# Patient Record
Sex: FEMALE | Race: WHITE | NOT HISPANIC OR LATINO | Employment: OTHER | URBAN - METROPOLITAN AREA
[De-identification: names, ages, dates, MRNs, and addresses within clinical notes are randomized per-mention and may not be internally consistent; named-entity substitution may affect disease eponyms.]

---

## 2017-01-12 RX ORDER — TRAZODONE HYDROCHLORIDE 50 MG/1
TABLET ORAL
Qty: 30 TABLET | Refills: 5 | Status: SHIPPED | OUTPATIENT
Start: 2017-01-12 | End: 2018-02-08 | Stop reason: SDUPTHER

## 2017-01-19 DIAGNOSIS — F41.9 ANXIETY: ICD-10-CM

## 2017-01-20 RX ORDER — VALACYCLOVIR HYDROCHLORIDE 500 MG/1
TABLET, FILM COATED ORAL
Qty: 4 TABLET | Refills: 0 | Status: SHIPPED | OUTPATIENT
Start: 2017-01-20 | End: 2017-06-21 | Stop reason: SDUPTHER

## 2017-01-20 RX ORDER — ALPRAZOLAM 0.5 MG/1
TABLET ORAL
Qty: 45 TABLET | Refills: 0 | Status: SHIPPED | OUTPATIENT
Start: 2017-01-20 | End: 2017-03-08 | Stop reason: SDUPTHER

## 2017-01-25 ENCOUNTER — OFFICE VISIT (OUTPATIENT)
Dept: SURGERY | Facility: CLINIC | Age: 72
End: 2017-01-25

## 2017-01-25 VITALS
BODY MASS INDEX: 23.01 KG/M2 | WEIGHT: 146.6 LBS | HEART RATE: 76 BPM | SYSTOLIC BLOOD PRESSURE: 140 MMHG | HEIGHT: 67 IN | OXYGEN SATURATION: 94 % | DIASTOLIC BLOOD PRESSURE: 80 MMHG

## 2017-01-25 DIAGNOSIS — R10.13 EPIGASTRIC PAIN: Primary | ICD-10-CM

## 2017-01-25 PROCEDURE — 99204 OFFICE O/P NEW MOD 45 MIN: CPT | Performed by: SURGERY

## 2017-02-27 ENCOUNTER — OUTSIDE FACILITY SERVICE (OUTPATIENT)
Dept: SURGERY | Facility: CLINIC | Age: 72
End: 2017-02-27

## 2017-02-27 ENCOUNTER — LAB REQUISITION (OUTPATIENT)
Dept: LAB | Facility: HOSPITAL | Age: 72
End: 2017-02-27

## 2017-02-27 DIAGNOSIS — K21.9 GASTRO-ESOPHAGEAL REFLUX DISEASE WITHOUT ESOPHAGITIS: ICD-10-CM

## 2017-02-27 PROCEDURE — 43239 EGD BIOPSY SINGLE/MULTIPLE: CPT | Performed by: SURGERY

## 2017-02-27 PROCEDURE — 88312 SPECIAL STAINS GROUP 1: CPT | Performed by: SURGERY

## 2017-02-27 PROCEDURE — 88305 TISSUE EXAM BY PATHOLOGIST: CPT | Performed by: SURGERY

## 2017-03-01 ENCOUNTER — TELEPHONE (OUTPATIENT)
Dept: FAMILY MEDICINE CLINIC | Facility: CLINIC | Age: 72
End: 2017-03-01

## 2017-03-01 LAB
CYTO UR: NORMAL
LAB AP CASE REPORT: NORMAL
Lab: NORMAL
PATH REPORT.FINAL DX SPEC: NORMAL
PATH REPORT.GROSS SPEC: NORMAL

## 2017-03-01 NOTE — TELEPHONE ENCOUNTER
Pt had EGD done on Monday. Has gastritis and inflammation in esophagus. Patient is taking Prilosec but would like to know with GERD what foods should she eat and what should she avoid. Asked GI office for a GERD diet but they did not have one.

## 2017-03-08 DIAGNOSIS — F41.9 ANXIETY: ICD-10-CM

## 2017-03-09 RX ORDER — ALPRAZOLAM 0.5 MG/1
TABLET ORAL
Qty: 45 TABLET | Refills: 0 | Status: SHIPPED | OUTPATIENT
Start: 2017-03-09 | End: 2017-05-02 | Stop reason: SDUPTHER

## 2017-03-17 ENCOUNTER — TELEPHONE (OUTPATIENT)
Dept: FAMILY MEDICINE CLINIC | Facility: CLINIC | Age: 72
End: 2017-03-17

## 2017-03-17 NOTE — TELEPHONE ENCOUNTER
sucralafate is for about 2 weeks and she can take it in addition to the omeprazole in am and zantac in pm.  I would follow Dr. Kingston's rec

## 2017-03-17 NOTE — TELEPHONE ENCOUNTER
Pt called said she saw dr Kingston and has severe GERD. Was told to take sucrafate 1 gram? Not sure on spelling.  She is a little worried about that and wanted your opinion.    Should she take instead the omeprazole in am and zantac in pm like you have been giving her instead? Please advise

## 2017-03-29 RX ORDER — CONJUGATED ESTROGENS 0.3 MG/1
TABLET, FILM COATED ORAL
Qty: 90 TABLET | Refills: 0 | Status: SHIPPED | OUTPATIENT
Start: 2017-03-29 | End: 2017-06-29 | Stop reason: SDUPTHER

## 2017-04-12 ENCOUNTER — TREATMENT (OUTPATIENT)
Dept: PHYSICAL THERAPY | Facility: CLINIC | Age: 72
End: 2017-04-12

## 2017-04-12 DIAGNOSIS — S92.131P: Primary | ICD-10-CM

## 2017-04-12 DIAGNOSIS — S93.401D SPRAIN OF RIGHT ANKLE, UNSPECIFIED LIGAMENT, SUBSEQUENT ENCOUNTER: ICD-10-CM

## 2017-04-12 PROCEDURE — G8978 MOBILITY CURRENT STATUS: HCPCS | Performed by: PHYSICAL THERAPIST

## 2017-04-12 PROCEDURE — 97110 THERAPEUTIC EXERCISES: CPT | Performed by: PHYSICAL THERAPIST

## 2017-04-12 PROCEDURE — 97161 PT EVAL LOW COMPLEX 20 MIN: CPT | Performed by: PHYSICAL THERAPIST

## 2017-04-12 PROCEDURE — G0283 ELEC STIM OTHER THAN WOUND: HCPCS | Performed by: PHYSICAL THERAPIST

## 2017-04-12 PROCEDURE — 97116 GAIT TRAINING THERAPY: CPT | Performed by: PHYSICAL THERAPIST

## 2017-04-12 PROCEDURE — G8979 MOBILITY GOAL STATUS: HCPCS | Performed by: PHYSICAL THERAPIST

## 2017-04-12 NOTE — PROGRESS NOTES
Physical Therapy Initial Evaluation and Plan of Care    TIME IN 1:05 TIME OUT 2:30    Patient: Alexandria Yousif   : 1945  Diagnosis/ICD-10 Code:  No primary diagnosis found.  Referring practitioner: Nazanin MATUTE    Patient reports still healing fracture.  Restrictions:  Ok to drive, come out of boot when start therapy, full weightbearing allowed.   Subjective Evaluation    History of Present Illness  Date of onset: 3/8/2017  Mechanism of injury: Sitting down, foot was asleep, lept up to talk to .  Spun with all weight on right foot- inversion/plantarflexion injury.  Saw MD, xray and fracture found. Patient was placed in a boot and crutches.  Last week allowed to wean out of boot.  Stopped crutches.    Quality of life: excellent    Pain  Current pain ratin  At best pain ratin  At worst pain ratin  Quality: dull ache  Relieving factors: relaxation, rest and ice (boot)  Aggravating factors: movement (walking, up on foot a lot)  Progression: improved    Social Support  Lives in: one-story house  Lives with: spouse    Diagnostic Tests  X-ray: abnormal (stress fx lateral process of talus)    Patient Goals  Patient goals for therapy: decreased pain, independence with ADLs/IADLs, increased strength, improved balance and return to sport/leisure activities             Objective     Observations     Right Ankle/Foot   Positive for edema. Negative for deformity and spasms.     Additional Observation Details  No ecchymosis    Palpation     Right Tenderness of the peroneus.     Tenderness     Right Ankle/Foot   Tenderness in the calcaneofibular ligament, lateral malleolus, metatarsal head, peroneal tendon, posterior talofibular ligament and talar dome.     Neurological Testing   Sensation     Ankle/Foot     Right Ankle/Foot   Diminished: light touch    Comments   Right light touch: lateral right foot.     Active Range of Motion   Left Ankle/Foot   Dorsiflexion (ke): 9 degrees   Plantar flexion:  70 degrees   Inversion: 34 degrees   Eversion: 18 degrees     Right Ankle/Foot   Dorsiflexion (ke): 0 degrees   Plantar flexion: 70 degrees   Inversion: 15 degrees   Eversion: 6 degrees     Strength/Myotome Testing     Left Ankle/Foot   Dorsiflexion: 5  Plantar flexion: 5  Inversion: 5  Eversion: 5  Great toe flexion: 5  Great toe extension: 5    Right Ankle/Foot   Dorsiflexion: 3  Plantar flexion: 3  Inversion: 3  Eversion: 3  Great toe flexion: 3  Great toe extension: 4    Additional Strength Details  Extensor digitorum 2-5 4/5  Flexor digitorum 2-/5 3/5 with pain  Pain worst with inversion      Tests     Right Ankle/Foot   Positive for anterior drawer and varus tilt.   Negative for valgus tilt.     Additional Tests Details       Swelling   Left Ankle/Foot   Figure 8: 50 cm  Malleoli: 25 cm    Right Ankle/Foot   Figure 8: 50 cm  Malleoli: 24.1 cm    Ambulation   Weight-Bearing Status   Weight-Bearing Status (Right): flat foot weight-bearing      Additional Weight-Bearing Status Details  Tall cast boot present    Ambulation: Level Surfaces   Ambulation without assistive device: independent    Observational Gait   Gait: antalgic   Increased right swing time. Decreased walking speed, stride length, right stance time and right step length.   Right foot contact pattern: foot flat         Assessment & Plan     Assessment  Impairments: abnormal gait, abnormal or restricted ROM, activity intolerance, impaired balance, impaired physical strength, pain with function, safety issue and weight-bearing intolerance  Assessment details: Patient is a 71 y.o. female who sustained an injury to the right ankle when she abruptly plantarflexed and inverted.  She presents with decreased and painful ROM, weakness in the ankle and foot musculature, varus instability, decreased tolerance to weightbearing, and an antalgic gait pattern.  Based on the above findings, Mrs. Yousif is an excellent candidate for therapy.  Prognosis:  good  Prognosis details: 3 weeks  1.  Patient will increase  DF and inversion ROM on right ankle.  2.  Patient will ambulate without her boot with minimal to no pain for 2 blocks with supportive footwear and possibly brace.  3.  Patient will demonstrate >0.5cm edema.  4.    6 weeks  1.  Patient will increase ROM for right ankle to full.   2.  Patient will ambulate without an antagic pattern or support for community distances including steps and ramps.  3.  Patient will return to all previous activities with tolerable symptoms.  4.  5/5 ankle strength.      Plan  Therapy options: will be seen for skilled physical therapy services  Planned modality interventions: cryotherapy, high voltage pulsed current (pain management), iontophoresis and ultrasound  Other planned modality interventions: modalities PRN  Planned therapy interventions: manual therapy, neuromuscular re-education, soft tissue mobilization, strengthening, stretching, joint mobilization, home exercise program, flexibility, functional ROM exercises and balance/weight-bearing training  Other planned therapy interventions: Suggest right ankle stabilzing brace due to inversion instability and weakness  Frequency: 2x week  Duration in weeks: 6  Treatment plan discussed with: patient        Manual Therapy:          mins  09472;  Therapeutic Exercise:    15     mins  93899;     Neuromuscular Pj:         mins  76048;    Therapeutic Activity:           mins  06553;     Gait Trainin     mins  68421;     Ultrasound:           mins  95030;    Electrical Stimulation:    15     mins  63587 ( );  Dry Needling           mins self-pay    Timed Treatment:   23   mins   Total Treatment:     90   mins    PT SIGNATURE: Aracelis Porter, PT   DATE TREATMENT INITIATED: 2017    Medicare Initial Certification  Certification Period: 2017  I certify that the therapy services are furnished while this patient is under my care.  The services outlined above are  required by this patient, and will be reviewed every 90 days.     PHYSICIAN:       DATE:     Please sign and return via fax to [phone].. Thank you, [facility].

## 2017-04-13 ENCOUNTER — TELEPHONE (OUTPATIENT)
Dept: FAMILY MEDICINE CLINIC | Facility: CLINIC | Age: 72
End: 2017-04-13

## 2017-04-13 DIAGNOSIS — K21.9 GASTROESOPHAGEAL REFLUX DISEASE, ESOPHAGITIS PRESENCE NOT SPECIFIED: Primary | ICD-10-CM

## 2017-04-13 NOTE — PATIENT INSTRUCTIONS
Patient was educated on findings of evaluation, purpose of treatment, and goals for therapy.  Treatment options discussed and questions answered.  Patient was educated on exercises/self treatment/pain relief techniques.  Please view [facility] Alexandria Yousif for a complete list of HEP instructions.

## 2017-04-13 NOTE — TELEPHONE ENCOUNTER
Pt called and said she would like for you to refer her to GI for gerd w/reflux. She said dr paris told her that is her next step and she would like you to choose for her. thanks

## 2017-04-17 ENCOUNTER — TREATMENT (OUTPATIENT)
Dept: PHYSICAL THERAPY | Facility: CLINIC | Age: 72
End: 2017-04-17

## 2017-04-17 DIAGNOSIS — S93.401D SPRAIN OF RIGHT ANKLE, UNSPECIFIED LIGAMENT, SUBSEQUENT ENCOUNTER: ICD-10-CM

## 2017-04-17 DIAGNOSIS — S92.131P: Primary | ICD-10-CM

## 2017-04-17 PROCEDURE — 97112 NEUROMUSCULAR REEDUCATION: CPT | Performed by: PHYSICAL THERAPIST

## 2017-04-17 PROCEDURE — G0283 ELEC STIM OTHER THAN WOUND: HCPCS | Performed by: PHYSICAL THERAPIST

## 2017-04-17 PROCEDURE — 97110 THERAPEUTIC EXERCISES: CPT | Performed by: PHYSICAL THERAPIST

## 2017-04-17 NOTE — PROGRESS NOTES
Physical Therapy Daily Progress Note    Time In 8:38  Time Out 9:45    Visit # : 2  Alexandria Yousif reports: ankle is feeling better.  .      Subjective     Objective   See Exercise, Manual, and Modality Logs for complete treatment.   Patient fitted with proankle stabilizer.    Assessment/Plan  Able to tolerate standing exercise with soreness but no instability with use of brace.    Progress strengthening /stabilization /functional activity  Assess new exercises and brace  Start cup walks         Manual Therapy:          mins  55242;  Therapeutic Exercise:    25     mins  51837;     Neuromuscular Pj:   13      mins  80387;    Therapeutic Activity:           mins  11116;     Gait Training:            mins  38589;     Ultrasound:           mins  55725;    Electrical Stimulation:    15     mins  34684 ( );  Dry Needling           mins self-pay    Timed Treatment:   38   mins   Total Treatment:     67   mins    Aracelis Porter, PT  Physical Therapist

## 2017-04-21 ENCOUNTER — TREATMENT (OUTPATIENT)
Dept: PHYSICAL THERAPY | Facility: CLINIC | Age: 72
End: 2017-04-21

## 2017-04-21 DIAGNOSIS — S92.131P: Primary | ICD-10-CM

## 2017-04-21 DIAGNOSIS — S93.401D SPRAIN OF RIGHT ANKLE, UNSPECIFIED LIGAMENT, SUBSEQUENT ENCOUNTER: ICD-10-CM

## 2017-04-21 PROCEDURE — 97110 THERAPEUTIC EXERCISES: CPT | Performed by: PHYSICAL THERAPIST

## 2017-04-21 PROCEDURE — 97112 NEUROMUSCULAR REEDUCATION: CPT | Performed by: PHYSICAL THERAPIST

## 2017-04-21 PROCEDURE — 97116 GAIT TRAINING THERAPY: CPT | Performed by: PHYSICAL THERAPIST

## 2017-04-21 NOTE — PROGRESS NOTES
Physical Therapy Daily Progress Note    Time In 10;35  Time Out 11:45    Visit # : 3  Alexandria Yousif reports: Back is really flared up across waist. Brace works well.  Mild swelling noted. Able  To transition to brace and out of boot without increased discomfort.    Subjective     Objective   See Exercise, Manual, and Modality Logs for complete treatment.       Assessment/Plan  Back exacerbation, unsure if position or gait without boot has exacerbation. Modified exercise position   Progress per Plan of Care  Continue to increased strength as ankle pain tolerates.         Manual Therapy:          mins  51894;  Therapeutic Exercise:    20     mins  31238;     Neuromuscular Pj:    20   mins  12527;    Therapeutic Activity:           mins  13138;     Gait Trainin     mins  80210;     Ultrasound:           mins  00383;    Electrical Stimulation:    20     mins  80319 ( );  Dry Needling           mins self-pay    Timed Treatment:   48   mins   Total Treatment:     70  mins    Aracelis Porter, PT  Physical Therapist

## 2017-04-24 ENCOUNTER — TREATMENT (OUTPATIENT)
Dept: PHYSICAL THERAPY | Facility: CLINIC | Age: 72
End: 2017-04-24

## 2017-04-24 DIAGNOSIS — S93.401D SPRAIN OF RIGHT ANKLE, UNSPECIFIED LIGAMENT, SUBSEQUENT ENCOUNTER: ICD-10-CM

## 2017-04-24 DIAGNOSIS — S92.131P: Primary | ICD-10-CM

## 2017-04-24 PROCEDURE — 97530 THERAPEUTIC ACTIVITIES: CPT | Performed by: PHYSICAL THERAPIST

## 2017-04-24 PROCEDURE — 97110 THERAPEUTIC EXERCISES: CPT | Performed by: PHYSICAL THERAPIST

## 2017-04-24 PROCEDURE — G0283 ELEC STIM OTHER THAN WOUND: HCPCS | Performed by: PHYSICAL THERAPIST

## 2017-04-24 PROCEDURE — 97112 NEUROMUSCULAR REEDUCATION: CPT | Performed by: PHYSICAL THERAPIST

## 2017-04-24 NOTE — PROGRESS NOTES
Physical Therapy Daily Progress Note    Time In 9:32  Time Out 10:45    Visit # : 4  Alexandria Yousif reports: ankle is good. Constant low grade tenderness, no pain.   My back is killing me.  Hard to walk. Reviewed medical history and MRI lumbar spine.    Subjective     Objective     Special Questions      Additional Special Questions  All denied    Postural Observations    Additional Postural Observation Details  Right upslip with anterior innominate rotation.    Observations     Right Ankle/Foot   Positive for edema.     Additional Observation Details  Lateral malleolus     Palpation     Right Tenderness of the iliopsoas.     Tenderness     Left Hip   Tenderness in the PSIS.     Right Hip   Tenderness in the PSIS.     Tests     Lumbar     Right   Negative crossed SLR.     Right Pelvic Girdle/Sacrum   Positive: sacrum compression.     Right Hip   Positive Gaenslen's.     Additional Tests Details  After mobilization, SI alignment WNL and no pain.     See Exercise, Manual, and Modality Logs for complete treatment.       Assessment/Plan  Right SI out of alignment possibly from boot weanng.  Needed mobilization in order to ambulate.  After realignment patient able to ambulate without antalgic gait pattern.    Progress strengthening /stabilization /functional activity  Keep ankle exercises bilateral with closed chain balance until back pain has resolved.  Add SI mobs as needed to treatment until patient is adjusted to not wearing the boot. If persists, patient will need to follow up with MD regarding spine but no s/s of anything other than SI joint at this time.       Manual Therapy:           mins  45834;  Therapeutic Exercise:    35    mins  95996;     Neuromuscular Pj:    8   mins  31198;    Therapeutic Activity:    10       mins  45668;     Gait Training:            mins  22677;     Ultrasound:           mins  82412;    Electrical Stimulation:    15     mins  33600 ( );  Dry Needling           mins  self-pay    Timed Treatment:   53   mins   Total Treatment:     75   mins    Aracelis Porter, PT  Physical Therapist

## 2017-04-25 RX ORDER — ROSUVASTATIN CALCIUM 10 MG/1
TABLET, COATED ORAL
Qty: 90 TABLET | Refills: 0 | Status: SHIPPED | OUTPATIENT
Start: 2017-04-25 | End: 2017-07-26 | Stop reason: SDUPTHER

## 2017-04-26 ENCOUNTER — TREATMENT (OUTPATIENT)
Dept: PHYSICAL THERAPY | Facility: CLINIC | Age: 72
End: 2017-04-26

## 2017-04-26 DIAGNOSIS — S92.131P: Primary | ICD-10-CM

## 2017-04-26 DIAGNOSIS — S93.401D SPRAIN OF RIGHT ANKLE, UNSPECIFIED LIGAMENT, SUBSEQUENT ENCOUNTER: ICD-10-CM

## 2017-04-26 PROCEDURE — 97112 NEUROMUSCULAR REEDUCATION: CPT | Performed by: PHYSICAL THERAPIST

## 2017-04-26 PROCEDURE — 97110 THERAPEUTIC EXERCISES: CPT | Performed by: PHYSICAL THERAPIST

## 2017-04-26 PROCEDURE — G0283 ELEC STIM OTHER THAN WOUND: HCPCS | Performed by: PHYSICAL THERAPIST

## 2017-04-26 NOTE — PROGRESS NOTES
Physical Therapy Daily Progress Note    Time In 9:30  Time Out 10:30    Visit # : 5  Alexandria Yousif reports: felt great until today.  Back is tight.  Ankle continues to improved.  Just a little sore when I wore boots.    Subjective     Objective   See Exercise, Manual, and Modality Logs for complete treatment.   Right upslip with anterior innominate and right inflare    Assessment/Plan  Right SI joint malaligned again but corrected easily and able to progress with standing today. Patient has near full ROM in ankle, minimal lateral malleolar edema.  Progress per Plan of Care           Manual Therapy:          mins  79079;  Therapeutic Exercise:    10     mins  29531;     Neuromuscular Pj:    20    mins  89153;    Therapeutic Activity:           mins  69293;     Gait Training:            mins  40554;     Ultrasound:           mins  01021;    Electrical Stimulation:    15     mins  20145 ( );  Dry Needling           mins self-pay    Timed Treatment:   30   mins   Total Treatment:     60   mins    Aracelis Porter, PT  Physical Therapist

## 2017-05-01 ENCOUNTER — TREATMENT (OUTPATIENT)
Dept: PHYSICAL THERAPY | Facility: CLINIC | Age: 72
End: 2017-05-01

## 2017-05-01 DIAGNOSIS — S93.401D SPRAIN OF RIGHT ANKLE, UNSPECIFIED LIGAMENT, SUBSEQUENT ENCOUNTER: ICD-10-CM

## 2017-05-01 DIAGNOSIS — S92.131P: Primary | ICD-10-CM

## 2017-05-01 PROCEDURE — G0283 ELEC STIM OTHER THAN WOUND: HCPCS | Performed by: PHYSICAL THERAPIST

## 2017-05-01 PROCEDURE — 97110 THERAPEUTIC EXERCISES: CPT | Performed by: PHYSICAL THERAPIST

## 2017-05-01 PROCEDURE — 97530 THERAPEUTIC ACTIVITIES: CPT | Performed by: PHYSICAL THERAPIST

## 2017-05-02 DIAGNOSIS — F41.9 ANXIETY: ICD-10-CM

## 2017-05-03 ENCOUNTER — TREATMENT (OUTPATIENT)
Dept: PHYSICAL THERAPY | Facility: CLINIC | Age: 72
End: 2017-05-03

## 2017-05-03 DIAGNOSIS — S93.401D SPRAIN OF RIGHT ANKLE, UNSPECIFIED LIGAMENT, SUBSEQUENT ENCOUNTER: ICD-10-CM

## 2017-05-03 DIAGNOSIS — S92.131P: Primary | ICD-10-CM

## 2017-05-03 PROCEDURE — G0283 ELEC STIM OTHER THAN WOUND: HCPCS | Performed by: PHYSICAL THERAPIST

## 2017-05-03 PROCEDURE — 97110 THERAPEUTIC EXERCISES: CPT | Performed by: PHYSICAL THERAPIST

## 2017-05-03 RX ORDER — DESVENLAFAXINE 100 MG/1
TABLET, EXTENDED RELEASE ORAL
Qty: 30 TABLET | Refills: 5 | Status: SHIPPED | OUTPATIENT
Start: 2017-05-03 | End: 2017-11-27 | Stop reason: SDUPTHER

## 2017-05-03 RX ORDER — ALPRAZOLAM 0.5 MG/1
TABLET ORAL
Qty: 45 TABLET | Refills: 0 | Status: SHIPPED | OUTPATIENT
Start: 2017-05-03 | End: 2017-06-21 | Stop reason: SDUPTHER

## 2017-05-05 ENCOUNTER — TRANSCRIBE ORDERS (OUTPATIENT)
Dept: FAMILY MEDICINE CLINIC | Facility: CLINIC | Age: 72
End: 2017-05-05

## 2017-05-05 DIAGNOSIS — Z12.31 VISIT FOR SCREENING MAMMOGRAM: Primary | ICD-10-CM

## 2017-05-10 ENCOUNTER — TREATMENT (OUTPATIENT)
Dept: PHYSICAL THERAPY | Facility: CLINIC | Age: 72
End: 2017-05-10

## 2017-05-10 DIAGNOSIS — S93.401D SPRAIN OF RIGHT ANKLE, UNSPECIFIED LIGAMENT, SUBSEQUENT ENCOUNTER: ICD-10-CM

## 2017-05-10 DIAGNOSIS — S92.131P: Primary | ICD-10-CM

## 2017-05-10 PROCEDURE — 97112 NEUROMUSCULAR REEDUCATION: CPT | Performed by: PHYSICAL THERAPIST

## 2017-05-10 PROCEDURE — 97110 THERAPEUTIC EXERCISES: CPT | Performed by: PHYSICAL THERAPIST

## 2017-05-10 PROCEDURE — G0283 ELEC STIM OTHER THAN WOUND: HCPCS | Performed by: PHYSICAL THERAPIST

## 2017-05-15 ENCOUNTER — TREATMENT (OUTPATIENT)
Dept: PHYSICAL THERAPY | Facility: CLINIC | Age: 72
End: 2017-05-15

## 2017-05-15 DIAGNOSIS — S93.401D SPRAIN OF RIGHT ANKLE, UNSPECIFIED LIGAMENT, SUBSEQUENT ENCOUNTER: ICD-10-CM

## 2017-05-15 DIAGNOSIS — S92.131P: Primary | ICD-10-CM

## 2017-05-15 PROCEDURE — G0283 ELEC STIM OTHER THAN WOUND: HCPCS | Performed by: PHYSICAL THERAPIST

## 2017-05-15 PROCEDURE — 97110 THERAPEUTIC EXERCISES: CPT | Performed by: PHYSICAL THERAPIST

## 2017-05-15 PROCEDURE — 97530 THERAPEUTIC ACTIVITIES: CPT | Performed by: PHYSICAL THERAPIST

## 2017-05-16 ENCOUNTER — HOSPITAL ENCOUNTER (OUTPATIENT)
Dept: MAMMOGRAPHY | Facility: HOSPITAL | Age: 72
Discharge: HOME | End: 2017-05-16
Admitting: INTERNAL MEDICINE

## 2017-05-16 DIAGNOSIS — Z12.31 VISIT FOR SCREENING MAMMOGRAM: ICD-10-CM

## 2017-05-16 PROCEDURE — G0202 SCR MAMMO BI INCL CAD: HCPCS

## 2017-05-25 ENCOUNTER — APPOINTMENT (OUTPATIENT)
Dept: MAMMOGRAPHY | Facility: HOSPITAL | Age: 72
End: 2017-05-25

## 2017-06-06 RX ORDER — FENOFIBRATE 145 MG/1
TABLET, COATED ORAL
Qty: 90 TABLET | Refills: 0 | Status: SHIPPED | OUTPATIENT
Start: 2017-06-06 | End: 2017-09-12 | Stop reason: SDUPTHER

## 2017-06-21 DIAGNOSIS — F41.9 ANXIETY: ICD-10-CM

## 2017-06-21 RX ORDER — VALACYCLOVIR HYDROCHLORIDE 500 MG/1
TABLET, FILM COATED ORAL
Qty: 4 TABLET | Refills: 0 | Status: SHIPPED | OUTPATIENT
Start: 2017-06-21 | End: 2017-12-14 | Stop reason: SDUPTHER

## 2017-06-21 RX ORDER — ALPRAZOLAM 0.5 MG/1
TABLET ORAL
Qty: 45 TABLET | Refills: 0 | Status: SHIPPED | OUTPATIENT
Start: 2017-06-21 | End: 2017-08-10 | Stop reason: SDUPTHER

## 2017-06-29 RX ORDER — CONJUGATED ESTROGENS 0.3 MG/1
TABLET, FILM COATED ORAL
Qty: 90 TABLET | Refills: 0 | Status: SHIPPED | OUTPATIENT
Start: 2017-06-29 | End: 2017-10-25 | Stop reason: SDUPTHER

## 2017-07-26 RX ORDER — ROSUVASTATIN CALCIUM 10 MG/1
TABLET, COATED ORAL
Qty: 90 TABLET | Refills: 0 | Status: SHIPPED | OUTPATIENT
Start: 2017-07-26 | End: 2017-10-25 | Stop reason: SDUPTHER

## 2017-08-10 DIAGNOSIS — F41.9 ANXIETY: ICD-10-CM

## 2017-08-11 RX ORDER — ALPRAZOLAM 0.5 MG/1
TABLET ORAL
Qty: 45 TABLET | Refills: 0 | Status: SHIPPED | OUTPATIENT
Start: 2017-08-11 | End: 2017-09-27 | Stop reason: SDUPTHER

## 2017-08-14 ENCOUNTER — TELEPHONE (OUTPATIENT)
Dept: FAMILY MEDICINE CLINIC | Facility: CLINIC | Age: 72
End: 2017-08-14

## 2017-08-14 NOTE — TELEPHONE ENCOUNTER
There is a mouth swab but we don't have the lab to do it.  Usually this is done at a specialty lab and with a psychiatrist

## 2017-08-14 NOTE — TELEPHONE ENCOUNTER
Pt is wanting to know if there is lab work that can be done to see if patient is on the correct anti depressant.

## 2017-09-08 DIAGNOSIS — E78.5 HYPERLIPIDEMIA, UNSPECIFIED HYPERLIPIDEMIA TYPE: Primary | ICD-10-CM

## 2017-09-11 LAB
ALBUMIN SERPL-MCNC: 5.3 G/DL (ref 3.5–5.2)
ALBUMIN/GLOB SERPL: 2.3 G/DL
ALP SERPL-CCNC: 60 U/L (ref 39–117)
ALT SERPL-CCNC: 44 U/L (ref 1–33)
AST SERPL-CCNC: 34 U/L (ref 1–32)
BASOPHILS # BLD AUTO: 0.05 10*3/MM3 (ref 0–0.2)
BASOPHILS NFR BLD AUTO: 0.9 % (ref 0–1.5)
BILIRUB SERPL-MCNC: 0.4 MG/DL (ref 0.1–1.2)
BUN SERPL-MCNC: 21 MG/DL (ref 8–23)
BUN/CREAT SERPL: 24.7 (ref 7–25)
CALCIUM SERPL-MCNC: 10.5 MG/DL (ref 8.6–10.5)
CHLORIDE SERPL-SCNC: 100 MMOL/L (ref 98–107)
CHOLEST SERPL-MCNC: 167 MG/DL (ref 0–200)
CO2 SERPL-SCNC: 30.1 MMOL/L (ref 22–29)
CREAT SERPL-MCNC: 0.85 MG/DL (ref 0.57–1)
EOSINOPHIL # BLD AUTO: 0.14 10*3/MM3 (ref 0–0.7)
EOSINOPHIL NFR BLD AUTO: 2.6 % (ref 0.3–6.2)
ERYTHROCYTE [DISTWIDTH] IN BLOOD BY AUTOMATED COUNT: 14.6 % (ref 11.7–13)
GLOBULIN SER CALC-MCNC: 2.3 GM/DL
GLUCOSE SERPL-MCNC: 107 MG/DL (ref 65–99)
HCT VFR BLD AUTO: 47.5 % (ref 35.6–45.5)
HDLC SERPL-MCNC: 56 MG/DL (ref 40–60)
HGB BLD-MCNC: 15.4 G/DL (ref 11.9–15.5)
IMM GRANULOCYTES # BLD: 0.02 10*3/MM3 (ref 0–0.03)
IMM GRANULOCYTES NFR BLD: 0.4 % (ref 0–0.5)
LDLC SERPL CALC-MCNC: 77 MG/DL (ref 0–100)
LDLC/HDLC SERPL: 1.38 {RATIO}
LYMPHOCYTES # BLD AUTO: 1.93 10*3/MM3 (ref 0.9–4.8)
LYMPHOCYTES NFR BLD AUTO: 36 % (ref 19.6–45.3)
MCH RBC QN AUTO: 30.7 PG (ref 26.9–32)
MCHC RBC AUTO-ENTMCNC: 32.4 G/DL (ref 32.4–36.3)
MCV RBC AUTO: 94.6 FL (ref 80.5–98.2)
MONOCYTES # BLD AUTO: 0.58 10*3/MM3 (ref 0.2–1.2)
MONOCYTES NFR BLD AUTO: 10.8 % (ref 5–12)
NEUTROPHILS # BLD AUTO: 2.64 10*3/MM3 (ref 1.9–8.1)
NEUTROPHILS NFR BLD AUTO: 49.3 % (ref 42.7–76)
PLATELET # BLD AUTO: 319 10*3/MM3 (ref 140–500)
POTASSIUM SERPL-SCNC: 4.3 MMOL/L (ref 3.5–5.2)
PROT SERPL-MCNC: 7.6 G/DL (ref 6–8.5)
RBC # BLD AUTO: 5.02 10*6/MM3 (ref 3.9–5.2)
SODIUM SERPL-SCNC: 144 MMOL/L (ref 136–145)
TRIGL SERPL-MCNC: 170 MG/DL (ref 0–150)
VLDLC SERPL CALC-MCNC: 34 MG/DL (ref 5–40)
WBC # BLD AUTO: 5.36 10*3/MM3 (ref 4.5–10.7)

## 2017-09-12 RX ORDER — FENOFIBRATE 145 MG/1
TABLET, COATED ORAL
Qty: 90 TABLET | Refills: 0 | Status: SHIPPED | OUTPATIENT
Start: 2017-09-12 | End: 2017-12-14 | Stop reason: SDUPTHER

## 2017-09-15 ENCOUNTER — OFFICE VISIT (OUTPATIENT)
Dept: FAMILY MEDICINE CLINIC | Facility: CLINIC | Age: 72
End: 2017-09-15

## 2017-09-15 VITALS
HEART RATE: 71 BPM | TEMPERATURE: 97.8 F | DIASTOLIC BLOOD PRESSURE: 60 MMHG | OXYGEN SATURATION: 98 % | BODY MASS INDEX: 22.88 KG/M2 | HEIGHT: 67 IN | SYSTOLIC BLOOD PRESSURE: 118 MMHG | WEIGHT: 145.8 LBS

## 2017-09-15 DIAGNOSIS — F41.9 ANXIETY: ICD-10-CM

## 2017-09-15 DIAGNOSIS — R06.00 DYSPNEA, UNSPECIFIED TYPE: ICD-10-CM

## 2017-09-15 DIAGNOSIS — E78.5 HYPERLIPIDEMIA, UNSPECIFIED HYPERLIPIDEMIA TYPE: Primary | ICD-10-CM

## 2017-09-15 DIAGNOSIS — Z00.00 HEALTH CARE MAINTENANCE: ICD-10-CM

## 2017-09-15 PROCEDURE — G0009 ADMIN PNEUMOCOCCAL VACCINE: HCPCS | Performed by: INTERNAL MEDICINE

## 2017-09-15 PROCEDURE — G0438 PPPS, INITIAL VISIT: HCPCS | Performed by: INTERNAL MEDICINE

## 2017-09-15 PROCEDURE — 90670 PCV13 VACCINE IM: CPT | Performed by: INTERNAL MEDICINE

## 2017-09-15 PROCEDURE — 99213 OFFICE O/P EST LOW 20 MIN: CPT | Performed by: INTERNAL MEDICINE

## 2017-09-15 RX ORDER — CYCLOBENZAPRINE HCL 10 MG
10 TABLET ORAL 2 TIMES DAILY PRN
COMMUNITY
End: 2019-05-10

## 2017-09-15 NOTE — PROGRESS NOTES
QUICK REFERENCE INFORMATION:  The ABCs of the Annual Wellness Visit    Initial Medicare Wellness Visit    HEALTH RISK ASSESSMENT    1945    Recent Hospitalizations:  No hospitalization(s) within the last year..        Current Medical Providers:  Patient Care Team:  Anita Muse MD as PCP - General  Anita Muse MD as PCP - Family Medicine  GIOVANI Whipple as PCP - Claims Attributed        Smoking Status:  History   Smoking Status   • Never Smoker   Smokeless Tobacco   • Never Used       Alcohol Consumption:  History   Alcohol Use   • 1.8 - 2.4 oz/week   • 2 - 3 Glasses of wine, 1 Cans of beer per week       Depression Screen:   PHQ-2/PHQ-9 Depression Screening 9/15/2017   Little interest or pleasure in doing things 0   Feeling down, depressed, or hopeless 0   Total Score 0       Health Habits and Functional and Cognitive Screening:  Functional & Cognitive Status 9/15/2017   Do you have difficulty preparing food and eating? No   Do you have difficulty bathing yourself? No   Do you have difficulty getting dressed? No   Do you have difficulty using the toilet? No   Do you have difficulty moving around from place to place? No   In the past year have you fallen or experienced a near fall? No   Do you need help using the phone?  No   Are you deaf or do you have serious difficulty hearing?  No   Do you need help with transportation? No   Do you need help shopping? No   Do you need help preparing meals?  No   Do you need help with housework?  No   Do you need help with laundry? No   Do you need help taking your medications? No   Do you need help managing money? No   Do you have difficulty concentrating, remembering or making decisions? No       Health Habits  Current Diet: Well Balanced Diet  Dental Exam: Up to date  Eye Exam: Up to date  Exercise (times per week): 1 times per week  Current Exercise Activities Include: Housecleaning          Does the patient have evidence of cognitive impairment?  No    Asiprin use counseling: Taking ASA appropriately as indicated      Recent Lab Results:    Visual Acuity:  No exam data present    Age-appropriate Screening Schedule:  Refer to the list below for future screening recommendations based on patient's age, sex and/or medical conditions. Orders for these recommended tests are listed in the plan section. The patient has been provided with a written plan.    Health Maintenance   Topic Date Due   • DXA SCAN  04/12/2017   • INFLUENZA VACCINE  10/01/2017 (Originally 8/1/2017)   • HEMOGLOBIN A1C  12/31/2018 (Originally 8/8/2017)   • LIPID PANEL  09/11/2018   • COLONOSCOPY  01/30/2019   • TDAP/TD VACCINES (2 - Td) 01/30/2019   • MAMMOGRAM  05/16/2019   • PNEUMOCOCCAL VACCINES (65+ LOW/MEDIUM RISK)  Completed   • ZOSTER VACCINE  Completed        Subjective   History of Present Illness    Alexandria Yousif is a 72 y.o. female who presents for an Annual Wellness Visit.    The following portions of the patient's history were reviewed and updated as appropriate: allergies, current medications, past family history, past medical history, past social history, past surgical history and problem list.    Outpatient Medications Prior to Visit   Medication Sig Dispense Refill   • ALPRAZolam (XANAX) 0.5 MG tablet TAKE 1 TABLET BY MOUTH EVERY DAY AS NEEDED FOR ANXIETY 45 tablet 0   • aspirin 81 MG tablet Take  by mouth.     • desvenlafaxine (PRISTIQ) 100 MG 24 hr tablet TAKE 1 TABLET BY MOUTH EVERY DAY 30 tablet 5   • fenofibrate (TRICOR) 145 MG tablet TAKE 1 TABLET BY MOUTH EVERY DAY 90 tablet 0   • guaifenesin-dextromethorphan (MUCINEX DM)  MG tablet sustained-release 12 hour tablet Take  by mouth.     • levETIRAcetam (KEPPRA) 500 MG tablet TAKE 1 TABLET BY MOUTH EVERY DAY AS NEEDED 30 tablet 5   • naproxen (NAPROSYN) 500 MG tablet TAKE 1 TABLET BY MOUTH EVERY 12 HOURS AS NEEDED FOR PAIN 60 tablet 0   • omeprazole (priLOSEC) 40 MG capsule TAKE 1 CAPSULE BY MOUTH EVERY DAY 90  "capsule 1   • PREMARIN 0.3 MG tablet TAKE 1 TABLET BY MOUTH EVERY DAY 90 tablet 0   • PRISTIQ 100 MG 24 hr tablet TAKE 1 TABLET BY MOUTH EVERY DAY 30 tablet 5   • raNITIdine (ZANTAC) 150 MG tablet Take 1 tablet by mouth Every Night. 30 tablet 4   • rosuvastatin (CRESTOR) 10 MG tablet TAKE 1 TABLET BY MOUTH EVERY NIGHT AT BEDTIME 90 tablet 0   • spironolactone (ALDACTONE) 100 MG tablet Take  by mouth daily.     • traZODone (DESYREL) 50 MG tablet TAKE 1/2 TO 1 TABLET BY MOUTH AT BEDTIME AS NEEDED FOR SLEEP 30 tablet 5   • valACYclovir (VALTREX) 500 MG tablet TAKE 2 TABLETS BY MOUTH TWICE DAILY 4 tablet 0     No facility-administered medications prior to visit.        Patient Active Problem List   Diagnosis   • Hypercalcemia   • Anxiety   • Hyperlipidemia   • Visit for screening mammogram   • Encounter for cervical Pap smear with pelvic exam   • Encounter for breast cancer screening other than mammogram   • Musculoskeletal strain   • Screening for colon cancer   • Pap smear of vagina following gynecologic surgery       Advance Care Planning:  has an advance directive - a copy HAS NOT been provided    Identification of Risk Factors:  Risk factors include: cardiovascular risk and hearing limitations.    Review of Systems    Compared to one year ago, the patient feels her physical health is better.  Compared to one year ago, the patient feels her mental health is better.    Objective     Physical Exam    Vitals:    09/15/17 1019   BP: 118/60   Pulse: 71   Temp: 97.8 °F (36.6 °C)   SpO2: 98%   Weight: 145 lb 12.8 oz (66.1 kg)   Height: 66.5\" (168.9 cm)       Body mass index is 23.18 kg/(m^2).  Discussed the patient's BMI with her. The BMI is in the acceptable range.    Assessment/Plan   Patient Self-Management and Personalized Health Advice  The patient has been provided with information about: diet, prevention of cardiac or vascular disease and mental health concerns and preventive services including:   · Colorectal " cancer screening, cologuard test ordered, Counseling for cardiovascular disease risk reduction.    Visit Diagnoses:    ICD-10-CM ICD-9-CM   1. Hyperlipidemia, unspecified hyperlipidemia type E78.5 272.4   2. Anxiety F41.9 300.00       No orders of the defined types were placed in this encounter.      Outpatient Encounter Prescriptions as of 9/15/2017   Medication Sig Dispense Refill   • ALPRAZolam (XANAX) 0.5 MG tablet TAKE 1 TABLET BY MOUTH EVERY DAY AS NEEDED FOR ANXIETY 45 tablet 0   • aspirin 81 MG tablet Take  by mouth.     • cyclobenzaprine (FLEXERIL) 10 MG tablet Take 10 mg by mouth 2 (Two) Times a Day As Needed for Muscle Spasms.     • desvenlafaxine (PRISTIQ) 100 MG 24 hr tablet TAKE 1 TABLET BY MOUTH EVERY DAY 30 tablet 5   • fenofibrate (TRICOR) 145 MG tablet TAKE 1 TABLET BY MOUTH EVERY DAY 90 tablet 0   • guaifenesin-dextromethorphan (MUCINEX DM)  MG tablet sustained-release 12 hour tablet Take  by mouth.     • levETIRAcetam (KEPPRA) 500 MG tablet TAKE 1 TABLET BY MOUTH EVERY DAY AS NEEDED 30 tablet 5   • naproxen (NAPROSYN) 500 MG tablet TAKE 1 TABLET BY MOUTH EVERY 12 HOURS AS NEEDED FOR PAIN 60 tablet 0   • omeprazole (priLOSEC) 40 MG capsule TAKE 1 CAPSULE BY MOUTH EVERY DAY 90 capsule 1   • PREMARIN 0.3 MG tablet TAKE 1 TABLET BY MOUTH EVERY DAY 90 tablet 0   • PRISTIQ 100 MG 24 hr tablet TAKE 1 TABLET BY MOUTH EVERY DAY 30 tablet 5   • raNITIdine (ZANTAC) 150 MG tablet Take 1 tablet by mouth Every Night. 30 tablet 4   • rosuvastatin (CRESTOR) 10 MG tablet TAKE 1 TABLET BY MOUTH EVERY NIGHT AT BEDTIME 90 tablet 0   • spironolactone (ALDACTONE) 100 MG tablet Take  by mouth daily.     • traZODone (DESYREL) 50 MG tablet TAKE 1/2 TO 1 TABLET BY MOUTH AT BEDTIME AS NEEDED FOR SLEEP 30 tablet 5   • valACYclovir (VALTREX) 500 MG tablet TAKE 2 TABLETS BY MOUTH TWICE DAILY 4 tablet 0     No facility-administered encounter medications on file as of 9/15/2017.        Reviewed use of high risk medication  in the elderly: yes  Reviewed for potential of harmful drug interactions in the elderly: yes    Follow Up:  No Follow-up on file.     An After Visit Summary and PPPS with all of these plans were given to the patient.        devan recommended  Consider hearing assessment

## 2017-09-15 NOTE — PROGRESS NOTES
Subjective   Alexandria Yousif is a 72 y.o. female who comes in today for   Chief Complaint   Patient presents with   • Hyperlipidemia     discuss lab results   • Anxiety   • adult wellness exam   .    History of Present Illness   Here for f/u on HL, HTG, anxiety and elevated glucose and AWV.  She takes xanax and pristiq for her anxiety and it is working well for her other than some occasional anxiety.  Takes crestor for HL and follows a diet for her TG.  Her glucose is occasionally elevated but does drink wine on a regular basis and occasionally eats more sweets, etc.   She recently was out of town at a convention and was off her diet and did drink more wine or a cocktail last week.  Does a lot of deep breathing and minimizing caffeine to help her anxiety.  She still has bouts of anxious feelings and is wondering about an occasional 0.25 mg of xanax.  Has GERD related sx's at times and uses prn omeprazole and daily zantac.  EGD within year.      The following portions of the patient's history were reviewed and updated as appropriate: allergies, current medications, past family history, past medical history, past social history, past surgical history and problem list.    Review of Systems   Constitutional: Negative.    Psychiatric/Behavioral: Positive for sleep disturbance. The patient is nervous/anxious.        Vitals:    09/15/17 1019   BP: 118/60   Pulse: 71   Temp: 97.8 °F (36.6 °C)   SpO2: 98%       Objective   Physical Exam   Constitutional: She is oriented to person, place, and time. She appears well-developed and well-nourished.   HENT:   Head: Normocephalic and atraumatic.   Right Ear: External ear normal.   Left Ear: External ear normal.   Mouth/Throat: Oropharynx is clear and moist.   Eyes: Conjunctivae are normal.   Neck: Neck supple.   Cardiovascular: Normal rate, regular rhythm and normal heart sounds.    No bruits   Pulmonary/Chest: Effort normal and breath sounds normal. No respiratory distress. She  has no wheezes. She has no rales.   Abdominal: Soft. Bowel sounds are normal. She exhibits no distension and no mass. There is no tenderness.   Lymphadenopathy:     She has no cervical adenopathy.   Neurological: She is alert and oriented to person, place, and time.   Skin: Skin is warm.   Psychiatric: She has a normal mood and affect. Her behavior is normal. Judgment and thought content normal.   Nursing note and vitals reviewed.      Assessment/Plan   Alexandria was seen today for hyperlipidemia, anxiety and adult wellness exam.    Diagnoses and all orders for this visit:    Hyperlipidemia, unspecified hyperlipidemia type    Anxiety    Dyspnea, unspecified type  -     Ambulatory Referral to Cardiology    [facility] maintenance  -     Discontinue: pneumococcal conj. 13-valent (PREVNAR-13) vaccine 0.5 mL; Inject 0.5 mL into the shoulder, thigh, or buttocks 1 (One) Time.    Other orders  -     Pneumococcal Conjugate Vaccine 13-Valent All        She has some dyspnea at times and would like to have cardiologist evaluate her based on her fhx of CAD, CHF and arrhythmia.    prevnar today (had pneumococcal over a year ago at her pharmacy --we called and verified)  Labs reviewed and she will hydrate and repeat her CMP in a few weeks  Continue her xanax and pristiq for anxiety which appears to be working ok.  She does have break through anxiety here and there but is functioning well  Continue tricor and crestor  for HTG /HL and diet discussed  Continue omeprazole and zantac for GERD.  She uses omeprazole prn and daily zantac.  EGD with Dr. Kingston earlier this year showed gastritis.           I have asked for the patient to return to clinic in 6month(s).

## 2017-09-18 ENCOUNTER — TELEPHONE (OUTPATIENT)
Dept: FAMILY MEDICINE CLINIC | Facility: CLINIC | Age: 72
End: 2017-09-18

## 2017-09-27 DIAGNOSIS — F41.9 ANXIETY: ICD-10-CM

## 2017-09-27 RX ORDER — ALPRAZOLAM 0.5 MG/1
TABLET ORAL
Qty: 45 TABLET | Refills: 0 | Status: SHIPPED | OUTPATIENT
Start: 2017-09-27 | End: 2018-01-05 | Stop reason: SDUPTHER

## 2017-10-03 ENCOUNTER — OFFICE VISIT (OUTPATIENT)
Dept: FAMILY MEDICINE CLINIC | Facility: CLINIC | Age: 72
End: 2017-10-03

## 2017-10-03 VITALS
RESPIRATION RATE: 18 BRPM | WEIGHT: 144.6 LBS | HEIGHT: 67 IN | TEMPERATURE: 98.2 F | SYSTOLIC BLOOD PRESSURE: 118 MMHG | DIASTOLIC BLOOD PRESSURE: 70 MMHG | HEART RATE: 88 BPM | OXYGEN SATURATION: 94 % | BODY MASS INDEX: 22.7 KG/M2

## 2017-10-03 DIAGNOSIS — T80.90XA INJECTION SITE REACTION, INITIAL ENCOUNTER: Primary | ICD-10-CM

## 2017-10-03 PROCEDURE — 99213 OFFICE O/P EST LOW 20 MIN: CPT | Performed by: INTERNAL MEDICINE

## 2017-10-03 NOTE — PROGRESS NOTES
Subjective   Alexandria Yousif is a 72 y.o. female who comes in today for   Chief Complaint   Patient presents with   • arm pain     has pneumo vaccine 2 weeks ago    .    History of Present Illness   Got prevnar shot on 9/15/17 and it hurt when going into the muscle at time of the shot.  She continues to have pain in the outer deltoid muscle when she moves it certain directions as well as if she lays on that shoulder.  No edema in arm.  At the time of the shot, nurse didn't notice anything different but pt did say that it hurt more than usual shots.  Shot was given with a 1 in needle and given in the deltoid about 2 fingerbreaths below the shoulder.  Reviewed location with patient and it does appear to be in the correct location.  Never swelled or got discolored.  She has used ice and tens unit.  Temporarily relieves the pain.  Worse with movement and it sometimes hurts while sleeping on it. No fever.  No lump under the skin after injection was given.  No knot.      The following portions of the patient's history were reviewed and updated as appropriate: allergies, current medications, past family history, past medical history, past social history, past surgical history and problem list.    Review of Systems   Constitutional: Negative.    Musculoskeletal: Positive for myalgias (left deltoid after she received the prevnar).       Vitals:    10/03/17 1556   BP: 118/70   Pulse: 88   Resp: 18   Temp: 98.2 °F (36.8 °C)   SpO2: 94%       Objective   Physical Exam   Constitutional: She is oriented to person, place, and time. She appears well-developed and well-nourished.   HENT:   Head: Normocephalic and atraumatic.   Musculoskeletal: Normal range of motion. She exhibits tenderness (over the lateral deltoid area.  no redness or bruising. no edema or swelling).   Neurological: She is alert and oriented to person, place, and time. She exhibits normal muscle tone. Coordination normal.   Skin: Skin is warm. No rash noted. No  erythema.   Psychiatric: She has a normal mood and affect. Her behavior is normal. Judgment and thought content normal.   Nursing note and vitals reviewed.      Assessment/Plan   Alexandria was seen today for arm pain.    Diagnoses and all orders for this visit:    Injection site reaction, initial encounter    Without evidence of bruising or infection or swelling, we will monitor her pain level for another 5-7 days.  If dysfunction starts or her pain level worsens, she will call me immediately.  She will continue her ice, ibuprofen prn and tylenol.  Gentle massage is ok as well.   If sx's don't improve, will get CT or MRI of shoulder to better assess area             I have asked for the patient to return to clinic in 3month(s).

## 2017-10-04 PROBLEM — T80.90XA INJECTION SITE REACTION: Status: ACTIVE | Noted: 2017-10-04

## 2017-10-10 ENCOUNTER — OFFICE VISIT (OUTPATIENT)
Dept: CARDIOLOGY | Facility: CLINIC | Age: 72
End: 2017-10-10

## 2017-10-10 VITALS
HEIGHT: 67 IN | DIASTOLIC BLOOD PRESSURE: 78 MMHG | HEART RATE: 83 BPM | WEIGHT: 143 LBS | SYSTOLIC BLOOD PRESSURE: 122 MMHG | BODY MASS INDEX: 22.44 KG/M2

## 2017-10-10 DIAGNOSIS — R06.09 DOE (DYSPNEA ON EXERTION): Primary | ICD-10-CM

## 2017-10-10 DIAGNOSIS — R01.1 SYSTOLIC MURMUR: ICD-10-CM

## 2017-10-10 DIAGNOSIS — Z82.49 FAMILY HISTORY OF CORONARY ARTERY DISEASE: ICD-10-CM

## 2017-10-10 PROCEDURE — 99204 OFFICE O/P NEW MOD 45 MIN: CPT | Performed by: INTERNAL MEDICINE

## 2017-10-10 PROCEDURE — 93000 ELECTROCARDIOGRAM COMPLETE: CPT | Performed by: INTERNAL MEDICINE

## 2017-10-10 NOTE — PROGRESS NOTES
Date of Office Visit: 10/10/2017  Encounter Provider: Daquan Rhodes MD  Place of Service: [facility]  Patient Name: Alexandria Yousif  :1945    Chief complaint: Shortness of breath, systolic murmur, family history   of multiple cardiac issues.    History of Present Illness:    Dear Anita:    I had the pleasure of seeing your patient in cardiology office on 10/10/2017.  As you   well know, she is a very pleasant 72 year-old white female with a past medical   history significant for rheumatic fever at age 17, systolic murmur, and hyperlipidemia   who presents for evaluation.  The patient states that over the last 8-9 months, she   has had significant shortness of breath.  She states that she feels short of breath   when rushing, walking briskly, or going up inclines.  She states that she often has   to stop and rest, and this is a significant change from her baseline previously.  She   also noticed that when she has hiked recently, she has to stop and rest as well.    She has not had any significant chest pain or chest pressure.  She does state that   she had rheumatic fever at age 17.  She has had a murmur for quite some time.    She also has hyperlipidemia and hypertriglyceridemia.  She has an extensive family   history of cardiac disease.  Her mother had 3 permanent pacemakers and   congestive heart failure.  She has one sister who had a fatal MI at age 64, and   another sister who has had atrial fibrillation.  Her father also had a four-vessel   CABG at age 62.    Past Medical History:   Diagnosis Date   • Abdominal pain    • Anxiety and depression    • GERD (gastroesophageal reflux disease)    • Headache    • Heart murmur    • Hyperlipidemia    • Hypertriglyceridemia    • Injury of back    • Osteopenia    • Rheumatic fever     Age 17   • Shortness of breath        Past Surgical History:   Procedure Laterality Date   • COLONOSCOPY     • HYSTERECTOMY     • KNEE  SURGERY     • OOPHORECTOMY     • TONSILLECTOMY AND ADENOIDECTOMY         Current Outpatient Prescriptions on File Prior to Visit   Medication Sig Dispense Refill   • ALPRAZolam (XANAX) 0.5 MG tablet TAKE 1 TABLET BY MOUTH EVERY DAY AS NEEDED ANXIETY 45 tablet 0   • aspirin 81 MG tablet Take  by mouth.     • cyclobenzaprine (FLEXERIL) 10 MG tablet Take 10 mg by mouth 2 (Two) Times a Day As Needed for Muscle Spasms.     • desvenlafaxine (PRISTIQ) 100 MG 24 hr tablet TAKE 1 TABLET BY MOUTH EVERY DAY 30 tablet 5   • fenofibrate (TRICOR) 145 MG tablet TAKE 1 TABLET BY MOUTH EVERY DAY 90 tablet 0   • guaifenesin-dextromethorphan (MUCINEX DM)  MG tablet sustained-release 12 hour tablet Take  by mouth.     • levETIRAcetam (KEPPRA) 500 MG tablet TAKE 1 TABLET BY MOUTH EVERY DAY AS NEEDED 30 tablet 5   • naproxen (NAPROSYN) 500 MG tablet TAKE 1 TABLET BY MOUTH EVERY 12 HOURS AS NEEDED FOR PAIN 60 tablet 0   • omeprazole (priLOSEC) 40 MG capsule TAKE 1 CAPSULE BY MOUTH EVERY DAY 90 capsule 1   • PREMARIN 0.3 MG tablet TAKE 1 TABLET BY MOUTH EVERY DAY 90 tablet 0   • PRISTIQ 100 MG 24 hr tablet TAKE 1 TABLET BY MOUTH EVERY DAY 30 tablet 5   • raNITIdine (ZANTAC) 150 MG tablet Take 1 tablet by mouth Every Night. 30 tablet 4   • rosuvastatin (CRESTOR) 10 MG tablet TAKE 1 TABLET BY MOUTH EVERY NIGHT AT BEDTIME 90 tablet 0   • spironolactone (ALDACTONE) 100 MG tablet Take  by mouth daily.     • traZODone (DESYREL) 50 MG tablet TAKE 1/2 TO 1 TABLET BY MOUTH AT BEDTIME AS NEEDED FOR SLEEP 30 tablet 5   • valACYclovir (VALTREX) 500 MG tablet TAKE 2 TABLETS BY MOUTH TWICE DAILY 4 tablet 0     No current facility-administered medications on file prior to visit.      Allergies as of 10/10/2017 - Dimitri as Reviewed 10/10/2017   Allergen Reaction Noted   • Codeine  03/29/2016     Social History     Social History   • Marital status:      Spouse name: N/A   • Number of children: N/A   • Years of education: N/A     Occupational  "History   • TEACHER      Social History Main Topics   • Smoking status: Never Smoker   • Smokeless tobacco: Never Used   • Alcohol use 1.8 - 2.4 oz/week     2 - 3 Glasses of wine, 1 Cans of beer per week   • Drug use: No   • Sexual activity: Defer     Other Topics Concern   • Not on file     Social History Narrative     Family History   Problem Relation Age of Onset   • Uterine cancer Mother    • Heart disease Mother      Mother with 3 pacemakers over course of life, as well as CHF   • Diabetes Mother    • Skin cancer Father    • Heart disease Father      Father with 4 vessel CABG at 62   • Colon polyps Father    • Heart disease Sister      Siter with fatal MI at age 64   • Atrial fibrillation Sister        Review of Systems   HENT: Positive for ear pain and hearing loss.    Cardiovascular: Positive for dyspnea on exertion.   Respiratory: Positive for cough.    Endocrine: Positive for heat intolerance.   Musculoskeletal: Positive for joint pain and myalgias.   Neurological: Positive for excessive daytime sleepiness and headaches.   Psychiatric/Behavioral: Positive for depression. The patient is nervous/anxious.    All other systems reviewed and are negative.    Objective:     Vitals:    10/10/17 0906   BP: 122/78   Pulse: 83   Weight: 143 lb (64.9 kg)   Height: 66.5\" (168.9 cm)     Body mass index is 22.74 kg/(m^2).    Physical Exam   Constitutional: She is oriented to person, place, and time. She appears well-developed and well-nourished.   HENT:   Head: Normocephalic and atraumatic.   Eyes: Conjunctivae are normal.   Neck: Neck supple.   Cardiovascular: Normal rate and regular rhythm.  Exam reveals no gallop and no friction rub.    Murmur heard.   Systolic murmur is present with a grade of 2/6  at the upper right sternal border, upper left sternal border, lower left sternal border  Pulmonary/Chest: Effort normal and breath sounds normal.   Abdominal: Soft. There is no tenderness.   Musculoskeletal: She exhibits no " edema.   Neurological: She is alert and oriented to person, place, and time.   Skin: Skin is warm.   Psychiatric: She has a normal mood and affect. Her behavior is normal.     Lab Review:     ECG 12 Lead  Date/Time: 10/10/2017 9:16 AM  Performed by: RYAN ALVA  Authorized by: RYAN ALVA   Comparison: not compared with previous ECG   Previous ECG: no previous ECG available  Rhythm: sinus rhythm  Clinical impression: non-specific ECG  Comments: Borderline q waves inferior leads          Assessment:       Diagnosis Plan   1. DONG (dyspnea on exertion)  Adult Stress Echo W/ Cont or Stress Agent if Necessary Per Protocol   2. Systolic murmur  Adult Stress Echo W/ Cont or Stress Agent if Necessary Per Protocol   3. Family history of coronary artery disease  Adult Stress Echo W/ Cont or Stress Agent if Necessary Per Protocol     Plan:       As noted, the patient has had progressive dyspnea on exertion, and this is a clear   change from her baseline.  This has been ongoing for 8-9 months.  She did have   rheumatic fever at age 17, and she does have a 2/6 systolic murmur throughout   her precordium.  She also has extensive family history of cardiac disease,   including coronary disease with her father having a four-vessel CABG at age 62   and her sister having a fatal MI at age 64.  I have recommended proceeding with   an exercise stress echocardiogram at this point.  This will not only evaluate for   any potential ischemia, but also for any structural heart disease.  I feel that this   will be a good first evaluation.  She does take an aspirin every day.  Further   plans will be made pending the results of the stress echocardiogram.

## 2017-10-18 ENCOUNTER — HOSPITAL ENCOUNTER (OUTPATIENT)
Dept: CARDIOLOGY | Facility: HOSPITAL | Age: 72
Discharge: HOME | End: 2017-10-18
Attending: INTERNAL MEDICINE | Admitting: INTERNAL MEDICINE

## 2017-10-18 VITALS
DIASTOLIC BLOOD PRESSURE: 80 MMHG | WEIGHT: 143 LBS | HEART RATE: 83 BPM | HEIGHT: 67 IN | BODY MASS INDEX: 22.44 KG/M2 | SYSTOLIC BLOOD PRESSURE: 100 MMHG

## 2017-10-18 DIAGNOSIS — R01.1 SYSTOLIC MURMUR: ICD-10-CM

## 2017-10-18 DIAGNOSIS — Z82.49 FAMILY HISTORY OF CORONARY ARTERY DISEASE: ICD-10-CM

## 2017-10-18 DIAGNOSIS — R06.09 DOE (DYSPNEA ON EXERTION): ICD-10-CM

## 2017-10-18 LAB
BH CV ECHO MEAS - ACS: 1.8 CM
BH CV ECHO MEAS - AO MAX PG: 7.9 MMHG
BH CV ECHO MEAS - AO ROOT AREA (BSA CORRECTED): 1.7
BH CV ECHO MEAS - AO ROOT AREA: 7.3 CM^2
BH CV ECHO MEAS - AO ROOT DIAM: 3 CM
BH CV ECHO MEAS - AO V2 MAX: 140.3 CM/SEC
BH CV ECHO MEAS - BSA(HAYCOCK): 1.8 M^2
BH CV ECHO MEAS - BSA: 1.8 M^2
BH CV ECHO MEAS - BZI_BMI: 22.4 KILOGRAMS/M^2
BH CV ECHO MEAS - BZI_METRIC_HEIGHT: 170.2 CM
BH CV ECHO MEAS - BZI_METRIC_WEIGHT: 64.9 KG
BH CV ECHO MEAS - CONTRAST EF 4CH: 63.5 ML/M^2
BH CV ECHO MEAS - EDV(MOD-SP4): 85 ML
BH CV ECHO MEAS - EDV(TEICH): 97.8 ML
BH CV ECHO MEAS - EF(CUBED): 72.1 %
BH CV ECHO MEAS - EF(MOD-SP4): 63.5 %
BH CV ECHO MEAS - EF(TEICH): 63.9 %
BH CV ECHO MEAS - ESV(MOD-SP4): 31 ML
BH CV ECHO MEAS - ESV(TEICH): 35.3 ML
BH CV ECHO MEAS - FS: 34.7 %
BH CV ECHO MEAS - IVS/LVPW: 1.1
BH CV ECHO MEAS - IVSD: 0.83 CM
BH CV ECHO MEAS - LAT PEAK E' VEL: 10 CM/SEC
BH CV ECHO MEAS - LV DIASTOLIC VOL/BSA (35-75): 48.5 ML/M^2
BH CV ECHO MEAS - LV MASS(C)D: 118.1 GRAMS
BH CV ECHO MEAS - LV MASS(C)DI: 67.4 GRAMS/M^2
BH CV ECHO MEAS - LV SYSTOLIC VOL/BSA (12-30): 17.7 ML/M^2
BH CV ECHO MEAS - LVIDD: 4.6 CM
BH CV ECHO MEAS - LVIDS: 3 CM
BH CV ECHO MEAS - LVLD AP4: 7.4 CM
BH CV ECHO MEAS - LVLS AP4: 5.8 CM
BH CV ECHO MEAS - LVPWD: 0.77 CM
BH CV ECHO MEAS - MED PEAK E' VEL: 6 CM/SEC
BH CV ECHO MEAS - MR MAX PG: 23.8 MMHG
BH CV ECHO MEAS - MR MAX VEL: 243.9 CM/SEC
BH CV ECHO MEAS - MV A DUR: 0.14 SEC
BH CV ECHO MEAS - MV A MAX VEL: 84.4 CM/SEC
BH CV ECHO MEAS - MV DEC SLOPE: 439.3 CM/SEC^2
BH CV ECHO MEAS - MV DEC TIME: 0.19 SEC
BH CV ECHO MEAS - MV E MAX VEL: 85.4 CM/SEC
BH CV ECHO MEAS - MV E/A: 1
BH CV ECHO MEAS - MV P1/2T MAX VEL: 84.9 CM/SEC
BH CV ECHO MEAS - MV P1/2T: 56.6 MSEC
BH CV ECHO MEAS - MVA P1/2T LCG: 2.6 CM^2
BH CV ECHO MEAS - MVA(P1/2T): 3.9 CM^2
BH CV ECHO MEAS - PULM A REVS DUR: 0.13 SEC
BH CV ECHO MEAS - PULM A REVS VEL: 31.5 CM/SEC
BH CV ECHO MEAS - PULM DIAS VEL: 38.3 CM/SEC
BH CV ECHO MEAS - PULM S/D: 1.3
BH CV ECHO MEAS - PULM SYS VEL: 49.5 CM/SEC
BH CV ECHO MEAS - RAP SYSTOLE: 3 MMHG
BH CV ECHO MEAS - RVSP: 28 MMHG
BH CV ECHO MEAS - SI(CUBED): 40.3 ML/M^2
BH CV ECHO MEAS - SI(MOD-SP4): 30.8 ML/M^2
BH CV ECHO MEAS - SI(TEICH): 35.6 ML/M^2
BH CV ECHO MEAS - SUP REN AO DIAM: 2 CM
BH CV ECHO MEAS - SV(CUBED): 70.6 ML
BH CV ECHO MEAS - SV(MOD-SP4): 54 ML
BH CV ECHO MEAS - SV(TEICH): 62.5 ML
BH CV ECHO MEAS - TAPSE (>1.6): 2.2 CM2
BH CV ECHO MEAS - TR MAX VEL: 248.9 CM/SEC
BH CV STRESS BP STAGE 1: NORMAL
BH CV STRESS BP STAGE 2: NORMAL
BH CV STRESS DURATION MIN STAGE 1: 3
BH CV STRESS DURATION MIN STAGE 2: 3
BH CV STRESS DURATION SEC STAGE 1: 0
BH CV STRESS DURATION SEC STAGE 2: 0
BH CV STRESS ECHO POST STRESS EJECTION FRACTION EF: 70 %
BH CV STRESS GRADE STAGE 1: 10
BH CV STRESS GRADE STAGE 2: 12
BH CV STRESS HR STAGE 1: 114
BH CV STRESS HR STAGE 2: 133
BH CV STRESS METS STAGE 1: 5
BH CV STRESS METS STAGE 2: 7.5
BH CV STRESS PROTOCOL 1: NORMAL
BH CV STRESS SPEED STAGE 1: 1.7
BH CV STRESS SPEED STAGE 2: 2.5
BH CV STRESS STAGE 1: 1
BH CV STRESS STAGE 2: 2
BH CV XLRA - RV BASE: 3 CM
BH CV XLRA - TDI S': 11 CM/SEC
E/E' RATIO: 11.5
LEFT ATRIUM VOLUME INDEX: 18 ML/M2
LV EF 2D ECHO EST: 64 %
MAXIMAL PREDICTED HEART RATE: 149 BPM
PERCENT MAX PREDICTED HR: 89.26 %
STRESS BASELINE BP: NORMAL MMHG
STRESS BASELINE HR: 83 BPM
STRESS O2 SAT REST: 97 %
STRESS PERCENT HR: 105 %
STRESS POST ESTIMATED WORKLOAD: 7.5 METS
STRESS POST EXERCISE DUR MIN: 6 MIN
STRESS POST EXERCISE DUR SEC: 0 SEC
STRESS POST PEAK BP: NORMAL MMHG
STRESS POST PEAK HR: 133 BPM
STRESS TARGET HR: 127 BPM

## 2017-10-18 PROCEDURE — 93350 STRESS TTE ONLY: CPT | Performed by: INTERNAL MEDICINE

## 2017-10-18 PROCEDURE — 93352 ADMIN ECG CONTRAST AGENT: CPT | Performed by: INTERNAL MEDICINE

## 2017-10-18 PROCEDURE — 93350 STRESS TTE ONLY: CPT

## 2017-10-18 PROCEDURE — 93320 DOPPLER ECHO COMPLETE: CPT | Performed by: INTERNAL MEDICINE

## 2017-10-18 PROCEDURE — 93325 DOPPLER ECHO COLOR FLOW MAPG: CPT

## 2017-10-18 PROCEDURE — 25010000002 PERFLUTREN (DEFINITY) 8.476 MG IN SODIUM CHLORIDE 0.9 % 10 ML INJECTION: Performed by: INTERNAL MEDICINE

## 2017-10-18 PROCEDURE — 93017 CV STRESS TEST TRACING ONLY: CPT

## 2017-10-18 PROCEDURE — 93016 CV STRESS TEST SUPVJ ONLY: CPT | Performed by: INTERNAL MEDICINE

## 2017-10-18 PROCEDURE — 93320 DOPPLER ECHO COMPLETE: CPT

## 2017-10-18 PROCEDURE — 93018 CV STRESS TEST I&R ONLY: CPT | Performed by: INTERNAL MEDICINE

## 2017-10-18 PROCEDURE — 93325 DOPPLER ECHO COLOR FLOW MAPG: CPT | Performed by: INTERNAL MEDICINE

## 2017-10-18 RX ADMIN — PERFLUTREN 3 ML: 6.52 INJECTION, SUSPENSION INTRAVENOUS at 13:23

## 2017-10-20 DIAGNOSIS — R79.89 ELEVATED LFTS: ICD-10-CM

## 2017-10-20 DIAGNOSIS — E78.5 HYPERLIPIDEMIA, UNSPECIFIED HYPERLIPIDEMIA TYPE: Primary | ICD-10-CM

## 2017-10-20 DIAGNOSIS — E34.9 ELEVATED CALCITONIN LEVEL: ICD-10-CM

## 2017-10-20 DIAGNOSIS — E83.52 SERUM CALCIUM ELEVATED: ICD-10-CM

## 2017-10-20 DIAGNOSIS — R73.9 HYPERGLYCEMIA: ICD-10-CM

## 2017-10-25 RX ORDER — CONJUGATED ESTROGENS 0.3 MG/1
TABLET, FILM COATED ORAL
Qty: 90 TABLET | Refills: 1 | Status: SHIPPED | OUTPATIENT
Start: 2017-10-25 | End: 2018-04-18 | Stop reason: SDUPTHER

## 2017-10-25 RX ORDER — ROSUVASTATIN CALCIUM 10 MG/1
TABLET, COATED ORAL
Qty: 90 TABLET | Refills: 1 | Status: SHIPPED | OUTPATIENT
Start: 2017-10-25 | End: 2018-05-08 | Stop reason: SDUPTHER

## 2017-10-27 LAB
ALBUMIN SERPL-MCNC: 5.1 G/DL (ref 3.5–5.2)
ALBUMIN/GLOB SERPL: 2 G/DL
ALP SERPL-CCNC: 65 U/L (ref 39–117)
ALT SERPL-CCNC: 25 U/L (ref 1–33)
AST SERPL-CCNC: 26 U/L (ref 1–32)
BASOPHILS # BLD AUTO: 0.04 10*3/MM3 (ref 0–0.2)
BASOPHILS NFR BLD AUTO: 0.8 % (ref 0–1.5)
BILIRUB SERPL-MCNC: 0.3 MG/DL (ref 0.1–1.2)
BUN SERPL-MCNC: 18 MG/DL (ref 8–23)
BUN/CREAT SERPL: 26.1 (ref 7–25)
CALCIUM SERPL-MCNC: 10.2 MG/DL (ref 8.6–10.5)
CHLORIDE SERPL-SCNC: 103 MMOL/L (ref 98–107)
CHOLEST SERPL-MCNC: 180 MG/DL (ref 0–200)
CO2 SERPL-SCNC: 26.2 MMOL/L (ref 22–29)
CREAT SERPL-MCNC: 0.69 MG/DL (ref 0.57–1)
EOSINOPHIL # BLD AUTO: 0.1 10*3/MM3 (ref 0–0.7)
EOSINOPHIL NFR BLD AUTO: 2.1 % (ref 0.3–6.2)
ERYTHROCYTE [DISTWIDTH] IN BLOOD BY AUTOMATED COUNT: 13.9 % (ref 11.7–13)
GFR SERPLBLD CREATININE-BSD FMLA CKD-EPI: 102 ML/MIN/1.73
GFR SERPLBLD CREATININE-BSD FMLA CKD-EPI: 84 ML/MIN/1.73
GLOBULIN SER CALC-MCNC: 2.5 GM/DL
GLUCOSE SERPL-MCNC: 102 MG/DL (ref 65–99)
HBA1C MFR BLD: 5.7 % (ref 4.8–5.6)
HCT VFR BLD AUTO: 44.8 % (ref 35.6–45.5)
HDLC SERPL-MCNC: 54 MG/DL (ref 40–60)
HGB BLD-MCNC: 14.6 G/DL (ref 11.9–15.5)
IMM GRANULOCYTES # BLD: 0 10*3/MM3 (ref 0–0.03)
IMM GRANULOCYTES NFR BLD: 0 % (ref 0–0.5)
LDLC SERPL CALC-MCNC: 98 MG/DL (ref 0–100)
LDLC/HDLC SERPL: 1.81 {RATIO}
LYMPHOCYTES # BLD AUTO: 1.71 10*3/MM3 (ref 0.9–4.8)
LYMPHOCYTES NFR BLD AUTO: 35.6 % (ref 19.6–45.3)
MCH RBC QN AUTO: 30.6 PG (ref 26.9–32)
MCHC RBC AUTO-ENTMCNC: 32.6 G/DL (ref 32.4–36.3)
MCV RBC AUTO: 93.9 FL (ref 80.5–98.2)
MONOCYTES # BLD AUTO: 0.43 10*3/MM3 (ref 0.2–1.2)
MONOCYTES NFR BLD AUTO: 8.9 % (ref 5–12)
NEUTROPHILS # BLD AUTO: 2.53 10*3/MM3 (ref 1.9–8.1)
NEUTROPHILS NFR BLD AUTO: 52.6 % (ref 42.7–76)
PLATELET # BLD AUTO: 310 10*3/MM3 (ref 140–500)
POTASSIUM SERPL-SCNC: 4.4 MMOL/L (ref 3.5–5.2)
PROT SERPL-MCNC: 7.6 G/DL (ref 6–8.5)
RBC # BLD AUTO: 4.77 10*6/MM3 (ref 3.9–5.2)
SODIUM SERPL-SCNC: 145 MMOL/L (ref 136–145)
TRIGL SERPL-MCNC: 140 MG/DL (ref 0–150)
VLDLC SERPL CALC-MCNC: 28 MG/DL (ref 5–40)
WBC # BLD AUTO: 4.81 10*3/MM3 (ref 4.5–10.7)

## 2017-11-09 ENCOUNTER — TELEPHONE (OUTPATIENT)
Dept: FAMILY MEDICINE CLINIC | Facility: CLINIC | Age: 72
End: 2017-11-09

## 2017-11-09 NOTE — TELEPHONE ENCOUNTER
Pt called she received her lab results about 1 1/2 wks ago however she is wondering since she has been off her calcium over a year    Should she start it back up or just stay off of it?

## 2017-11-09 NOTE — TELEPHONE ENCOUNTER
She should stay off calcium supplementation indefinitely unless we tell her otherwise.  We no longer rec anyone to take ca++ unless they have issues with low Ca++

## 2017-11-27 RX ORDER — DESVENLAFAXINE 100 MG/1
TABLET, EXTENDED RELEASE ORAL
Qty: 30 TABLET | Refills: 5 | Status: SHIPPED | OUTPATIENT
Start: 2017-11-27 | End: 2018-04-24

## 2017-12-14 RX ORDER — VALACYCLOVIR HYDROCHLORIDE 500 MG/1
TABLET, FILM COATED ORAL
Qty: 4 TABLET | Refills: 0 | Status: SHIPPED | OUTPATIENT
Start: 2017-12-14 | End: 2018-09-25

## 2017-12-14 RX ORDER — FENOFIBRATE 145 MG/1
TABLET, COATED ORAL
Qty: 90 TABLET | Refills: 0 | Status: SHIPPED | OUTPATIENT
Start: 2017-12-14 | End: 2018-03-23 | Stop reason: SDUPTHER

## 2018-01-05 DIAGNOSIS — F41.9 ANXIETY: ICD-10-CM

## 2018-01-05 RX ORDER — ALPRAZOLAM 0.5 MG/1
TABLET ORAL
Qty: 45 TABLET | Refills: 0 | Status: SHIPPED | OUTPATIENT
Start: 2018-01-05 | End: 2018-02-26 | Stop reason: SDUPTHER

## 2018-01-22 RX ORDER — RANITIDINE 150 MG/1
TABLET ORAL
Qty: 30 TABLET | Refills: 5 | Status: SHIPPED | OUTPATIENT
Start: 2018-01-22 | End: 2018-09-25

## 2018-02-08 RX ORDER — TRAZODONE HYDROCHLORIDE 50 MG/1
TABLET ORAL
Qty: 30 TABLET | Refills: 5 | Status: SHIPPED | OUTPATIENT
Start: 2018-02-08 | End: 2018-09-25

## 2018-02-26 DIAGNOSIS — F41.9 ANXIETY: ICD-10-CM

## 2018-02-26 RX ORDER — ALPRAZOLAM 0.5 MG/1
TABLET ORAL
Qty: 45 TABLET | Refills: 0 | Status: SHIPPED | OUTPATIENT
Start: 2018-02-26 | End: 2018-04-18 | Stop reason: SDUPTHER

## 2018-03-23 RX ORDER — FENOFIBRATE 145 MG/1
TABLET, COATED ORAL
Qty: 90 TABLET | Refills: 0 | Status: SHIPPED | OUTPATIENT
Start: 2018-03-23 | End: 2018-06-20 | Stop reason: SDUPTHER

## 2018-03-23 RX ORDER — OMEPRAZOLE 40 MG/1
CAPSULE, DELAYED RELEASE ORAL
Qty: 90 CAPSULE | Refills: 0 | Status: SHIPPED | OUTPATIENT
Start: 2018-03-23 | End: 2018-06-27 | Stop reason: SDUPTHER

## 2018-04-03 RX ORDER — LEVETIRACETAM 500 MG/1
TABLET ORAL
Qty: 30 TABLET | Refills: 0 | Status: SHIPPED | OUTPATIENT
Start: 2018-04-03 | End: 2018-09-25

## 2018-04-04 ENCOUNTER — TELEPHONE (OUTPATIENT)
Dept: FAMILY MEDICINE CLINIC | Facility: CLINIC | Age: 73
End: 2018-04-04

## 2018-04-05 NOTE — TELEPHONE ENCOUNTER
Left message for pt asked her to call me and let me know if she would like the medrol dose pack today or Friday

## 2018-04-05 NOTE — TELEPHONE ENCOUNTER
Minimal movement of her head and slow movements of her body.  Increase hydration.  Also, she could try antivert which is OTC and she could take it tid prn vertigo.  Occasionally if this doesn't correct itself in a day or two, we can try medrol dosepak.  If she is still having sx's, you can call out the medrol dosepak for her on Thursday

## 2018-04-18 DIAGNOSIS — F41.9 ANXIETY: ICD-10-CM

## 2018-04-18 RX ORDER — CONJUGATED ESTROGENS 0.3 MG/1
TABLET, FILM COATED ORAL
Qty: 90 TABLET | Refills: 0 | Status: SHIPPED | OUTPATIENT
Start: 2018-04-18 | End: 2018-07-27 | Stop reason: SDUPTHER

## 2018-04-18 RX ORDER — ALPRAZOLAM 0.5 MG/1
TABLET ORAL
Qty: 45 TABLET | Refills: 0 | Status: SHIPPED | OUTPATIENT
Start: 2018-04-18 | End: 2018-06-08 | Stop reason: SDUPTHER

## 2018-04-24 ENCOUNTER — TELEPHONE (OUTPATIENT)
Dept: FAMILY MEDICINE CLINIC | Facility: CLINIC | Age: 73
End: 2018-04-24

## 2018-04-24 RX ORDER — DESVENLAFAXINE SUCCINATE 50 MG/1
50 TABLET, EXTENDED RELEASE ORAL DAILY
Qty: 30 TABLET | Refills: 5 | Status: SHIPPED | OUTPATIENT
Start: 2018-04-24 | End: 2018-10-27 | Stop reason: SDUPTHER

## 2018-04-24 NOTE — TELEPHONE ENCOUNTER
Patient would like a prescription for pristiq 50mg     salty     Was using samples but is out of those and we do not have any right now for her.

## 2018-05-08 RX ORDER — ROSUVASTATIN CALCIUM 10 MG/1
TABLET, COATED ORAL
Qty: 90 TABLET | Refills: 0 | Status: SHIPPED | OUTPATIENT
Start: 2018-05-08 | End: 2018-08-22 | Stop reason: SDUPTHER

## 2018-05-09 ENCOUNTER — TRANSCRIBE ORDERS (OUTPATIENT)
Dept: ADMINISTRATIVE | Facility: HOSPITAL | Age: 73
End: 2018-05-09

## 2018-05-09 ENCOUNTER — TELEPHONE (OUTPATIENT)
Dept: FAMILY MEDICINE CLINIC | Facility: CLINIC | Age: 73
End: 2018-05-09

## 2018-05-09 DIAGNOSIS — Z78.0 MENOPAUSE: Primary | ICD-10-CM

## 2018-05-09 DIAGNOSIS — Z12.39 SCREENING BREAST EXAMINATION: Primary | ICD-10-CM

## 2018-05-09 NOTE — TELEPHONE ENCOUNTER
Patient would like to have an order for a bone density scan. Said that it has been a couple years since her last one.

## 2018-05-14 DIAGNOSIS — E78.5 HYPERLIPIDEMIA, UNSPECIFIED HYPERLIPIDEMIA TYPE: ICD-10-CM

## 2018-05-14 DIAGNOSIS — R73.9 HYPERGLYCEMIA: ICD-10-CM

## 2018-05-14 DIAGNOSIS — E83.52 HYPERCALCEMIA: Primary | ICD-10-CM

## 2018-05-18 LAB
ALBUMIN SERPL-MCNC: 5.3 G/DL (ref 3.5–5.2)
ALBUMIN/GLOB SERPL: 2 G/DL
ALP SERPL-CCNC: 66 U/L (ref 39–117)
ALT SERPL-CCNC: 42 U/L (ref 1–33)
AST SERPL-CCNC: 31 U/L (ref 1–32)
BASOPHILS # BLD AUTO: 0.06 10*3/MM3 (ref 0–0.2)
BASOPHILS NFR BLD AUTO: 0.9 % (ref 0–1.5)
BILIRUB SERPL-MCNC: 0.3 MG/DL (ref 0.1–1.2)
BUN SERPL-MCNC: 20 MG/DL (ref 8–23)
BUN/CREAT SERPL: 22.2 (ref 7–25)
CALCIUM SERPL-MCNC: 10.3 MG/DL (ref 8.6–10.5)
CHLORIDE SERPL-SCNC: 101 MMOL/L (ref 98–107)
CHOLEST SERPL-MCNC: 176 MG/DL (ref 0–200)
CO2 SERPL-SCNC: 26.3 MMOL/L (ref 22–29)
CREAT SERPL-MCNC: 0.9 MG/DL (ref 0.57–1)
EOSINOPHIL # BLD AUTO: 0.2 10*3/MM3 (ref 0–0.7)
EOSINOPHIL NFR BLD AUTO: 3.1 % (ref 0.3–6.2)
ERYTHROCYTE [DISTWIDTH] IN BLOOD BY AUTOMATED COUNT: 14.2 % (ref 11.7–13)
GFR SERPLBLD CREATININE-BSD FMLA CKD-EPI: 62 ML/MIN/1.73
GFR SERPLBLD CREATININE-BSD FMLA CKD-EPI: 75 ML/MIN/1.73
GLOBULIN SER CALC-MCNC: 2.7 GM/DL
GLUCOSE SERPL-MCNC: 105 MG/DL (ref 65–99)
HBA1C MFR BLD: 6.31 % (ref 4.8–5.6)
HCT VFR BLD AUTO: 48.3 % (ref 35.6–45.5)
HDLC SERPL-MCNC: 55 MG/DL (ref 40–60)
HGB BLD-MCNC: 15.7 G/DL (ref 11.9–15.5)
IMM GRANULOCYTES # BLD: 0.01 10*3/MM3 (ref 0–0.03)
IMM GRANULOCYTES NFR BLD: 0.2 % (ref 0–0.5)
LDLC SERPL CALC-MCNC: 86 MG/DL (ref 0–100)
LDLC/HDLC SERPL: 1.56 {RATIO}
LYMPHOCYTES # BLD AUTO: 1.81 10*3/MM3 (ref 0.9–4.8)
LYMPHOCYTES NFR BLD AUTO: 27.8 % (ref 19.6–45.3)
MCH RBC QN AUTO: 30.8 PG (ref 26.9–32)
MCHC RBC AUTO-ENTMCNC: 32.5 G/DL (ref 32.4–36.3)
MCV RBC AUTO: 94.7 FL (ref 80.5–98.2)
MONOCYTES # BLD AUTO: 0.49 10*3/MM3 (ref 0.2–1.2)
MONOCYTES NFR BLD AUTO: 7.5 % (ref 5–12)
NEUTROPHILS # BLD AUTO: 3.96 10*3/MM3 (ref 1.9–8.1)
NEUTROPHILS NFR BLD AUTO: 60.7 % (ref 42.7–76)
PLATELET # BLD AUTO: 304 10*3/MM3 (ref 140–500)
POTASSIUM SERPL-SCNC: 4.9 MMOL/L (ref 3.5–5.2)
PROT SERPL-MCNC: 8 G/DL (ref 6–8.5)
RBC # BLD AUTO: 5.1 10*6/MM3 (ref 3.9–5.2)
SODIUM SERPL-SCNC: 143 MMOL/L (ref 136–145)
TRIGL SERPL-MCNC: 177 MG/DL (ref 0–150)
VLDLC SERPL CALC-MCNC: 35.4 MG/DL (ref 5–40)
WBC # BLD AUTO: 6.52 10*3/MM3 (ref 4.5–10.7)

## 2018-05-23 ENCOUNTER — TELEPHONE (OUTPATIENT)
Dept: FAMILY MEDICINE CLINIC | Facility: CLINIC | Age: 73
End: 2018-05-23

## 2018-05-23 DIAGNOSIS — R53.83 FATIGUE, UNSPECIFIED TYPE: Primary | ICD-10-CM

## 2018-05-23 NOTE — TELEPHONE ENCOUNTER
Patient has been having a lot of fatigue and mental confusion.     Would like to know if the high blood sugar could cause  The fatigue.   But also put an order in for her to have a urinalysis done to see if there is a UTI due to the mental confusion.

## 2018-05-24 ENCOUNTER — HOSPITAL ENCOUNTER (OUTPATIENT)
Dept: MAMMOGRAPHY | Facility: HOSPITAL | Age: 73
Discharge: HOME | End: 2018-05-24
Admitting: INTERNAL MEDICINE

## 2018-05-24 ENCOUNTER — HOSPITAL ENCOUNTER (OUTPATIENT)
Dept: BONE DENSITY | Facility: HOSPITAL | Age: 73
Discharge: HOME | End: 2018-05-24

## 2018-05-24 DIAGNOSIS — Z78.0 MENOPAUSE: ICD-10-CM

## 2018-05-24 DIAGNOSIS — Z12.39 SCREENING BREAST EXAMINATION: ICD-10-CM

## 2018-05-24 PROCEDURE — 77080 DXA BONE DENSITY AXIAL: CPT

## 2018-05-24 PROCEDURE — 77067 SCR MAMMO BI INCL CAD: CPT

## 2018-05-25 LAB
APPEARANCE UR: CLEAR
BACTERIA #/AREA URNS HPF: NORMAL /HPF
BILIRUB UR QL STRIP: NEGATIVE
COLOR UR: YELLOW
EPI CELLS #/AREA URNS HPF: NORMAL /HPF
GLUCOSE UR QL: NEGATIVE
HGB UR QL STRIP: NEGATIVE
KETONES UR QL STRIP: NEGATIVE
LEUKOCYTE ESTERASE UR QL STRIP: NEGATIVE
MICRO URNS: NORMAL
MICRO URNS: NORMAL
NITRITE UR QL STRIP: NEGATIVE
PH UR STRIP: 7.5 [PH] (ref 5–7.5)
PROT UR QL STRIP: NEGATIVE
RBC #/AREA URNS HPF: NORMAL /HPF
SP GR UR: 1.01 (ref 1–1.03)
T3FREE SERPL-MCNC: 2.8 PG/ML (ref 2–4.4)
T4 FREE SERPL-MCNC: 1.36 NG/DL (ref 0.93–1.7)
TSH SERPL DL<=0.005 MIU/L-ACNC: 1.57 MIU/ML (ref 0.27–4.2)
URINALYSIS REFLEX: NORMAL
UROBILINOGEN UR STRIP-MCNC: 0.2 MG/DL (ref 0.2–1)
WBC #/AREA URNS HPF: NORMAL /HPF

## 2018-05-27 ENCOUNTER — RESULTS ENCOUNTER (OUTPATIENT)
Dept: FAMILY MEDICINE CLINIC | Facility: CLINIC | Age: 73
End: 2018-05-27

## 2018-05-27 DIAGNOSIS — R53.83 FATIGUE, UNSPECIFIED TYPE: ICD-10-CM

## 2018-05-29 ENCOUNTER — OFFICE VISIT (OUTPATIENT)
Dept: FAMILY MEDICINE CLINIC | Facility: CLINIC | Age: 73
End: 2018-05-29

## 2018-05-29 VITALS
TEMPERATURE: 98.3 F | HEIGHT: 67 IN | DIASTOLIC BLOOD PRESSURE: 74 MMHG | RESPIRATION RATE: 18 BRPM | WEIGHT: 142 LBS | OXYGEN SATURATION: 97 % | SYSTOLIC BLOOD PRESSURE: 124 MMHG | HEART RATE: 80 BPM | BODY MASS INDEX: 22.29 KG/M2

## 2018-05-29 DIAGNOSIS — R41.82 ALTERED MENTAL STATUS, UNSPECIFIED ALTERED MENTAL STATUS TYPE: ICD-10-CM

## 2018-05-29 DIAGNOSIS — R73.03 PREDIABETES: ICD-10-CM

## 2018-05-29 DIAGNOSIS — R51.9 NONINTRACTABLE HEADACHE, UNSPECIFIED CHRONICITY PATTERN, UNSPECIFIED HEADACHE TYPE: ICD-10-CM

## 2018-05-29 DIAGNOSIS — R42 VERTIGO: Primary | ICD-10-CM

## 2018-05-29 PROCEDURE — 99214 OFFICE O/P EST MOD 30 MIN: CPT | Performed by: INTERNAL MEDICINE

## 2018-05-29 NOTE — PROGRESS NOTES
Subjective   Alexandria Yousif is a 73 y.o. female who comes in today for   Chief Complaint   Patient presents with   • Fatigue   • Altered Mental Status   .    History of Present Illness   Here for f/u on depression and anxiety on pristiq and prn xanax and over the winter she always gets a little down.  Developed vertigo for a few days with nausea severe for 2 days.  On the 4th day, I sent in prednisone taper and she actually didn't take it at first.  10 days later, vertigo started again after turning over in bed, and started spinning and therefore she started prednisone taper and no more vertigo since the prednisone.  She has gradually improved with mental confusion where she got off the elevator at the wrong floor, put her gown on over her clothes prior to going to the grocery, and sometimes word finding concerns.  NIDDM and strokes in her family.  Some ha's at times.  Stopped the spironolactone after labs showed dehydration.  Labs also showed elevated glucose.    The following portions of the patient's history were reviewed and updated as appropriate: allergies, current medications, past family history, past medical history, past social history, past surgical history and problem list.    Review of Systems   Constitutional: Positive for fatigue.   Psychiatric/Behavioral: Positive for decreased concentration and sleep disturbance (does ok if takes 1/2 trazodone). Negative for dysphoric mood. The patient is not nervous/anxious.        Vitals:    05/29/18 1033   BP: 124/74   Pulse: 80   Resp: 18   Temp: 98.3 °F (36.8 °C)   SpO2: 97%       Objective   Physical Exam   Constitutional: She is oriented to person, place, and time. She appears well-developed and well-nourished.   HENT:   Head: Normocephalic and atraumatic.   Right Ear: External ear normal.   Left Ear: External ear normal.   Mouth/Throat: Oropharynx is clear and moist.   Eyes: Conjunctivae are normal.   Neck: Neck supple.   Cardiovascular: Normal rate,  regular rhythm and normal heart sounds.    No bruits   Pulmonary/Chest: Effort normal and breath sounds normal. No respiratory distress. She has no wheezes. She has no rales.   Abdominal: Soft. Bowel sounds are normal. She exhibits no distension and no mass. There is no tenderness.   Lymphadenopathy:     She has no cervical adenopathy.   Neurological: She is alert and oriented to person, place, and time.   Skin: Skin is warm.   Psychiatric: She has a normal mood and affect. Her behavior is normal. Judgment and thought content normal.   Nursing note and vitals reviewed.      Assessment/Plan   Alexandria was seen today for fatigue and altered mental status.    Diagnoses and all orders for this visit:    Vertigo  -     MRI Brain With & Without Contrast; Future    Nonintractable headache, unspecified chronicity pattern, unspecified headache type  -     MRI Brain With & Without Contrast; Future    Altered mental status, unspecified altered mental status type  -     MRI Brain With & Without Contrast; Future    Prediabetes    labs reviewed  Discussed diet and exercise and hyperglycemia.  Decreasing alcohol will also help reduce her sugars.  Her weight is stable  Get MRI due to HA and vertigo episodes.  I think more than likely she is suffering from accumulation of side effects from multiple medications that altar mental status including alcohol.    Minimize medications that altar her mental status like flexeril, keppra, and xanax and also minimze or stop alcohol consumption.    Continue good hydration  Consider psychological testing if MRI is negative and sx's of mental fog persist                 I have asked for the patient to return to clinic in 6month(s).

## 2018-06-08 ENCOUNTER — HOSPITAL ENCOUNTER (OUTPATIENT)
Dept: MRI IMAGING | Facility: HOSPITAL | Age: 73
Discharge: HOME | End: 2018-06-08
Admitting: INTERNAL MEDICINE

## 2018-06-08 DIAGNOSIS — R41.82 ALTERED MENTAL STATUS, UNSPECIFIED ALTERED MENTAL STATUS TYPE: ICD-10-CM

## 2018-06-08 DIAGNOSIS — R42 VERTIGO: ICD-10-CM

## 2018-06-08 DIAGNOSIS — R51.9 NONINTRACTABLE HEADACHE, UNSPECIFIED CHRONICITY PATTERN, UNSPECIFIED HEADACHE TYPE: ICD-10-CM

## 2018-06-08 DIAGNOSIS — F41.9 ANXIETY: ICD-10-CM

## 2018-06-08 PROCEDURE — 82565 ASSAY OF CREATININE: CPT

## 2018-06-08 PROCEDURE — 0 GADOBENATE DIMEGLUMINE 529 MG/ML SOLUTION: Performed by: INTERNAL MEDICINE

## 2018-06-08 PROCEDURE — 70553 MRI BRAIN STEM W/O & W/DYE: CPT

## 2018-06-08 PROCEDURE — A9577 INJ MULTIHANCE: HCPCS | Performed by: INTERNAL MEDICINE

## 2018-06-08 RX ADMIN — GADOBENATE DIMEGLUMINE 12 ML: 529 INJECTION, SOLUTION INTRAVENOUS at 13:00

## 2018-06-09 LAB — CREAT BLDA-MCNC: 0.7 MG/DL (ref 0.6–1.3)

## 2018-06-09 RX ORDER — ALPRAZOLAM 0.5 MG/1
TABLET ORAL
Qty: 45 TABLET | Refills: 0 | Status: SHIPPED | OUTPATIENT
Start: 2018-06-09 | End: 2018-08-06 | Stop reason: SDUPTHER

## 2018-06-11 DIAGNOSIS — R93.0 ABNORMAL MRI OF HEAD: Primary | ICD-10-CM

## 2018-06-11 DIAGNOSIS — R40.4 TRANSIENT ALTERATION OF AWARENESS: ICD-10-CM

## 2018-06-12 ENCOUNTER — TELEPHONE (OUTPATIENT)
Dept: FAMILY MEDICINE CLINIC | Facility: CLINIC | Age: 73
End: 2018-06-12

## 2018-06-12 NOTE — TELEPHONE ENCOUNTER
Left message for pt that the reason for an additional MRI is because radiologist wants thinner cuts on MRI to look at sides of brain. Could be artifact and wants to make sure. Per dr poole.

## 2018-06-13 ENCOUNTER — TELEPHONE (OUTPATIENT)
Dept: FAMILY MEDICINE CLINIC | Facility: CLINIC | Age: 73
End: 2018-06-13

## 2018-06-13 NOTE — TELEPHONE ENCOUNTER
----- Message from Anita Muse MD sent at 6/12/2018  5:36 PM EDT -----  Please explain that due to me being at a [employer] and having no wifi or phone access, I placed the order and asked you to call her.  Wanted the order to go through early so not to delay. Results weren't back yesterday therefore didn't send you the order.  Radiologist called me.   I wrote on the orders that this is to rule out artifact in the temporal lobe.  There was either motion artifact or worse case scenario there could be inflammation or infection present. Radiologist thinks more likely due to artifact.  Apologize for the gap in communication for me.    ----- Message -----  From: Chika Govea  Sent: 6/11/2018   3:49 PM  To: Anita Muse MD, Rhiannon Buenrostro LPN    Pt wants to know why she is having another mri . Can someone please call her and explain to her why she is needing another mri

## 2018-06-13 NOTE — TELEPHONE ENCOUNTER
I spoke w/ schedule one they do not have any openings as of right now before June 20th , I also spoke w/ pt I told her I would call them again tomorrow to see if they have had any cancellations the pt is not avail to go for the mri on Friday or Monday

## 2018-06-13 NOTE — TELEPHONE ENCOUNTER
I spoke with patient yesterday and explained to her the reasoning for another MRI. And also explained to her that you are out of the office. She is getting the MRI done next week and I told her that should be okay that way when you are back from camp that you would be able to review results and call her if needed and there would not be a gap.     She is fine, with no worries

## 2018-06-19 ENCOUNTER — TELEPHONE (OUTPATIENT)
Dept: FAMILY MEDICINE CLINIC | Facility: CLINIC | Age: 73
End: 2018-06-19

## 2018-06-19 DIAGNOSIS — R41.82 ALTERED MENTAL STATUS, UNSPECIFIED ALTERED MENTAL STATUS TYPE: ICD-10-CM

## 2018-06-19 DIAGNOSIS — R93.0 ABNORMAL MRI OF HEAD: Primary | ICD-10-CM

## 2018-06-19 NOTE — TELEPHONE ENCOUNTER
Radiology called and said that they are now wanting with and without contrast for MRI with seizure protocol.    Could you please put in new order for patient.   She is having done tomorrow

## 2018-06-20 ENCOUNTER — HOSPITAL ENCOUNTER (OUTPATIENT)
Dept: MRI IMAGING | Facility: HOSPITAL | Age: 73
Discharge: HOME | End: 2018-06-20
Admitting: INTERNAL MEDICINE

## 2018-06-20 DIAGNOSIS — R40.4 TRANSIENT ALTERATION OF AWARENESS: ICD-10-CM

## 2018-06-20 DIAGNOSIS — R93.0 ABNORMAL MRI OF HEAD: ICD-10-CM

## 2018-06-20 DIAGNOSIS — R41.82 ALTERED MENTAL STATUS, UNSPECIFIED ALTERED MENTAL STATUS TYPE: ICD-10-CM

## 2018-06-20 PROCEDURE — 0 GADOBENATE DIMEGLUMINE 529 MG/ML SOLUTION: Performed by: INTERNAL MEDICINE

## 2018-06-20 PROCEDURE — A9577 INJ MULTIHANCE: HCPCS | Performed by: INTERNAL MEDICINE

## 2018-06-20 PROCEDURE — 82565 ASSAY OF CREATININE: CPT

## 2018-06-20 PROCEDURE — 70553 MRI BRAIN STEM W/O & W/DYE: CPT

## 2018-06-20 RX ADMIN — GADOBENATE DIMEGLUMINE 15 ML: 529 INJECTION, SOLUTION INTRAVENOUS at 19:30

## 2018-06-21 LAB — CREAT BLDA-MCNC: 0.8 MG/DL (ref 0.6–1.3)

## 2018-06-22 DIAGNOSIS — H93.13 TINNITUS OF BOTH EARS: ICD-10-CM

## 2018-06-22 DIAGNOSIS — R51.9 NONINTRACTABLE HEADACHE, UNSPECIFIED CHRONICITY PATTERN, UNSPECIFIED HEADACHE TYPE: Primary | ICD-10-CM

## 2018-06-22 DIAGNOSIS — R41.82 ALTERED MENTAL STATUS, UNSPECIFIED ALTERED MENTAL STATUS TYPE: ICD-10-CM

## 2018-06-22 RX ORDER — FENOFIBRATE 145 MG/1
TABLET, COATED ORAL
Qty: 90 TABLET | Refills: 1 | Status: SHIPPED | OUTPATIENT
Start: 2018-06-22 | End: 2018-09-25

## 2018-06-26 ENCOUNTER — HOSPITAL ENCOUNTER (OUTPATIENT)
Dept: MRI IMAGING | Facility: HOSPITAL | Age: 73
Discharge: HOME | End: 2018-06-26
Admitting: INTERNAL MEDICINE

## 2018-06-26 DIAGNOSIS — R41.82 ALTERED MENTAL STATUS, UNSPECIFIED ALTERED MENTAL STATUS TYPE: ICD-10-CM

## 2018-06-26 DIAGNOSIS — H93.13 TINNITUS OF BOTH EARS: ICD-10-CM

## 2018-06-26 DIAGNOSIS — R51.9 NONINTRACTABLE HEADACHE, UNSPECIFIED CHRONICITY PATTERN, UNSPECIFIED HEADACHE TYPE: ICD-10-CM

## 2018-06-26 PROCEDURE — 70544 MR ANGIOGRAPHY HEAD W/O DYE: CPT

## 2018-06-27 RX ORDER — OMEPRAZOLE 40 MG/1
CAPSULE, DELAYED RELEASE ORAL
Qty: 90 CAPSULE | Refills: 0 | Status: SHIPPED | OUTPATIENT
Start: 2018-06-27 | End: 2018-09-25

## 2018-06-29 ENCOUNTER — APPOINTMENT (OUTPATIENT)
Dept: MRI IMAGING | Facility: HOSPITAL | Age: 73
End: 2018-06-29

## 2018-06-29 DIAGNOSIS — H93.13 TINNITUS OF BOTH EARS: ICD-10-CM

## 2018-06-29 DIAGNOSIS — R42 VERTIGO: ICD-10-CM

## 2018-06-29 DIAGNOSIS — R93.0 ABNORMAL MRI OF THE HEAD: Primary | ICD-10-CM

## 2018-07-30 ENCOUNTER — TELEPHONE (OUTPATIENT)
Dept: FAMILY MEDICINE CLINIC | Facility: CLINIC | Age: 73
End: 2018-07-30

## 2018-07-30 RX ORDER — CONJUGATED ESTROGENS 0.3 MG/1
TABLET, FILM COATED ORAL
Qty: 90 TABLET | Refills: 0 | Status: SHIPPED | OUTPATIENT
Start: 2018-07-30 | End: 2018-09-25

## 2018-08-01 ENCOUNTER — OFFICE VISIT (OUTPATIENT)
Dept: FAMILY MEDICINE CLINIC | Facility: CLINIC | Age: 73
End: 2018-08-01

## 2018-08-01 VITALS
DIASTOLIC BLOOD PRESSURE: 80 MMHG | HEIGHT: 66 IN | OXYGEN SATURATION: 98 % | SYSTOLIC BLOOD PRESSURE: 110 MMHG | HEART RATE: 84 BPM | RESPIRATION RATE: 18 BRPM | TEMPERATURE: 97.8 F | WEIGHT: 138.1 LBS | BODY MASS INDEX: 22.19 KG/M2

## 2018-08-01 DIAGNOSIS — R73.03 PREDIABETES: ICD-10-CM

## 2018-08-01 DIAGNOSIS — Z87.898 HISTORY OF VERTIGO: ICD-10-CM

## 2018-08-01 DIAGNOSIS — E78.5 HYPERLIPIDEMIA, UNSPECIFIED HYPERLIPIDEMIA TYPE: ICD-10-CM

## 2018-08-01 DIAGNOSIS — F41.9 ANXIETY: ICD-10-CM

## 2018-08-01 DIAGNOSIS — R41.0 MENTAL CONFUSION: ICD-10-CM

## 2018-08-01 DIAGNOSIS — E83.52 HYPERCALCEMIA: Primary | ICD-10-CM

## 2018-08-01 PROCEDURE — 99214 OFFICE O/P EST MOD 30 MIN: CPT | Performed by: INTERNAL MEDICINE

## 2018-08-01 NOTE — PROGRESS NOTES
"Subjective   Alexandria Yousif is a 73 y.o. female who comes in today for   Chief Complaint   Patient presents with   • Hyperlipidemia   • Dizziness     follow up   .    History of Present Illness   Here to f/u on episode of vertigo and mental fog.  MRI was ordered that showed questionable temporal lobe changes and therefore MRI seizure protocol was ordered.  That showed sinus cavity narrowing and copious venous revascularization.  To f/u on that, Dr. Lake wanted MRA.  I sent her to ENT Dr. Abarca and he has sent  her to Dr. Corbett in his practice who is a neuro ENT.  Dr. Abarca initially saw her for the sinus anatomy that shows narrowing but he wanted her to see the neuro ENT Dr. Corbett.  If he clears her, Dr. Abarca will order balance PT sessions.  Her vertigo has resolved totally.  Her mental confusion is also greatly improved--85 % improved.  She has stopped taking keppra and does take 0.25mg xanax daily and occasionally 0.5mg xanax /day if she is having a lot of anxiety that day.  Takes 25mg trazodone qhs and it has \"cured\" her insomnia.  Taking an asa 81 mg qd and MRI does show small vessel ischemic changes.  Had mohs' surgery on scalp on Friday for skin cancer.  Fasting today to f/u on HL.  pmh anxiety on pristiq/xanax prn, HL on tricor and crestor, GERD on omeprazole  The following portions of the patient's history were reviewed and updated as appropriate: allergies, current medications, past family history, past medical history, past social history, past surgical history and problem list.    Review of Systems   Constitutional:        Intentionally lowering carbs and sweets and has lost 4 pounds   Neurological:        Vertigo has resolved.  Dr. Abarca felt like her balance wasn't totally normal.  He may start PT for her.  Vertigo couldn't be reproduced by audiologist at ENT office.    Brain fog/mental confusion is 85% better.  Still feels at times like her mental speed is not what her younger friends have.  No " "confusion.  Still has some anxiety and takes xanax daily.         Vitals:    08/01/18 0924   BP: 110/80   Pulse: 84   Resp: 18   Temp: 97.8 °F (36.6 °C)   SpO2: 98%       Objective   Physical Exam   Constitutional: She is oriented to person, place, and time. She appears well-developed and well-nourished.   HENT:   Head: Normocephalic and atraumatic.   Right Ear: External ear normal.   Left Ear: External ear normal.   Mouth/Throat: Oropharynx is clear and moist.   Eyes: Conjunctivae are normal.   Neck: Neck supple.   Cardiovascular: Normal rate, regular rhythm and normal heart sounds.    No bruits   Pulmonary/Chest: Effort normal and breath sounds normal. No respiratory distress. She has no wheezes. She has no rales.   Abdominal: Soft. Bowel sounds are normal. She exhibits no distension and no mass. There is no tenderness.   Lymphadenopathy:     She has no cervical adenopathy.   Neurological: She is alert and oriented to person, place, and time.   Skin: Skin is warm.   Psychiatric: She has a normal mood and affect. Her behavior is normal. Judgment and thought content normal.   Nursing note and vitals reviewed.      Assessment/Plan   Alexandria was seen today for hyperlipidemia and dizziness.    Diagnoses and all orders for this visit:    Hypercalcemia  -     Comprehensive Metabolic Panel  -     CBC & Differential  -     Hemoglobin A1c  -     Calcium, Ionized    Anxiety  -     Comprehensive Metabolic Panel  -     CBC & Differential  -     Hemoglobin A1c  -     Calcium, Ionized    Prediabetes  -     Comprehensive Metabolic Panel  -     CBC & Differential  -     Hemoglobin A1c  -     Calcium, Ionized    Hyperlipidemia, unspecified hyperlipidemia type    Mental confusion    History of vertigo    Dr. Abarca and Aric following vertigo and balance.  Mental fog is greatly improved.  Some of her remaining \"speed\" issues could be related to anxiety or related to small vessel changes found on MRI.  She is taking baby ASA /day and " will f/u on HL with labs today  Anxiety is controlled on pristiq and prn xanax.  Avoiding alcohol is recommended due to mental speed issues.  Off keppra which I think is good . She took that PRN related to her mood issues.  F/u on her sugars today with a1c and cmp.   H/o elev calcium--check cmp and ionized calcium             I have asked for the patient to return to clinic in 6month(s).

## 2018-08-02 LAB
ALBUMIN SERPL-MCNC: 5.5 G/DL (ref 3.5–5.2)
ALBUMIN/GLOB SERPL: 2.4 G/DL
ALP SERPL-CCNC: 67 U/L (ref 39–117)
ALT SERPL-CCNC: 28 U/L (ref 1–33)
AST SERPL-CCNC: 25 U/L (ref 1–32)
BASOPHILS # BLD AUTO: 0.05 10*3/MM3 (ref 0–0.2)
BASOPHILS NFR BLD AUTO: 0.9 % (ref 0–1.5)
BILIRUB SERPL-MCNC: 0.3 MG/DL (ref 0.1–1.2)
BUN SERPL-MCNC: 22 MG/DL (ref 8–23)
BUN/CREAT SERPL: 30.6 (ref 7–25)
CA-I SERPL ISE-MCNC: 5.4 MG/DL (ref 4.5–5.6)
CALCIUM SERPL-MCNC: 10.2 MG/DL (ref 8.6–10.5)
CHLORIDE SERPL-SCNC: 101 MMOL/L (ref 98–107)
CO2 SERPL-SCNC: 28.6 MMOL/L (ref 22–29)
CREAT SERPL-MCNC: 0.72 MG/DL (ref 0.57–1)
EOSINOPHIL # BLD AUTO: 0.16 10*3/MM3 (ref 0–0.7)
EOSINOPHIL NFR BLD AUTO: 2.9 % (ref 0.3–6.2)
ERYTHROCYTE [DISTWIDTH] IN BLOOD BY AUTOMATED COUNT: 13.7 % (ref 11.7–13)
GLOBULIN SER CALC-MCNC: 2.3 GM/DL
GLUCOSE SERPL-MCNC: 94 MG/DL (ref 65–99)
HBA1C MFR BLD: 5.9 % (ref 4.8–5.6)
HCT VFR BLD AUTO: 48.5 % (ref 35.6–45.5)
HGB BLD-MCNC: 15.7 G/DL (ref 11.9–15.5)
IMM GRANULOCYTES # BLD: 0.02 10*3/MM3 (ref 0–0.03)
IMM GRANULOCYTES NFR BLD: 0.4 % (ref 0–0.5)
LYMPHOCYTES # BLD AUTO: 1.8 10*3/MM3 (ref 0.9–4.8)
LYMPHOCYTES NFR BLD AUTO: 32.7 % (ref 19.6–45.3)
MCH RBC QN AUTO: 30.1 PG (ref 26.9–32)
MCHC RBC AUTO-ENTMCNC: 32.4 G/DL (ref 32.4–36.3)
MCV RBC AUTO: 93.1 FL (ref 80.5–98.2)
MONOCYTES # BLD AUTO: 0.41 10*3/MM3 (ref 0.2–1.2)
MONOCYTES NFR BLD AUTO: 7.5 % (ref 5–12)
NEUTROPHILS # BLD AUTO: 3.08 10*3/MM3 (ref 1.9–8.1)
NEUTROPHILS NFR BLD AUTO: 56 % (ref 42.7–76)
PLATELET # BLD AUTO: 332 10*3/MM3 (ref 140–500)
POTASSIUM SERPL-SCNC: 4.3 MMOL/L (ref 3.5–5.2)
PROT SERPL-MCNC: 7.8 G/DL (ref 6–8.5)
RBC # BLD AUTO: 5.21 10*6/MM3 (ref 3.9–5.2)
SODIUM SERPL-SCNC: 143 MMOL/L (ref 136–145)
WBC # BLD AUTO: 5.5 10*3/MM3 (ref 4.5–10.7)

## 2018-08-06 DIAGNOSIS — E88.09 HYPERPROTEINEMIA: Primary | ICD-10-CM

## 2018-08-06 DIAGNOSIS — F41.9 ANXIETY: ICD-10-CM

## 2018-08-06 RX ORDER — ALPRAZOLAM 0.5 MG/1
TABLET ORAL
Qty: 45 TABLET | Refills: 0 | Status: SHIPPED | OUTPATIENT
Start: 2018-08-06 | End: 2018-09-25

## 2018-08-07 LAB
ALBUMIN SERPL ELPH-MCNC: 4.4 G/DL (ref 2.9–4.4)
ALBUMIN/GLOB SERPL: 1.4 {RATIO} (ref 0.7–1.7)
ALPHA1 GLOB SERPL ELPH-MCNC: 0.3 G/DL (ref 0–0.4)
ALPHA2 GLOB SERPL ELPH-MCNC: 0.9 G/DL (ref 0.4–1)
B-GLOBULIN SERPL ELPH-MCNC: 1.3 G/DL (ref 0.7–1.3)
GAMMA GLOB SERPL ELPH-MCNC: 0.8 G/DL (ref 0.4–1.8)
GLOBULIN SER CALC-MCNC: 3.2 G/DL (ref 2.2–3.9)
LABORATORY COMMENT REPORT: NORMAL
Lab: NORMAL
M PROTEIN SERPL ELPH-MCNC: NORMAL G/DL
PROT SERPL-MCNC: 7.6 G/DL (ref 6–8.5)
WRITTEN AUTHORIZATION: NORMAL

## 2018-08-17 ENCOUNTER — TELEPHONE (OUTPATIENT)
Dept: FAMILY MEDICINE CLINIC | Facility: CLINIC | Age: 73
End: 2018-08-17

## 2018-08-17 NOTE — TELEPHONE ENCOUNTER
Patient would like a recommendation for a vascular neurosurgeon.     Dr Corbett at [facility] has suggested this after looking at her MRI, and said that this is above his expertise to deal with sinuses around the brain. She told him that he would look into who she should see.     She did say she is feeling much better doing PT at home for balance.

## 2018-08-20 DIAGNOSIS — R90.89 ABNORMAL FINDING ON MRI OF BRAIN: ICD-10-CM

## 2018-08-20 DIAGNOSIS — Z87.898 HISTORY OF VERTIGO: Primary | ICD-10-CM

## 2018-08-22 RX ORDER — ROSUVASTATIN CALCIUM 10 MG/1
TABLET, COATED ORAL
Qty: 90 TABLET | Refills: 1 | Status: SHIPPED | OUTPATIENT
Start: 2018-08-22 | End: 2018-09-25

## 2018-09-14 ENCOUNTER — OFFICE VISIT (OUTPATIENT)
Dept: NEUROSURGERY | Facility: CLINIC | Age: 73
End: 2018-09-14

## 2018-09-14 VITALS
HEART RATE: 64 BPM | HEIGHT: 67 IN | DIASTOLIC BLOOD PRESSURE: 76 MMHG | WEIGHT: 140 LBS | BODY MASS INDEX: 21.97 KG/M2 | RESPIRATION RATE: 16 BRPM | SYSTOLIC BLOOD PRESSURE: 120 MMHG

## 2018-09-14 DIAGNOSIS — R73.03 PREDIABETES: ICD-10-CM

## 2018-09-14 DIAGNOSIS — R90.89 ABNORMAL BRAIN MRI: Primary | ICD-10-CM

## 2018-09-14 DIAGNOSIS — R06.00 DYSPNEA, UNSPECIFIED TYPE: ICD-10-CM

## 2018-09-14 PROCEDURE — 99204 OFFICE O/P NEW MOD 45 MIN: CPT | Performed by: NEUROLOGICAL SURGERY

## 2018-09-14 RX ORDER — CEFAZOLIN SODIUM 2 G/100ML
2 INJECTION, SOLUTION INTRAVENOUS ONCE
Status: CANCELLED | OUTPATIENT
Start: 2018-10-08 | End: 2018-10-08

## 2018-09-14 RX ORDER — SODIUM CHLORIDE 9 MG/ML
100 INJECTION, SOLUTION INTRAVENOUS CONTINUOUS
Status: CANCELLED | OUTPATIENT
Start: 2018-10-08

## 2018-09-14 NOTE — PROGRESS NOTES
"Subjective   Patient ID: Alexnadria Yousif is a 73 y.o. female is being seen for consultation today at the request of Anita Muse MD for brain imaging abnormality. She is unaccompanied.    History of Present Illness  Patient presents for evaluation and treatment recommendations of abnormal brain imaging. She had sudden onset of severe vertigo in her sleep in April. It slowly improved over one week. She has had a total of 5 episodes, typically when lying flat and improved with sitting up. Her last episode was last week (several brief episodes while out of town. She has associated nausea and visual changes and imbalance but no falls. She took prednisone with second episode in May and symptoms improved fairly quickly. She had confusion- with difficulty comprehending conversations as well as inappropriate thinking and decision making \"a fogginess\". She was told she may have had some TIAs. She had VNG and [place] maneuver as well as evaluation with ENT.    Radiologic evaluation demonstrated a question of stenosis of the distal transverse sinuses and Dr. Bravo indicates the possibility of hypertrophic pacchionian granulations.    The following portions of the patient's history were reviewed and updated as appropriate: allergies, current medications, past family history, past medical history, past social history, past surgical history and problem list.    Review of Systems   Constitutional: Negative for fever.   HENT: Negative for trouble swallowing.    Eyes: Negative for visual disturbance.   Respiratory: Positive for cough (allergy related). Negative for wheezing.    Cardiovascular: Positive for palpitations. Negative for chest pain.   Gastrointestinal: Negative for abdominal pain.   Genitourinary: Positive for enuresis (intermittent). Negative for difficulty urinating.   Musculoskeletal: Positive for gait problem.   Neurological: Positive for dizziness. Negative for speech difficulty and headaches. "   Psychiatric/Behavioral: Positive for confusion and decreased concentration.       Objective   Physical Exam   Constitutional: She is oriented to person, place, and time. She appears well-developed and well-nourished.   Body mass index is 22.26 kg/m².     Eyes: Pupils are equal, round, and reactive to light. EOM are normal.   Neck: Neck supple. Normal carotid pulses present. Carotid bruit is not present.   Cardiovascular: Normal rate, regular rhythm and normal heart sounds.    Pulmonary/Chest: Effort normal.   Neurological: She is alert and oriented to person, place, and time. She has normal strength. She has a normal Finger-Nose-Finger Test, a normal Heel to Shin Test, a normal Romberg Test and a normal Tandem Gait Test. Gait normal.   Skin: Skin is warm and dry.   Psychiatric: She has a normal mood and affect. Her speech is normal and behavior is normal. Judgment normal.   Vitals reviewed.    Neurologic Exam     Mental Status   Oriented to person, place, and time.   Follows 3 step commands.   Attention: normal. Concentration: normal.   Speech: speech is normal   Level of consciousness: alert  Knowledge: good.   Normal comprehension.     Cranial Nerves     CN II   Visual fields full to confrontation.     CN III, IV, VI   Pupils are equal, round, and reactive to light.  Extraocular motions are normal.   CN III: no CN III palsy  CN VI: no CN VI palsy  Nystagmus: none     CN V   Facial sensation intact.     CN VII   Facial expression full, symmetric.     CN VIII   Hearing: impaired (to finger rustle)  Right Rinne: AC > BC  Left Rinne: AC > BC  Chavarria: lateralizes left     CN IX, X   CN IX normal.   CN X normal.     CN XI   CN XI normal.     CN XII   CN XII normal.     Motor Exam   Right arm pronator drift: absent  Left arm pronator drift: absent    Strength   Strength 5/5 throughout.     Gait, Coordination, and Reflexes     Gait  Gait: normal    Coordination   Romberg: negative  Finger to nose coordination:  normal  Heel to shin coordination: normal  Tandem walking coordination: normal      Assessment/Plan   Independent Review of Radiographic Studies:      I personally reviewed the MRI and MRA done of brain: The MRA demonstrates no arterial abnormalities.  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  There are also a few frontal periventricular white matter hyperintensities.    Medical Decision Making:    I confirmed and obtained the above history as recorded by the nurse practitioner acting as a scribe. I performed the above examination and it is documented by the nurse practitioner acting as a scribe.    The patient presents with the above-noted history and physical findings.  There are no specific red flags.    I don't think that we have solved the question as to whether or not there is a dural arteriovenous malformation or significant stenosis of the distal transverse sinuses.  There is certainly hypertrophy of the cortical venous drainage system in the area that would drain into the transverse sinus which can be seen either on the basis of stenosis or an arteriovenous malformation.    The symptoms that the patient has been experiencing certainly could be secondary to a lot of different things.  To ascribe the symptomatology to dural venous sinus stenosis we would have to demonstrate elevation of intracranial pressure proximal to the areas of stenosis.    Additionally the noninvasive testing that is been accomplished does not rule out the possibility of the IV dural arteriovenous malformation.    I'm recommending the patient undergo a cerebral arteriogram with venous pressure monitoring of the sinus.  This will give us a much better idea of the degree of stenosis of the transverse sinuses and whether or not the narrowing is hemodynamically significant.  We will also elucidate whether or not there is an abnormal connection between arteries and veins which of course, if there is venous reflux into  cortical veins, would pose a vital risk to her because of the risk of intracranial hemorrhage.    The risks, benefits and alternatives of cerebral angiography were explained in detail to the patient. The alternative is not to undergo this procedure. The benefit of cerebral angiography should be, though is not guaranteed, the elucidation of the vascular anatomy of the brain and the blood vessels leading to the brain. The risks of the procedure include, but are not limited to, the possibility of stroke, bleeding, damage to an artery in the brain, groin or neck(possibly requiring surgery to correct), infection, reaction to the contrast dye resulting in itching, swelling, anaphylaxis or even death, lack of ability to perform the procedure, need for further operative intervention, blindness, etcetera. The patient voiced understanding of the risks, benefits and alternatives and requests that we proceed with cerebral angiography.    After a complete physical exam, the patient has been informed of the consequences, benefits, appropriate us, and office policies regarding the medication being prescribed. A ERIKA check will be made on-line, and will be repeated if prescription is renewed after a 90 day period. The patient agrees to adhering to the medication regimen as prescribed.    PLAN: Cerebral arteriogram and return to office.    Alexandria was seen today for brain imaging abnormality.    Diagnoses and all orders for this visit:    Abnormal brain MRI  -     Case Request; Standing  -     CBC and Differential; Future  -     Basic metabolic panel; Future  -     XR chest 1 vw; Future  -     sodium chloride 0.9 % infusion; Infuse 100 mL/hr into a venous catheter Continuous.  -     ceFAZolin (ANCEF) 2 g in sodium chloride 0.9 % 100 mL IVPB; Infuse 2 g into a venous catheter 1 (One) Time.  -     Case Request    Dyspnea, unspecified type    Prediabetes    Other orders  -     Follow anesthesia standing orders.  -     Obtain informed  consent  -     Clorhexidine skin prep; Future  -     Follow Anesthesia Guidelines / Standing Orders; Standing  -     SCD (sequential compression device)- to be placed on patient in Pre-op; Standing  -     Clip operative site; Standing  -     Provide instructions to patient on NPO status; Future      Return for Follow up after testing.

## 2018-09-19 ENCOUNTER — RESULTS ENCOUNTER (OUTPATIENT)
Dept: NEUROSURGERY | Facility: CLINIC | Age: 73
End: 2018-09-19

## 2018-09-19 DIAGNOSIS — R90.89 ABNORMAL BRAIN MRI: ICD-10-CM

## 2018-09-25 ENCOUNTER — HOSPITAL ENCOUNTER (OUTPATIENT)
Dept: GENERAL RADIOLOGY | Facility: HOSPITAL | Age: 73
Discharge: HOME | End: 2018-09-25
Admitting: NEUROLOGICAL SURGERY

## 2018-09-25 ENCOUNTER — APPOINTMENT (OUTPATIENT)
Dept: PREADMISSION TESTING | Facility: HOSPITAL | Age: 73
End: 2018-09-25

## 2018-09-25 VITALS
HEIGHT: 66 IN | BODY MASS INDEX: 23.05 KG/M2 | TEMPERATURE: 98.2 F | SYSTOLIC BLOOD PRESSURE: 124 MMHG | WEIGHT: 143.4 LBS | HEART RATE: 70 BPM | OXYGEN SATURATION: 95 % | DIASTOLIC BLOOD PRESSURE: 77 MMHG | RESPIRATION RATE: 16 BRPM

## 2018-09-25 DIAGNOSIS — R90.89 ABNORMAL BRAIN MRI: ICD-10-CM

## 2018-09-25 LAB
ANION GAP SERPL CALCULATED.3IONS-SCNC: 10 MMOL/L
BASOPHILS # BLD AUTO: 0.05 10*3/MM3 (ref 0–0.2)
BASOPHILS NFR BLD AUTO: 1 % (ref 0–1.5)
BUN BLD-MCNC: 12 MG/DL (ref 8–23)
BUN/CREAT SERPL: 16.9 (ref 7–25)
CALCIUM SPEC-SCNC: 9.8 MG/DL (ref 8.6–10.5)
CHLORIDE SERPL-SCNC: 104 MMOL/L (ref 98–107)
CO2 SERPL-SCNC: 28 MMOL/L (ref 22–29)
CREAT BLD-MCNC: 0.71 MG/DL (ref 0.57–1)
DEPRECATED RDW RBC AUTO: 44.7 FL (ref 37–54)
EOSINOPHIL # BLD AUTO: 0.12 10*3/MM3 (ref 0–0.7)
EOSINOPHIL NFR BLD AUTO: 2.4 % (ref 0.3–6.2)
ERYTHROCYTE [DISTWIDTH] IN BLOOD BY AUTOMATED COUNT: 13.4 % (ref 11.7–13)
GFR SERPL CREATININE-BSD FRML MDRD: 81 ML/MIN/1.73
GLUCOSE BLD-MCNC: 79 MG/DL (ref 65–99)
HBA1C MFR BLD: 6.13 % (ref 4.8–5.6)
HCT VFR BLD AUTO: 47.3 % (ref 35.6–45.5)
HGB BLD-MCNC: 15.3 G/DL (ref 11.9–15.5)
IMM GRANULOCYTES # BLD: 0 10*3/MM3 (ref 0–0.03)
IMM GRANULOCYTES NFR BLD: 0 % (ref 0–0.5)
LYMPHOCYTES # BLD AUTO: 1.59 10*3/MM3 (ref 0.9–4.8)
LYMPHOCYTES NFR BLD AUTO: 32.1 % (ref 19.6–45.3)
MCH RBC QN AUTO: 29.6 PG (ref 26.9–32)
MCHC RBC AUTO-ENTMCNC: 32.3 G/DL (ref 32.4–36.3)
MCV RBC AUTO: 91.5 FL (ref 80.5–98.2)
MONOCYTES # BLD AUTO: 0.49 10*3/MM3 (ref 0.2–1.2)
MONOCYTES NFR BLD AUTO: 9.9 % (ref 5–12)
NEUTROPHILS # BLD AUTO: 2.71 10*3/MM3 (ref 1.9–8.1)
NEUTROPHILS NFR BLD AUTO: 54.6 % (ref 42.7–76)
PLATELET # BLD AUTO: 267 10*3/MM3 (ref 140–500)
PMV BLD AUTO: 9.4 FL (ref 6–12)
POTASSIUM BLD-SCNC: 4.2 MMOL/L (ref 3.5–5.2)
RBC # BLD AUTO: 5.17 10*6/MM3 (ref 3.9–5.2)
SODIUM BLD-SCNC: 142 MMOL/L (ref 136–145)
WBC NRBC COR # BLD: 4.96 10*3/MM3 (ref 4.5–10.7)

## 2018-09-25 PROCEDURE — 93010 ELECTROCARDIOGRAM REPORT: CPT | Performed by: INTERNAL MEDICINE

## 2018-09-25 PROCEDURE — 71046 X-RAY EXAM CHEST 2 VIEWS: CPT

## 2018-09-25 PROCEDURE — 85025 COMPLETE CBC W/AUTO DIFF WBC: CPT | Performed by: NEUROLOGICAL SURGERY

## 2018-09-25 PROCEDURE — 83036 HEMOGLOBIN GLYCOSYLATED A1C: CPT | Performed by: INTERNAL MEDICINE

## 2018-09-25 PROCEDURE — 80048 BASIC METABOLIC PNL TOTAL CA: CPT | Performed by: NEUROLOGICAL SURGERY

## 2018-09-25 PROCEDURE — 36415 COLL VENOUS BLD VENIPUNCTURE: CPT | Performed by: INTERNAL MEDICINE

## 2018-09-25 PROCEDURE — 84165 PROTEIN E-PHORESIS SERUM: CPT | Performed by: INTERNAL MEDICINE

## 2018-09-25 PROCEDURE — 84155 ASSAY OF PROTEIN SERUM: CPT | Performed by: INTERNAL MEDICINE

## 2018-09-25 PROCEDURE — 93005 ELECTROCARDIOGRAM TRACING: CPT

## 2018-09-25 RX ORDER — ROSUVASTATIN CALCIUM 10 MG/1
10 TABLET, COATED ORAL NIGHTLY
COMMUNITY
End: 2019-02-25 | Stop reason: SDUPTHER

## 2018-09-25 RX ORDER — VALACYCLOVIR HYDROCHLORIDE 500 MG/1
500 TABLET, FILM COATED ORAL 2 TIMES DAILY PRN
COMMUNITY
End: 2018-10-18 | Stop reason: SDUPTHER

## 2018-09-25 RX ORDER — CHLORHEXIDINE GLUCONATE 500 MG/1
1 CLOTH TOPICAL TAKE AS DIRECTED
COMMUNITY
End: 2018-10-12

## 2018-09-25 RX ORDER — RANITIDINE 150 MG/1
150 TABLET ORAL NIGHTLY PRN
COMMUNITY
End: 2018-10-18 | Stop reason: SDUPTHER

## 2018-09-25 RX ORDER — OMEPRAZOLE 40 MG/1
40 CAPSULE, DELAYED RELEASE ORAL DAILY
COMMUNITY
End: 2018-10-18 | Stop reason: SDUPTHER

## 2018-09-25 RX ORDER — NAPROXEN 500 MG/1
500 TABLET ORAL AS NEEDED
COMMUNITY
End: 2021-08-23

## 2018-09-25 RX ORDER — TRAZODONE HYDROCHLORIDE 50 MG/1
25 TABLET ORAL NIGHTLY
COMMUNITY
End: 2019-01-18 | Stop reason: SDUPTHER

## 2018-09-25 RX ORDER — FENOFIBRATE 145 MG/1
145 TABLET, COATED ORAL DAILY
COMMUNITY
End: 2018-12-13 | Stop reason: SDUPTHER

## 2018-09-25 RX ORDER — ALPRAZOLAM 0.5 MG/1
0.5 TABLET ORAL 2 TIMES DAILY PRN
COMMUNITY
End: 2018-09-30 | Stop reason: SDUPTHER

## 2018-09-25 RX ORDER — LEVETIRACETAM 500 MG/1
250 TABLET, EXTENDED RELEASE ORAL AS NEEDED
COMMUNITY
End: 2019-09-26

## 2018-09-26 LAB
ALBUMIN SERPL-MCNC: 4.5 G/DL (ref 2.9–4.4)
ALBUMIN/GLOB SERPL: 1.6 {RATIO} (ref 0.7–1.7)
ALPHA1 GLOB FLD ELPH-MCNC: 0.3 G/DL (ref 0–0.4)
ALPHA2 GLOB SERPL ELPH-MCNC: 0.8 G/DL (ref 0.4–1)
B-GLOBULIN SERPL ELPH-MCNC: 1.2 G/DL (ref 0.7–1.3)
GAMMA GLOB SERPL ELPH-MCNC: 0.7 G/DL (ref 0.4–1.8)
GLOBULIN SER CALC-MCNC: 2.9 G/DL (ref 2.2–3.9)
Lab: ABNORMAL
M-SPIKE: ABNORMAL G/DL
PROT SERPL-MCNC: 7.4 G/DL (ref 6–8.5)

## 2018-10-02 RX ORDER — ALPRAZOLAM 0.5 MG/1
TABLET ORAL
Qty: 45 TABLET | Refills: 0 | Status: SHIPPED | OUTPATIENT
Start: 2018-10-02 | End: 2018-10-08 | Stop reason: HOSPADM

## 2018-10-08 ENCOUNTER — ANESTHESIA EVENT (OUTPATIENT)
Dept: PERIOP | Facility: HOSPITAL | Age: 73
End: 2018-10-08

## 2018-10-08 ENCOUNTER — ANESTHESIA (OUTPATIENT)
Dept: PERIOP | Facility: HOSPITAL | Age: 73
End: 2018-10-08

## 2018-10-08 ENCOUNTER — HOSPITAL ENCOUNTER (OUTPATIENT)
Facility: HOSPITAL | Age: 73
Setting detail: HOSPITAL OUTPATIENT SURGERY
Discharge: HOME | End: 2018-10-08
Attending: NEUROLOGICAL SURGERY | Admitting: NEUROLOGICAL SURGERY

## 2018-10-08 ENCOUNTER — APPOINTMENT (OUTPATIENT)
Dept: GENERAL RADIOLOGY | Facility: HOSPITAL | Age: 73
End: 2018-10-08

## 2018-10-08 VITALS
DIASTOLIC BLOOD PRESSURE: 87 MMHG | BODY MASS INDEX: 21.63 KG/M2 | WEIGHT: 137.8 LBS | SYSTOLIC BLOOD PRESSURE: 139 MMHG | HEIGHT: 67 IN | OXYGEN SATURATION: 99 % | TEMPERATURE: 97.8 F | HEART RATE: 73 BPM | RESPIRATION RATE: 16 BRPM

## 2018-10-08 DIAGNOSIS — R90.89 ABNORMAL BRAIN MRI: ICD-10-CM

## 2018-10-08 PROBLEM — T82.9XXA COMPLICATION OF VASCULAR ACCESS FOR DIALYSIS: Status: ACTIVE | Noted: 2018-10-08

## 2018-10-08 PROCEDURE — C1760 CLOSURE DEV, VASC: HCPCS | Performed by: NEUROLOGICAL SURGERY

## 2018-10-08 PROCEDURE — C1887 CATHETER, GUIDING: HCPCS | Performed by: NEUROLOGICAL SURGERY

## 2018-10-08 PROCEDURE — 25010000002 PROPOFOL 10 MG/ML EMULSION: Performed by: ANESTHESIOLOGY

## 2018-10-08 PROCEDURE — C1894 INTRO/SHEATH, NON-LASER: HCPCS | Performed by: NEUROLOGICAL SURGERY

## 2018-10-08 PROCEDURE — 36226 PLACE CATH VERTEBRAL ART: CPT | Performed by: NEUROLOGICAL SURGERY

## 2018-10-08 PROCEDURE — 75860 VEIN X-RAY NECK: CPT

## 2018-10-08 PROCEDURE — C1769 GUIDE WIRE: HCPCS | Performed by: NEUROLOGICAL SURGERY

## 2018-10-08 PROCEDURE — 75870 VEIN X-RAY SKULL: CPT | Performed by: NEUROLOGICAL SURGERY

## 2018-10-08 PROCEDURE — 25010000002 HEPARIN (PORCINE) PER 1000 UNITS: Performed by: NEUROLOGICAL SURGERY

## 2018-10-08 PROCEDURE — 75870 VEIN X-RAY SKULL: CPT

## 2018-10-08 PROCEDURE — 25010000002 MIDAZOLAM PER 1 MG: Performed by: ANESTHESIOLOGY

## 2018-10-08 PROCEDURE — 36224 PLACE CATH CAROTD ART: CPT | Performed by: NEUROLOGICAL SURGERY

## 2018-10-08 PROCEDURE — 36012 PLACE CATHETER IN VEIN: CPT | Performed by: NEUROLOGICAL SURGERY

## 2018-10-08 PROCEDURE — 25010000002 FENTANYL CITRATE (PF) 100 MCG/2ML SOLUTION: Performed by: ANESTHESIOLOGY

## 2018-10-08 PROCEDURE — 36227 PLACE CATH XTRNL CAROTID: CPT | Performed by: NEUROLOGICAL SURGERY

## 2018-10-08 PROCEDURE — 0 IODIXANOL PER 1 ML: Performed by: NEUROLOGICAL SURGERY

## 2018-10-08 RX ORDER — FENTANYL CITRATE 50 UG/ML
50 INJECTION, SOLUTION INTRAMUSCULAR; INTRAVENOUS
Status: DISCONTINUED | OUTPATIENT
Start: 2018-10-08 | End: 2018-10-08 | Stop reason: HOSPADM

## 2018-10-08 RX ORDER — SODIUM CHLORIDE, SODIUM LACTATE, POTASSIUM CHLORIDE, CALCIUM CHLORIDE 600; 310; 30; 20 MG/100ML; MG/100ML; MG/100ML; MG/100ML
9 INJECTION, SOLUTION INTRAVENOUS CONTINUOUS
Status: DISCONTINUED | OUTPATIENT
Start: 2018-10-08 | End: 2018-10-08 | Stop reason: HOSPADM

## 2018-10-08 RX ORDER — PROMETHAZINE HYDROCHLORIDE 25 MG/1
12.5 TABLET ORAL ONCE AS NEEDED
Status: DISCONTINUED | OUTPATIENT
Start: 2018-10-08 | End: 2018-10-08 | Stop reason: HOSPADM

## 2018-10-08 RX ORDER — MIDAZOLAM HYDROCHLORIDE 1 MG/ML
INJECTION INTRAMUSCULAR; INTRAVENOUS AS NEEDED
Status: DISCONTINUED | OUTPATIENT
Start: 2018-10-08 | End: 2018-10-08 | Stop reason: SURG

## 2018-10-08 RX ORDER — SODIUM CHLORIDE 9 MG/ML
100 INJECTION, SOLUTION INTRAVENOUS CONTINUOUS
Status: DISCONTINUED | OUTPATIENT
Start: 2018-10-08 | End: 2018-10-08 | Stop reason: HOSPADM

## 2018-10-08 RX ORDER — DIPHENHYDRAMINE HYDROCHLORIDE 50 MG/ML
12.5 INJECTION INTRAMUSCULAR; INTRAVENOUS
Status: DISCONTINUED | OUTPATIENT
Start: 2018-10-08 | End: 2018-10-08 | Stop reason: HOSPADM

## 2018-10-08 RX ORDER — PROPOFOL 10 MG/ML
VIAL (ML) INTRAVENOUS CONTINUOUS PRN
Status: DISCONTINUED | OUTPATIENT
Start: 2018-10-08 | End: 2018-10-08 | Stop reason: SURG

## 2018-10-08 RX ORDER — MIDAZOLAM HYDROCHLORIDE 1 MG/ML
1 INJECTION INTRAMUSCULAR; INTRAVENOUS
Status: DISCONTINUED | OUTPATIENT
Start: 2018-10-08 | End: 2018-10-08 | Stop reason: HOSPADM

## 2018-10-08 RX ORDER — FLUMAZENIL 0.1 MG/ML
0.2 INJECTION INTRAVENOUS AS NEEDED
Status: DISCONTINUED | OUTPATIENT
Start: 2018-10-08 | End: 2018-10-08 | Stop reason: HOSPADM

## 2018-10-08 RX ORDER — MIDAZOLAM HYDROCHLORIDE 1 MG/ML
2 INJECTION INTRAMUSCULAR; INTRAVENOUS
Status: DISCONTINUED | OUTPATIENT
Start: 2018-10-08 | End: 2018-10-08 | Stop reason: HOSPADM

## 2018-10-08 RX ORDER — LIDOCAINE HYDROCHLORIDE 10 MG/ML
0.5 INJECTION, SOLUTION EPIDURAL; INFILTRATION; INTRACAUDAL; PERINEURAL ONCE AS NEEDED
Status: DISCONTINUED | OUTPATIENT
Start: 2018-10-08 | End: 2018-10-08 | Stop reason: HOSPADM

## 2018-10-08 RX ORDER — EPHEDRINE SULFATE 50 MG/ML
5 INJECTION, SOLUTION INTRAVENOUS ONCE AS NEEDED
Status: DISCONTINUED | OUTPATIENT
Start: 2018-10-08 | End: 2018-10-08 | Stop reason: HOSPADM

## 2018-10-08 RX ORDER — PROMETHAZINE HYDROCHLORIDE 25 MG/1
25 SUPPOSITORY RECTAL ONCE AS NEEDED
Status: DISCONTINUED | OUTPATIENT
Start: 2018-10-08 | End: 2018-10-08 | Stop reason: HOSPADM

## 2018-10-08 RX ORDER — FENTANYL CITRATE 50 UG/ML
INJECTION, SOLUTION INTRAMUSCULAR; INTRAVENOUS AS NEEDED
Status: DISCONTINUED | OUTPATIENT
Start: 2018-10-08 | End: 2018-10-08 | Stop reason: SURG

## 2018-10-08 RX ORDER — FAMOTIDINE 10 MG/ML
20 INJECTION, SOLUTION INTRAVENOUS ONCE
Status: COMPLETED | OUTPATIENT
Start: 2018-10-08 | End: 2018-10-08

## 2018-10-08 RX ORDER — LABETALOL HYDROCHLORIDE 5 MG/ML
5 INJECTION, SOLUTION INTRAVENOUS
Status: DISCONTINUED | OUTPATIENT
Start: 2018-10-08 | End: 2018-10-08 | Stop reason: HOSPADM

## 2018-10-08 RX ORDER — PROMETHAZINE HYDROCHLORIDE 25 MG/1
25 TABLET ORAL ONCE AS NEEDED
Status: DISCONTINUED | OUTPATIENT
Start: 2018-10-08 | End: 2018-10-08 | Stop reason: HOSPADM

## 2018-10-08 RX ORDER — NALOXONE HCL 0.4 MG/ML
0.2 VIAL (ML) INJECTION AS NEEDED
Status: DISCONTINUED | OUTPATIENT
Start: 2018-10-08 | End: 2018-10-08 | Stop reason: HOSPADM

## 2018-10-08 RX ORDER — ACETAMINOPHEN 500 MG
1000 TABLET ORAL EVERY 6 HOURS PRN
COMMUNITY

## 2018-10-08 RX ORDER — IODIXANOL 320 MG/ML
350 INJECTION, SOLUTION INTRAVASCULAR
Status: COMPLETED | OUTPATIENT
Start: 2018-10-08 | End: 2018-10-08

## 2018-10-08 RX ORDER — ALPRAZOLAM 0.5 MG/1
0.25 TABLET ORAL DAILY PRN
COMMUNITY
End: 2018-11-26 | Stop reason: SDUPTHER

## 2018-10-08 RX ORDER — PROMETHAZINE HYDROCHLORIDE 25 MG/ML
12.5 INJECTION, SOLUTION INTRAMUSCULAR; INTRAVENOUS ONCE AS NEEDED
Status: DISCONTINUED | OUTPATIENT
Start: 2018-10-08 | End: 2018-10-08 | Stop reason: HOSPADM

## 2018-10-08 RX ORDER — OXYCODONE AND ACETAMINOPHEN 7.5; 325 MG/1; MG/1
1 TABLET ORAL ONCE AS NEEDED
Status: DISCONTINUED | OUTPATIENT
Start: 2018-10-08 | End: 2018-10-08 | Stop reason: HOSPADM

## 2018-10-08 RX ORDER — SODIUM CHLORIDE 0.9 % (FLUSH) 0.9 %
1-10 SYRINGE (ML) INJECTION AS NEEDED
Status: DISCONTINUED | OUTPATIENT
Start: 2018-10-08 | End: 2018-10-08 | Stop reason: HOSPADM

## 2018-10-08 RX ORDER — ONDANSETRON 2 MG/ML
4 INJECTION INTRAMUSCULAR; INTRAVENOUS ONCE AS NEEDED
Status: DISCONTINUED | OUTPATIENT
Start: 2018-10-08 | End: 2018-10-08 | Stop reason: HOSPADM

## 2018-10-08 RX ORDER — CEFAZOLIN SODIUM 2 G/100ML
2 INJECTION, SOLUTION INTRAVENOUS ONCE
Status: DISCONTINUED | OUTPATIENT
Start: 2018-10-08 | End: 2018-10-08 | Stop reason: HOSPADM

## 2018-10-08 RX ORDER — HYDROCODONE BITARTRATE AND ACETAMINOPHEN 7.5; 325 MG/1; MG/1
1 TABLET ORAL ONCE AS NEEDED
Status: DISCONTINUED | OUTPATIENT
Start: 2018-10-08 | End: 2018-10-08 | Stop reason: HOSPADM

## 2018-10-08 RX ORDER — HYDROMORPHONE HYDROCHLORIDE 1 MG/ML
0.5 INJECTION, SOLUTION INTRAMUSCULAR; INTRAVENOUS; SUBCUTANEOUS
Status: DISCONTINUED | OUTPATIENT
Start: 2018-10-08 | End: 2018-10-08 | Stop reason: HOSPADM

## 2018-10-08 RX ADMIN — FAMOTIDINE 20 MG: 10 INJECTION, SOLUTION INTRAVENOUS at 11:14

## 2018-10-08 RX ADMIN — SODIUM CHLORIDE 100 ML/HR: 9 INJECTION, SOLUTION INTRAVENOUS at 10:25

## 2018-10-08 RX ADMIN — FENTANYL CITRATE 50 MCG: 50 INJECTION INTRAMUSCULAR; INTRAVENOUS at 12:58

## 2018-10-08 RX ADMIN — FENTANYL CITRATE 25 MCG: 50 INJECTION INTRAMUSCULAR; INTRAVENOUS at 12:27

## 2018-10-08 RX ADMIN — PROPOFOL 50 MCG/KG/MIN: 10 INJECTION, EMULSION INTRAVENOUS at 12:29

## 2018-10-08 RX ADMIN — MIDAZOLAM HYDROCHLORIDE 2 MG: 2 INJECTION, SOLUTION INTRAMUSCULAR; INTRAVENOUS at 11:14

## 2018-10-08 RX ADMIN — FENTANYL CITRATE 25 MCG: 50 INJECTION INTRAMUSCULAR; INTRAVENOUS at 12:43

## 2018-10-08 RX ADMIN — Medication 2 MG: at 12:25

## 2018-10-08 RX ADMIN — IODIXANOL 213.3 ML: 320 INJECTION, SOLUTION INTRAVASCULAR at 13:00

## 2018-10-08 RX ADMIN — SODIUM CHLORIDE, POTASSIUM CHLORIDE, SODIUM LACTATE AND CALCIUM CHLORIDE: 600; 310; 30; 20 INJECTION, SOLUTION INTRAVENOUS at 12:23

## 2018-10-08 NOTE — ANESTHESIA POSTPROCEDURE EVALUATION
"Patient: Alexandria Yousif    Procedure Summary     Date:  10/08/18 Room / Location:  [facility] INV [facility]    Anesthesia Start:  1223 Anesthesia Stop:  1439    Procedure:  CEREBRAL ANGIOGRAM AND VENOGRAM WITH INTRASINUS PRESSURE RECORDING (N/A ) Diagnosis:       Abnormal brain MRI      (Abnormal brain MRI [R90.89])    Surgeon:  Alfredo Caruso MD Provider:  Cesar Cabral MD    Anesthesia Type:  MAC ASA Status:  3          Anesthesia Type: MAC  Last vitals  BP   126/77 (10/08/18 1610)   Temp   36.6 °C (97.8 °F) (10/08/18 1438)   Pulse   71 (10/08/18 1610)   Resp   16 (10/08/18 1610)     SpO2   98 % (10/08/18 1610)     Post Anesthesia Care and Evaluation    Patient location during evaluation: bedside  Patient participation: complete - patient participated  Level of consciousness: sleepy but conscious  Pain score: 0  Pain management: adequate  Airway patency: patent  Anesthetic complications: No anesthetic complications    Cardiovascular status: acceptable  Respiratory status: acceptable  Hydration status: acceptable    Comments: /77   Pulse 71   Temp 36.6 °C (97.8 °F) (Oral)   Resp 16   Ht 169.5 cm (66.75\")   Wt 62.5 kg (137 lb 12.8 oz)   LMP  (LMP Unknown)   SpO2 98%   BMI 21.74 kg/m²         "

## 2018-10-08 NOTE — BRIEF OP NOTE
CEREBRAL ANGIOGRAM  Progress Note    Alexandria Yousif  10/8/2018    Pre-op Diagnosis:   Abnormal brain MRI [R90.89]       Post-Op Diagnosis Codes:     * Abnormal brain MRI [R90.89]    Procedure/CPT® Codes:      Procedure(s):  CEREBRAL ANGIOGRAM AND VENOGRAM WITH INTRASINUS PRESSURE RECORDING    Surgeon(s):  Alfredo Caruso MD    Anesthesia: Monitor Anesthesia Care    Staff:   Circulator: Odessa Bob RN  Scrub Person: Amirah Olivera  Assistant: Judy Monique CSA  Vascular Radiology Technician: Luisa Muñoz    Estimated Blood Loss: none    Urine Voided: * No values recorded between 10/8/2018 12:22 PM and 10/8/2018  2:30 PM *    Specimens:                None      Drains:      Findings: normal venous sinus pressures    Complications: none      Alfredo Caruso MD     Date: 10/8/2018  Time: 2:30 PM

## 2018-10-08 NOTE — ANESTHESIA PREPROCEDURE EVALUATION
Anesthesia Evaluation     NPO Solid Status: > 8 hours             Airway   Mallampati: II  Neck ROM: full  No difficulty expected  Dental - normal exam     Pulmonary - normal exam   Cardiovascular - normal exam    (+) hypertension,       Neuro/Psych  (+) TIA, headaches, dizziness/light headedness, psychiatric history Anxiety,     GI/Hepatic/Renal/Endo    (+)  GERD,      Musculoskeletal     Abdominal    Substance History      OB/GYN          Other   (+) arthritis                     Anesthesia Plan    ASA 3     MAC     intravenous induction   Anesthetic plan, all risks, benefits, and alternatives have been provided, discussed and informed consent has been obtained with: patient.

## 2018-10-08 NOTE — OP NOTE
CEREBRAL ANGIOGRAM  Procedure Note  Cerebral Angiogram    Alexandria Yousif  10/8/2018  [id number]    SURGEON  Alfredo Caruso MD    ASSISTANT:  Judy Monique CFA  INDICATION:  Abnormal brain MRI and MRA    Pre-op Diagnosis:   Abnormal brain MRI [R90.89]    Post-Op Diagnosis Codes:     * Abnormal brain MRI [R90.89]    PROCEDURE PERFORMED:  Procedure(s):  CEREBRAL ANGIOGRAM AND VENOGRAM WITH INTRASINUS PRESSURE RECORDING    1. Selective catheterization and bilateral internal carotid angiography  2. Selective catheterization and bilateral external carotid angiography  3. Selective catheterization and right vertebral artery angiography  4. Sagittal sinus venogram  5. Right transverse sinus venogram  6. Left transverse sinus venogram  7. Intracranial pressure monitoring    Anesthesia: Monitor Anesthesia Care    Staff:   Circulator: Odessa Bob RN  Scrub Person: Amirah Olivera  Assistant: Judy Monique CSA  Vascular Radiology Technician: Luisa Muñoz    Estimated Blood Loss: minimal    Specimens: none      Findings: Prominent cortical draining veins without evidence of dural arteriovenous malformation or intraparenchymal arteriovenous malformation.  No cerebral aneurysms identified.    Normal intracranial pressure    Complications: none    PROCEDURE IN DETAIL: The patient was brought to the operating room where she was hooked up to EKG, oxygen saturation, and blood pressure monitoring and placed on the biplane angiography table. Continuous monitoring was accomplished during the operative intervention. She was given intravenous sedation and the groin was prepped in the usual sterile manner.     Local anesthesia was given.  Singlewall puncture technique 5 Kyrgyz introducing sheath. 5 Kyr[place]z Radhames catheter.    Under fluoroscopic guidance selective catheterization of the right external carotid artery and the left external carotid artery.    Bilateral external carotid arteriography demonstrates the  normal branching pattern of the external carotid artery without evidence of arteriovenous fistula, venous anomaly, abnormal connections between the extra and intracranial circulation, or aneurysm was identified.    Selective catheterization of the right internal carotid artery and the left internal carotid artery.  Bilateral internal carotid artery cerebral angiography in multiple projections demonstrates the normal branching pattern of the internal carotid artery bilaterally. There is no evidence of aneurysmal formation, arterio-venous malformation, arterio-venous fistula or venous anomaly.    Selective catheterization of the right vertebral artery and right vertebral arteriography multiple projections demonstrates the normal branching pattern of the vertebral arteries bilaterally. There is no evidence of aneurysmal formation, arterio-venous malformation, arterio-venous fistula or venous anomaly.    The venous phase of both the carotid and the vertebral angiogram demonstrates patency of both jugular veins and prominent pacchionian granulations at the transverse sigmoid sinus junction bilaterally.    I then placed a 5 Bur[place]ian introducing sheath under ultrasound guidance into the right femoral vein.  Using a MTC 5 [place]ian guide catheter I then selectively catheterized the right jugular vein.    Left jugular vein/jugular bulb venography demonstrated normal drainage pattern as expected.    Jugular vein pressure on the left was measured.        I then slightly catheterized the right jugular vein and jugular bulb venography demonstrated normal venous pattern of the jugular vein as expected.    Jugular vein pressure on the right was measured.        I then used the marksman 0.27 microcatheter and a Synchro wire and I selectively catheterized the right transverse sinus from the left jugular vein.    Left transverse sinus venography demonstrated the appropriate drainage pattern around the large pacchionian granulations  immediately into the sigmoid sinus into the jugular vein.    Left transverse sinus pressure was measured.        I drew the microcatheter back into the right transverse sinus.  Right transverse sinus venography demonstrated appropriate drainage pattern around the pacchionian granulations and down the sigmoid sinus into the jugular vein.    Right transverse sinurs pressure was measured:        I then selectively catheterize the anterior sagittal sinus.  Anterior sagittal sinus venography demonstrated a normal venous drainage pattern with anterior and posterior illness drainage.    Anterior sagittal sinus pressure was measured.        I then withdrew the microcatheter back to the posterior sagittal sinus.  Posterior sagittal sinus venography demonstrated normal venous drainage pattern as expected.    Posterior sagittal sinus pressure was measured.        Arteriography through the existing sheath demonstrates the sheath to be within the common femoral artery. Minyx system was used to close the artery, pressure was held for the appropriate length of time and the patient was transferred to the recovery room in stable and good condition.      Alfredo Caruso MD     Date: 10/8/2018  Time: 2:31 PM

## 2018-10-08 NOTE — H&P (VIEW-ONLY)
"Subjective   Patient ID: Alexandria Yousif is a 73 y.o. female is being seen for consultation today at the request of Anita Muse MD for brain imaging abnormality. She is unaccompanied.    History of Present Illness  Patient presents for evaluation and treatment recommendations of abnormal brain imaging. She had sudden onset of severe vertigo in her sleep in April. It slowly improved over one week. She has had a total of 5 episodes, typically when lying flat and improved with sitting up. Her last episode was last week (several brief episodes while out of town. She has associated nausea and visual changes and imbalance but no falls. She took prednisone with second episode in May and symptoms improved fairly quickly. She had confusion- with difficulty comprehending conversations as well as inappropriate thinking and decision making \"a fogginess\". She was told she may have had some TIAs. She had VNG and Waters-Robertson maneuver as well as evaluation with ENT.    Radiologic evaluation demonstrated a question of stenosis of the distal transverse sinuses and Dr. Bravo indicates the possibility of hypertrophic pacchionian granulations.    The following portions of the patient's history were reviewed and updated as appropriate: allergies, current medications, past family history, past medical history, past social history, past surgical history and problem list.    Review of Systems   Constitutional: Negative for fever.   HENT: Negative for trouble swallowing.    Eyes: Negative for visual disturbance.   Respiratory: Positive for cough (allergy related). Negative for wheezing.    Cardiovascular: Positive for palpitations. Negative for chest pain.   Gastrointestinal: Negative for abdominal pain.   Genitourinary: Positive for enuresis (intermittent). Negative for difficulty urinating.   Musculoskeletal: Positive for gait problem.   Neurological: Positive for dizziness. Negative for speech difficulty and headaches. "   Psychiatric/Behavioral: Positive for confusion and decreased concentration.       Objective   Physical Exam   Constitutional: She is oriented to person, place, and time. She appears well-developed and well-nourished.   Body mass index is 22.26 kg/m².     Eyes: Pupils are equal, round, and reactive to light. EOM are normal.   Neck: Neck supple. Normal carotid pulses present. Carotid bruit is not present.   Cardiovascular: Normal rate, regular rhythm and normal heart sounds.    Pulmonary/Chest: Effort normal.   Neurological: She is alert and oriented to person, place, and time. She has normal strength. She has a normal Finger-Nose-Finger Test, a normal Heel to Shin Test, a normal Romberg Test and a normal Tandem Gait Test. Gait normal.   Skin: Skin is warm and dry.   Psychiatric: She has a normal mood and affect. Her speech is normal and behavior is normal. Judgment normal.   Vitals reviewed.    Neurologic Exam     Mental Status   Oriented to person, place, and time.   Follows 3 step commands.   Attention: normal. Concentration: normal.   Speech: speech is normal   Level of consciousness: alert  Knowledge: good.   Normal comprehension.     Cranial Nerves     CN II   Visual fields full to confrontation.     CN III, IV, VI   Pupils are equal, round, and reactive to light.  Extraocular motions are normal.   CN III: no CN III palsy  CN VI: no CN VI palsy  Nystagmus: none     CN V   Facial sensation intact.     CN VII   Facial expression full, symmetric.     CN VIII   Hearing: impaired (to finger rustle)  Right Rinne: AC > BC  Left Rinne: AC > BC  Chavarria: lateralizes left     CN IX, X   CN IX normal.   CN X normal.     CN XI   CN XI normal.     CN XII   CN XII normal.     Motor Exam   Right arm pronator drift: absent  Left arm pronator drift: absent    Strength   Strength 5/5 throughout.     Gait, Coordination, and Reflexes     Gait  Gait: normal    Coordination   Romberg: negative  Finger to nose coordination:  normal  Heel to shin coordination: normal  Tandem walking coordination: normal      Assessment/Plan   Independent Review of Radiographic Studies:      I personally reviewed the MRI and MRA done of brain: The MRA demonstrates no arterial abnormalities.  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  There are also a few frontal periventricular white matter hyperintensities.    Medical Decision Making:    I confirmed and obtained the above history as recorded by the nurse practitioner acting as a scribe. I performed the above examination and it is documented by the nurse practitioner acting as a scribe.    The patient presents with the above-noted history and physical findings.  There are no specific red flags.    I don't think that we have solved the question as to whether or not there is a dural arteriovenous malformation or significant stenosis of the distal transverse sinuses.  There is certainly hypertrophy of the cortical venous drainage system in the area that would drain into the transverse sinus which can be seen either on the basis of stenosis or an arteriovenous malformation.    The symptoms that the patient has been experiencing certainly could be secondary to a lot of different things.  To ascribe the symptomatology to dural venous sinus stenosis we would have to demonstrate elevation of intracranial pressure proximal to the areas of stenosis.    Additionally the noninvasive testing that is been accomplished does not rule out the possibility of the IV dural arteriovenous malformation.    I'm recommending the patient undergo a cerebral arteriogram with venous pressure monitoring of the sinus.  This will give us a much better idea of the degree of stenosis of the transverse sinuses and whether or not the narrowing is hemodynamically significant.  We will also elucidate whether or not there is an abnormal connection between arteries and veins which of course, if there is venous reflux into  cortical veins, would pose a vital risk to her because of the risk of intracranial hemorrhage.    The risks, benefits and alternatives of cerebral angiography were explained in detail to the patient. The alternative is not to undergo this procedure. The benefit of cerebral angiography should be, though is not guaranteed, the elucidation of the vascular anatomy of the brain and the blood vessels leading to the brain. The risks of the procedure include, but are not limited to, the possibility of stroke, bleeding, damage to an artery in the brain, groin or neck(possibly requiring surgery to correct), infection, reaction to the contrast dye resulting in itching, swelling, anaphylaxis or even death, lack of ability to perform the procedure, need for further operative intervention, blindness, etcetera. The patient voiced understanding of the risks, benefits and alternatives and requests that we proceed with cerebral angiography.    After a complete physical exam, the patient has been informed of the consequences, benefits, appropriate us, and office policies regarding the medication being prescribed. A ERIKA check will be made on-line, and will be repeated if prescription is renewed after a 90 day period. The patient agrees to adhering to the medication regimen as prescribed.    PLAN: Cerebral arteriogram and return to office.    Alexandria was seen today for brain imaging abnormality.    Diagnoses and all orders for this visit:    Abnormal brain MRI  -     Case Request; Standing  -     CBC and Differential; Future  -     Basic metabolic panel; Future  -     XR chest 1 vw; Future  -     sodium chloride 0.9 % infusion; Infuse 100 mL/hr into a venous catheter Continuous.  -     ceFAZolin (ANCEF) 2 g in sodium chloride 0.9 % 100 mL IVPB; Infuse 2 g into a venous catheter 1 (One) Time.  -     Case Request    Dyspnea, unspecified type    Prediabetes    Other orders  -     Follow anesthesia standing orders.  -     Obtain informed  consent  -     Clorhexidine skin prep; Future  -     Follow Anesthesia Guidelines / Standing Orders; Standing  -     SCD (sequential compression device)- to be placed on patient in Pre-op; Standing  -     Clip operative site; Standing  -     Provide instructions to patient on NPO status; Future      Return for Follow up after testing.

## 2018-10-08 NOTE — DISCHARGE INSTRUCTIONS
Alfredo Caruso MD  [facility] [place] [facility]    [address]  [address]  [place] [address]   P: [phone]    SEDATION DISCHARGE INSTRUCTIONS.  IMPORTANT: The following information will help you return to your best level of health.  Sedation.  You have had a procedure that called for some medicine to reduce anxiety and pain. This medicine (or medicines) is called sedation. After receiving the medicine, you may be sleepy, but able to breathe on your own. The effects of the medicine may last for several hours.  Follow these instructions after sedation:  Go right home. Rest quietly at home today, then you can be up and about.  Do not drink alcohol, drive or operate machinery for 24 hours.  Do not do anything where light-headedness or clumsiness would be dangerous.  Do not make important decisions or sign any legal papers for the next 24 hours.  Make sure A RESPONSIBLE PERSON stays with you the rest of today and overnight for your protection and safety.  Start your diet with fluids and light foods (jello, soup, juice, toast). Then, slowly progress to your usual diet if you are not sick to your stomach.  Call your doctor if you have:  a gray or blue skin color.  excess sleepiness.  repeated vomiting.  trouble breathing.  any new problems or concerns.    POSTOPERATIVE CARE INSTRUCTIONS FOR CEREBRAL ANGIOGRAPHY    1. After your angiogram, you will need to be less active than normal. you should plan to be off work and relax for the next two days. This is because Dr. Caruso has placed a plug in your artery to close it up, but it still needs some time to heal.    2. You may feel some stinging and see some slight swelling. You may also see bruising; possibly a large amount of bruising. This is normal. An ice pack will help relieve the stinging and swelling. Use the pack at your puncture site for about 20 minutes; then remove it for at least 30 minutes. You may repeat this as often as you  need.    3. Because Dr. Caruso injected some dye into your artery, you will need to drink a lot of fluids over the next 2 days in order to flush it out of your body. Water and juice are the best choices. If you need to drink soda, coffee or tea, choose decaffeinated. Caffeine will dehydrate you, and it will take you longer to flush out the dye from your body. You will know you are getting enough fluids if your urine is clear or very pale yellow.    4. Avoid strenuous activity and heavy lifting intil you return to the office. Heavy lifting is considered anything over 10 pounds. A gallon of milk weighs 8 pounds. If you have to hold anything more than 10 pounds (such as a baby), have someone place that object in your lap or arms.    5. Check your dressing occasionally. You may remove it 48 hours after your procedure. If you notice that you are bleeding or your dressing becomes soaked with fresh blood, call 911 and have them take you to the emergency room.   Hold pressure over the site. Call our office immediately ([phone]) if you notice the following: any bright redness at the puncture site, coldness in the leg, periods of the chills, red streaks running up your abdomen or down your leg, a fever over 101 degrees F orally, green or yellow discharge or discharge that has a foul odor. If it is after office hours, your call will be forwarded to the neurosurgeon on-call.    6. If you have any questions or concerns at any time, please feel free to call the office at ([phone]).Dr. Caruso or neurosurgeon on-call is available 24-hours a day. Our staff is here to help you in any way we can.

## 2018-10-09 ENCOUNTER — TELEPHONE (OUTPATIENT)
Dept: NEUROSURGERY | Facility: CLINIC | Age: 73
End: 2018-10-09

## 2018-10-09 NOTE — TELEPHONE ENCOUNTER
Patient had C-angio with [facility] 10/8, states that after the procedure she had some nausea and headache but they resolved overnight. States that nausea returned after breakfast. Denies vomiting, headache, change in vision, states that she is just nauseated.  Per LB, patient should increase fluids and take a bland diet. Patient also states that she has noticed insomnia since procedure, states that she thought that she would be drowsy but instead feels as though she has been given a stimulant. Per LB, unable to determine the reason for this feeling, no cause for concern. Informed patient, voiced understanding. If has worst headache of life, dizziness, or any other signs of aneurysm rupture, go to ER. Voiced understanding.

## 2018-10-12 ENCOUNTER — OFFICE VISIT (OUTPATIENT)
Dept: NEUROSURGERY | Facility: CLINIC | Age: 73
End: 2018-10-12

## 2018-10-12 VITALS
SYSTOLIC BLOOD PRESSURE: 122 MMHG | BODY MASS INDEX: 21.93 KG/M2 | HEART RATE: 64 BPM | DIASTOLIC BLOOD PRESSURE: 70 MMHG | WEIGHT: 139 LBS | RESPIRATION RATE: 16 BRPM

## 2018-10-12 DIAGNOSIS — R90.89 ABNORMAL BRAIN MRI: Primary | ICD-10-CM

## 2018-10-12 DIAGNOSIS — R42 VERTIGO: ICD-10-CM

## 2018-10-12 PROCEDURE — 99213 OFFICE O/P EST LOW 20 MIN: CPT | Performed by: NEUROLOGICAL SURGERY

## 2018-10-12 NOTE — PROGRESS NOTES
Subjective   Patient ID: Alexandria Yousif is a 73 y.o. female is here today for follow-up of abnormal brain imaging. She has undergone cerebral angiography with venogram on 10-8 and presents unaccompanied .    History of Present Illness  Patient presents for follow-up after cerebral angiogram and MR venogram for evaluation of abnormal brain imaging. She reports headaches and nausea for first 24 hours and insomnia for 36 hours. Now resolved. No leg swelling, numbness, color or temperature change.    She has had no further episodes of vertigo.     The following portions of the patient's history were reviewed and updated as appropriate: allergies, current medications and problem list.    Review of Systems   Eyes: Negative for visual disturbance.   Cardiovascular: Negative for leg swelling.   Skin: Negative for color change (or coolness of procedure leg), pallor and wound (swelling, excess bruising or bleeding of groin site ).   Neurological: Positive for headaches (but now resolved). Negative for facial asymmetry, speech difficulty, weakness and numbness (of procedure leg).       Objective   Physical Exam   Constitutional: She is oriented to person, place, and time. She appears well-developed and well-nourished.   Body mass index is 22.26 kg/m².     Eyes: Pupils are equal, round, and reactive to light. EOM are normal.   Neck: Neck supple. Normal carotid pulses present. Carotid bruit is not present.   Cardiovascular: Intact distal pulses.    Pulmonary/Chest: Effort normal.   Neurological: She is alert and oriented to person, place, and time. Gait normal.   Skin: Skin is warm and dry.   Superficial Skin irritation left groin; right groin site with mild yellow bruise   Psychiatric: She has a normal mood and affect. Her speech is normal and behavior is normal. Judgment normal.   Vitals reviewed.    Neurologic Exam     Mental Status   Oriented to person, place, and time.   Speech: speech is normal   Level of consciousness:  alert  Knowledge: good.   Normal comprehension.     Cranial Nerves   Cranial nerves II through XII intact.     CN III, IV, VI   Pupils are equal, round, and reactive to light.  Extraocular motions are normal.     Motor Exam   Right arm pronator drift: absent  Left arm pronator drift: absent    Sensory Exam   Right leg light touch: normal    Gait, Coordination, and Reflexes     Gait  Gait: normal      Assessment/Plan   Independent Review of Radiographic Studies:    Personally reviewed the cerebral arteriogram that I performed last week.  I also reviewed the pressures that were taken of the intracranial venous sinuses.  There are some prominent cortical venous drainage areas but there is no evidence of arteriovenous fistula or significant flow restriction of the exited this of blood from the brain.  There is a small area of arachnoidal granulations in the left transverse sigmoid junction that is not flow restrictive.  Intracranial pressures are all within normal limits.  No aneurysms or vascular abnormalities are identified intracranially or leading to the brain.  Medical Decision Making:    I confirmed and obtained the above history as recorded by the nurse practitioner acting as a scribe. I performed the above examination and it is documented by the nurse practitioner acting as a scribe.    I have asked Dr. Bill, neuroradiologist review the arteriogram and he will let me know later on today whether or not he sees any abnormality.  I am taking this extra precaution to make sure there were not missing some.    Otherwise from a neurosurgical standpoint there are no indications for further imaging for that matter follow-up.  This vertigo that she is experienced hopefully will not return!  He can restart her aspirin at any time.        Alexandria was seen today for abnormal brain imaging.    Diagnoses and all orders for this visit:    Abnormal brain MRI    Vertigo      Return if symptoms worsen or fail to improve.

## 2018-10-18 RX ORDER — VALACYCLOVIR HYDROCHLORIDE 500 MG/1
TABLET, FILM COATED ORAL
Qty: 4 TABLET | Refills: 0 | Status: SHIPPED | OUTPATIENT
Start: 2018-10-18 | End: 2019-08-14 | Stop reason: SDUPTHER

## 2018-10-18 RX ORDER — OMEPRAZOLE 40 MG/1
CAPSULE, DELAYED RELEASE ORAL
Qty: 90 CAPSULE | Refills: 0 | Status: SHIPPED | OUTPATIENT
Start: 2018-10-18 | End: 2019-01-30 | Stop reason: SDUPTHER

## 2018-10-18 RX ORDER — CONJUGATED ESTROGENS 0.3 MG/1
TABLET, FILM COATED ORAL
Qty: 90 TABLET | Refills: 0 | Status: SHIPPED | OUTPATIENT
Start: 2018-10-18 | End: 2019-01-20 | Stop reason: SDUPTHER

## 2018-10-30 RX ORDER — DESVENLAFAXINE SUCCINATE 50 MG/1
50 TABLET, EXTENDED RELEASE ORAL DAILY
Qty: 90 TABLET | Refills: 1 | Status: SHIPPED | OUTPATIENT
Start: 2018-10-30 | End: 2019-01-02 | Stop reason: SDUPTHER

## 2018-11-26 DIAGNOSIS — F41.9 ANXIETY: Primary | ICD-10-CM

## 2018-11-27 RX ORDER — ALPRAZOLAM 0.5 MG/1
TABLET ORAL
Qty: 45 TABLET | Refills: 0 | Status: SHIPPED | OUTPATIENT
Start: 2018-11-27 | End: 2019-01-17 | Stop reason: SDUPTHER

## 2018-12-13 RX ORDER — FENOFIBRATE 145 MG/1
TABLET, COATED ORAL
Qty: 90 TABLET | Refills: 0 | Status: SHIPPED | OUTPATIENT
Start: 2018-12-13 | End: 2019-03-14 | Stop reason: SDUPTHER

## 2019-01-02 RX ORDER — DESVENLAFAXINE SUCCINATE 50 MG/1
50 TABLET, EXTENDED RELEASE ORAL DAILY
Qty: 90 TABLET | Refills: 1 | Status: SHIPPED | OUTPATIENT
Start: 2019-01-02 | End: 2019-03-04 | Stop reason: SDUPTHER

## 2019-01-17 DIAGNOSIS — F41.9 ANXIETY: ICD-10-CM

## 2019-01-18 RX ORDER — ALPRAZOLAM 0.5 MG/1
TABLET ORAL
Qty: 45 TABLET | Refills: 0 | Status: SHIPPED | OUTPATIENT
Start: 2019-01-18 | End: 2019-03-14 | Stop reason: SDUPTHER

## 2019-01-18 RX ORDER — TRAZODONE HYDROCHLORIDE 50 MG/1
TABLET ORAL
Qty: 30 TABLET | Refills: 5 | Status: SHIPPED | OUTPATIENT
Start: 2019-01-18 | End: 2019-10-29 | Stop reason: SDUPTHER

## 2019-01-22 RX ORDER — CONJUGATED ESTROGENS 0.3 MG/1
TABLET, FILM COATED ORAL
Qty: 90 TABLET | Refills: 0 | Status: SHIPPED | OUTPATIENT
Start: 2019-01-22 | End: 2019-04-25 | Stop reason: SDUPTHER

## 2019-01-31 RX ORDER — OMEPRAZOLE 40 MG/1
CAPSULE, DELAYED RELEASE ORAL
Qty: 90 CAPSULE | Refills: 0 | Status: SHIPPED | OUTPATIENT
Start: 2019-01-31 | End: 2019-05-15 | Stop reason: SDUPTHER

## 2019-02-22 ENCOUNTER — TRANSCRIBE ORDERS (OUTPATIENT)
Dept: ADMINISTRATIVE | Facility: HOSPITAL | Age: 74
End: 2019-02-22

## 2019-02-22 DIAGNOSIS — M75.101 TEAR OF RIGHT ROTATOR CUFF, UNSPECIFIED TEAR EXTENT: Primary | ICD-10-CM

## 2019-02-25 ENCOUNTER — TELEPHONE (OUTPATIENT)
Dept: FAMILY MEDICINE CLINIC | Facility: CLINIC | Age: 74
End: 2019-02-25

## 2019-02-25 DIAGNOSIS — E78.5 HYPERLIPIDEMIA, UNSPECIFIED HYPERLIPIDEMIA TYPE: Primary | ICD-10-CM

## 2019-02-25 DIAGNOSIS — R73.9 HYPERGLYCEMIA: ICD-10-CM

## 2019-02-25 RX ORDER — ROSUVASTATIN CALCIUM 10 MG/1
10 TABLET, COATED ORAL NIGHTLY
Qty: 90 TABLET | Refills: 1 | Status: SHIPPED | OUTPATIENT
Start: 2019-02-25 | End: 2019-08-31 | Stop reason: SDUPTHER

## 2019-02-26 RX ORDER — ROSUVASTATIN CALCIUM 10 MG/1
TABLET, COATED ORAL
Qty: 90 TABLET | Refills: 0 | OUTPATIENT
Start: 2019-02-26

## 2019-02-28 ENCOUNTER — HOSPITAL ENCOUNTER (OUTPATIENT)
Dept: MRI IMAGING | Facility: HOSPITAL | Age: 74
Discharge: HOME | End: 2019-02-28
Admitting: ORTHOPAEDIC SURGERY

## 2019-02-28 DIAGNOSIS — M75.101 TEAR OF RIGHT ROTATOR CUFF, UNSPECIFIED TEAR EXTENT: ICD-10-CM

## 2019-02-28 PROCEDURE — 73221 MRI JOINT UPR EXTREM W/O DYE: CPT

## 2019-03-01 ENCOUNTER — RESULTS ENCOUNTER (OUTPATIENT)
Dept: FAMILY MEDICINE CLINIC | Facility: CLINIC | Age: 74
End: 2019-03-01

## 2019-03-01 DIAGNOSIS — E78.5 HYPERLIPIDEMIA, UNSPECIFIED HYPERLIPIDEMIA TYPE: ICD-10-CM

## 2019-03-01 DIAGNOSIS — R73.9 HYPERGLYCEMIA: ICD-10-CM

## 2019-03-04 NOTE — TELEPHONE ENCOUNTER
Pt has been taking 100 mg every other day because her depression has been worse through the winter months, so she is almost out of the Pristiq earlier then should be. She is requesting a refill.

## 2019-03-05 NOTE — TELEPHONE ENCOUNTER
Does she want me to refill it at 100 mg daily dosage?  Is she currently taking 50 mg alternating with 100 mg qod?  Or 100 mg qod and opposite days she takes no pristiq?

## 2019-03-05 NOTE — TELEPHONE ENCOUNTER
She is alt 100mg/50mg she said she intends to go back to just the 50mg hopefully soon, once the weather gets better and able to get out more. I believe she want to keep the 50mg qd.

## 2019-03-06 ENCOUNTER — TRANSCRIBE ORDERS (OUTPATIENT)
Dept: PHYSICAL THERAPY | Facility: CLINIC | Age: 74
End: 2019-03-06

## 2019-03-06 DIAGNOSIS — M25.511 ACUTE PAIN OF RIGHT SHOULDER: Primary | ICD-10-CM

## 2019-03-06 RX ORDER — DESVENLAFAXINE SUCCINATE 50 MG/1
50 TABLET, EXTENDED RELEASE ORAL DAILY
Qty: 90 TABLET | Refills: 1 | Status: SHIPPED | OUTPATIENT
Start: 2019-03-06 | End: 2019-03-08 | Stop reason: SDUPTHER

## 2019-03-07 ENCOUNTER — TELEPHONE (OUTPATIENT)
Dept: FAMILY MEDICINE CLINIC | Facility: CLINIC | Age: 74
End: 2019-03-07

## 2019-03-07 DIAGNOSIS — E83.52 HYPERCALCEMIA: Primary | ICD-10-CM

## 2019-03-07 LAB
ALBUMIN SERPL-MCNC: 5.3 G/DL (ref 3.5–5.2)
ALBUMIN/GLOB SERPL: 2.5 G/DL
ALP SERPL-CCNC: 64 U/L (ref 39–117)
ALT SERPL-CCNC: 26 U/L (ref 1–33)
AST SERPL-CCNC: 30 U/L (ref 1–32)
BILIRUB SERPL-MCNC: 0.3 MG/DL (ref 0.1–1.2)
BUN SERPL-MCNC: 20 MG/DL (ref 8–23)
BUN/CREAT SERPL: 26.7 (ref 7–25)
CALCIUM SERPL-MCNC: 10.6 MG/DL (ref 8.6–10.5)
CHLORIDE SERPL-SCNC: 104 MMOL/L (ref 98–107)
CHOLEST SERPL-MCNC: 149 MG/DL (ref 0–200)
CO2 SERPL-SCNC: 26.6 MMOL/L (ref 22–29)
CREAT SERPL-MCNC: 0.75 MG/DL (ref 0.57–1)
GLOBULIN SER CALC-MCNC: 2.1 GM/DL
GLUCOSE SERPL-MCNC: 107 MG/DL (ref 65–99)
HBA1C MFR BLD: 6.3 % (ref 4.8–5.6)
HDLC SERPL-MCNC: 56 MG/DL (ref 40–60)
LDLC SERPL CALC-MCNC: 68 MG/DL (ref 0–100)
LDLC/HDLC SERPL: 1.22 {RATIO}
POTASSIUM SERPL-SCNC: 4.3 MMOL/L (ref 3.5–5.2)
PROT SERPL-MCNC: 7.4 G/DL (ref 6–8.5)
SODIUM SERPL-SCNC: 146 MMOL/L (ref 136–145)
TRIGL SERPL-MCNC: 124 MG/DL (ref 0–150)
TSH SERPL DL<=0.005 MIU/L-ACNC: 2.07 MIU/ML (ref 0.27–4.2)
VLDLC SERPL CALC-MCNC: 24.8 MG/DL (ref 5–40)

## 2019-03-07 NOTE — TELEPHONE ENCOUNTER
Pt said she takes prestiq 1 a day but sometimes she takes 2 a day. This is why she doesn't have any. There are no samples. Please advise

## 2019-03-07 NOTE — TELEPHONE ENCOUNTER
Pt called and said she is early on the pristiq and her ins will not pay for it until next week. Do you have sample?

## 2019-03-08 RX ORDER — METFORMIN HYDROCHLORIDE 500 MG/1
TABLET, EXTENDED RELEASE ORAL
Qty: 30 TABLET | Refills: 2 | Status: SHIPPED | OUTPATIENT
Start: 2019-03-08 | End: 2019-05-29 | Stop reason: SDUPTHER

## 2019-03-08 RX ORDER — DESVENLAFAXINE 100 MG/1
100 TABLET, EXTENDED RELEASE ORAL DAILY
Qty: 90 TABLET | Refills: 0 | Status: SHIPPED | OUTPATIENT
Start: 2019-03-08 | End: 2019-08-05 | Stop reason: SDUPTHER

## 2019-03-08 NOTE — TELEPHONE ENCOUNTER
Could we prescribe pristiq 100 mg and she half the tablet on the days she wants 50 mg and takes a whole tablet on the other days.
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
## 2019-03-13 ENCOUNTER — TREATMENT (OUTPATIENT)
Dept: PHYSICAL THERAPY | Facility: CLINIC | Age: 74
End: 2019-03-13

## 2019-03-13 DIAGNOSIS — S40.011D CONTUSION OF MULTIPLE SITES OF RIGHT SHOULDER, SUBSEQUENT ENCOUNTER: Primary | ICD-10-CM

## 2019-03-13 DIAGNOSIS — M25.511 ACUTE PAIN OF RIGHT SHOULDER: ICD-10-CM

## 2019-03-13 DIAGNOSIS — M75.41 IMPINGEMENT SYNDROME OF RIGHT SHOULDER: ICD-10-CM

## 2019-03-13 PROCEDURE — 97110 THERAPEUTIC EXERCISES: CPT | Performed by: PHYSICAL THERAPIST

## 2019-03-13 PROCEDURE — 97161 PT EVAL LOW COMPLEX 20 MIN: CPT | Performed by: PHYSICAL THERAPIST

## 2019-03-13 PROCEDURE — 97035 APP MDLTY 1+ULTRASOUND EA 15: CPT | Performed by: PHYSICAL THERAPIST

## 2019-03-13 PROCEDURE — G0283 ELEC STIM OTHER THAN WOUND: HCPCS | Performed by: PHYSICAL THERAPIST

## 2019-03-14 DIAGNOSIS — F41.9 ANXIETY: ICD-10-CM

## 2019-03-18 ENCOUNTER — TREATMENT (OUTPATIENT)
Dept: PHYSICAL THERAPY | Facility: CLINIC | Age: 74
End: 2019-03-18

## 2019-03-18 DIAGNOSIS — S40.011D CONTUSION OF MULTIPLE SITES OF RIGHT SHOULDER, SUBSEQUENT ENCOUNTER: Primary | ICD-10-CM

## 2019-03-18 DIAGNOSIS — M75.41 IMPINGEMENT SYNDROME OF RIGHT SHOULDER: ICD-10-CM

## 2019-03-18 DIAGNOSIS — M25.511 ACUTE PAIN OF RIGHT SHOULDER: ICD-10-CM

## 2019-03-18 PROCEDURE — 97140 MANUAL THERAPY 1/> REGIONS: CPT | Performed by: PHYSICAL THERAPIST

## 2019-03-18 PROCEDURE — G0283 ELEC STIM OTHER THAN WOUND: HCPCS | Performed by: PHYSICAL THERAPIST

## 2019-03-18 PROCEDURE — 97035 APP MDLTY 1+ULTRASOUND EA 15: CPT | Performed by: PHYSICAL THERAPIST

## 2019-03-18 PROCEDURE — 97110 THERAPEUTIC EXERCISES: CPT | Performed by: PHYSICAL THERAPIST

## 2019-03-18 NOTE — PROGRESS NOTES
Physical Therapy Daily Progress Note         Visit # : 2  Alexandria Yousif reports: sore with ex, had to decrease to once day.    Subjective     Objective   See Exercise, Manual, and Modality Logs for complete treatment.   TTP right upper trap, levator, serratus, AC joint      Assessment/Plan  Increased shoulder PROM but also increased pain.  Adjusted intensity to light tension vs. Strain and corrected form.  Progress per Plan of Care  Continue gentle ROM and joint mobs until symptoms calmed down.         Manual Therapy:    10     mins  38418;  Therapeutic Exercise:    20     mins  27404;     Neuromuscular Pj:    -    mins  93816;    Therapeutic Activity:     -     mins  00267;     Gait Training:      -     mins  90207;     Ultrasound:     10     mins  99292;    Electrical Stimulation:    15     mins  32103 ( );  Dry Needling           mins self-pay    Timed Treatment:   40   mins   Total Treatment:     70   mins    Aracelis Porter, PT  Physical Therapist

## 2019-03-19 RX ORDER — ALPRAZOLAM 0.5 MG/1
TABLET ORAL
Qty: 45 TABLET | Refills: 0 | Status: SHIPPED | OUTPATIENT
Start: 2019-03-19 | End: 2019-05-03 | Stop reason: SDUPTHER

## 2019-03-19 RX ORDER — FENOFIBRATE 145 MG/1
TABLET, COATED ORAL
Qty: 90 TABLET | Refills: 0 | Status: SHIPPED | OUTPATIENT
Start: 2019-03-19 | End: 2019-06-05 | Stop reason: SDUPTHER

## 2019-03-22 ENCOUNTER — TREATMENT (OUTPATIENT)
Dept: PHYSICAL THERAPY | Facility: CLINIC | Age: 74
End: 2019-03-22

## 2019-03-22 DIAGNOSIS — M75.41 IMPINGEMENT SYNDROME OF RIGHT SHOULDER: ICD-10-CM

## 2019-03-22 DIAGNOSIS — M25.511 ACUTE PAIN OF RIGHT SHOULDER: ICD-10-CM

## 2019-03-22 DIAGNOSIS — S40.011D CONTUSION OF MULTIPLE SITES OF RIGHT SHOULDER, SUBSEQUENT ENCOUNTER: Primary | ICD-10-CM

## 2019-03-22 PROCEDURE — 97035 APP MDLTY 1+ULTRASOUND EA 15: CPT | Performed by: PHYSICAL THERAPIST

## 2019-03-22 PROCEDURE — 97110 THERAPEUTIC EXERCISES: CPT | Performed by: PHYSICAL THERAPIST

## 2019-03-22 PROCEDURE — G0283 ELEC STIM OTHER THAN WOUND: HCPCS | Performed by: PHYSICAL THERAPIST

## 2019-03-22 PROCEDURE — 97140 MANUAL THERAPY 1/> REGIONS: CPT | Performed by: PHYSICAL THERAPIST

## 2019-03-22 NOTE — PROGRESS NOTES
Physical Therapy Daily Progress Note       Visit # : 3  Alexandria Yousif reports: my shoulder feels great, just pain in the muscle now above the shoulder into my neck    Subjective     Objective       Active Range of Motion     Right Shoulder   Flexion: 160 degrees   Abduction: 140 degrees   External rotation 0°: 60 degrees   Internal rotation 0°: 67 degrees      See Exercise, Manual, and Modality Logs for complete treatment.       Assessment/Plan  Improving right shoulder AROM but still tenderness at end range of abduction and ER.  Guarding significant in levator scapula and upper trap on the right.  Progress per Plan of Care           Manual Therapy:    15     mins  91807;  Therapeutic Exercise:    10     mins  89690;     Neuromuscular Pj:         mins  36759;    Therapeutic Activity:           mins  67403;     Gait Training:             mins  17674;     Ultrasound:     10     mins  43797;    Electrical Stimulation:    15     mins  91221 ( );  Dry Needling           mins self-pay    Timed Treatment:   35   mins   Total Treatment:     80   mins    Aracelis Porter, PT  Physical Therapist

## 2019-03-25 ENCOUNTER — TREATMENT (OUTPATIENT)
Dept: PHYSICAL THERAPY | Facility: CLINIC | Age: 74
End: 2019-03-25

## 2019-03-25 DIAGNOSIS — M75.41 IMPINGEMENT SYNDROME OF RIGHT SHOULDER: ICD-10-CM

## 2019-03-25 DIAGNOSIS — M25.511 ACUTE PAIN OF RIGHT SHOULDER: ICD-10-CM

## 2019-03-25 DIAGNOSIS — S40.011D CONTUSION OF MULTIPLE SITES OF RIGHT SHOULDER, SUBSEQUENT ENCOUNTER: Primary | ICD-10-CM

## 2019-03-25 PROCEDURE — 97140 MANUAL THERAPY 1/> REGIONS: CPT | Performed by: PHYSICAL THERAPIST

## 2019-03-25 PROCEDURE — 97110 THERAPEUTIC EXERCISES: CPT | Performed by: PHYSICAL THERAPIST

## 2019-03-25 PROCEDURE — G0283 ELEC STIM OTHER THAN WOUND: HCPCS | Performed by: PHYSICAL THERAPIST

## 2019-03-25 PROCEDURE — 97035 APP MDLTY 1+ULTRASOUND EA 15: CPT | Performed by: PHYSICAL THERAPIST

## 2019-03-25 NOTE — PROGRESS NOTES
Physical Therapy Daily Progress Note         Visit # : 4  Alexandria Yousif reports: my shoulder feels great.  No pain at all.  Still some pain in the neck muscle.  No radiation.    Subjective     Objective       Palpation     Right Tenderness of the suboccipitals and upper trapezius.     Strength/Myotome Testing     Right Shoulder     Planes of Motion   Flexion: 4+   Abduction: 4+   External rotation at 45°: 4+   Internal rotation at 45°: 4+     Additional Strength Details  No pain.    Tests     Right Shoulder   Negative AC shear, belly press, empty can, Hawkin's and Speed's.    Right shoulder AROM:  Full and no pain.    See Exercise, Manual, and Modality Logs for complete treatment.     Assessment/Plan  Right shoulder much improved, just weakness.  Upper trap and paracervicals  from fall with pain on active movement.    Progress per Plan of Care  Add bands tostrengthening exercises in one week then probable d/c to HEP         Manual Therapy:    20     mins  60787;  Therapeutic Exercise:    15     mins  71506;     Neuromuscular Pj:    -    mins  23849;    Therapeutic Activity:     -     mins  94576;     Gait Training:      -     mins  70809;     Ultrasound:     8    mins  46511;    Electrical Stimulation:    15     mins  84199 ( );  Dry Needling     -     mins self-pay    Timed Treatment:   43   mins   Total Treatment:     60   mins    Aracelis Porter, PT  Physical Therapist

## 2019-03-29 DIAGNOSIS — E83.52 HYPERCALCEMIA: ICD-10-CM

## 2019-04-01 RX ORDER — RANITIDINE 150 MG/1
TABLET ORAL
Qty: 90 TABLET | Refills: 0 | Status: SHIPPED | OUTPATIENT
Start: 2019-04-01 | End: 2019-09-26 | Stop reason: ALTCHOICE

## 2019-04-01 RX ORDER — LEVETIRACETAM 500 MG/1
TABLET ORAL
Qty: 90 TABLET | Refills: 0 | Status: SHIPPED | OUTPATIENT
Start: 2019-04-01 | End: 2019-10-09 | Stop reason: SDUPTHER

## 2019-04-04 ENCOUNTER — TELEPHONE (OUTPATIENT)
Dept: FAMILY MEDICINE CLINIC | Facility: CLINIC | Age: 74
End: 2019-04-04

## 2019-04-04 DIAGNOSIS — R30.0 DYSURIA: Primary | ICD-10-CM

## 2019-04-04 NOTE — TELEPHONE ENCOUNTER
Pt called office today r/t having painful urination and urgency. Pt requested that I add a urinalysis onto her lab order.  Pt was advised after speaking to practice manager that we could to the U/A but there would be no one to read this until Monday r/t both APRN being book up today and tomorrow. Pt was ok with that and stated that she would prefer to wait on poole.  Lab order placed.

## 2019-04-05 ENCOUNTER — TREATMENT (OUTPATIENT)
Dept: PHYSICAL THERAPY | Facility: CLINIC | Age: 74
End: 2019-04-05

## 2019-04-05 DIAGNOSIS — S40.011D CONTUSION OF MULTIPLE SITES OF RIGHT SHOULDER, SUBSEQUENT ENCOUNTER: Primary | ICD-10-CM

## 2019-04-05 DIAGNOSIS — M25.511 ACUTE PAIN OF RIGHT SHOULDER: ICD-10-CM

## 2019-04-05 DIAGNOSIS — M75.41 IMPINGEMENT SYNDROME OF RIGHT SHOULDER: ICD-10-CM

## 2019-04-05 LAB
ALBUMIN SERPL-MCNC: 5.3 G/DL (ref 3.5–5.2)
ALBUMIN/GLOB SERPL: 2.5 G/DL
ALP SERPL-CCNC: 65 U/L (ref 39–117)
ALT SERPL-CCNC: 28 U/L (ref 1–33)
AST SERPL-CCNC: 28 U/L (ref 1–32)
BILIRUB SERPL-MCNC: 0.2 MG/DL (ref 0.2–1.2)
BUN SERPL-MCNC: 15 MG/DL (ref 8–23)
BUN/CREAT SERPL: 20.5 (ref 7–25)
CA-I SERPL ISE-MCNC: 5.5 MG/DL (ref 4.5–5.6)
CALCIUM SERPL-MCNC: 10.5 MG/DL (ref 8.6–10.5)
CHLORIDE SERPL-SCNC: 101 MMOL/L (ref 98–107)
CO2 SERPL-SCNC: 28.4 MMOL/L (ref 22–29)
CREAT SERPL-MCNC: 0.73 MG/DL (ref 0.57–1)
GLOBULIN SER CALC-MCNC: 2.1 GM/DL
GLUCOSE SERPL-MCNC: 90 MG/DL (ref 65–99)
POTASSIUM SERPL-SCNC: 4.5 MMOL/L (ref 3.5–5.2)
PROT SERPL-MCNC: 7.4 G/DL (ref 6–8.5)
PTH-INTACT SERPL-MCNC: 27 PG/ML (ref 15–65)
SODIUM SERPL-SCNC: 141 MMOL/L (ref 136–145)

## 2019-04-05 PROCEDURE — G0283 ELEC STIM OTHER THAN WOUND: HCPCS | Performed by: PHYSICAL THERAPIST

## 2019-04-05 PROCEDURE — 97140 MANUAL THERAPY 1/> REGIONS: CPT | Performed by: PHYSICAL THERAPIST

## 2019-04-05 PROCEDURE — 97110 THERAPEUTIC EXERCISES: CPT | Performed by: PHYSICAL THERAPIST

## 2019-04-05 NOTE — PROGRESS NOTES
Physical Therapy Daily Progress Note/Discharge Summary     Patient was seen for a total of 5 visits.  Treatments consisted of ROM, manual, ultrasound, estim, and heat.  Visit # : 5  Alexandria Yousif reports: my shoulder feels really good.  I've returned to most activities without any problem.    Subjective Evaluation    Pain  Location: neck only.           Objective       Palpation     Additional Palpation Details  SA space    Active Range of Motion     Right Shoulder   Normal active range of motion    Additional Active Range of Motion Details  No significant pain.    Joint Play     Right Shoulder  Joints within functional limits are the anterior capsule, posterior capsule and inferior capsule.     Strength/Myotome Testing     Right Shoulder     Planes of Motion   Flexion: 5   Abduction: 5   External rotation at 0°: 4+   Internal rotation at 0°: 5     Isolated Muscles   Supraspinatus: 4+     Tests     Right Shoulder   Positive empty can.   Negative active compression ([place]), Hawkin's and Neer's.      See Exercise, Manual, and Modality Logs for complete treatment.       Assessment/Plan  Patient independent with HEP, has normal ROM, and near full strength.  Ready for discharge.  Goals met.  [facility] to [facility]           Manual Therapy:    15     mins  47186;  Therapeutic Exercise:    15     mins  52823;     Neuromuscular Pj:    -    mins  93544;    Therapeutic Activity:     -     mins  91578;     Gait Training:      -     mins  82486;     Ultrasound:     -     mins  98078;    Electrical Stimulation:    15     mins  16155 ( );  Dry Needling     -     mins self-pay    Timed Treatment:   30   mins   Total Treatment:     65   mins    Aracelis Porter PT  Physical Therapist

## 2019-04-08 LAB
APPEARANCE UR: CLEAR
BACTERIA #/AREA URNS HPF: ABNORMAL /HPF
BACTERIA UR CULT: NORMAL
BACTERIA UR CULT: NORMAL
BILIRUB UR QL STRIP: NEGATIVE
COLOR UR: YELLOW
CRYSTALS URNS MICRO: ABNORMAL
EPI CELLS #/AREA URNS HPF: ABNORMAL /HPF
GLUCOSE UR QL: NEGATIVE
HGB UR QL STRIP: NEGATIVE
KETONES UR QL STRIP: NEGATIVE
LEUKOCYTE ESTERASE UR QL STRIP: ABNORMAL
MICRO URNS: ABNORMAL
NITRITE UR QL STRIP: NEGATIVE
PH UR STRIP: 5.5 [PH] (ref 5–7.5)
PROT UR QL STRIP: NEGATIVE
RBC #/AREA URNS HPF: ABNORMAL /HPF
SP GR UR: 1.02 (ref 1–1.03)
UNIDENT CRYS URNS QL MICRO: PRESENT /LPF
URINALYSIS REFLEX: ABNORMAL
UROBILINOGEN UR STRIP-MCNC: 0.2 MG/DL (ref 0.2–1)
WBC #/AREA URNS HPF: ABNORMAL /HPF

## 2019-04-09 ENCOUNTER — TELEPHONE (OUTPATIENT)
Dept: FAMILY MEDICINE CLINIC | Facility: CLINIC | Age: 74
End: 2019-04-09

## 2019-04-09 NOTE — TELEPHONE ENCOUNTER
----- Message from Anita Muse MD sent at 4/9/2019 12:49 PM EDT -----  Regarding: UA  UA normal other than trace leucocytes  and cx neg.  No infection

## 2019-04-10 ENCOUNTER — TELEPHONE (OUTPATIENT)
Dept: FAMILY MEDICINE CLINIC | Facility: CLINIC | Age: 74
End: 2019-04-10

## 2019-04-10 NOTE — TELEPHONE ENCOUNTER
I notified PT of lab results. What is CX stand for? If Chest X-ray Pt said she didn't have one so Im confused. Please help. Thank you.

## 2019-04-25 ENCOUNTER — TELEPHONE (OUTPATIENT)
Dept: CARDIOLOGY | Facility: CLINIC | Age: 74
End: 2019-04-25

## 2019-04-25 DIAGNOSIS — R55 PRE-SYNCOPE: Primary | ICD-10-CM

## 2019-04-25 RX ORDER — CONJUGATED ESTROGENS 0.3 MG/1
TABLET, FILM COATED ORAL
Qty: 90 TABLET | Refills: 0 | Status: SHIPPED | OUTPATIENT
Start: 2019-04-25 | End: 2019-07-22 | Stop reason: SDUPTHER

## 2019-04-25 NOTE — TELEPHONE ENCOUNTER
"04/25/19  11:13 AM  Alexandria Yousif  1945  Home Phone [phone]   Mobile [phone]       Alexandria Yousif is a patient of Dr. Rhodes, calling in saying her ENT saw her for some L ear noise w/o infection, intermittent, X 6-8 weeks, after lying down and getting up, turning head quickly, positional. W/her hx of TIAs last fall, the ENT said she \"needed to see her cardiologist to have her carotids checked again\".    Made an appt w/Iona Nova on 5/2/19 @ 2:30pm.     The ENT did not take a BP/HR while she was there, and she did not have BP cuff at home.    Also, current cardiac meds are:    Fenofibrate 145mg daily  Rosuvastatin 10mg nightly  Aspirin 81mg daily    Just blue, Dr. Rhodes/Iona.    Shalini Thomas  Triage RN        "

## 2019-04-25 NOTE — TELEPHONE ENCOUNTER
Iona,    I ordered an echocardiogram with bubble study and a carotid Doppler.  Can you please schedule on the same day with Iona Nova?  Can you please let the patient know?  Thanks     NATHAN

## 2019-04-29 ENCOUNTER — TREATMENT (OUTPATIENT)
Dept: PHYSICAL THERAPY | Facility: CLINIC | Age: 74
End: 2019-04-29

## 2019-04-29 DIAGNOSIS — M75.41 IMPINGEMENT SYNDROME OF RIGHT SHOULDER: ICD-10-CM

## 2019-04-29 DIAGNOSIS — M25.511 ACUTE PAIN OF RIGHT SHOULDER: Primary | ICD-10-CM

## 2019-04-29 PROCEDURE — 97110 THERAPEUTIC EXERCISES: CPT | Performed by: PHYSICAL THERAPIST

## 2019-04-29 PROCEDURE — G0283 ELEC STIM OTHER THAN WOUND: HCPCS | Performed by: PHYSICAL THERAPIST

## 2019-04-29 PROCEDURE — 97140 MANUAL THERAPY 1/> REGIONS: CPT | Performed by: PHYSICAL THERAPIST

## 2019-04-29 PROCEDURE — 97530 THERAPEUTIC ACTIVITIES: CPT | Performed by: PHYSICAL THERAPIST

## 2019-04-29 NOTE — PROGRESS NOTES
Re-Assessment / Re-Certification       Patient: Alexandria Yousif   : 1945  Diagnosis/ICD-10 Code:  Acute pain of right shoulder [M25.511]  Referring practitioner: Amador Acosta MD  Date of Initial Visit: 2019  Today's Date: 2019  Patient seen for 1 sessions      Subjective:   Alexandria Yousif    Subjective Questionnaire: QuickDASH: 39%  Clinical Progress: worse  Home Program Compliance: Yes  Treatment has included: HEP    Subjective Evaluation    History of Present Illness  Mechanism of injury: Shoulder was doing better.    Went back to the gym, used the stretching machine with arms overhead and walking using arm.Then potted geraniums.  Couldn't hardly move next day. Used Kepra at night, rested, heat worked better and TENS. Neck and shoulder flared up. Now radiating pain down right shoulder.    Pain  At worst pain rating: 10  Location: running down right arm nerve, right upper trap, to posterior shoulder.  Quality: radiating         Objective       Palpation     Right Tenderness of the biceps, cervical paraspinals, infraspinatus, suboccipitals, supraspinatus and upper trapezius.     Neurological Testing     Sensation   Cervical/Thoracic   Left   Intact: light touch    Right   Intact: light touch    Shoulder   Left Shoulder   Intact: light touch    Right Shoulder   Intact: light touch    Reflexes   Left   Biceps (C5/C6): normal (2+)  Brachioradialis (C6): normal (2+)  Triceps (C7): normal (2+)    Right   Biceps (C5/C6): normal (2+)  Brachioradialis (C6): normal (2+)  Triceps (C7): normal (2+)    Passive Range of Motion     Right Shoulder   Flexion: 130 degrees with pain  Abduction: 140 degrees with pain  External rotation 90°: 78 degrees with pain  Internal rotation 90°: 82 degrees with pain    Strength/Myotome Testing     Right Shoulder     Planes of Motion   Flexion: 3+   Abduction: 3+   External rotation at 0°: 5   Internal rotation at 0°: 4     Isolated Muscles   Biceps: 5   Supraspinatus: 3+    Triceps: 5   Upper trapezius: 5     Additional Strength Details  Pain limited.    Tests   Cervical     Right   Positive cervical distraction and Spurling's sign.     Right Shoulder   Positive AC shear, active compression ([place]), empty can, Neer's and painful arc.   Negative anterior load and shift, belly press, drop arm, lift-off and posterior load and shift.      Assessment & Plan     Assessment  Impairments: abnormal muscle firing, abnormal muscle tone, abnormal or restricted ROM, activity intolerance, impaired physical strength and pain with function  Functional Limitations: carrying objects, lifting, sleeping, walking, pulling, pushing, uncomfortable because of pain, reaching overhead and unable to perform repetitive tasks  Plan  Therapy options: will be seen for skilled physical therapy services  Planned modality interventions: electrical stimulation/Russian stimulation, thermotherapy (hydrocollator packs), ultrasound and iontophoresis  Planned therapy interventions: manual therapy, home exercise program, functional ROM exercises, therapeutic activities, stretching and strengthening  Frequency: 2x week  Duration in visits: 12  Treatment plan discussed with: patient       Patient presents with acute exacerbation of previous right shoulder impingement and cervical degenerative changes.  She has regressed in her active and PROM in the shoulder, AROM cervical, and and positive impingement signs.  Cervical compression and distraction are positive.           GOALS:  STG X4 weeks  1.  Patient will tolerate and be compliant with HEP.  2.  Increase shoulder ROM by 10 degrees in flexion.  3.  Increase right shoulder ROM to 80 ER.  4.  Actively retract shoulders without hiking shoulder.  LTG X8 weeks  1.  Full AROM right shoulder and 75% cervical spine.  2.  Strength at least 4+/5.  3.  Tolerate ADL's, driving, and light hobbies.      Recommendations: Resume therapy  Timeframe: 1 month  Prognosis to achieve goals:  good    PT Signature: Aracelis Porter, PT      Based upon review of the patient's progress and continued therapy plan, it is my medical opinion that Alexandria Yousif should [facility]  [address] [facility] [address] [phone].    Signature: __________________________________  Amador Acosta MD    Manual Therapy:    15     mins  02853;  Therapeutic Exercise:    15     mins  48022;     Neuromuscular Pj:    -    mins  27341;    Therapeutic Activity:    10     mins  79858;     Gait Training:      -     mins  18666;     Ultrasound:     10     mins  93870;    Electrical Stimulation:    15     mins  49803 ( );  Dry Needling     -     mins self-pay    Timed Treatment:   50   mins   Total Treatment:     70   mins

## 2019-04-30 NOTE — PATIENT INSTRUCTIONS
Patient was educated on findings of evaluation, purpose of treatment, and goals for therapy.  Treatment options discussed and questions answered.  Patient was educated on exercises/self treatment/pain relief techniques.

## 2019-05-02 ENCOUNTER — HOSPITAL ENCOUNTER (OUTPATIENT)
Dept: CARDIOLOGY | Facility: HOSPITAL | Age: 74
Discharge: HOME | End: 2019-05-02

## 2019-05-02 ENCOUNTER — HOSPITAL ENCOUNTER (OUTPATIENT)
Dept: CARDIOLOGY | Facility: HOSPITAL | Age: 74
Discharge: HOME | End: 2019-05-02
Admitting: INTERNAL MEDICINE

## 2019-05-02 VITALS
HEIGHT: 66 IN | BODY MASS INDEX: 21.69 KG/M2 | WEIGHT: 135 LBS | HEART RATE: 75 BPM | SYSTOLIC BLOOD PRESSURE: 122 MMHG | DIASTOLIC BLOOD PRESSURE: 70 MMHG

## 2019-05-02 DIAGNOSIS — R55 PRE-SYNCOPE: ICD-10-CM

## 2019-05-02 LAB
BH CV ECHO MEAS - ACS: 1.7 CM
BH CV ECHO MEAS - AO MAX PG (FULL): 5.8 MMHG
BH CV ECHO MEAS - AO MAX PG: 11.1 MMHG
BH CV ECHO MEAS - AO MEAN PG (FULL): 2.5 MMHG
BH CV ECHO MEAS - AO MEAN PG: 5.5 MMHG
BH CV ECHO MEAS - AO ROOT AREA (BSA CORRECTED): 1.6
BH CV ECHO MEAS - AO ROOT AREA: 5.5 CM^2
BH CV ECHO MEAS - AO ROOT DIAM: 2.6 CM
BH CV ECHO MEAS - AO V2 MAX: 166.5 CM/SEC
BH CV ECHO MEAS - AO V2 MEAN: 105.8 CM/SEC
BH CV ECHO MEAS - AO V2 VTI: 32.8 CM
BH CV ECHO MEAS - AVA(I,A): 2.2 CM^2
BH CV ECHO MEAS - AVA(I,D): 2.2 CM^2
BH CV ECHO MEAS - AVA(V,A): 1.9 CM^2
BH CV ECHO MEAS - AVA(V,D): 1.9 CM^2
BH CV ECHO MEAS - BSA(HAYCOCK): 1.7 M^2
BH CV ECHO MEAS - BSA: 1.7 M^2
BH CV ECHO MEAS - BZI_BMI: 21.8 KILOGRAMS/M^2
BH CV ECHO MEAS - BZI_METRIC_HEIGHT: 167.6 CM
BH CV ECHO MEAS - BZI_METRIC_WEIGHT: 61.2 KG
BH CV ECHO MEAS - EDV(MOD-SP2): 50 ML
BH CV ECHO MEAS - EDV(MOD-SP4): 43 ML
BH CV ECHO MEAS - EDV(TEICH): 82.7 ML
BH CV ECHO MEAS - EF(CUBED): 84 %
BH CV ECHO MEAS - EF(MOD-BP): 67 %
BH CV ECHO MEAS - EF(MOD-SP2): 68 %
BH CV ECHO MEAS - EF(MOD-SP4): 67.4 %
BH CV ECHO MEAS - EF(TEICH): 77.4 %
BH CV ECHO MEAS - ESV(MOD-SP2): 16 ML
BH CV ECHO MEAS - ESV(MOD-SP4): 14 ML
BH CV ECHO MEAS - ESV(TEICH): 18.7 ML
BH CV ECHO MEAS - FS: 45.7 %
BH CV ECHO MEAS - IVS/LVPW: 1
BH CV ECHO MEAS - IVSD: 1 CM
BH CV ECHO MEAS - LAT PEAK E' VEL: 8 CM/SEC
BH CV ECHO MEAS - LV DIASTOLIC VOL/BSA (35-75): 25.4 ML/M^2
BH CV ECHO MEAS - LV MASS(C)D: 141.7 GRAMS
BH CV ECHO MEAS - LV MASS(C)DI: 83.7 GRAMS/M^2
BH CV ECHO MEAS - LV MAX PG: 5.3 MMHG
BH CV ECHO MEAS - LV MEAN PG: 3.1 MMHG
BH CV ECHO MEAS - LV SYSTOLIC VOL/BSA (12-30): 8.3 ML/M^2
BH CV ECHO MEAS - LV V1 MAX: 115 CM/SEC
BH CV ECHO MEAS - LV V1 MEAN: 82.4 CM/SEC
BH CV ECHO MEAS - LV V1 VTI: 26.1 CM
BH CV ECHO MEAS - LVIDD: 4.3 CM
BH CV ECHO MEAS - LVIDS: 2.3 CM
BH CV ECHO MEAS - LVLD AP2: 6 CM
BH CV ECHO MEAS - LVLD AP4: 6.3 CM
BH CV ECHO MEAS - LVLS AP2: 5.1 CM
BH CV ECHO MEAS - LVLS AP4: 4.9 CM
BH CV ECHO MEAS - LVOT AREA (M): 2.8 CM^2
BH CV ECHO MEAS - LVOT AREA: 2.8 CM^2
BH CV ECHO MEAS - LVOT DIAM: 1.9 CM
BH CV ECHO MEAS - LVPWD: 1 CM
BH CV ECHO MEAS - MED PEAK E' VEL: 6 CM/SEC
BH CV ECHO MEAS - MV A DUR: 0.14 SEC
BH CV ECHO MEAS - MV A MAX VEL: 84.4 CM/SEC
BH CV ECHO MEAS - MV DEC SLOPE: 588.3 CM/SEC^2
BH CV ECHO MEAS - MV DEC TIME: 0.2 SEC
BH CV ECHO MEAS - MV E MAX VEL: 105 CM/SEC
BH CV ECHO MEAS - MV E/A: 1.2
BH CV ECHO MEAS - MV MAX PG: 3.9 MMHG
BH CV ECHO MEAS - MV MEAN PG: 2.2 MMHG
BH CV ECHO MEAS - MV P1/2T MAX VEL: 112.2 CM/SEC
BH CV ECHO MEAS - MV P1/2T: 55.9 MSEC
BH CV ECHO MEAS - MV V2 MAX: 98.9 CM/SEC
BH CV ECHO MEAS - MV V2 MEAN: 68.6 CM/SEC
BH CV ECHO MEAS - MV V2 VTI: 32.8 CM
BH CV ECHO MEAS - MVA P1/2T LCG: 2 CM^2
BH CV ECHO MEAS - MVA(P1/2T): 3.9 CM^2
BH CV ECHO MEAS - MVA(VTI): 2.2 CM^2
BH CV ECHO MEAS - PA MAX PG (FULL): 1.5 MMHG
BH CV ECHO MEAS - PA MAX PG: 4.6 MMHG
BH CV ECHO MEAS - PA V2 MAX: 107.4 CM/SEC
BH CV ECHO MEAS - PULM A REVS DUR: 0.12 SEC
BH CV ECHO MEAS - PULM A REVS VEL: 36.4 CM/SEC
BH CV ECHO MEAS - PULM DIAS VEL: 47.5 CM/SEC
BH CV ECHO MEAS - PULM S/D: 0.94
BH CV ECHO MEAS - PULM SYS VEL: 44.6 CM/SEC
BH CV ECHO MEAS - PVA(V,A): 1.6 CM^2
BH CV ECHO MEAS - PVA(V,D): 1.6 CM^2
BH CV ECHO MEAS - QP/QS: 0.58
BH CV ECHO MEAS - RAP SYSTOLE: 3 MMHG
BH CV ECHO MEAS - RV MAX PG: 3.1 MMHG
BH CV ECHO MEAS - RV MEAN PG: 2 MMHG
BH CV ECHO MEAS - RV V1 MAX: 87.8 CM/SEC
BH CV ECHO MEAS - RV V1 MEAN: 65.8 CM/SEC
BH CV ECHO MEAS - RV V1 VTI: 21.6 CM
BH CV ECHO MEAS - RVOT AREA: 2 CM^2
BH CV ECHO MEAS - RVOT DIAM: 1.6 CM
BH CV ECHO MEAS - RVSP: 24 MMHG
BH CV ECHO MEAS - SI(AO): 106.8 ML/M^2
BH CV ECHO MEAS - SI(CUBED): 39.3 ML/M^2
BH CV ECHO MEAS - SI(LVOT): 43.1 ML/M^2
BH CV ECHO MEAS - SI(MOD-SP2): 20.1 ML/M^2
BH CV ECHO MEAS - SI(MOD-SP4): 17.1 ML/M^2
BH CV ECHO MEAS - SI(TEICH): 37.8 ML/M^2
BH CV ECHO MEAS - SUP REN AO DIAM: 1.7 CM
BH CV ECHO MEAS - SV(AO): 180.8 ML
BH CV ECHO MEAS - SV(CUBED): 66.4 ML
BH CV ECHO MEAS - SV(LVOT): 73 ML
BH CV ECHO MEAS - SV(MOD-SP2): 34 ML
BH CV ECHO MEAS - SV(MOD-SP4): 29 ML
BH CV ECHO MEAS - SV(RVOT): 42.5 ML
BH CV ECHO MEAS - SV(TEICH): 64 ML
BH CV ECHO MEAS - TAPSE (>1.6): 1.9 CM2
BH CV ECHO MEAS - TR MAX VEL: 238.9 CM/SEC
BH CV ECHO MEASUREMENTS AVERAGE E/E' RATIO: 15
BH CV XLRA - RV BASE: 2.3 CM
BH CV XLRA - TDI S': 16 CM/SEC
LEFT ATRIUM VOLUME INDEX: 15 ML/M2
LEFT ATRIUM VOLUME: 25 CM3
MAXIMAL PREDICTED HEART RATE: 147 BPM
SINUS: 2.8 CM
STJ: 2.9 CM
STRESS TARGET HR: 125 BPM

## 2019-05-02 PROCEDURE — 93306 TTE W/DOPPLER COMPLETE: CPT | Performed by: INTERNAL MEDICINE

## 2019-05-02 PROCEDURE — 93880 EXTRACRANIAL BILAT STUDY: CPT | Performed by: INTERNAL MEDICINE

## 2019-05-02 PROCEDURE — 93306 TTE W/DOPPLER COMPLETE: CPT

## 2019-05-02 PROCEDURE — 93880 EXTRACRANIAL BILAT STUDY: CPT

## 2019-05-03 ENCOUNTER — TREATMENT (OUTPATIENT)
Dept: PHYSICAL THERAPY | Facility: CLINIC | Age: 74
End: 2019-05-03

## 2019-05-03 DIAGNOSIS — F41.9 ANXIETY: ICD-10-CM

## 2019-05-03 DIAGNOSIS — M25.511 ACUTE PAIN OF RIGHT SHOULDER: Primary | ICD-10-CM

## 2019-05-03 DIAGNOSIS — M75.41 IMPINGEMENT SYNDROME OF RIGHT SHOULDER: ICD-10-CM

## 2019-05-03 PROCEDURE — 97140 MANUAL THERAPY 1/> REGIONS: CPT | Performed by: PHYSICAL THERAPIST

## 2019-05-03 PROCEDURE — 97110 THERAPEUTIC EXERCISES: CPT | Performed by: PHYSICAL THERAPIST

## 2019-05-03 PROCEDURE — G0283 ELEC STIM OTHER THAN WOUND: HCPCS | Performed by: PHYSICAL THERAPIST

## 2019-05-03 NOTE — PROGRESS NOTES
Physical Therapy Daily Progress Note         Visit # : 2  Alexandria Yousif reports: much better after last treatment.  Had to have a carotid us yesterday and my neck flared up but shoulder still feels good.  No longer pain radiating down shoulder.    Subjective     Objective   See Exercise, Manual, and Modality Logs for complete treatment.   Slight ttp right SA space.  Moderate tenderness right upper trap  (+) cervical compression/distraction.    Assessment/Plan  Much improved right shoulder A/PROM with decreased pain.  Still with limited right IR to ~60 degrees.  Added stretch to improve posterior capsule mobility.   Progress per Plan of Care  Add gentle theraband next visit if tolerated.          Manual Therapy:    15     mins  07508;  Therapeutic Exercise:    10     mins  59870;     Neuromuscular Pj:    -    mins  30091;    Therapeutic Activity:     -     mins  56919;     Gait Training:      -     mins  18669;     Ultrasound:     8     mins  06399;    Electrical Stimulation:    15     mins  69155 ( );  Dry Needling     -     mins self-pay    Timed Treatment:   33   mins   Total Treatment:     60   mins    Aracelis Porter, PT  Physical Therapist

## 2019-05-06 LAB
BH CV XLRA MEAS LEFT DIST CCA EDV: 29 CM/SEC
BH CV XLRA MEAS LEFT DIST CCA PSV: 97.2 CM/SEC
BH CV XLRA MEAS LEFT DIST ICA EDV: -32.1 CM/SEC
BH CV XLRA MEAS LEFT DIST ICA PSV: -101.9 CM/SEC
BH CV XLRA MEAS LEFT ICA/CCA RATIO: 1
BH CV XLRA MEAS LEFT MID CCA EDV: 24.3 CM/SEC
BH CV XLRA MEAS LEFT MID CCA PSV: 91.7 CM/SEC
BH CV XLRA MEAS LEFT MID ICA EDV: -20.4 CM/SEC
BH CV XLRA MEAS LEFT MID ICA PSV: -59.6 CM/SEC
BH CV XLRA MEAS LEFT PROX CCA EDV: 23.5 CM/SEC
BH CV XLRA MEAS LEFT PROX CCA PSV: 98 CM/SEC
BH CV XLRA MEAS LEFT PROX ECA PSV: -78.4 CM/SEC
BH CV XLRA MEAS LEFT PROX ICA EDV: -15.7 CM/SEC
BH CV XLRA MEAS LEFT PROX ICA PSV: -80 CM/SEC
BH CV XLRA MEAS LEFT PROX SCLA PSV: 111.2 CM/SEC
BH CV XLRA MEAS LEFT VERTEBRAL A PSV: -73.7 CM/SEC
BH CV XLRA MEAS RIGHT DIST CCA EDV: 30.8 CM/SEC
BH CV XLRA MEAS RIGHT DIST CCA PSV: 96 CM/SEC
BH CV XLRA MEAS RIGHT DIST ICA EDV: -28.7 CM/SEC
BH CV XLRA MEAS RIGHT DIST ICA PSV: -86.2 CM/SEC
BH CV XLRA MEAS RIGHT ICA/CCA RATIO: 0.9
BH CV XLRA MEAS RIGHT MID CCA EDV: 32.2 CM/SEC
BH CV XLRA MEAS RIGHT MID CCA PSV: 125.4 CM/SEC
BH CV XLRA MEAS RIGHT MID ICA EDV: -19.6 CM/SEC
BH CV XLRA MEAS RIGHT MID ICA PSV: -58.1 CM/SEC
BH CV XLRA MEAS RIGHT PROX CCA EDV: 29.4 CM/SEC
BH CV XLRA MEAS RIGHT PROX CCA PSV: 122.6 CM/SEC
BH CV XLRA MEAS RIGHT PROX ECA PSV: -138 CM/SEC
BH CV XLRA MEAS RIGHT PROX ICA EDV: -16.8 CM/SEC
BH CV XLRA MEAS RIGHT PROX ICA PSV: -68.7 CM/SEC
BH CV XLRA MEAS RIGHT PROX SCLA PSV: 150.5 CM/SEC
BH CV XLRA MEAS RIGHT VERTEBRAL A PSV: 71.3 CM/SEC

## 2019-05-06 RX ORDER — ALPRAZOLAM 0.5 MG/1
TABLET ORAL
Qty: 45 TABLET | Refills: 0 | Status: SHIPPED | OUTPATIENT
Start: 2019-05-06 | End: 2019-06-25 | Stop reason: SDUPTHER

## 2019-05-06 NOTE — PROGRESS NOTES
Called and informed patient of grade 2 diastolic dysfunction.  Patient has an appointment with me on Friday, 5/10.  Will further discuss and evaluate plan at that time.  At this point in time patient is feeling well and denies any shortness of breath.

## 2019-05-07 ENCOUNTER — TELEPHONE (OUTPATIENT)
Dept: CARDIOLOGY | Facility: CLINIC | Age: 74
End: 2019-05-07

## 2019-05-07 NOTE — TELEPHONE ENCOUNTER
----- Message from Daquan Rhodes MD sent at 5/6/2019  6:05 PM EDT -----  Can you please let her know these were normal?    ----- Message -----  From: Nik Rodriguez MD  Sent: 5/6/2019   4:50 PM  To: Daquan Rhodes MD

## 2019-05-08 ENCOUNTER — TREATMENT (OUTPATIENT)
Dept: PHYSICAL THERAPY | Facility: CLINIC | Age: 74
End: 2019-05-08

## 2019-05-08 DIAGNOSIS — M25.511 ACUTE PAIN OF RIGHT SHOULDER: Primary | ICD-10-CM

## 2019-05-08 DIAGNOSIS — M75.41 IMPINGEMENT SYNDROME OF RIGHT SHOULDER: ICD-10-CM

## 2019-05-08 PROCEDURE — G0283 ELEC STIM OTHER THAN WOUND: HCPCS | Performed by: PHYSICAL THERAPIST

## 2019-05-08 PROCEDURE — 97140 MANUAL THERAPY 1/> REGIONS: CPT | Performed by: PHYSICAL THERAPIST

## 2019-05-08 PROCEDURE — 97035 APP MDLTY 1+ULTRASOUND EA 15: CPT | Performed by: PHYSICAL THERAPIST

## 2019-05-08 NOTE — PROGRESS NOTES
Physical Therapy Daily Progress Note         Visit # : 3  Alexandria Yousif reports: much better with arm.  Neck still tight.  No radiation    Subjective     Objective   See Exercise, Manual, and Modality Logs for complete treatment.   Full ER, d/c cane stretch  Lacks ~20 degrees abduction, 10 degrees flexion.    Assessment/Plan  Right shoulder non tender with near full P/AROM.  No longer guarding at end range.  Upper trap remains tight but no radicular symptoms.  Progressing well with HEP, may be ready for more strengthening if ROM full in shoulder next visit.  Progress strengthening /stabilization /functional activity  tbands when ROM full         Manual Therapy:    20     mins  01821;  Therapeutic Exercise:    5     mins  84231;     Neuromuscular Pj:    -    mins  82697;    Therapeutic Activity:     -     mins  99880;     Gait Training:      -     mins  17424;     Ultrasound:     10     mins  02811;    Electrical Stimulation:    15     mins  85774 ( );  Dry Needling     -     mins self-pay    Timed Treatment:   35   mins   Total Treatment:     60   mins    Aracelis Porter, PT  Physical Therapist

## 2019-05-10 ENCOUNTER — OFFICE VISIT (OUTPATIENT)
Dept: CARDIOLOGY | Facility: CLINIC | Age: 74
End: 2019-05-10

## 2019-05-10 ENCOUNTER — TREATMENT (OUTPATIENT)
Dept: PHYSICAL THERAPY | Facility: CLINIC | Age: 74
End: 2019-05-10

## 2019-05-10 VITALS
SYSTOLIC BLOOD PRESSURE: 120 MMHG | HEIGHT: 66 IN | OXYGEN SATURATION: 98 % | DIASTOLIC BLOOD PRESSURE: 72 MMHG | HEART RATE: 90 BPM | WEIGHT: 139 LBS | BODY MASS INDEX: 22.34 KG/M2

## 2019-05-10 DIAGNOSIS — M25.511 ACUTE PAIN OF RIGHT SHOULDER: Primary | ICD-10-CM

## 2019-05-10 DIAGNOSIS — I51.89 DIASTOLIC DYSFUNCTION: Primary | ICD-10-CM

## 2019-05-10 DIAGNOSIS — R06.09 DOE (DYSPNEA ON EXERTION): ICD-10-CM

## 2019-05-10 DIAGNOSIS — R53.83 FATIGUE, UNSPECIFIED TYPE: ICD-10-CM

## 2019-05-10 DIAGNOSIS — M75.41 IMPINGEMENT SYNDROME OF RIGHT SHOULDER: ICD-10-CM

## 2019-05-10 PROCEDURE — G0283 ELEC STIM OTHER THAN WOUND: HCPCS | Performed by: PHYSICAL THERAPIST

## 2019-05-10 PROCEDURE — 97035 APP MDLTY 1+ULTRASOUND EA 15: CPT | Performed by: PHYSICAL THERAPIST

## 2019-05-10 PROCEDURE — 99214 OFFICE O/P EST MOD 30 MIN: CPT | Performed by: NURSE PRACTITIONER

## 2019-05-10 PROCEDURE — 97140 MANUAL THERAPY 1/> REGIONS: CPT | Performed by: PHYSICAL THERAPIST

## 2019-05-10 NOTE — PROGRESS NOTES
[facility]      Patient Name: Alexandria Yousif  :1945  Age: 74 y.o.  Primary Cardiologist: Geoff Rhodes MD  Encounter Provider:  GIOVANI Olivarez      Chief Complaint:   Chief Complaint   Patient presents with   • Follow-up         HPI  Alexandria Yousif is a 74 y.o. female who presents today for a reevaluation visit. Pt has a  history significant for TIA, hypertension, hypertriglyceridemia.  Patient reports that she has done well over the past 6 months.  She states that she has been seen by her ear nose and throat doctor because she could hear her heartbeat through her left ear and thought she had a infection.  There was no infection so he recommended that she be evaluated by our service for a carotid Doppler study.  Dr. Rhodes did order a carotid Doppler study and echocardiogram prior to her appointment today.  Carotid artery study revealed normal carotid arteries.  Echocardiogram revealed grade 2 diastolic dysfunction which is new compared to last echocardiogram.  Patient states that she does have some episodes of shortness of breath with moderate exertion.  She also reports intermittent episodes of fatigue.  She denies any chest pain, palpitations, lightheadedness, swelling.  Reports that she does not exercise on a routine basis.    The following portions of the patient's history were reviewed and updated as appropriate: allergies, current medications, past family history, past medical history, past social history, past surgical history and problem list.    Current Outpatient Medications on File Prior to Visit   Medication Sig   • acetaminophen (TYLENOL) 500 MG tablet Take 1,000 mg by mouth Every 6 (Six) Hours As Needed for Mild Pain .   • ALPRAZolam (XANAX) 0.5 MG tablet TAKE 1 TABLET BY MOUTH EVERY DAY AS NEEDED FOR ANXIETY   • aspirin 81 MG tablet Take 81 mg by mouth Daily.   • desvenlafaxine (PRISTIQ) 100 MG 24 hr tablet Take 1 tablet by mouth Daily.   •  fenofibrate (TRICOR) 145 MG tablet TAKE 1 TABLET BY MOUTH EVERY DAY   • levETIRAcetam (KEPPRA) 500 MG tablet TAKE 1 TABLET BY MOUTH EVERY DAY AS NEEDED   • levETIRAcetam XR (KEPPRA XR) 500 MG 24 hr tablet Take 250 mg by mouth As Needed. TAKES FOR BACK SPASMS   • metFORMIN ER (GLUCOPHAGE-XR) 500 MG 24 hr tablet Take one daily with largest meal of the day and a glass of water   • naproxen (NAPROSYN) 500 MG tablet Take 500 mg by mouth As Needed for Mild Pain .   • omeprazole (priLOSEC) 40 MG capsule TAKE 1 CAPSULE BY MOUTH EVERY DAY   • PREMARIN 0.3 MG tablet TAKE 1 TABLET BY MOUTH EVERY DAY   • raNITIdine (ZANTAC) 150 MG tablet TAKE 1 TABLET BY MOUTH EVERY NIGHT   • rosuvastatin (CRESTOR) 10 MG tablet Take 1 tablet by mouth Every Night.   • traZODone (DESYREL) 50 MG tablet TAKE 1/2 TO 1 TABLET BY MOUTH AT BEDTIME AS NEEDED FOR SLEEP   • valACYclovir (VALTREX) 500 MG tablet TAKE 2 TABLETS BY MOUTH TWICE DAILY   • [DISCONTINUED] cyclobenzaprine (FLEXERIL) 10 MG tablet Take 10 mg by mouth 2 (Two) Times a Day As Needed for Muscle Spasms.   • [DISCONTINUED] spironolactone (ALDACTONE) 100 MG tablet Take 100 mg by mouth Every Other Day.     No current facility-administered medications on file prior to visit.          Review of Systems   Constitution: Positive for malaise/fatigue and weight loss.   HENT: Positive for hearing loss.    Eyes: Positive for blurred vision.   Cardiovascular: Positive for dyspnea on exertion. Negative for chest pain and leg swelling.   Respiratory: Positive for cough and shortness of breath.    Endocrine: Positive for heat intolerance and polyuria.   Musculoskeletal: Positive for joint pain and myalgias.   Gastrointestinal: Positive for abdominal pain, diarrhea and nausea.   Genitourinary: Positive for decreased libido.   Neurological: Positive for excessive daytime sleepiness, dizziness, headaches and light-headedness.   Psychiatric/Behavioral: Positive for depression. The patient is  "nervous/anxious.    All other systems reviewed and are negative.      OBJECTIVE:   Vital Signs  Vitals:    05/10/19 1328   BP: 120/72   Pulse: 90   SpO2: 98%     Estimated body mass index is 22.44 kg/m² as calculated from the following:    Height as of this encounter: 167.6 cm (66\").    Weight as of this encounter: 63 kg (139 lb).    Physical Exam   Constitutional: She is oriented to person, place, and time. Vital signs are normal. She appears well-developed and well-nourished. She is active.   Eyes: Conjunctivae are normal.   Neck: Carotid bruit is not present.   Cardiovascular: Normal rate, regular rhythm and normal heart sounds.   Pulmonary/Chest: Breath sounds normal.   Abdominal: Normal appearance.   Musculoskeletal:   No cyanosis, clubbing, edema  Normal ROM   Neurological: She is alert and oriented to person, place, and time. GCS eye subscore is 4. GCS verbal subscore is 5. GCS motor subscore is 6.   Skin: Skin is warm, dry and intact.   Psychiatric: She has a normal mood and affect. Her speech is normal and behavior is normal. Judgment and thought content normal. Cognition and memory are normal.       Procedures    Cardiac Procedures:  1. Stress echocardiogram 10/18/2017.  EF 64%.  Trace aortic valve regurgitation.  Trace tricuspid valve regurgitation.  Normal stress echo without significant echocardiographic evidence for myocardial ischemia.  2. Echocardiogram 5/2/2019.  EF 67%.  Normal left ventricular cavity size.  All left ventricular wall segments contract normally.  LVH.  Grade 2 diastolic dysfunction.  3. Carotid ultrasound of 5/6/2019.  Normal bilateral carotid arteries.        ASSESSMENT:      Diagnosis Plan   1. Diastolic dysfunction     2. DONG (dyspnea on exertion)     3. Fatigue, unspecified type           PLAN OF CARE:     Patient with new diagnosis of grade 2 diastolic dysfunction.  Patient reports that she has had intermittent episodes of fatigue as well as some shortness of breath when she " has moderate exertion.  She states that she started to notice this a few months ago.  Patient and I had discussion about diastolic dysfunction and treatment options.  At this time patient is feeling pretty well on a routine day-to-day basis.  Her blood pressure is very well controlled at 120/72.  She is currently not on any beta-blockers or calcium channel blockers.  We did discuss the role of beta-blockers and calcium channel blockers and diastolic dysfunction.  My fear was starting her on either of these medications is that she will become hypotensive will have increased lightheadedness and increased fatigue.  At this point I am recommending that patient begin a exercise regimen.  I advised her that if her symptoms remain at baseline or better that we are not going to make any medication changes.  If her symptoms worsen then we may consider medical therapy for diastolic dysfunction.  I think that patient would probably benefit most from calcium channel blocker as I think the beta-blocker could just increase her fatigue.  She was advised to notify our office if she had any increased symptoms.  I did personally review both the carotid artery study as well as her current echocardiogram and most recent stress echocardiogram that was done in 2017.  She will follow-up with Dr. Rhodes in 6 months.    Counseling was given to patient for the following topics: diagnostic results, instructions for management, risk factor reductions, prognosis, patient and family education, impressions, risks and benefits of treatment options and importance of treatment compliance . Total time of the encounter was 50 minutes and 40 minutes was spend counseling.      Thank you for allowing me to participate in the care of your patient,      Sincerely,   GIOVANI Olivarez  [facility]  05/10/19  2:22 PM    **Francesco Disclaimer:**  Much of this encounter note is an electronic transcription/translation of spoken language  to printed text. The electronic translation of spoken language may permit erroneous, or at times, nonsensical words or phrases to be inadvertently transcribed. Although I have reviewed the note for such errors, some may still exist.

## 2019-05-10 NOTE — PROGRESS NOTES
Physical Therapy Daily Progress Note         Visit # : 4  Alexandria Yousif reports: I am feeling really good in the shoulder.  Tender at neck.    Subjective     Objective   See Exercise, Manual, and Modality Logs for complete treatment.   Minimal tenderness right SA space  Full PROM abduction, IR, ER.  Lacks 10 degrees flexion with hypomobility.  Increased UT and levator tension right    Assessment/Plan  Near full PROM in right shoulder.  Still limited with cervical rotation and sidebending, pivms and muscular restrictions.  Progress strengthening /stabilization /functional activity           Manual Therapy:    15     mins  87911;  Therapeutic Exercise:    -     mins  14461;     Neuromuscular Pj:    -    mins  77765;    Therapeutic Activity:     -     mins  00789;     Gait Training:      -     mins  29327;     Ultrasound:     10     mins  06544;    Electrical Stimulation:    15     mins  98565 ( );  Dry Needling     -     mins self-pay    Timed Treatment:   25   mins   Total Treatment:     55   mins    Aracelis Porter, PT  Physical Therapist

## 2019-05-13 ENCOUNTER — TREATMENT (OUTPATIENT)
Dept: PHYSICAL THERAPY | Facility: CLINIC | Age: 74
End: 2019-05-13

## 2019-05-13 DIAGNOSIS — M75.41 IMPINGEMENT SYNDROME OF RIGHT SHOULDER: ICD-10-CM

## 2019-05-13 DIAGNOSIS — M25.511 ACUTE PAIN OF RIGHT SHOULDER: Primary | ICD-10-CM

## 2019-05-13 DIAGNOSIS — S40.011D CONTUSION OF MULTIPLE SITES OF RIGHT SHOULDER, SUBSEQUENT ENCOUNTER: ICD-10-CM

## 2019-05-13 PROCEDURE — 97110 THERAPEUTIC EXERCISES: CPT | Performed by: PHYSICAL THERAPIST

## 2019-05-13 PROCEDURE — G0283 ELEC STIM OTHER THAN WOUND: HCPCS | Performed by: PHYSICAL THERAPIST

## 2019-05-13 PROCEDURE — 97140 MANUAL THERAPY 1/> REGIONS: CPT | Performed by: PHYSICAL THERAPIST

## 2019-05-13 NOTE — PROGRESS NOTES
Physical Therapy Daily Progress Note     Visit # : 5  Alexandria Yousif reports: neck and shoulder feeling really good, just mild tightness in cervical spine. No radiation.  I've had a flareup of sciatica since this weekend on the left side.    Subjective     Objective   See Exercise, Manual, and Modality Logs for complete treatment.   See additional exercises.    Assessment/Plan  Able to increase strengthening on the UE without pain.  Near full ROM now. Improving cervical and shoulder ROM.  Progress per Plan of Care  Assess if patient can  to possibly qiw for treatment as ADL's and activities are increased.         Manual Therapy:    15     mins  03317;  Therapeutic Exercise:    8     mins  35284;     Neuromuscular Pj:    -    mins  81251;    Therapeutic Activity:     -     mins  26655;     Gait Training:      -     mins  92093;     Ultrasound:     8     mins  25509;    Electrical Stimulation:    15     mins  57412 ( );  Dry Needling     -     mins self-pay    Timed Treatment:   31   mins   Total Treatment:     70   mins    Aracelis Porter, PT  Physical Therapist

## 2019-05-15 RX ORDER — OMEPRAZOLE 40 MG/1
CAPSULE, DELAYED RELEASE ORAL
Qty: 90 CAPSULE | Refills: 0 | Status: SHIPPED | OUTPATIENT
Start: 2019-05-15 | End: 2019-08-14 | Stop reason: SDUPTHER

## 2019-05-17 ENCOUNTER — TREATMENT (OUTPATIENT)
Dept: PHYSICAL THERAPY | Facility: CLINIC | Age: 74
End: 2019-05-17

## 2019-05-17 DIAGNOSIS — M25.511 ACUTE PAIN OF RIGHT SHOULDER: Primary | ICD-10-CM

## 2019-05-17 DIAGNOSIS — M75.41 IMPINGEMENT SYNDROME OF RIGHT SHOULDER: ICD-10-CM

## 2019-05-17 PROCEDURE — 97110 THERAPEUTIC EXERCISES: CPT | Performed by: PHYSICAL THERAPIST

## 2019-05-17 PROCEDURE — 97035 APP MDLTY 1+ULTRASOUND EA 15: CPT | Performed by: PHYSICAL THERAPIST

## 2019-05-17 PROCEDURE — G0283 ELEC STIM OTHER THAN WOUND: HCPCS | Performed by: PHYSICAL THERAPIST

## 2019-05-17 PROCEDURE — 97140 MANUAL THERAPY 1/> REGIONS: CPT | Performed by: PHYSICAL THERAPIST

## 2019-05-17 NOTE — PROGRESS NOTES
Physical Therapy Daily Progress Note         Visit # : 6  Alexandria Yousif reports: my shoulder and neck are doing much better.  Just a little soreness in the neck.  Able to swim a few laps with back stroke and felt good.  Still a little painful for freestyle.    Subjective     Objective   See Exercise, Manual, and Modality Logs for complete treatment.   Full cervical and shoulder ROM.  Tender with trigger point right upper trap  Right shoulder flexion, abduction, IR, ER, scaption 5/5 without pain.    Assessment/Plan  Able to progress UE and postural exercises without difficulty. May be ready for HEP next week.    Anticipate DC next Visit, continue qiw           Manual Therapy:    14   mins  09054;  Therapeutic Exercise:    16    mins  93798;     Neuromuscular Pj:    -    mins  43375;    Therapeutic Activity:     -     mins  00158;     Gait Training:      -     mins  34768;     Ultrasound:     8     mins  95149;    Electrical Stimulation:    15     mins  89178 ( );  Dry Needling     -     mins self-pay    Timed Treatment:   38   mins   Total Treatment:     60   mins    Aracelis Porter, PT  Physical Therapist

## 2019-05-21 ENCOUNTER — TELEPHONE (OUTPATIENT)
Dept: FAMILY MEDICINE CLINIC | Facility: CLINIC | Age: 74
End: 2019-05-21

## 2019-05-21 NOTE — TELEPHONE ENCOUNTER
Pt wants to know if she can go back on Spironolactone? And Also asked if she needed to do blood work now? She has been on metformin for 2 months? Asking about checking her Blood Sugar.    PT is seeing Dr. Hurtado pt has early stages heart diease.

## 2019-05-22 NOTE — TELEPHONE ENCOUNTER
Checking her blood sugar would be helpful  rec checkig FBS in am.  accucheck glucometer is usually one that medicare covers well. But she should call them and find out what type glucometer they cover and let me know and I will order strips/lancets.  Does she want to restart spironolactone due to what reason?  It is used for many things.

## 2019-05-23 NOTE — TELEPHONE ENCOUNTER
Hair regrowth on scalp spironolactone. From skin cancer mole removal  If you are ok with doing this?  Also would like the accucheck meter, strips and lancets please

## 2019-05-24 RX ORDER — SPIRONOLACTONE 25 MG/1
25 TABLET ORAL DAILY
Qty: 30 TABLET | Refills: 4 | Status: SHIPPED | OUTPATIENT
Start: 2019-05-24 | End: 2020-01-16

## 2019-05-24 NOTE — TELEPHONE ENCOUNTER
Please call in accucheck meter to her pharmacy and #100 lancets and #100 test strips for checking FBS qam.    I sent in spironolactone to her pharmacy

## 2019-05-29 RX ORDER — METFORMIN HYDROCHLORIDE 500 MG/1
TABLET, EXTENDED RELEASE ORAL
Qty: 30 TABLET | Refills: 0 | Status: SHIPPED | OUTPATIENT
Start: 2019-05-29 | End: 2019-07-08 | Stop reason: SDUPTHER

## 2019-06-03 ENCOUNTER — TELEPHONE (OUTPATIENT)
Dept: CARDIOLOGY | Facility: CLINIC | Age: 74
End: 2019-06-03

## 2019-06-03 ENCOUNTER — TRANSCRIBE ORDERS (OUTPATIENT)
Dept: ADMINISTRATIVE | Facility: HOSPITAL | Age: 74
End: 2019-06-03

## 2019-06-03 DIAGNOSIS — Z12.39 BREAST SCREENING: Primary | ICD-10-CM

## 2019-06-03 DIAGNOSIS — R00.2 PALPITATIONS: Primary | ICD-10-CM

## 2019-06-03 NOTE — TELEPHONE ENCOUNTER
06/03/19   Pt called stating she is having sporadic and more frequent palpitations. She states this has occurred more since seeing Iona on 5/10/19. She states she is having it more at rest and she get soa and dizzy with episodes.   She had an episode yesterday that lasted over 1 hour. She took a Xanax thinking it was anxiety. She also did some deep breathing and laid down took a nap.   She states she has started taking Metformin about 2 mths ago did know if fluttering could be from this.   Please advise   Pt's call back# [phone]   Thanks Tyshawn SAINZ

## 2019-06-03 NOTE — TELEPHONE ENCOUNTER
When I saw her she was not having any heart palpitations.  I think that we should probably place a Holter monitor to evaluate if she is having any dysrhythmias.  I have placed order.  I have spoken with the patient and she is in agreement.  She states that she is having palpitations/tachycardias at least once if not more times daily.Iona, we you please call and make arrangements for patient to come in to get Holter monitor?    Thanks,   GENARO

## 2019-06-05 RX ORDER — FENOFIBRATE 145 MG/1
TABLET, COATED ORAL
Qty: 90 TABLET | Refills: 0 | Status: SHIPPED | OUTPATIENT
Start: 2019-06-05 | End: 2019-09-12 | Stop reason: SDUPTHER

## 2019-06-06 ENCOUNTER — HOSPITAL ENCOUNTER (OUTPATIENT)
Dept: MAMMOGRAPHY | Facility: HOSPITAL | Age: 74
Discharge: HOME | End: 2019-06-06
Admitting: INTERNAL MEDICINE

## 2019-06-06 DIAGNOSIS — Z12.39 BREAST SCREENING: ICD-10-CM

## 2019-06-06 PROCEDURE — 77067 SCR MAMMO BI INCL CAD: CPT

## 2019-06-18 ENCOUNTER — TELEPHONE (OUTPATIENT)
Dept: CARDIOLOGY | Facility: CLINIC | Age: 74
End: 2019-06-18

## 2019-06-18 NOTE — TELEPHONE ENCOUNTER
06/18/19   Pt called wanting Holter monitor results.   Pt's call back # [phone]    Thanks Tyshawn SAINZ

## 2019-06-20 ENCOUNTER — TELEPHONE (OUTPATIENT)
Dept: CARDIOLOGY | Facility: CLINIC | Age: 74
End: 2019-06-20

## 2019-06-20 NOTE — TELEPHONE ENCOUNTER
06/20/19  12:19 PM      Left message for patient to call to receive Holter results.    GIOVANI Gonzalez  [facility]

## 2019-06-20 NOTE — TELEPHONE ENCOUNTER
The patient called requesting her results from her Holter monitor from 6/3/19.    Pt #: [phone]    Thank you  Cami

## 2019-06-21 NOTE — TELEPHONE ENCOUNTER
Did you give them to her?  There is a note attached to the report, if you could review them that would be great.

## 2019-06-25 DIAGNOSIS — F41.9 ANXIETY: ICD-10-CM

## 2019-06-25 RX ORDER — ALPRAZOLAM 0.5 MG/1
TABLET ORAL
Qty: 45 TABLET | Refills: 0 | Status: SHIPPED | OUTPATIENT
Start: 2019-06-25 | End: 2019-08-20 | Stop reason: SDUPTHER

## 2019-07-05 ENCOUNTER — APPOINTMENT (OUTPATIENT)
Dept: CARDIOLOGY | Facility: HOSPITAL | Age: 74
End: 2019-07-05

## 2019-07-05 ENCOUNTER — TELEPHONE (OUTPATIENT)
Dept: FAMILY MEDICINE CLINIC | Facility: CLINIC | Age: 74
End: 2019-07-05

## 2019-07-05 ENCOUNTER — HOSPITAL ENCOUNTER (EMERGENCY)
Facility: HOSPITAL | Age: 74
Discharge: HOME | End: 2019-07-05
Attending: EMERGENCY MEDICINE | Admitting: EMERGENCY MEDICINE

## 2019-07-05 VITALS
BODY MASS INDEX: 22.44 KG/M2 | RESPIRATION RATE: 18 BRPM | TEMPERATURE: 98.2 F | HEART RATE: 72 BPM | DIASTOLIC BLOOD PRESSURE: 93 MMHG | SYSTOLIC BLOOD PRESSURE: 128 MMHG | HEIGHT: 66 IN | OXYGEN SATURATION: 96 %

## 2019-07-05 DIAGNOSIS — M79.89 RIGHT LEG SWELLING: Primary | ICD-10-CM

## 2019-07-05 LAB
BH CV LOWER VASCULAR LEFT COMMON FEMORAL AUGMENT: NORMAL
BH CV LOWER VASCULAR LEFT COMMON FEMORAL COMPETENT: NORMAL
BH CV LOWER VASCULAR LEFT COMMON FEMORAL COMPRESS: NORMAL
BH CV LOWER VASCULAR LEFT COMMON FEMORAL PHASIC: NORMAL
BH CV LOWER VASCULAR LEFT COMMON FEMORAL SPONT: NORMAL
BH CV LOWER VASCULAR RIGHT COMMON FEMORAL AUGMENT: NORMAL
BH CV LOWER VASCULAR RIGHT COMMON FEMORAL COMPETENT: NORMAL
BH CV LOWER VASCULAR RIGHT COMMON FEMORAL COMPRESS: NORMAL
BH CV LOWER VASCULAR RIGHT COMMON FEMORAL PHASIC: NORMAL
BH CV LOWER VASCULAR RIGHT COMMON FEMORAL SPONT: NORMAL
BH CV LOWER VASCULAR RIGHT DISTAL FEMORAL COMPRESS: NORMAL
BH CV LOWER VASCULAR RIGHT GASTRONEMIUS COMPRESS: NORMAL
BH CV LOWER VASCULAR RIGHT GREATER SAPH AK COMPRESS: NORMAL
BH CV LOWER VASCULAR RIGHT GREATER SAPH BK COMPRESS: NORMAL
BH CV LOWER VASCULAR RIGHT MID FEMORAL AUGMENT: NORMAL
BH CV LOWER VASCULAR RIGHT MID FEMORAL COMPETENT: NORMAL
BH CV LOWER VASCULAR RIGHT MID FEMORAL COMPRESS: NORMAL
BH CV LOWER VASCULAR RIGHT MID FEMORAL PHASIC: NORMAL
BH CV LOWER VASCULAR RIGHT MID FEMORAL SPONT: NORMAL
BH CV LOWER VASCULAR RIGHT PERONEAL COMPRESS: NORMAL
BH CV LOWER VASCULAR RIGHT POPLITEAL AUGMENT: NORMAL
BH CV LOWER VASCULAR RIGHT POPLITEAL COMPETENT: NORMAL
BH CV LOWER VASCULAR RIGHT POPLITEAL COMPRESS: NORMAL
BH CV LOWER VASCULAR RIGHT POPLITEAL PHASIC: NORMAL
BH CV LOWER VASCULAR RIGHT POPLITEAL SPONT: NORMAL
BH CV LOWER VASCULAR RIGHT POSTERIOR TIBIAL COMPRESS: NORMAL
BH CV LOWER VASCULAR RIGHT PROXIMAL FEMORAL COMPRESS: NORMAL
BH CV LOWER VASCULAR RIGHT SAPHENOFEMORAL JUNCTION COMPRESS: NORMAL

## 2019-07-05 PROCEDURE — 93971 EXTREMITY STUDY: CPT

## 2019-07-05 PROCEDURE — 99283 EMERGENCY DEPT VISIT LOW MDM: CPT

## 2019-07-05 NOTE — TELEPHONE ENCOUNTER
Pt called to let you know that she went to the ER due to her pain today. [facility] Location. See Notes in Chart from ER.  CR

## 2019-07-05 NOTE — ED PROVIDER NOTES
EMERGENCY DEPARTMENT ENCOUNTER    Room number:  04/04  Date Seen:  7/5/2019  Time of transfer:1600  PCP:  Anita Muse MD      PROGRESS AND CONSULT NOTES:  1600:  Patient care transferred from MD Navdeep pending doppler RLE. Please refer to his note for history and exam findings.     1632: Per emelia Jin, doppler RLE is negative for DVT.     1719: Rechecked pt. Pt is resting comfortably. Notified pt of DVT RLE-negative acute. Discussed the plan to discharge the pt home. I instructed the pt to elevate her RLE and take OTC medication as needed for symptomatic treatment. Pt understands and agrees with the plan, all questions answered.      DIAGNOSIS:  Final diagnoses:   Right leg swelling       DISPOSITION:  DISCHARGE    Patient discharged in stable condition.    Reviewed implications of results, diagnosis, meds, responsibility to follow up, warning signs and symptoms of possible worsening, potential complications and reasons to return to ER.    Patient/Family voiced understanding of above instructions.    Discussed plan for discharge, as there is no emergent indication for admission. Patient referred to primary care provider for BP management due to today's BP. Pt/family is agreeable and understands need for follow up and repeat testing.  Pt is aware that discharge does not mean that nothing is wrong but it indicates no emergency is present that requires admission and they must continue care with follow-up as given below or physician of their choice.     FOLLOW-UP  Anita Muse MD  [address] [facility] [address]  [place] [address]  [phone]      As needed         Medication List      No changes were made to your prescriptions during this visit.           Provider attestation:  Documentation assistance provided by angela Hernandez for Dr. HERMELINDO Elizabeth MD.  Information recorded by the angela was done at my direction and has been verified and validated by me.      Please note that  portions of this were completed with a voice recognition program.          Erica Hernandez  07/05/19 1719       Toan Elizabeth MD  07/06/19 0052
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
## 2019-07-08 RX ORDER — METFORMIN HYDROCHLORIDE 500 MG/1
TABLET, EXTENDED RELEASE ORAL
Qty: 30 TABLET | Refills: 0 | Status: SHIPPED | OUTPATIENT
Start: 2019-07-08 | End: 2019-08-14 | Stop reason: SDUPTHER

## 2019-07-22 RX ORDER — CONJUGATED ESTROGENS 0.3 MG/1
TABLET, FILM COATED ORAL
Qty: 90 TABLET | Refills: 0 | Status: SHIPPED | OUTPATIENT
Start: 2019-07-22 | End: 2019-10-23 | Stop reason: SDUPTHER

## 2019-07-29 ENCOUNTER — DOCUMENTATION (OUTPATIENT)
Dept: PHYSICAL THERAPY | Facility: CLINIC | Age: 74
End: 2019-07-29

## 2019-07-29 NOTE — PROGRESS NOTES
Discharge Summary  Discharge Summary from Physical Therapy Report      Dates  PT visit: 3/13/19-5/17/19  Number of Visits: 6     Discharge Status of Patient: Full cervical and shoulder ROM, tendern right upper trap, right shoulder strength 5/5 without pain.    Goals: All Met    Discharge Plan: Continue with current home exercise program as instructed    Comments      Date of Discharge 6/17/19        Aracelis Porter, PT  Physical Therapist

## 2019-08-05 ENCOUNTER — TELEPHONE (OUTPATIENT)
Dept: CARDIOLOGY | Facility: CLINIC | Age: 74
End: 2019-08-05

## 2019-08-05 RX ORDER — DESVENLAFAXINE 100 MG/1
100 TABLET, EXTENDED RELEASE ORAL DAILY
Qty: 90 TABLET | Refills: 0 | Status: SHIPPED | OUTPATIENT
Start: 2019-08-05 | End: 2019-10-31 | Stop reason: SDUPTHER

## 2019-08-05 NOTE — TELEPHONE ENCOUNTER
She did have some very brief episodes, lasting less than 10 seconds of atrial tachycardias.  This could be what she is experiencing.  There is nothing that needs to be done unless it is consistent and last more than a minute or so.  If the episodes last longer than 30 minutes she may need a ZIO monitor.

## 2019-08-05 NOTE — TELEPHONE ENCOUNTER
The patient called and she worn a Holter monitor in June 6th.  It came back with normal results. Since then she has been keeping a journal of heart fluttering which has only happened about 5 times in the 5 weeks. She is wanting to know what she should do if you would like to review the journal or if you want her to come in and be seen.     Pt # [phone]    Thank you  Cami

## 2019-08-08 ENCOUNTER — TELEPHONE (OUTPATIENT)
Dept: CARDIOLOGY | Facility: CLINIC | Age: 74
End: 2019-08-08

## 2019-08-08 NOTE — TELEPHONE ENCOUNTER
"08/08/19  1:13 PM  Alexandria Yousif  1945  Home Phone [phone]   Mobile [phone]       Alexandria SRINATH Benja  a patient of Dr Rhodes called today in reference to her \"heart fluttering\". She has been keeping a journal of when it happens and how long it lasts.She was just concerned because it has happened and once lasted 15-20 mins.  Patient has had a holter monitor that came back relatively benign with some episodes of atrial tachycardia.  When asked about BP/HR patient stated that she has not checked one. She went and got machine and /77  HR 79  Denies chest pain/SOA but states after these episodes she is fatigued for about 2 hours.  I explained to patient that Iona MAIN has recommended to call office if she has an episode lasting longer than 30 minutes per last phone conversation on 8/5/19.   Patient felt reassured, encouraged her to take BP regularly and call office if symptoms worsen or are accompanied with chest pain and SOA. Also instructed that if emergent call Kelly Monson-is there anything else you would like to add ?    Paulette Cisneros, LUANA      "

## 2019-08-14 RX ORDER — METFORMIN HYDROCHLORIDE 500 MG/1
TABLET, EXTENDED RELEASE ORAL
Qty: 30 TABLET | Refills: 0 | Status: SHIPPED | OUTPATIENT
Start: 2019-08-14 | End: 2019-09-12 | Stop reason: SDUPTHER

## 2019-08-14 RX ORDER — OMEPRAZOLE 40 MG/1
CAPSULE, DELAYED RELEASE ORAL
Qty: 90 CAPSULE | Refills: 0 | Status: SHIPPED | OUTPATIENT
Start: 2019-08-14 | End: 2019-11-26 | Stop reason: SDUPTHER

## 2019-08-14 RX ORDER — VALACYCLOVIR HYDROCHLORIDE 500 MG/1
TABLET, FILM COATED ORAL
Qty: 4 TABLET | Refills: 0 | Status: SHIPPED | OUTPATIENT
Start: 2019-08-14 | End: 2020-02-05

## 2019-08-20 DIAGNOSIS — F41.9 ANXIETY: ICD-10-CM

## 2019-08-21 RX ORDER — ALPRAZOLAM 0.5 MG/1
TABLET ORAL
Qty: 45 TABLET | Refills: 0 | Status: SHIPPED | OUTPATIENT
Start: 2019-08-21 | End: 2019-10-10 | Stop reason: SDUPTHER

## 2019-09-03 RX ORDER — ROSUVASTATIN CALCIUM 10 MG/1
10 TABLET, COATED ORAL NIGHTLY
Qty: 90 TABLET | Refills: 0 | Status: SHIPPED | OUTPATIENT
Start: 2019-09-03 | End: 2019-12-05 | Stop reason: SDUPTHER

## 2019-09-13 RX ORDER — METFORMIN HYDROCHLORIDE 500 MG/1
TABLET, EXTENDED RELEASE ORAL
Qty: 30 TABLET | Refills: 0 | Status: SHIPPED | OUTPATIENT
Start: 2019-09-13 | End: 2019-10-10 | Stop reason: SDUPTHER

## 2019-09-13 RX ORDER — FENOFIBRATE 145 MG/1
TABLET, COATED ORAL
Qty: 90 TABLET | Refills: 0 | Status: SHIPPED | OUTPATIENT
Start: 2019-09-13 | End: 2019-12-14 | Stop reason: SDUPTHER

## 2019-09-26 ENCOUNTER — OFFICE VISIT (OUTPATIENT)
Dept: GASTROENTEROLOGY | Facility: CLINIC | Age: 74
End: 2019-09-26

## 2019-09-26 VITALS
TEMPERATURE: 98.7 F | WEIGHT: 137.8 LBS | BODY MASS INDEX: 22.14 KG/M2 | SYSTOLIC BLOOD PRESSURE: 122 MMHG | DIASTOLIC BLOOD PRESSURE: 82 MMHG | HEIGHT: 66 IN

## 2019-09-26 DIAGNOSIS — K21.00 GASTROESOPHAGEAL REFLUX DISEASE WITH ESOPHAGITIS: Primary | ICD-10-CM

## 2019-09-26 PROCEDURE — 99203 OFFICE O/P NEW LOW 30 MIN: CPT | Performed by: INTERNAL MEDICINE

## 2019-09-26 RX ORDER — AMOXICILLIN 500 MG
CAPSULE ORAL
COMMUNITY

## 2019-09-26 RX ORDER — BIOTIN 10 MG
TABLET ORAL
COMMUNITY

## 2019-09-26 NOTE — PROGRESS NOTES
Chief Complaint   Patient presents with   • Heartburn     Alexandria Yousif is a 74 y.o. female who presents with a history of GERD  HPI     Patient 74-year-old female with history of diabetes, hypertension, hyperlipidemia with ongoing reflux esophagitis.  Patient does very well with Prilosec though has been hesitant because of bad press lately.  Patient was assured that the vacation was safe.  Patient does does report occasional breakthrough symptoms that do not seem to respond well to either Zantac or Tums but due to improve with Pepto-Bismol.  Patient denies any change in bowel habits no melena or bright red blood per rectum.  Patient reports continued good appetite here for further recommendations.    Past Medical History:   Diagnosis Date   • Abdominal pain    • Anxiety and depression    • Arthritis    • Brain ischemia    • Cancer (CMS/HCC) March-2008    Scalp and face-[facility]   • Cervical herniated disc    • Coronary artery disease Spring, 2019    left ventricle-diastolic   • Diabetes mellitus (CMS/HCC) Spring 2019    Began Metformin   • GERD (gastroesophageal reflux disease)    • GERD (gastroesophageal reflux disease)    • Headache    • Heart murmur    • History of skin cancer    • Hyperlipidemia    • Hypertension    • Hypertriglyceridemia    • Injury of back    • Lumbar herniated disc    • Mental confusion    • Osteopenia    • Rheumatic fever     Age 17   • Shortness of breath    • TIA (transient ischemic attack)    • Tinnitus    • Vertigo        Current Outpatient Medications:   •  acetaminophen (TYLENOL) 500 MG tablet, Take 1,000 mg by mouth Every 6 (Six) Hours As Needed for Mild Pain ., Disp: , Rfl:   •  ALPRAZolam (XANAX) 0.5 MG tablet, TAKE 1 TABLET BY MOUTH EVERY DAY AS NEEDED FOR ANXIETY, Disp: 45 tablet, Rfl: 0  •  aspirin 81 MG tablet, Take 81 mg by mouth Daily., Disp: , Rfl:   •  Biotin 10 MG tablet, , Disp: , Rfl:   •  Cholecalciferol (D3 HIGH POTENCY) 2000 units capsule, , Disp: , Rfl:   •   desvenlafaxine (PRISTIQ) 100 MG 24 hr tablet, TAKE 1 TABLET BY MOUTH DAILY, Disp: 90 tablet, Rfl: 0  •  fenofibrate (TRICOR) 145 MG tablet, TAKE 1 TABLET BY MOUTH EVERY DAY, Disp: 90 tablet, Rfl: 0  •  levETIRAcetam (KEPPRA) 500 MG tablet, TAKE 1 TABLET BY MOUTH EVERY DAY AS NEEDED, Disp: 90 tablet, Rfl: 0  •  metFORMIN ER (GLUCOPHAGE-XR) 500 MG 24 hr tablet, TAKE 1 TABLET BY MOUTH DAILY WITH LARGEST MEAL OF THE DAY AND A GLASS OF WATER, Disp: 30 tablet, Rfl: 0  •  naproxen (NAPROSYN) 500 MG tablet, Take 500 mg by mouth As Needed for Mild Pain ., Disp: , Rfl:   •  Omega-3 Fatty Acids (FISH OIL) 1200 MG capsule capsule, , Disp: , Rfl:   •  omeprazole (priLOSEC) 40 MG capsule, TAKE 1 CAPSULE BY MOUTH EVERY DAY, Disp: 90 capsule, Rfl: 0  •  PREMARIN 0.3 MG tablet, TAKE 1 TABLET BY MOUTH EVERY DAY, Disp: 90 tablet, Rfl: 0  •  rosuvastatin (CRESTOR) 10 MG tablet, TAKE 1 TABLET BY MOUTH EVERY NIGHT, Disp: 90 tablet, Rfl: 0  •  spironolactone (ALDACTONE) 25 MG tablet, Take 1 tablet by mouth Daily., Disp: 30 tablet, Rfl: 4  •  traZODone (DESYREL) 50 MG tablet, TAKE 1/2 TO 1 TABLET BY MOUTH AT BEDTIME AS NEEDED FOR SLEEP, Disp: 30 tablet, Rfl: 5  •  valACYclovir (VALTREX) 500 MG tablet, TAKE 2 TABLETS BY MOUTH TWICE DAILY, Disp: 4 tablet, Rfl: 0  •  Alum Hydroxide-Mag Trisilicate (GAVISCON) 80-14.2 MG chewable tablet, Chew 1 tablet 4 (Four) Times a Day As Needed (Heartburn)., Disp: 224 each, Rfl: 11  Allergies   Allergen Reactions   • Codeine Nausea Only     BLURRED VISION, RASH     Social History     Socioeconomic History   • Marital status:      Spouse name: Not on file   • Number of children: Not on file   • Years of education: Not on file   • Highest education level: Not on file   Occupational History   • Occupation: TEACHER (retired)   Tobacco Use   • Smoking status: Never Smoker   • Smokeless tobacco: Never Used   Substance and Sexual Activity   • Alcohol use: Yes     Alcohol/week: 1.8 - 2.4 oz     Types: 2 - 3  Glasses of wine, 1 Cans of beer per week     Comment: liquor-1 to 2 month   • Drug use: No   • Sexual activity: Yes     Partners: Male     Birth control/protection: Post-menopausal     Family History   Problem Relation Age of Onset   • Uterine cancer Mother    • Heart disease Mother         Mother with 3 pacemakers over course of life, as well as CHF   • Diabetes Mother    • Skin cancer Father    • Heart disease Father         Father with 4 vessel CABG at 62   • Colon polyps Father         negative   • Heart disease Sister         Siter with fatal MI at age 64   • Atrial fibrillation Sister    • Diabetes Maternal Grandmother    • Heart disease Maternal Grandmother    • Stroke Maternal Grandmother    • Heart disease Paternal Grandmother    • Stroke Paternal Grandmother    • Heart disease Paternal Grandfather    • Stroke Paternal Grandfather    • Malig Hyperthermia Neg Hx      Review of Systems   Constitutional: Negative.    HENT: Negative.    Eyes: Negative.    Respiratory: Negative.    Cardiovascular: Negative.    Gastrointestinal: Negative.    Endocrine: Negative.    Musculoskeletal: Negative.    Skin: Negative.    Allergic/Immunologic: Negative.    Hematological: Negative.      Vitals:    09/26/19 1325   BP: 122/82   Temp: 98.7 °F (37.1 °C)     Physical Exam   Constitutional: She is oriented to person, place, and time. She appears well-developed and well-nourished.   HENT:   Head: Normocephalic and atraumatic.   Eyes: Pupils are equal, round, and reactive to light. No scleral icterus.   Neck: Normal range of motion.   Cardiovascular: Normal rate, regular rhythm and normal heart sounds. Exam reveals no gallop and no friction rub.   No murmur heard.  Pulmonary/Chest: Effort normal and breath sounds normal. She has no wheezes. She has no rales.   Abdominal: Soft. Bowel sounds are normal. She exhibits no shifting dullness, no distension, no pulsatile liver, no fluid wave, no abdominal bruit, no ascites, no pulsatile  midline mass and no mass. There is no hepatosplenomegaly. There is no tenderness. There is no rigidity and no guarding. No hernia.   Musculoskeletal: Normal range of motion. She exhibits no edema.   Lymphadenopathy:     She has no cervical adenopathy.   Neurological: She is alert and oriented to person, place, and time. No cranial nerve deficit.   Skin: Skin is warm and dry. No rash noted.   Psychiatric: She has a normal mood and affect. Her behavior is normal.   Nursing note and vitals reviewed.    Diagnoses and all orders for this visit:    Gastroesophageal reflux disease with esophagitis    Other orders  -     Omega-3 Fatty Acids (FISH OIL) 1200 MG capsule capsule  -     Cholecalciferol (D3 HIGH POTENCY) 2000 units capsule  -     Biotin 10 MG tablet  -     Alum Hydroxide-Mag Trisilicate (GAVISCON) 80-14.2 MG chewable tablet; Chew 1 tablet 4 (Four) Times a Day As Needed (Heartburn).    Patient 74-year-old female with history of hypertension, hyperlipidemia and diabetes as well as long-standing GERD who does relatively well with Prilosec though still has occasional breakthrough.  Patient is tried Zantac without terrific success even Tums does not work very well but does know Pepto-Bismol improves her breakthrough episodes more effectively.  This does suggest this may be alkali reflux and not residual acidity.  Would recommend changing to Gaviscon for her breakthrough symptoms to see if that will do a better job of controlling the alkali reflux.  No indication at this point yet for repeat endoscopy unless her symptoms change.  Will follow clinically as needed.

## 2019-09-30 DIAGNOSIS — E83.52 HYPERCALCEMIA: ICD-10-CM

## 2019-09-30 DIAGNOSIS — E78.5 HYPERLIPIDEMIA, UNSPECIFIED HYPERLIPIDEMIA TYPE: Primary | ICD-10-CM

## 2019-09-30 DIAGNOSIS — R73.9 HYPERGLYCEMIA: ICD-10-CM

## 2019-09-30 DIAGNOSIS — E03.9 HYPOTHYROIDISM, UNSPECIFIED TYPE: ICD-10-CM

## 2019-10-01 LAB
ALBUMIN SERPL-MCNC: 5.1 G/DL (ref 3.5–5.2)
ALBUMIN/GLOB SERPL: 2.3 G/DL
ALP SERPL-CCNC: 65 U/L (ref 39–117)
ALT SERPL-CCNC: 28 U/L (ref 1–33)
AST SERPL-CCNC: 25 U/L (ref 1–32)
BILIRUB SERPL-MCNC: 0.2 MG/DL (ref 0.2–1.2)
BUN SERPL-MCNC: 21 MG/DL (ref 8–23)
BUN/CREAT SERPL: 32.8 (ref 7–25)
CALCIUM SERPL-MCNC: 9.8 MG/DL (ref 8.6–10.5)
CHLORIDE SERPL-SCNC: 102 MMOL/L (ref 98–107)
CHOLEST SERPL-MCNC: 162 MG/DL (ref 0–200)
CO2 SERPL-SCNC: 27.2 MMOL/L (ref 22–29)
CREAT SERPL-MCNC: 0.64 MG/DL (ref 0.57–1)
GLOBULIN SER CALC-MCNC: 2.2 GM/DL
GLUCOSE SERPL-MCNC: 96 MG/DL (ref 65–99)
HBA1C MFR BLD: 6.1 % (ref 4.8–5.6)
HDLC SERPL-MCNC: 52 MG/DL (ref 40–60)
LDLC SERPL CALC-MCNC: 81 MG/DL (ref 0–100)
LDLC/HDLC SERPL: 1.57 {RATIO}
POTASSIUM SERPL-SCNC: 4.1 MMOL/L (ref 3.5–5.2)
PROT SERPL-MCNC: 7.3 G/DL (ref 6–8.5)
SODIUM SERPL-SCNC: 141 MMOL/L (ref 136–145)
TRIGL SERPL-MCNC: 143 MG/DL (ref 0–150)
TSH SERPL DL<=0.005 MIU/L-ACNC: 1.99 UIU/ML (ref 0.27–4.2)
VLDLC SERPL CALC-MCNC: 28.6 MG/DL

## 2019-10-09 ENCOUNTER — OFFICE VISIT (OUTPATIENT)
Dept: FAMILY MEDICINE CLINIC | Facility: CLINIC | Age: 74
End: 2019-10-09

## 2019-10-09 VITALS
OXYGEN SATURATION: 97 % | HEART RATE: 78 BPM | DIASTOLIC BLOOD PRESSURE: 78 MMHG | BODY MASS INDEX: 22.18 KG/M2 | SYSTOLIC BLOOD PRESSURE: 130 MMHG | HEIGHT: 66 IN | WEIGHT: 138 LBS

## 2019-10-09 DIAGNOSIS — R73.03 PREDIABETES: ICD-10-CM

## 2019-10-09 DIAGNOSIS — F32.A DEPRESSION, UNSPECIFIED DEPRESSION TYPE: ICD-10-CM

## 2019-10-09 DIAGNOSIS — F41.9 ANXIETY: ICD-10-CM

## 2019-10-09 DIAGNOSIS — E78.5 HYPERLIPIDEMIA, UNSPECIFIED HYPERLIPIDEMIA TYPE: ICD-10-CM

## 2019-10-09 DIAGNOSIS — Z12.11 COLON CANCER SCREENING: Primary | ICD-10-CM

## 2019-10-09 PROCEDURE — 99214 OFFICE O/P EST MOD 30 MIN: CPT | Performed by: INTERNAL MEDICINE

## 2019-10-09 RX ORDER — INFLUENZA VACCINE, ADJUVANTED 15; 15; 15 UG/.5ML; UG/.5ML; UG/.5ML
INJECTION, SUSPENSION INTRAMUSCULAR
Refills: 0 | COMMUNITY
Start: 2019-09-26 | End: 2019-12-09

## 2019-10-09 RX ORDER — HEPATITIS A VACCINE 1440 [IU]/ML
INJECTION, SUSPENSION INTRAMUSCULAR
Refills: 0 | COMMUNITY
Start: 2019-08-29 | End: 2019-12-09

## 2019-10-09 NOTE — PROGRESS NOTES
Subjective   Alexandria Yousif is a 74 y.o. female who comes in today for   Chief Complaint   Patient presents with   • Hyperlipidemia     F/U from Labs   .    History of Present Illness   Here for f/u on HL on crestor 10 mg qd and h/o TIA. Also has prediabetes and is on  mg qd and her a1c dropped from 6.3% to 6.1%.  She is exercising three days a week and watching her diet closely.  Seeing Dr. Flood for GERd and her screening cscope which is upcoming.  Taking trazodone for insomnia.  Was dx with early DM and also was dx with early stages of heart failure with Dr. Rhodes earlier this year.  He is seeing her again in Dec. 2019.  No swelling and no CP.  She does take a half or quarter of his Xanax as needed for anxiety.  She usually does require this at least once a day.  She is tried many different things for her anxiety including counseling and the Pristiq seems to be the best option for her.  Pression is much better controlled after increasing the dosage.    The following portions of the patient's history were reviewed and updated as appropriate: allergies, current medications, past family history, past medical history, past social history, past surgical history and problem list.    Review of Systems   Cardiovascular: Negative.    Gastrointestinal: Negative.    Psychiatric/Behavioral:        Depression is much improved with the increased dosage of pristiq from 50 mg to 100 mg qd       Vitals:    10/09/19 1358   BP: 130/78   Pulse: 78   SpO2: 97%     Fall Risk Assessment was completed, and patient is at low risk for falls.    STEADI Fall Risk Assessment has not been completed.    PHQ-2/PHQ-9 Depression Screening 10/9/2019   Little interest or pleasure in doing things 2   Feeling down, depressed, or hopeless 2   Trouble falling or staying asleep, or sleeping too much 0   Feeling tired or having little energy 2   Poor appetite or overeating 0   Feeling bad about yourself - or that you are a failure or have  let yourself or your family down 2   Trouble concentrating on things, such as reading the newspaper or watching television 2   Moving or speaking so slowly that other people could have noticed. Or the opposite - being so fidgety or restless that you have been moving around a lot more than usual 0   Thoughts that you would be better off dead, or of hurting yourself in some way 0   Total Score 10       Objective   Physical Exam   Constitutional: She is oriented to person, place, and time. She appears well-developed and well-nourished.   HENT:   Head: Normocephalic and atraumatic.   Right Ear: External ear normal.   Left Ear: External ear normal.   Mouth/Throat: Oropharynx is clear and moist.   Eyes: Conjunctivae are normal.   Neck: Neck supple.   Cardiovascular: Normal rate, regular rhythm and normal heart sounds.   No bruits   Pulmonary/Chest: Effort normal and breath sounds normal. No respiratory distress. She has no wheezes. She has no rales.   Abdominal: Soft. Bowel sounds are normal. She exhibits no distension and no mass. There is no tenderness.   Lymphadenopathy:     She has no cervical adenopathy.   Neurological: She is alert and oriented to person, place, and time.   Skin: Skin is warm.   Psychiatric: She has a normal mood and affect. Her behavior is normal. Judgment and thought content normal.   Nursing note and vitals reviewed.        Current Outpatient Medications:   •  acetaminophen (TYLENOL) 500 MG tablet, Take 1,000 mg by mouth Every 6 (Six) Hours As Needed for Mild Pain ., Disp: , Rfl:   •  ALPRAZolam (XANAX) 0.5 MG tablet, TAKE 1 TABLET BY MOUTH EVERY DAY AS NEEDED FOR ANXIETY, Disp: 45 tablet, Rfl: 0  •  Alum Hydroxide-Mag Trisilicate (GAVISCON) 80-14.2 MG chewable tablet, Chew 1 tablet 4 (Four) Times a Day As Needed (Heartburn)., Disp: 224 each, Rfl: 11  •  aspirin 81 MG tablet, Take 81 mg by mouth Daily., Disp: , Rfl:   •  Biotin 10 MG tablet, , Disp: , Rfl:   •  Cholecalciferol (D3 HIGH POTENCY)  2000 units capsule, , Disp: , Rfl:   •  desvenlafaxine (PRISTIQ) 100 MG 24 hr tablet, TAKE 1 TABLET BY MOUTH DAILY, Disp: 90 tablet, Rfl: 0  •  fenofibrate (TRICOR) 145 MG tablet, TAKE 1 TABLET BY MOUTH EVERY DAY, Disp: 90 tablet, Rfl: 0  •  metFORMIN ER (GLUCOPHAGE-XR) 500 MG 24 hr tablet, TAKE 1 TABLET BY MOUTH DAILY WITH LARGEST MEAL OF THE DAY AND A GLASS OF WATER, Disp: 30 tablet, Rfl: 0  •  naproxen (NAPROSYN) 500 MG tablet, Take 500 mg by mouth As Needed for Mild Pain ., Disp: , Rfl:   •  Omega-3 Fatty Acids (FISH OIL) 1200 MG capsule capsule, , Disp: , Rfl:   •  omeprazole (priLOSEC) 40 MG capsule, TAKE 1 CAPSULE BY MOUTH EVERY DAY, Disp: 90 capsule, Rfl: 0  •  PREMARIN 0.3 MG tablet, TAKE 1 TABLET BY MOUTH EVERY DAY, Disp: 90 tablet, Rfl: 0  •  rosuvastatin (CRESTOR) 10 MG tablet, TAKE 1 TABLET BY MOUTH EVERY NIGHT, Disp: 90 tablet, Rfl: 0  •  spironolactone (ALDACTONE) 25 MG tablet, Take 1 tablet by mouth Daily., Disp: 30 tablet, Rfl: 4  •  traZODone (DESYREL) 50 MG tablet, TAKE 1/2 TO 1 TABLET BY MOUTH AT BEDTIME AS NEEDED FOR SLEEP, Disp: 30 tablet, Rfl: 5  •  valACYclovir (VALTREX) 500 MG tablet, TAKE 2 TABLETS BY MOUTH TWICE DAILY, Disp: 4 tablet, Rfl: 0  •  FLUAD 0.5 ML suspension prefilled syringe, ADM 0.5ML IM UTD, Disp: , Rfl: 0  •  HAVRIX 1440 EL U/ML vaccine, ADM 1ML IM UTD, Disp: , Rfl: 0    Assessment/Plan   Alexandria was seen today for hyperlipidemia.    Diagnoses and all orders for this visit:    Colon cancer screening  -     Ambulatory Referral to Gastroenterology    Hyperlipidemia, unspecified hyperlipidemia type    Anxiety    Prediabetes    Depression, unspecified depression type    He is due for colon cancer screening and I placed a referral for Dr. Flood.  She is currently under his care for her reflux disease.  She will continue the Crestor 10 mg daily for her cholesterol and she will also continue the metformin  mg once daily for prediabetes.  We did see an improvement in her A1c  however it is still above 6%.  She thinks this could be related to her recent vacation and birthdays.  She is exercising regularly.  This helps her mood as well as her overall body health.  I will see her back in 6 months.  She will continue the Pristiq 100 mg daily and she has as needed Xanax if needed.               I have asked for the patient to return to clinic in 6month(s).

## 2019-10-10 DIAGNOSIS — F41.9 ANXIETY: ICD-10-CM

## 2019-10-10 RX ORDER — METFORMIN HYDROCHLORIDE 500 MG/1
TABLET, EXTENDED RELEASE ORAL
Qty: 30 TABLET | Refills: 0 | Status: SHIPPED | OUTPATIENT
Start: 2019-10-10 | End: 2019-11-15 | Stop reason: SDUPTHER

## 2019-10-11 RX ORDER — ALPRAZOLAM 0.5 MG/1
TABLET ORAL
Qty: 45 TABLET | Refills: 0 | Status: SHIPPED | OUTPATIENT
Start: 2019-10-11 | End: 2019-11-26 | Stop reason: SDUPTHER

## 2019-10-17 ENCOUNTER — TELEPHONE (OUTPATIENT)
Dept: FAMILY MEDICINE CLINIC | Facility: CLINIC | Age: 74
End: 2019-10-17

## 2019-10-17 NOTE — TELEPHONE ENCOUNTER
----- Message from Anita Muse MD sent at 10/14/2019  7:36 PM EDT -----  Regarding: RE: Immunizations  Ok NP  Can  You let pt know we don't have record of a tetanus shot.    ----- Message -----  From: Charis Jaeger MA  Sent: 10/14/2019  10:51 AM  To: Anita Muse MD  Subject: FW: Immunizations                                Could not find anything.    ----- Message -----  From: Aditi Cobb  Sent: 10/14/2019  10:41 AM  To: Charis Jaeger MA  Subject: RE: Immunizations                                    ----- Message -----  From: Charis Jaeger MA  Sent: 10/14/2019   7:49 AM  To: Aditi Cobb  Subject: FW: Immunizations                                Can you please find.    ----- Message -----  From: Anita Muse MD  Sent: 10/9/2019   3:54 PM  To: Charis Jaeger MA  Subject: Immunizations                                    Can you look back in all scripts and see if he can find a tetanus shot please thanks

## 2019-10-23 RX ORDER — CONJUGATED ESTROGENS 0.3 MG/1
TABLET, FILM COATED ORAL
Qty: 90 TABLET | Refills: 2 | Status: SHIPPED | OUTPATIENT
Start: 2019-10-23 | End: 2020-07-15

## 2019-10-29 RX ORDER — TRAZODONE HYDROCHLORIDE 50 MG/1
TABLET ORAL
Qty: 30 TABLET | Refills: 0 | Status: SHIPPED | OUTPATIENT
Start: 2019-10-29 | End: 2019-11-27 | Stop reason: SDUPTHER

## 2019-10-31 RX ORDER — DESVENLAFAXINE 100 MG/1
100 TABLET, EXTENDED RELEASE ORAL DAILY
Qty: 90 TABLET | Refills: 1 | Status: SHIPPED | OUTPATIENT
Start: 2019-10-31 | End: 2020-04-22

## 2019-11-14 ENCOUNTER — PREP FOR SURGERY (OUTPATIENT)
Dept: OTHER | Facility: HOSPITAL | Age: 74
End: 2019-11-14

## 2019-11-14 DIAGNOSIS — Z12.11 SCREEN FOR COLON CANCER: ICD-10-CM

## 2019-11-14 DIAGNOSIS — Z83.71 FH: COLON POLYPS: Primary | ICD-10-CM

## 2019-11-15 RX ORDER — METFORMIN HYDROCHLORIDE 500 MG/1
TABLET, EXTENDED RELEASE ORAL
Qty: 30 TABLET | Refills: 0 | Status: SHIPPED | OUTPATIENT
Start: 2019-11-15 | End: 2019-12-14 | Stop reason: SDUPTHER

## 2019-11-26 DIAGNOSIS — F41.9 ANXIETY: ICD-10-CM

## 2019-11-26 RX ORDER — ALPRAZOLAM 0.5 MG/1
TABLET ORAL
Qty: 45 TABLET | Refills: 0 | Status: SHIPPED | OUTPATIENT
Start: 2019-11-26 | End: 2020-01-15

## 2019-11-26 RX ORDER — OMEPRAZOLE 40 MG/1
CAPSULE, DELAYED RELEASE ORAL
Qty: 90 CAPSULE | Refills: 0 | Status: SHIPPED | OUTPATIENT
Start: 2019-11-26 | End: 2020-03-04

## 2019-11-27 PROBLEM — Z83.719 FH: COLON POLYPS: Status: ACTIVE | Noted: 2019-11-27

## 2019-11-27 PROBLEM — Z83.71 FH: COLON POLYPS: Status: ACTIVE | Noted: 2019-11-27

## 2019-11-27 PROBLEM — Z12.11 SCREEN FOR COLON CANCER: Status: ACTIVE | Noted: 2019-11-27

## 2019-11-27 RX ORDER — TRAZODONE HYDROCHLORIDE 50 MG/1
TABLET ORAL
Qty: 30 TABLET | Refills: 0 | Status: SHIPPED | OUTPATIENT
Start: 2019-11-27 | End: 2020-02-27

## 2019-12-06 RX ORDER — ROSUVASTATIN CALCIUM 10 MG/1
10 TABLET, COATED ORAL NIGHTLY
Qty: 90 TABLET | Refills: 0 | Status: SHIPPED | OUTPATIENT
Start: 2019-12-06 | End: 2020-02-27

## 2019-12-09 ENCOUNTER — OFFICE VISIT (OUTPATIENT)
Dept: CARDIOLOGY | Facility: CLINIC | Age: 74
End: 2019-12-09

## 2019-12-09 VITALS
DIASTOLIC BLOOD PRESSURE: 72 MMHG | HEIGHT: 66 IN | SYSTOLIC BLOOD PRESSURE: 118 MMHG | BODY MASS INDEX: 22.56 KG/M2 | HEART RATE: 90 BPM | WEIGHT: 140.4 LBS | OXYGEN SATURATION: 94 %

## 2019-12-09 DIAGNOSIS — R42 LIGHTHEADEDNESS: ICD-10-CM

## 2019-12-09 DIAGNOSIS — I47.1 ATRIAL TACHYCARDIA (HCC): ICD-10-CM

## 2019-12-09 DIAGNOSIS — R00.2 PALPITATIONS: Primary | ICD-10-CM

## 2019-12-09 PROCEDURE — 93000 ELECTROCARDIOGRAM COMPLETE: CPT | Performed by: INTERNAL MEDICINE

## 2019-12-09 PROCEDURE — 99214 OFFICE O/P EST MOD 30 MIN: CPT | Performed by: INTERNAL MEDICINE

## 2019-12-09 RX ORDER — LEVETIRACETAM 500 MG/1
250 TABLET ORAL AS NEEDED
COMMUNITY
End: 2020-11-06 | Stop reason: SDUPTHER

## 2019-12-16 RX ORDER — FENOFIBRATE 145 MG/1
TABLET, COATED ORAL
Qty: 90 TABLET | Refills: 0 | Status: SHIPPED | OUTPATIENT
Start: 2019-12-16 | End: 2020-03-16

## 2019-12-16 RX ORDER — METFORMIN HYDROCHLORIDE 500 MG/1
TABLET, EXTENDED RELEASE ORAL
Qty: 30 TABLET | Refills: 0 | Status: SHIPPED | OUTPATIENT
Start: 2019-12-16 | End: 2020-01-13

## 2019-12-20 ENCOUNTER — TELEPHONE (OUTPATIENT)
Dept: CARDIOLOGY | Facility: CLINIC | Age: 74
End: 2019-12-20

## 2019-12-20 NOTE — TELEPHONE ENCOUNTER
12/20/19  Noon [facility] [place] left - Patient states zio patch was placed on 12/9 - she had to remove it on 12/14 due to major skin reaction, causing her to see dermatologist and get a cortisone injection.  Her ph [phone]/alix

## 2019-12-29 NOTE — PROGRESS NOTES
Date of Office Visit: 2019  Encounter Provider: Daquan Rhodes MD  Place of Service: [facility] CARDIOLOGY  Patient Name: Alexandria Yousif  :1945    Chief complaint: Follow-up for palpitations, atrial tachycardia.    History of Present Illness:    I again had the pleasure of seeing the patient in cardiology office on 2019.  She is a very pleasant 74 year-old white female with a history of palpitations and atrial tachycardia who presents for follow-up.  I initially saw the patient on 10/10/2017.  She had been having shortness of breath with exertion.  She also was noted to have a systolic murmur on exam.  She has an extensive family history of cardiac disease.  Her mother had 3 permanent pacemakers and CHF, her sister had a fatal MI at age 64, another sister had atrial fibrillation, and her father had a four-vessel CABG at age 62.  She underwent an exercise stress echocardiogram on 10/17/2017 which was normal.    The patient began experiencing palpitations in .  She had also been feeling lightheaded.  She had an echocardiogram and carotid artery Doppler ultrasound on 2019.  The carotid artery ultrasound was normal.  The echocardiogram showed an ejection fraction of 67% with grade 2 diastolic dysfunction.  A subsequent Holter monitor on 2019 showed 12 very brief runs of atrial tachycardia, with the longest being 7 beats in length.  There was no atrial fibrillation.    The patient presents today for follow-up.  She is still having intermittent palpitations which she describes as occurring several times a week.  She states that she feels her heart racing, and she does feel somewhat lightheaded when this occurs.  She says that the episodes can last up to 10 minutes at a time.  She only feels short of breath when she has palpitations.    Past Medical History:   Diagnosis Date   • Abdominal pain    • Anxiety and depression    • Arthritis    • Brain ischemia   "  • Cancer (CMS/[facility]) March-2008    Scalp and face-basel   • Cervical herniated disc    • Coronary artery disease Spring, 2019    left ventricle-diastolic   • Diabetes mellitus (CMS/[facility]) Spring 2019    Began Metformin   • GERD (gastroesophageal reflux disease)    • GERD (gastroesophageal reflux disease)    • Headache    • Heart murmur    • History of skin cancer    • Hyperlipidemia    • Hypertension    • Hypertriglyceridemia    • Injury of back    • Lumbar herniated disc    • Mental confusion    • Osteopenia    • Rheumatic fever     Age 17   • Shortness of breath    • TIA (transient ischemic attack)    • Tinnitus    • Vertigo        Past Surgical History:   Procedure Laterality Date   • CEREBRAL ANGIOGRAM N/A 10/8/2018    Procedure: CEREBRAL ANGIOGRAM AND VENOGRAM WITH INTRASINUS PRESSURE RECORDING;  Surgeon: Alfredo Caruso MD;  Location: [facility] 18/19;  Service: Neurosurgery   • COLONOSCOPY     • ENDOSCOPY  02/27/2017    Esophagitis, gastritis, small hh   • HYSTERECTOMY     • KNEE SURGERY     • MOHS SURGERY     • OOPHORECTOMY     • TONSILLECTOMY AND ADENOIDECTOMY     • UPPER GASTROINTESTINAL ENDOSCOPY  2016    \"Extremely severe GERD\"       Current Outpatient Medications on File Prior to Visit   Medication Sig Dispense Refill   • acetaminophen (TYLENOL) 500 MG tablet Take 1,000 mg by mouth Every 6 (Six) Hours As Needed for Mild Pain .     • ALPRAZolam (XANAX) 0.5 MG tablet TAKE 1 TABLET BY MOUTH EVERY DAY AS NEEDED FOR ANXIETY 45 tablet 0   • Alum Hydroxide-Mag Trisilicate (GAVISCON) 80-14.2 MG chewable tablet Chew 1 tablet 4 (Four) Times a Day As Needed (Heartburn). 224 each 11   • aspirin 81 MG tablet Take 81 mg by mouth Daily.     • Biotin 10 MG tablet      • Cholecalciferol (D3 HIGH POTENCY) 2000 units capsule      • desvenlafaxine (PRISTIQ) 100 MG 24 hr tablet TAKE 1 TABLET BY MOUTH DAILY 90 tablet 1   • levETIRAcetam (KEPPRA) 500 MG tablet Take 250 mg by mouth As Needed.     • naproxen (NAPROSYN) " 500 MG tablet Take 500 mg by mouth As Needed for Mild Pain .     • Omega-3 Fatty Acids (FISH OIL) 1200 MG capsule capsule      • omeprazole (priLOSEC) 40 MG capsule TAKE 1 CAPSULE BY MOUTH EVERY DAY 90 capsule 0   • PREMARIN 0.3 MG tablet TAKE 1 TABLET BY MOUTH EVERY DAY 90 tablet 2   • rosuvastatin (CRESTOR) 10 MG tablet TAKE 1 TABLET BY MOUTH EVERY NIGHT 90 tablet 0   • spironolactone (ALDACTONE) 25 MG tablet Take 1 tablet by mouth Daily. 30 tablet 4   • traZODone (DESYREL) 50 MG tablet TAKE 1/2 TO 1 TABLET BY MOUTH AT BEDTIME AS NEEDED FOR SLEEP 30 tablet 0   • valACYclovir (VALTREX) 500 MG tablet TAKE 2 TABLETS BY MOUTH TWICE DAILY 4 tablet 0     No current facility-administered medications on file prior to visit.      Allergies as of 12/09/2019 - Reviewed 12/09/2019   Allergen Reaction Noted   • Codeine Nausea Only 03/29/2016     Social History     Socioeconomic History   • Marital status:      Spouse name: Not on file   • Number of children: Not on file   • Years of education: Not on file   • Highest education level: Not on file   Occupational History   • Occupation: TEACHER (retired)   Tobacco Use   • Smoking status: Never Smoker   • Smokeless tobacco: Never Used   Substance and Sexual Activity   • Alcohol use: Yes     Alcohol/week: 3.0 - 4.0 standard drinks     Types: 2 - 3 Glasses of wine, 1 Cans of beer per week     Comment: liquor-1 to 2 month   • Drug use: No   • Sexual activity: Yes     Partners: Male     Birth control/protection: Post-menopausal     Family History   Problem Relation Age of Onset   • Uterine cancer Mother    • Heart disease Mother         Mother with 3 pacemakers over course of life, as well as CHF   • Diabetes Mother    • Skin cancer Father    • Heart disease Father         Father with 4 vessel CABG at 62   • Colon polyps Father         negative   • Heart disease Sister         Siter with fatal MI at age 64   • Atrial fibrillation Sister    • Diabetes Maternal Grandmother    •  "Heart disease Maternal Grandmother    • Stroke Maternal Grandmother    • Heart disease Paternal Grandmother    • Stroke Paternal Grandmother    • Heart disease Paternal Grandfather    • Stroke Paternal Grandfather    • Malig Hyperthermia Neg Hx        Review of Systems   Constitution: Positive for malaise/fatigue.   HENT: Positive for hearing loss and sore throat.    Cardiovascular: Positive for palpitations.   Respiratory: Positive for cough, shortness of breath and snoring.    Musculoskeletal: Positive for joint pain and muscle cramps.   Gastrointestinal: Positive for abdominal pain.   Genitourinary: Positive for frequency.   Neurological: Positive for dizziness.   Psychiatric/Behavioral: Positive for depression. The patient is nervous/anxious.    All other systems reviewed and are negative.     Objective:     Vitals:    12/09/19 1506   BP: 118/72   BP Location: Left arm   Patient Position: Sitting   Pulse: 90   SpO2: 94%   Weight: 63.7 kg (140 lb 6.4 oz)   Height: 167.6 cm (66\")     Body mass index is 22.66 kg/m².    Physical Exam   Constitutional: She is oriented to person, place, and time. She appears well-developed and well-nourished.   HENT:   Head: Normocephalic and atraumatic.   Eyes: Conjunctivae are normal.   Neck: Neck supple.   Cardiovascular: Normal rate and regular rhythm. Exam reveals no gallop and no friction rub.   Murmur heard.   Systolic murmur is present with a grade of 2/6 at the upper right sternal border, upper left sternal border and lower left sternal border.  Pulmonary/Chest: Effort normal and breath sounds normal.   Abdominal: Soft. There is no tenderness.   Musculoskeletal: She exhibits no edema.   Neurological: She is alert and oriented to person, place, and time.   Skin: Skin is warm.   Psychiatric: She has a normal mood and affect. Her behavior is normal.     Lab Review:     ECG 12 Lead  Date/Time: 12/9/2019 3:09 PM  Performed by: Daquan Rhodes MD  Authorized by: Meagan, " Daquan LAGUNA MD   Comparison: compared with previous ECG from 9/25/2019  Similar to previous ECG  Rhythm: sinus rhythm  Rate: normal  BPM: 90    Clinical impression: normal ECG          Cardiac Procedures:  1.  Stress echocardiogram on 10/17/2017: Ejection fraction was 64%.  There was no ischemia.  There was no valvular disease.  2.  Carotid artery Doppler ultrasound on 5/2/2019: Normal.  3.  Echocardiogram on 5/2/2019: Ejection fraction was 67%.  There was mild LVH.  There was grade 2 diastolic dysfunction.  4.  Holter monitor on 6/6/2019: There were 12 very brief runs of atrial tachycardia, with the longest being 7 beats in length.    Assessment:       Diagnosis Plan   1. Palpitations  Holter Monitor - 72 Hour Up To 21 Days   2. Lightheadedness  Holter Monitor - 72 Hour Up To 21 Days   3. Atrial tachycardia (CMS/HCC)       Plan:       At this point, she is still having symptoms.  She did not have an episode of significant palpitations while she was wearing the Holter monitor.  I suspect that she is either having SVT or atrial tachycardia in longer runs than what was noted on the Holter monitor.  The episode she is referring to last up to 10 minutes at a time.  At this point, I have recommended proceeding with a 14-day ZIO Patch to further evaluate for any arrhythmias.  Her echocardiogram looked good.  She is only symptomatic with the palpitations, and I do not feel that she needs an ischemic evaluation at this point.  Depending on the results of the ZIO Patch, she might benefit from a beta-blocker or calcium channel blocker.  The patient was in agreement with the plan.

## 2019-12-31 ENCOUNTER — TELEPHONE (OUTPATIENT)
Dept: FAMILY MEDICINE CLINIC | Facility: CLINIC | Age: 74
End: 2019-12-31

## 2020-01-10 NOTE — TELEPHONE ENCOUNTER
I have called and left a voicemail for the patient and I have also sent the message over on my chart.

## 2020-01-13 RX ORDER — METFORMIN HYDROCHLORIDE 500 MG/1
TABLET, EXTENDED RELEASE ORAL
Qty: 90 TABLET | Refills: 1 | Status: SHIPPED | OUTPATIENT
Start: 2020-01-13 | End: 2020-07-06

## 2020-01-13 RX ORDER — METFORMIN HYDROCHLORIDE 500 MG/1
TABLET, EXTENDED RELEASE ORAL
Qty: 30 TABLET | Refills: 0 | Status: SHIPPED | OUTPATIENT
Start: 2020-01-13 | End: 2020-01-13

## 2020-01-15 DIAGNOSIS — F41.9 ANXIETY: ICD-10-CM

## 2020-01-15 RX ORDER — ALPRAZOLAM 0.5 MG/1
TABLET ORAL
Qty: 45 TABLET | Refills: 0 | Status: SHIPPED | OUTPATIENT
Start: 2020-01-15 | End: 2020-02-26

## 2020-01-16 RX ORDER — SPIRONOLACTONE 25 MG/1
25 TABLET ORAL DAILY
Qty: 30 TABLET | Refills: 4 | Status: SHIPPED | OUTPATIENT
Start: 2020-01-16 | End: 2020-06-19

## 2020-02-05 RX ORDER — VALACYCLOVIR HYDROCHLORIDE 500 MG/1
TABLET, FILM COATED ORAL
Qty: 4 TABLET | Refills: 0 | Status: SHIPPED | OUTPATIENT
Start: 2020-02-05 | End: 2022-03-15 | Stop reason: SDUPTHER

## 2020-02-17 ENCOUNTER — ANESTHESIA EVENT (OUTPATIENT)
Dept: GASTROENTEROLOGY | Facility: HOSPITAL | Age: 75
End: 2020-02-17

## 2020-02-17 ENCOUNTER — HOSPITAL ENCOUNTER (OUTPATIENT)
Facility: HOSPITAL | Age: 75
Setting detail: HOSPITAL OUTPATIENT SURGERY
Discharge: HOME | End: 2020-02-17
Attending: INTERNAL MEDICINE | Admitting: INTERNAL MEDICINE

## 2020-02-17 ENCOUNTER — ANESTHESIA (OUTPATIENT)
Dept: GASTROENTEROLOGY | Facility: HOSPITAL | Age: 75
End: 2020-02-17

## 2020-02-17 VITALS
RESPIRATION RATE: 16 BRPM | HEART RATE: 70 BPM | OXYGEN SATURATION: 99 % | HEIGHT: 67 IN | TEMPERATURE: 97.9 F | BODY MASS INDEX: 21.08 KG/M2 | SYSTOLIC BLOOD PRESSURE: 136 MMHG | DIASTOLIC BLOOD PRESSURE: 80 MMHG | WEIGHT: 134.31 LBS

## 2020-02-17 DIAGNOSIS — Z12.11 SCREEN FOR COLON CANCER: ICD-10-CM

## 2020-02-17 DIAGNOSIS — Z83.71 FH: COLON POLYPS: ICD-10-CM

## 2020-02-17 LAB — GLUCOSE BLDC GLUCOMTR-MCNC: 91 MG/DL (ref 70–130)

## 2020-02-17 PROCEDURE — 82962 GLUCOSE BLOOD TEST: CPT

## 2020-02-17 PROCEDURE — 88305 TISSUE EXAM BY PATHOLOGIST: CPT | Performed by: INTERNAL MEDICINE

## 2020-02-17 PROCEDURE — 45380 COLONOSCOPY AND BIOPSY: CPT | Performed by: INTERNAL MEDICINE

## 2020-02-17 PROCEDURE — 25010000002 PROPOFOL 10 MG/ML EMULSION: Performed by: NURSE ANESTHETIST, CERTIFIED REGISTERED

## 2020-02-17 PROCEDURE — S0260 H&P FOR SURGERY: HCPCS | Performed by: INTERNAL MEDICINE

## 2020-02-17 RX ORDER — PROPOFOL 10 MG/ML
VIAL (ML) INTRAVENOUS CONTINUOUS PRN
Status: DISCONTINUED | OUTPATIENT
Start: 2020-02-17 | End: 2020-02-17 | Stop reason: SURG

## 2020-02-17 RX ORDER — SODIUM CHLORIDE 0.9 % (FLUSH) 0.9 %
10 SYRINGE (ML) INJECTION AS NEEDED
Status: DISCONTINUED | OUTPATIENT
Start: 2020-02-17 | End: 2020-02-17 | Stop reason: HOSPADM

## 2020-02-17 RX ORDER — PROPOFOL 10 MG/ML
VIAL (ML) INTRAVENOUS AS NEEDED
Status: DISCONTINUED | OUTPATIENT
Start: 2020-02-17 | End: 2020-02-17 | Stop reason: SURG

## 2020-02-17 RX ORDER — SODIUM CHLORIDE, SODIUM LACTATE, POTASSIUM CHLORIDE, CALCIUM CHLORIDE 600; 310; 30; 20 MG/100ML; MG/100ML; MG/100ML; MG/100ML
30 INJECTION, SOLUTION INTRAVENOUS CONTINUOUS PRN
Status: DISCONTINUED | OUTPATIENT
Start: 2020-02-17 | End: 2020-02-17 | Stop reason: HOSPADM

## 2020-02-17 RX ADMIN — SODIUM CHLORIDE, POTASSIUM CHLORIDE, SODIUM LACTATE AND CALCIUM CHLORIDE 30 ML/HR: 600; 310; 30; 20 INJECTION, SOLUTION INTRAVENOUS at 09:38

## 2020-02-17 RX ADMIN — PROPOFOL 50 MG: 10 INJECTION, EMULSION INTRAVENOUS at 09:51

## 2020-02-17 RX ADMIN — SODIUM CHLORIDE, POTASSIUM CHLORIDE, SODIUM LACTATE AND CALCIUM CHLORIDE: 600; 310; 30; 20 INJECTION, SOLUTION INTRAVENOUS at 09:32

## 2020-02-17 RX ADMIN — PROPOFOL 250 MCG/KG/MIN: 10 INJECTION, EMULSION INTRAVENOUS at 09:50

## 2020-02-17 NOTE — DISCHARGE INSTRUCTIONS
For the next 24 hours patient needs to be with a responsible adult.    For 24 hours DO NOT drive, operate machinery, appliances, drink alcohol, make important decisions or sign legal documents.    Start with a light or bland diet and advance to regular diet as tolerated.    Follow recommendations on procedure report provided by your doctor.    Call Dr Flood for problems [phone]    Problems may include but not limited to: large amounts of bleeding, trouble breathing, repeated vomiting, severe unrelieved pain, fever or chills.

## 2020-02-17 NOTE — BRIEF OP NOTE
COLONOSCOPY  Progress Note    Alexandria Yousif  2/17/2020    Pre-op Diagnosis:   Screen for colon cancer [Z12.11]  FH: colon polyps [Z83.71]       Post-Op Diagnosis Codes:     * Screen for colon cancer [Z12.11]     * FH: colon polyps [Z83.71]     * Colon polyps [K63.5]     * Internal hemorrhoids [K64.8]    Procedure/CPT® Codes:      Procedure(s):  COLONOSCOPY TO CECUM WITH COLD BIOPSY POLYPECTOMY    Surgeon(s):  Stan Flood MD    Anesthesia: Monitored Anesthesia Care    Staff:   Endo Technician: Jenny Castorena  Endo Nurse: Eulalia Benitez RN    Estimated Blood Loss: minimal    Urine Voided: * No values recorded between 2/17/2020  9:53 AM and 2/17/2020 10:07 AM *    Specimens:                Specimens     ID Source Type Tests Collected By Collected At Frozen?      A Large Intestine, Cecum Polyp · TISSUE PATHOLOGY EXAM   Stan Flood MD 2/17/20 1001      Description: CECAL POLYP    This specimen was not marked as sent.                Drains: * No LDAs found *    Findings: Colonoscopy to the terminal ileum with normal mucosa.  Cecal polyp identified removed completely cold biopsy.  Internal hemorrhoids noted as well.    Complications: None      Stan Flood MD     Date: 2/17/2020  Time: 10:09 AM

## 2020-02-17 NOTE — ANESTHESIA PREPROCEDURE EVALUATION
Anesthesia Evaluation     Patient summary reviewed and Nursing notes reviewed   no history of anesthetic complications:  NPO Solid Status: > 8 hours  NPO Liquid Status: > 8 hours           Airway   Mallampati: II  TM distance: >3 FB  Neck ROM: full  No difficulty expected  Dental - normal exam     Pulmonary    (-) rhonchi, decreased breath sounds, wheezes, not a smoker  Cardiovascular   Exercise tolerance: good (4-7 METS)    Rhythm: regular  Rate: normal    (+) hypertension, CAD, dysrhythmias PVC, hyperlipidemia,   (-) angina, DONG, murmur    ROS comment: S/p Holter hist SVT and frequent PVCs   LVEF 67% via 2D echo     Neuro/Psych  (+) TIA,     (-) seizures, CVA    ROS Comment: Last TIA 2 yrs ago   Takes keppra prn for back pain   GI/Hepatic/Renal/Endo    (+)   diabetes mellitus type 2 well controlled,   (-) no renal disease    Musculoskeletal     Abdominal     Abdomen: soft.   Substance History      OB/GYN          Other   arthritis,                    Anesthesia Plan    ASA 3     MAC   total IV anesthesia  intravenous induction     Anesthetic plan, all risks, benefits, and alternatives have been provided, discussed and informed consent has been obtained with: patient.

## 2020-02-17 NOTE — ANESTHESIA POSTPROCEDURE EVALUATION
"Patient: Alexandria Yousif    Procedure Summary     Date:  02/17/20 Room / Location:   [facility]    Anesthesia Start:  0945 Anesthesia Stop:  1017    Procedure:  COLONOSCOPY TO CECUM WITH COLD BIOPSY POLYPECTOMY (N/A ) Diagnosis:       Screen for colon cancer      FH: colon polyps      Colon polyps      Internal hemorrhoids      (Screen for colon cancer [Z12.11])      (FH: colon polyps [Z83.71])    Surgeon:  Stan Flood MD Provider:  Devyn Bhat MD    Anesthesia Type:  MAC ASA Status:  3          Anesthesia Type: MAC    Vitals  Vitals Value Taken Time   /69 2/17/2020 10:22 AM   Temp     Pulse 69 2/17/2020 10:22 AM   Resp 16 2/17/2020 10:22 AM   SpO2 97 % 2/17/2020 10:22 AM           Post Anesthesia Care and Evaluation    Patient location during evaluation: bedside  Patient participation: complete - patient participated  Level of consciousness: awake and alert  Pain management: adequate  Airway patency: patent  Anesthetic complications: No anesthetic complications  PONV Status: none  Cardiovascular status: acceptable  Respiratory status: acceptable  Hydration status: acceptable    Comments: /69 (BP Location: Left arm, Patient Position: Lying)   Pulse 69   Temp 36.6 °C (97.9 °F) (Oral)   Resp 16   Ht 170.2 cm (67\")   Wt 60.9 kg (134 lb 5 oz)   LMP  (LMP Unknown)   SpO2 97%   BMI 21.04 kg/m²         "

## 2020-02-17 NOTE — H&P
"[facility]  Pre Procedure History & Physical    Chief Complaint:   Family history of colon polyps    Subjective     HPI:   Patient 74-year-old female with history of hypertension hyperlipidemia presenting for screening.  Patient with father with history of colon polyps here for colonoscopy.    Past Medical History:   Past Medical History:   Diagnosis Date   • Abdominal pain    • Anxiety and depression    • Arthritis    • Brain ischemia    • Cancer (CMS/HCC) March-2008    Scalp and face-basel   • Cervical herniated disc    • Coronary artery disease Spring, 2019    left ventricle-diastolic   • Diabetes mellitus (CMS/HCC) Spring 2019    Began Metformin   • GERD (gastroesophageal reflux disease)    • GERD (gastroesophageal reflux disease)    • Headache    • Heart murmur    • History of skin cancer    • Hyperlipidemia    • Hypertension    • Hypertriglyceridemia    • Injury of back    • Lumbar herniated disc    • Mental confusion    • Osteopenia    • Rheumatic fever     Age 17   • Shortness of breath    • TIA (transient ischemic attack)    • Tinnitus    • Vertigo        Past Surgical History:  Past Surgical History:   Procedure Laterality Date   • CEREBRAL ANGIOGRAM N/A 10/8/2018    Procedure: CEREBRAL ANGIOGRAM AND VENOGRAM WITH INTRASINUS PRESSURE RECORDING;  Surgeon: Alfredo Caruso MD;  Location: [facility] 18/19;  Service: Neurosurgery   • COLONOSCOPY      2010   • ENDOSCOPY  02/27/2017    Esophagitis, gastritis, small hh   • HYSTERECTOMY     • KNEE SURGERY     • MOHS SURGERY     • OOPHORECTOMY     • TONSILLECTOMY AND ADENOIDECTOMY     • UPPER GASTROINTESTINAL ENDOSCOPY  2016    \"Extremely severe GERD\"       Family History:  Family History   Problem Relation Age of Onset   • Uterine cancer Mother    • Heart disease Mother         Mother with 3 pacemakers over course of life, as well as CHF   • Diabetes Mother    • Skin cancer Father    • Heart disease Father         Father with 4 " vessel CABG at 62   • Colon polyps Father         negative   • Heart disease Sister         Siter with fatal MI at age 64   • Atrial fibrillation Sister    • Diabetes Maternal Grandmother    • Heart disease Maternal Grandmother    • Stroke Maternal Grandmother    • Heart disease Paternal Grandmother    • Stroke Paternal Grandmother    • Heart disease Paternal Grandfather    • Stroke Paternal Grandfather    • Malig Hyperthermia Neg Hx        Social History:   reports that she has never smoked. She has never used smokeless tobacco. She reports that she drinks about 3.0 - 4.0 standard drinks of alcohol per week. She reports that she does not use drugs.    Medications:   Medications Prior to Admission   Medication Sig Dispense Refill Last Dose   • acetaminophen (TYLENOL) 500 MG tablet Take 1,000 mg by mouth Every 6 (Six) Hours As Needed for Mild Pain .   2/16/2020 at Unknown time   • ALPRAZolam (XANAX) 0.5 MG tablet TAKE 1 TABLET BY MOUTH EVERY DAY AS NEEDED FOR ANXIETY 45 tablet 0 2/16/2020 at Unknown time   • Alum Hydroxide-Mag Trisilicate (GAVISCON) 80-14.2 MG chewable tablet Chew 1 tablet 4 (Four) Times a Day As Needed (Heartburn). 224 each 11 2/16/2020 at Unknown time   • aspirin 81 MG tablet Take 81 mg by mouth Daily.   2/14/2020 at Unknown time   • Biotin 10 MG tablet    2/16/2020 at Unknown time   • Cholecalciferol (D3 HIGH POTENCY) 2000 units capsule    2/16/2020 at Unknown time   • desvenlafaxine (PRISTIQ) 100 MG 24 hr tablet TAKE 1 TABLET BY MOUTH DAILY 90 tablet 1 2/16/2020 at Unknown time   • fenofibrate (TRICOR) 145 MG tablet TAKE 1 TABLET BY MOUTH EVERY DAY 90 tablet 0 2/16/2020 at Unknown time   • levETIRAcetam (KEPPRA) 500 MG tablet Take 250 mg by mouth As Needed.   2/16/2020 at Unknown time   • metFORMIN ER (GLUCOPHAGE-XR) 500 MG 24 hr tablet TAKE 1 TABLET BY MOUTH DAILY WITH LARGEST MEAL OF THE DAY AND A GLASS OF WATER 90 tablet 01 2/16/2020 at Unknown time   • metoprolol tartrate (LOPRESSOR) 25 MG  "tablet Take 1 tablet by mouth Daily As Needed (Palpitations). 60 tablet 11 2/16/2020 at Unknown time   • naproxen (NAPROSYN) 500 MG tablet Take 500 mg by mouth As Needed for Mild Pain .   2/16/2020 at Unknown time   • Omega-3 Fatty Acids (FISH OIL) 1200 MG capsule capsule    2/16/2020 at Unknown time   • omeprazole (priLOSEC) 40 MG capsule TAKE 1 CAPSULE BY MOUTH EVERY DAY 90 capsule 0 2/16/2020 at Unknown time   • PREMARIN 0.3 MG tablet TAKE 1 TABLET BY MOUTH EVERY DAY 90 tablet 2 2/16/2020 at Unknown time   • rosuvastatin (CRESTOR) 10 MG tablet TAKE 1 TABLET BY MOUTH EVERY NIGHT 90 tablet 0 2/16/2020 at Unknown time   • spironolactone (ALDACTONE) 25 MG tablet TAKE 1 TABLET BY MOUTH DAILY 30 tablet 4 2/16/2020 at Unknown time   • traZODone (DESYREL) 50 MG tablet TAKE 1/2 TO 1 TABLET BY MOUTH AT BEDTIME AS NEEDED FOR SLEEP 30 tablet 0 2/16/2020 at Unknown time   • valACYclovir (VALTREX) 500 MG tablet TAKE 2 TABLETS BY MOUTH TWICE DAILY 4 tablet 0        Allergies:  Codeine    ROS:    Pertinent items are noted in HPI     Objective     Blood pressure 151/90, pulse 76, temperature 97.9 °F (36.6 °C), temperature source Oral, resp. rate 15, height 170.2 cm (67\"), weight 60.9 kg (134 lb 5 oz), SpO2 97 %, not currently breastfeeding.    Physical Exam   Constitutional: Pt is oriented to person, place, and time and well-developed, well-nourished, and in no distress.   Mouth/Throat: Oropharynx is clear and moist.   Neck: Normal range of motion.   Cardiovascular: Normal rate, regular rhythm and normal heart sounds.    Pulmonary/Chest: Effort normal and breath sounds normal.   Abdominal: Soft. Nontender  Skin: Skin is warm and dry.   Psychiatric: Mood, memory, affect and judgment normal.     Assessment/Plan     Diagnosis:  Family history: Polyps    Anticipated Surgical Procedure:  Colonoscopy    The risks, benefits, and alternatives of this procedure have been discussed with the patient or the responsible party- the patient " understands and agrees to proceed.

## 2020-02-18 LAB
CYTO UR: NORMAL
LAB AP CASE REPORT: NORMAL
PATH REPORT.FINAL DX SPEC: NORMAL
PATH REPORT.GROSS SPEC: NORMAL

## 2020-02-26 DIAGNOSIS — F41.9 ANXIETY: ICD-10-CM

## 2020-02-27 RX ORDER — ROSUVASTATIN CALCIUM 10 MG/1
10 TABLET, COATED ORAL NIGHTLY
Qty: 90 TABLET | Refills: 0 | Status: SHIPPED | OUTPATIENT
Start: 2020-02-27 | End: 2020-05-25

## 2020-02-27 RX ORDER — TRAZODONE HYDROCHLORIDE 50 MG/1
TABLET ORAL
Qty: 90 TABLET | Refills: 0 | Status: SHIPPED | OUTPATIENT
Start: 2020-02-27 | End: 2020-05-25

## 2020-02-27 RX ORDER — ALPRAZOLAM 0.5 MG/1
TABLET ORAL
Qty: 45 TABLET | Refills: 0 | Status: SHIPPED | OUTPATIENT
Start: 2020-02-27 | End: 2020-04-06

## 2020-03-04 RX ORDER — OMEPRAZOLE 40 MG/1
CAPSULE, DELAYED RELEASE ORAL
Qty: 90 CAPSULE | Refills: 0 | Status: SHIPPED | OUTPATIENT
Start: 2020-03-04 | End: 2020-06-03

## 2020-03-16 RX ORDER — FENOFIBRATE 145 MG/1
TABLET, COATED ORAL
Qty: 90 TABLET | Refills: 0 | Status: SHIPPED | OUTPATIENT
Start: 2020-03-16 | End: 2020-05-28

## 2020-03-23 ENCOUNTER — TELEPHONE (OUTPATIENT)
Dept: GASTROENTEROLOGY | Facility: CLINIC | Age: 75
End: 2020-03-23

## 2020-03-23 NOTE — TELEPHONE ENCOUNTER
----- Message from Stan Flood MD sent at 3/22/2020  3:07 PM EDT -----  Polyp benign, repeat colon 5 years as discussed

## 2020-04-06 DIAGNOSIS — F41.9 ANXIETY: ICD-10-CM

## 2020-04-07 RX ORDER — ALPRAZOLAM 0.5 MG/1
TABLET ORAL
Qty: 45 TABLET | Refills: 0 | Status: SHIPPED | OUTPATIENT
Start: 2020-04-07 | End: 2020-05-28

## 2020-04-17 ENCOUNTER — TRANSCRIBE ORDERS (OUTPATIENT)
Dept: ADMINISTRATIVE | Facility: HOSPITAL | Age: 75
End: 2020-04-17

## 2020-04-17 ENCOUNTER — TELEPHONE (OUTPATIENT)
Dept: FAMILY MEDICINE CLINIC | Facility: CLINIC | Age: 75
End: 2020-04-17

## 2020-04-17 DIAGNOSIS — Z12.31 VISIT FOR SCREENING MAMMOGRAM: Primary | ICD-10-CM

## 2020-04-17 NOTE — TELEPHONE ENCOUNTER
PATIENT CALLING WOULD LIKE ORDERS FOR BONE DENSITY SCAN SENT TO [facility] SO SHE CAN HAVE IT DONE THE SAME TIME AS HER MAMMOGRAM.    PLEASE CALL BACK [phone].

## 2020-04-17 NOTE — TELEPHONE ENCOUNTER
Left voicemail for patient to call the office to schedule a virtual visit.    HUB- if patient calls back please schedule her for a virtual visit

## 2020-04-22 RX ORDER — DESVENLAFAXINE 100 MG/1
100 TABLET, EXTENDED RELEASE ORAL DAILY
Qty: 90 TABLET | Refills: 1 | Status: SHIPPED | OUTPATIENT
Start: 2020-04-22 | End: 2020-10-21

## 2020-04-24 ENCOUNTER — TELEMEDICINE (OUTPATIENT)
Dept: FAMILY MEDICINE CLINIC | Facility: CLINIC | Age: 75
End: 2020-04-24

## 2020-04-24 DIAGNOSIS — E83.52 HYPERCALCEMIA: ICD-10-CM

## 2020-04-24 DIAGNOSIS — E78.5 HYPERLIPIDEMIA, UNSPECIFIED HYPERLIPIDEMIA TYPE: ICD-10-CM

## 2020-04-24 DIAGNOSIS — Z78.0 MENOPAUSE: Primary | ICD-10-CM

## 2020-04-24 DIAGNOSIS — R73.03 PREDIABETES: ICD-10-CM

## 2020-04-24 DIAGNOSIS — F41.9 ANXIETY: ICD-10-CM

## 2020-04-24 PROCEDURE — 99214 OFFICE O/P EST MOD 30 MIN: CPT | Performed by: INTERNAL MEDICINE

## 2020-04-24 NOTE — PATIENT INSTRUCTIONS
It was good talking to you today and great to see you!  I look forward to when we can see each other face-to-face!  I did go ahead and place fasting lab orders for you to do sometime this summer.  Maybe the same day you come in for your mammogram and your bone density test would be a good time to do your blood work.  Currently were not doing labs in our building, but by June… Hopefully we will be doing labs again.  Call me if you need to find out more direction on blood work.  The labs are in our system so if we are doing labs in either my office or on the first floor, you should be able to get them done the same day as you do your mammogram and bone density.

## 2020-04-24 NOTE — PROGRESS NOTES
Subjective   Alexandria Yousif is a 74 y.o. female who comes in today for   Chief Complaint   Patient presents with   • Med Refill   • Osteoporosis   • Hypertension   • Hyperlipidemia   • Depression   • Hyperglycemia   .    History of Present Illness   Today we are doing a VV due to restrictions from Covid 19.  PMH of hyperglycemia on MTF, HTN on metoprolol and spironolactone, HTG and HL on tricor and crestor, depression on pristiq 100 mg qd and prn xanax which she takes daily, she also takes trazodone 50 mg qhs prn for insomnia.    She has osteopenia and last dexa was 5/2018  MMG is due in June 2020.  She does take low dose premarin 0.3 mg qd for PM and vaginal atrophy.  Having some ha's that she thinks is related to tension and it flaring her trigeminal nerve pain.  Taking baclofen prn which does help.  Her left temple area is tender to touch.  Doesn't feel like it is in her jaw joint.  Wearing a  at night.  Vision has been normal;  Sx's started a week ago and has been daily but it is better than it was when it started last weekend.  Flexeril made it totally resolve.  She ran out and then switched to baclofen. Her keppra also seems to help it as well and it did help her.    The following portions of the patient's history were reviewed and updated as appropriate: allergies, current medications, past family history, past medical history, past social history, past surgical history and problem list.    Review of Systems   Eyes: Negative.    Neurological: Positive for headaches.       LMP  (LMP Unknown)     STEADI Fall Risk Assessment has not been completed.    PHQ-2/PHQ-9 Depression Screening 10/9/2019   Little interest or pleasure in doing things 2   Feeling down, depressed, or hopeless 2   Trouble falling or staying asleep, or sleeping too much 0   Feeling tired or having little energy 2   Poor appetite or overeating 0   Feeling bad about yourself - or that you are a failure or have let yourself or your  family down 2   Trouble concentrating on things, such as reading the newspaper or watching television 2   Moving or speaking so slowly that other people could have noticed. Or the opposite - being so fidgety or restless that you have been moving around a lot more than usual 0   Thoughts that you would be better off dead, or of hurting yourself in some way 0   Total Score 10       Objective   Physical Exam   Constitutional: She appears well-developed and well-nourished.   HENT:   Head: Normocephalic and atraumatic.   Nose: Nose normal.   Eyes: Conjunctivae and EOM are normal.   Pulmonary/Chest: Effort normal. No respiratory distress.   Neurological: She is alert.   Psychiatric: She has a normal mood and affect. Her behavior is normal. Judgment and thought content normal.         Current Outpatient Medications:   •  acetaminophen (TYLENOL) 500 MG tablet, Take 1,000 mg by mouth Every 6 (Six) Hours As Needed for Mild Pain ., Disp: , Rfl:   •  ALPRAZolam (XANAX) 0.5 MG tablet, TAKE 1 TABLET BY MOUTH EVERY DAY AS NEEDED FOR ANXIETY, Disp: 45 tablet, Rfl: 0  •  Alum Hydroxide-Mag Trisilicate (GAVISCON) 80-14.2 MG chewable tablet, Chew 1 tablet 4 (Four) Times a Day As Needed (Heartburn)., Disp: 224 each, Rfl: 11  •  aspirin 81 MG tablet, Take 81 mg by mouth Daily., Disp: , Rfl:   •  Biotin 10 MG tablet, , Disp: , Rfl:   •  Cholecalciferol (D3 HIGH POTENCY) 2000 units capsule, , Disp: , Rfl:   •  desvenlafaxine (PRISTIQ) 100 MG 24 hr tablet, TAKE 1 TABLET BY MOUTH DAILY, Disp: 90 tablet, Rfl: 1  •  fenofibrate (TRICOR) 145 MG tablet, TAKE 1 TABLET BY MOUTH EVERY DAY, Disp: 90 tablet, Rfl: 0  •  levETIRAcetam (KEPPRA) 500 MG tablet, Take 250 mg by mouth As Needed., Disp: , Rfl:   •  metFORMIN ER (GLUCOPHAGE-XR) 500 MG 24 hr tablet, TAKE 1 TABLET BY MOUTH DAILY WITH LARGEST MEAL OF THE DAY AND A GLASS OF WATER, Disp: 90 tablet, Rfl: 01  •  metoprolol tartrate (LOPRESSOR) 25 MG tablet, Take 1 tablet by mouth Daily As Needed  (Palpitations)., Disp: 60 tablet, Rfl: 11  •  naproxen (NAPROSYN) 500 MG tablet, Take 500 mg by mouth As Needed for Mild Pain ., Disp: , Rfl:   •  Omega-3 Fatty Acids (FISH OIL) 1200 MG capsule capsule, , Disp: , Rfl:   •  omeprazole (priLOSEC) 40 MG capsule, TAKE 1 CAPSULE BY MOUTH EVERY DAY, Disp: 90 capsule, Rfl: 0  •  PREMARIN 0.3 MG tablet, TAKE 1 TABLET BY MOUTH EVERY DAY, Disp: 90 tablet, Rfl: 2  •  rosuvastatin (CRESTOR) 10 MG tablet, TAKE 1 TABLET BY MOUTH EVERY NIGHT, Disp: 90 tablet, Rfl: 0  •  spironolactone (ALDACTONE) 25 MG tablet, TAKE 1 TABLET BY MOUTH DAILY, Disp: 30 tablet, Rfl: 4  •  traZODone (DESYREL) 50 MG tablet, TAKE 1/2 TO 1 TABLET BY MOUTH AT BEDTIME AS NEEDED FOR SLEEP, Disp: 90 tablet, Rfl: 0  •  valACYclovir (VALTREX) 500 MG tablet, TAKE 2 TABLETS BY MOUTH TWICE DAILY, Disp: 4 tablet, Rfl: 0    Assessment/Plan   Alexandria was seen today for med refill, osteoporosis, hypertension, hyperlipidemia, depression and hyperglycemia.    Diagnoses and all orders for this visit:    Menopause  -     DEXA Bone Density Axial; Future    Hyperlipidemia, unspecified hyperlipidemia type  -     Hemoglobin A1c; Future  -     Comprehensive Metabolic Panel; Future  -     CBC & Differential; Future  -     Lipid Panel With LDL / HDL Ratio; Future  -     TSH; Future  -     T4, Free; Future    Prediabetes  -     Hemoglobin A1c; Future  -     Comprehensive Metabolic Panel; Future  -     CBC & Differential; Future  -     Lipid Panel With LDL / HDL Ratio; Future  -     TSH; Future  -     T4, Free; Future    Anxiety  -     Hemoglobin A1c; Future  -     Comprehensive Metabolic Panel; Future  -     CBC & Differential; Future  -     Lipid Panel With LDL / HDL Ratio; Future  -     TSH; Future  -     T4, Free; Future    Hypercalcemia  -     Comprehensive Metabolic Panel; Future    seeing Dr. Rhodes for h/o SVT and takes prn metoprolol and LVH  Taking daily xanax and did increase to the total 0.5 mg xanax now and pristiq  100 mg qd and trazodone prn qhs.  Depression is flared with low motivation and fatigue.  Is down about being home bound due to her age and not able to get out and help others, Christianity, etc.  We discussed coping mechanisms like taking a walk, calling a friend, sending a notecard or giftcard, baking cookies for someone in need.  She doesn't want to adjust her medications at this time and I agree this is a flare of her baseline depression due to our current world situation.    I am concerned about her temporal tenderness and ha in the temple and left top of head  She had right TGN when her mom  a few years ago.  keppra has helped and so has flexeril or baclofen.  This goes more in line with nerve pain and or musculoskeletal tension.  She will start taking qhs keppra for a week and if her pain persists or worsens, or if visual changes occur, she will let me know right away and she will need at very least a sed rate.   She will continue the Crestor 10 mg daily along with TriCor 145 mg daily.  She is due for fasting labs and this will be ordered today but she can do it once the restrictions lifted.  She will continue the daily metformin as this has controlled her blood sugars as she was heading towards diabetes.  Total time coordinating care for video visit 30 minutes             I have asked for the patient to return to clinic in 6month(s).

## 2020-04-29 RX ORDER — METHYLPREDNISOLONE 4 MG/1
TABLET ORAL
Qty: 21 TABLET | Refills: 0 | Status: SHIPPED | OUTPATIENT
Start: 2020-04-29 | End: 2020-10-21

## 2020-05-25 RX ORDER — ROSUVASTATIN CALCIUM 10 MG/1
10 TABLET, COATED ORAL NIGHTLY
Qty: 90 TABLET | Refills: 1 | Status: SHIPPED | OUTPATIENT
Start: 2020-05-25 | End: 2020-11-23

## 2020-05-25 RX ORDER — TRAZODONE HYDROCHLORIDE 50 MG/1
TABLET ORAL
Qty: 90 TABLET | Refills: 1 | Status: SHIPPED | OUTPATIENT
Start: 2020-05-25 | End: 2020-11-23

## 2020-05-28 DIAGNOSIS — F41.9 ANXIETY: ICD-10-CM

## 2020-05-28 RX ORDER — FENOFIBRATE 145 MG/1
TABLET, COATED ORAL
Qty: 90 TABLET | Refills: 0 | Status: SHIPPED | OUTPATIENT
Start: 2020-05-28 | End: 2020-08-25

## 2020-05-28 RX ORDER — ALPRAZOLAM 0.5 MG/1
TABLET ORAL
Qty: 45 TABLET | Refills: 0 | Status: SHIPPED | OUTPATIENT
Start: 2020-05-28 | End: 2020-07-15

## 2020-06-03 RX ORDER — OMEPRAZOLE 40 MG/1
CAPSULE, DELAYED RELEASE ORAL
Qty: 90 CAPSULE | Refills: 0 | Status: SHIPPED | OUTPATIENT
Start: 2020-06-03 | End: 2020-09-10

## 2020-06-19 ENCOUNTER — OFFICE VISIT (OUTPATIENT)
Dept: CARDIOLOGY | Facility: CLINIC | Age: 75
End: 2020-06-19

## 2020-06-19 VITALS
OXYGEN SATURATION: 96 % | SYSTOLIC BLOOD PRESSURE: 124 MMHG | BODY MASS INDEX: 21.35 KG/M2 | DIASTOLIC BLOOD PRESSURE: 78 MMHG | HEIGHT: 67 IN | HEART RATE: 96 BPM | WEIGHT: 136 LBS

## 2020-06-19 DIAGNOSIS — R00.2 PALPITATIONS: Primary | ICD-10-CM

## 2020-06-19 DIAGNOSIS — I47.1 ATRIAL TACHYCARDIA (HCC): ICD-10-CM

## 2020-06-19 DIAGNOSIS — I49.3 PVC'S (PREMATURE VENTRICULAR CONTRACTIONS): ICD-10-CM

## 2020-06-19 DIAGNOSIS — I47.1 PAROXYSMAL SVT (SUPRAVENTRICULAR TACHYCARDIA) (HCC): ICD-10-CM

## 2020-06-19 PROCEDURE — 99213 OFFICE O/P EST LOW 20 MIN: CPT | Performed by: INTERNAL MEDICINE

## 2020-06-19 RX ORDER — SPIRONOLACTONE 25 MG/1
25 TABLET ORAL DAILY
Qty: 30 TABLET | Refills: 4 | Status: SHIPPED | OUTPATIENT
Start: 2020-06-19 | End: 2021-06-22 | Stop reason: SDUPTHER

## 2020-06-22 NOTE — PROGRESS NOTES
Date of Office Visit:  2020  Encounter Provider: Daquan Rhodes MD  Place of Service: [facility] CARDIOLOGY  Patient Name: Alexandria Yousif  :1945    Chief complaint: Follow-up for palpitations, atrial tachycardia, SVT, and PVC's.    History of Present Illness:    I again had the pleasure of seeing the patient in cardiology office on 2020.  She is a very pleasant 75 year-old white female with a history of SVT, PVC's, and atrial tachycardia who presents for follow-up.  I initially saw the patient on 10/10/2017.  She had been having shortness of breath with exertion.  She also was noted to have a systolic murmur on exam.  She has an extensive family history of cardiac disease.  Her mother had 3 permanent pacemakers and CHF, her sister had a fatal MI at age 64, another sister had atrial fibrillation, and her father had a four-vessel CABG at age 62.  She underwent an exercise stress echocardiogram on 10/17/2017 which was normal.    The patient began experiencing palpitations in .  She had also been feeling lightheaded.  She had an echocardiogram and carotid artery Doppler ultrasound on 2019.  The carotid artery ultrasound was normal.  The echocardiogram showed an ejection fraction of 67% with grade 2 diastolic dysfunction.  A subsequent Holter monitor on 2019 showed 12 very brief runs of atrial tachycardia, with the longest being 7 beats in length.  She subsequently wore a 14-day ZIO patch starting on 2019.  This showed 5 episodes of SVT, with the longest being 11 beats in length.  She also had frequent PVCs (5% of the total) with rare ventricular bigeminy and trigeminy.  Of note, she did have a severe reaction to her ZIO Patch adhesive with a rash and blisters which actually required steroid injections and oral steroids afterwards.    The patient presents today for follow-up.  She still feels palpitations at times, and she does take metoprolol on  "these occasions.  This usually helps.  She has been more fatigued, although she states that there has been some depression and anxiety from the COVID-19 pandemic as well.  She only feels short of breath with the palpitations.  She has not had any chest discomfort.    Past Medical History:   Diagnosis Date   • Abdominal pain    • Anxiety and depression    • Arthritis    • Brain ischemia    • Cancer (CMS/HCC) March-2008    Scalp and face-basel   • Cervical herniated disc    • Coronary artery disease Spring, 2019    left ventricle-diastolic   • Diabetes mellitus (CMS/HCC) Spring 2019    Began Metformin   • GERD (gastroesophageal reflux disease)    • GERD (gastroesophageal reflux disease)    • Headache    • Heart murmur    • History of skin cancer    • Hyperlipidemia    • Hypertension    • Hypertriglyceridemia    • Injury of back    • Lumbar herniated disc    • Mental confusion    • Osteopenia    • Rheumatic fever     Age 17   • Shortness of breath    • TIA (transient ischemic attack)    • Tinnitus    • Vertigo        Past Surgical History:   Procedure Laterality Date   • CEREBRAL ANGIOGRAM N/A 10/8/2018    Procedure: CEREBRAL ANGIOGRAM AND VENOGRAM WITH INTRASINUS PRESSURE RECORDING;  Surgeon: Alfredo Caruso MD;  Location: [facility] HYBRID OR 18/19;  Service: Neurosurgery   • COLONOSCOPY      2010   • COLONOSCOPY N/A 2/17/2020    Procedure: COLONOSCOPY TO CECUM WITH COLD BIOPSY POLYPECTOMY;  Surgeon: Stan Flood MD;  Location: [facility] ENDOSCOPY;  Service: Gastroenterology;  Laterality: N/A;  PRE- FAMILY HX COLON POLYPS  POST- POLYP, HEMORRHOIDS   • ENDOSCOPY  02/27/2017    Esophagitis, gastritis, small hh   • HYSTERECTOMY     • KNEE SURGERY     • MOHS SURGERY     • OOPHORECTOMY     • TONSILLECTOMY AND ADENOIDECTOMY     • UPPER GASTROINTESTINAL ENDOSCOPY  2016    \"Extremely severe GERD\"       Current Outpatient Medications on File Prior to Visit   Medication Sig Dispense Refill   • acetaminophen (TYLENOL) 500 " MG tablet Take 1,000 mg by mouth Every 6 (Six) Hours As Needed for Mild Pain .     • ALPRAZolam (XANAX) 0.5 MG tablet TAKE 1 TABLET BY MOUTH EVERY DAY AS NEEDED FOR ANXIETY 45 tablet 0   • Alum Hydroxide-Mag Trisilicate (GAVISCON) 80-14.2 MG chewable tablet Chew 1 tablet 4 (Four) Times a Day As Needed (Heartburn). 224 each 11   • aspirin 81 MG tablet Take 81 mg by mouth Daily.     • Biotin 10 MG tablet      • Cholecalciferol (D3 HIGH POTENCY) 2000 units capsule      • desvenlafaxine (PRISTIQ) 100 MG 24 hr tablet TAKE 1 TABLET BY MOUTH DAILY 90 tablet 1   • fenofibrate (TRICOR) 145 MG tablet TAKE 1 TABLET BY MOUTH EVERY DAY 90 tablet 0   • levETIRAcetam (KEPPRA) 500 MG tablet Take 250 mg by mouth As Needed.     • metFORMIN ER (GLUCOPHAGE-XR) 500 MG 24 hr tablet TAKE 1 TABLET BY MOUTH DAILY WITH LARGEST MEAL OF THE DAY AND A GLASS OF WATER 90 tablet 01   • methylPREDNISolone (MEDROL, BAILEE,) 4 MG tablet Take as directed on package instructions. 21 tablet 0   • metoprolol tartrate (LOPRESSOR) 25 MG tablet Take 1 tablet by mouth Daily As Needed (Palpitations). 60 tablet 11   • naproxen (NAPROSYN) 500 MG tablet Take 500 mg by mouth As Needed for Mild Pain .     • Omega-3 Fatty Acids (FISH OIL) 1200 MG capsule capsule      • omeprazole (priLOSEC) 40 MG capsule TAKE 1 CAPSULE BY MOUTH EVERY DAY 90 capsule 0   • PREMARIN 0.3 MG tablet TAKE 1 TABLET BY MOUTH EVERY DAY 90 tablet 2   • rosuvastatin (CRESTOR) 10 MG tablet TAKE 1 TABLET BY MOUTH EVERY NIGHT 90 tablet 1   • traZODone (DESYREL) 50 MG tablet TAKE 1/2 TO 1 TABLET BY MOUTH AT BEDTIME AS NEEDED FOR SLEEP 90 tablet 1   • valACYclovir (VALTREX) 500 MG tablet TAKE 2 TABLETS BY MOUTH TWICE DAILY 4 tablet 0     No current facility-administered medications on file prior to visit.      Allergies as of 06/19/2020 - Reviewed 02/17/2020   Allergen Reaction Noted   • Codeine Nausea Only 03/29/2016     Social History     Socioeconomic History   • Marital status:      Spouse  "name: Not on file   • Number of children: Not on file   • Years of education: Not on file   • Highest education level: Not on file   Occupational History   • Occupation: TEACHER (retired)   Tobacco Use   • Smoking status: Never Smoker   • Smokeless tobacco: Never Used   Substance and Sexual Activity   • Alcohol use: Yes     Alcohol/week: 3.0 - 4.0 standard drinks     Types: 2 - 3 Glasses of wine, 1 Cans of beer per week     Comment: liquor-1 to 2 month   • Drug use: No   • Sexual activity: Yes     Partners: Male     Birth control/protection: Post-menopausal     Family History   Problem Relation Age of Onset   • Uterine cancer Mother    • Heart disease Mother         Mother with 3 pacemakers over course of life, as well as CHF   • Diabetes Mother    • Skin cancer Father    • Heart disease Father         Father with 4 vessel CABG at 62   • Colon polyps Father         negative   • Heart disease Sister         Siter with fatal MI at age 64   • Atrial fibrillation Sister    • Diabetes Maternal Grandmother    • Heart disease Maternal Grandmother    • Stroke Maternal Grandmother    • Heart disease Paternal Grandmother    • Stroke Paternal Grandmother    • Heart disease Paternal Grandfather    • Stroke Paternal Grandfather    • Malig Hyperthermia Neg Hx        Review of Systems   Constitution: Positive for malaise/fatigue.   Cardiovascular: Positive for palpitations.   Musculoskeletal: Positive for joint pain and muscle cramps.   Gastrointestinal: Positive for abdominal pain.   Psychiatric/Behavioral: Positive for depression.   All other systems reviewed and are negative.     Objective:     Vitals:    06/19/20 1206   BP: 124/78   Pulse: 96   SpO2: 96%   Weight: 61.7 kg (136 lb)   Height: 170.2 cm (67.01\")     Body mass index is 21.3 kg/m².    Physical Exam   Constitutional: She is oriented to person, place, and time. She appears well-developed and well-nourished.   HENT:   Head: Normocephalic and atraumatic.   Eyes: " Conjunctivae are normal.   Neck: Neck supple.   Cardiovascular: Normal rate and regular rhythm. Exam reveals no gallop and no friction rub.   Murmur heard.   Systolic murmur is present with a grade of 2/6 at the upper right sternal border, upper left sternal border and lower left sternal border.  Pulmonary/Chest: Effort normal and breath sounds normal.   Abdominal: Soft. There is no tenderness.   Musculoskeletal: She exhibits no edema.   Neurological: She is alert and oriented to person, place, and time.   Skin: Skin is warm.   Psychiatric: She has a normal mood and affect. Her behavior is normal.     Lab Review:   Procedures  Cardiac Procedures:  1.  Stress echocardiogram on 10/17/2017: Ejection fraction was 64%.  There was no ischemia.  There was no valvular disease.  2.  Carotid artery Doppler ultrasound on 5/2/2019: Normal.  3.  Echocardiogram on 5/2/2019: Ejection fraction was 67%.  There was mild LVH.  There was grade 2 diastolic dysfunction.  4.  Holter monitor on 6/6/2019: There were 12 very brief runs of atrial tachycardia, with the longest being 7 beats in length.  5.  ZIO Patch starting on 12/9/2019: There were 5 episodes of SVT, the longest 11 beats in length.  There were frequent PVCs, comprising up to 5% of the total.  There was rare ventricular bigeminy and trigeminy.  There was no ventricular tachycardia.    Assessment:       Diagnosis Plan   1. Palpitations     2. Atrial tachycardia (CMS/HCC)     3. Paroxysmal SVT (supraventricular tachycardia) (CMS/HCC)     4. PVC's (premature ventricular contractions)       Plan:       Most of today, I reassured her about her findings.  I assured her that the brief runs of SVT and the PVCs are both fairly benign in terms of harming her.  I told her that these are more annoying from a symptomatic standpoint than dangerous.  She has not had any prolonged palpitations.  She is going to continue on the metoprolol 25 mg as needed.  I did also tell her that stress and  anxiety can make the rhythm issues worse.  She was also concerned about the grade 2 diastolic dysfunction on her echo.  She has absolutely no signs of heart failure currently.  Again, she did develop a severe reaction to the adhesive from the ZIO Patch with rash and blisters which required steroid injections and steroid pills afterwards.  I do not think she would be a good candidate for another long-term monitor other than a loop recorder because of this.    She does have a significant family history of multiple cardiac issues, including coronary artery disease, and this should be taken into account for any future symptoms as well.  For now, I will plan on seeing her back in the office in 8 months unless other issues arise.

## 2020-06-26 ENCOUNTER — HOSPITAL ENCOUNTER (OUTPATIENT)
Dept: MAMMOGRAPHY | Facility: HOSPITAL | Age: 75
Discharge: HOME | End: 2020-06-26
Admitting: INTERNAL MEDICINE

## 2020-06-26 ENCOUNTER — HOSPITAL ENCOUNTER (OUTPATIENT)
Dept: BONE DENSITY | Facility: HOSPITAL | Age: 75
Discharge: HOME | End: 2020-06-26

## 2020-06-26 DIAGNOSIS — Z12.31 VISIT FOR SCREENING MAMMOGRAM: ICD-10-CM

## 2020-06-26 DIAGNOSIS — Z78.0 MENOPAUSE: ICD-10-CM

## 2020-06-26 PROCEDURE — 77067 SCR MAMMO BI INCL CAD: CPT

## 2020-06-26 PROCEDURE — 77063 BREAST TOMOSYNTHESIS BI: CPT

## 2020-06-26 PROCEDURE — 77080 DXA BONE DENSITY AXIAL: CPT

## 2020-07-06 RX ORDER — METFORMIN HYDROCHLORIDE 500 MG/1
TABLET, EXTENDED RELEASE ORAL
Qty: 90 TABLET | Refills: 1 | Status: SHIPPED | OUTPATIENT
Start: 2020-07-06 | End: 2021-01-02

## 2020-07-14 DIAGNOSIS — F41.9 ANXIETY: ICD-10-CM

## 2020-07-15 RX ORDER — ALPRAZOLAM 0.5 MG/1
TABLET ORAL
Qty: 45 TABLET | Refills: 0 | Status: SHIPPED | OUTPATIENT
Start: 2020-07-15 | End: 2020-09-01

## 2020-07-15 RX ORDER — CONJUGATED ESTROGENS 0.3 MG/1
TABLET, FILM COATED ORAL
Qty: 90 TABLET | Refills: 2 | Status: SHIPPED | OUTPATIENT
Start: 2020-07-15 | End: 2021-04-20

## 2020-07-23 ENCOUNTER — RESULTS ENCOUNTER (OUTPATIENT)
Dept: FAMILY MEDICINE CLINIC | Facility: CLINIC | Age: 75
End: 2020-07-23

## 2020-07-23 DIAGNOSIS — R73.03 PREDIABETES: ICD-10-CM

## 2020-07-23 DIAGNOSIS — E78.5 HYPERLIPIDEMIA, UNSPECIFIED HYPERLIPIDEMIA TYPE: ICD-10-CM

## 2020-07-23 DIAGNOSIS — E83.52 HYPERCALCEMIA: ICD-10-CM

## 2020-07-23 DIAGNOSIS — F41.9 ANXIETY: ICD-10-CM

## 2020-08-25 RX ORDER — FENOFIBRATE 145 MG/1
TABLET, COATED ORAL
Qty: 90 TABLET | Refills: 0 | Status: SHIPPED | OUTPATIENT
Start: 2020-08-25 | End: 2020-11-23

## 2020-09-01 DIAGNOSIS — F41.9 ANXIETY: ICD-10-CM

## 2020-09-01 RX ORDER — ALPRAZOLAM 0.5 MG/1
TABLET ORAL
Qty: 45 TABLET | Refills: 0 | Status: SHIPPED | OUTPATIENT
Start: 2020-09-01 | End: 2020-10-15

## 2020-09-10 RX ORDER — OMEPRAZOLE 40 MG/1
CAPSULE, DELAYED RELEASE ORAL
Qty: 90 CAPSULE | Refills: 0 | Status: SHIPPED | OUTPATIENT
Start: 2020-09-10 | End: 2020-12-03

## 2020-09-21 ENCOUNTER — TELEPHONE (OUTPATIENT)
Dept: CARDIOLOGY | Facility: CLINIC | Age: 75
End: 2020-09-21

## 2020-09-21 DIAGNOSIS — R00.2 PALPITATIONS: Primary | ICD-10-CM

## 2020-09-21 NOTE — TELEPHONE ENCOUNTER
Dr Rhodes.  Pt is calling in to let you know for the last 4 days she has noticed she is having increased palpitations.  She is feeling weak and dizzy and a headache.  This has been off and on with episodes lasting about 5 minutes before resolving.  Today she is feeling better than she has for the last 4 days.    She was concerned that her bp was elevated this am 151/75, 139/86 hr 81, 143/82 hr 78.  But this afternoon it is 110/74, 120/83 and 109/67 hr 70s.    Cardiac meds  Fenofibrate  Spironolactone 25gm daily  crestor  Metoprolol tartrate 25mg PRN- took Thursday, Saturday and Sunday.    Please advise  Thanks  Maxine Abernathy RN  Triage nurse

## 2020-09-21 NOTE — TELEPHONE ENCOUNTER
Discussed with Dr Rhodes, he would like the pt to get a Holter (tomorrow if possible) he would also like her not to take the metoprolol while meng the monitor.    I called and notified the pt about the metoprolol, she is awaiting a call from the scheduling department for the Holter.  Thanks  Maxine Abernathy RN  Triage nurse

## 2020-09-22 NOTE — TELEPHONE ENCOUNTER
Iona,  Please call the pt.  GM was wanting the Holter placed today.  Thanks  Maxine Abernathy RN  Triage nurse

## 2020-10-14 DIAGNOSIS — F41.9 ANXIETY: ICD-10-CM

## 2020-10-15 RX ORDER — ALPRAZOLAM 0.5 MG/1
TABLET ORAL
Qty: 45 TABLET | Refills: 0 | Status: SHIPPED | OUTPATIENT
Start: 2020-10-15 | End: 2020-12-03

## 2020-10-20 ENCOUNTER — TELEPHONE (OUTPATIENT)
Dept: FAMILY MEDICINE CLINIC | Facility: CLINIC | Age: 75
End: 2020-10-20

## 2020-10-20 NOTE — TELEPHONE ENCOUNTER
PATIENT CALLED AND STATED THAT SHE HAS A BLACK AND BLUE AND SWOLLEN FINGER RIGHT INDEX. NOTICED THE SWELLING LAST EVENING 10/19/20. THE DISCOLORIZATION SEEMS TO BE MOVING DOWN TOWARDS HER SECOND KNUCKLE. SHE IS SOAKING IT IN EPSOM SALTS. SHE HAS NO RECOLLECTION OF SMASHING OR INJURING THIS FINGER.SHE WOULD LIKE TO KNOW IF SHE SHE IS UP TO DATE ON HER TETANUS SHOTS? SHE STATED THAT SHE CAN TEXT PICTURES TO ANY INDIVIDUAL AT THE PRACTICE, BUT SHE DOES NOT KNOW HOW TO UPLOAD PICTURES TO HER MY CHART.     PLEASE ADVISE.      CALL BACK [phone]     [facility] [place]

## 2020-10-21 ENCOUNTER — OFFICE VISIT (OUTPATIENT)
Dept: FAMILY MEDICINE CLINIC | Facility: CLINIC | Age: 75
End: 2020-10-21

## 2020-10-21 VITALS
BODY MASS INDEX: 21.56 KG/M2 | HEART RATE: 88 BPM | SYSTOLIC BLOOD PRESSURE: 132 MMHG | DIASTOLIC BLOOD PRESSURE: 80 MMHG | RESPIRATION RATE: 16 BRPM | TEMPERATURE: 96.8 F | OXYGEN SATURATION: 98 % | WEIGHT: 137.4 LBS | HEIGHT: 67 IN

## 2020-10-21 DIAGNOSIS — S60.410A ABRASION OF RIGHT INDEX FINGER, INITIAL ENCOUNTER: ICD-10-CM

## 2020-10-21 DIAGNOSIS — S61.219A CUT OF FINGER: Primary | ICD-10-CM

## 2020-10-21 PROCEDURE — 90714 TD VACC NO PRESV 7 YRS+ IM: CPT | Performed by: INTERNAL MEDICINE

## 2020-10-21 PROCEDURE — 90471 IMMUNIZATION ADMIN: CPT | Performed by: INTERNAL MEDICINE

## 2020-10-21 PROCEDURE — 99213 OFFICE O/P EST LOW 20 MIN: CPT | Performed by: INTERNAL MEDICINE

## 2020-10-21 RX ORDER — DESVENLAFAXINE 100 MG/1
100 TABLET, EXTENDED RELEASE ORAL DAILY
Qty: 90 TABLET | Refills: 1 | Status: SHIPPED | OUTPATIENT
Start: 2020-10-21 | End: 2021-04-20

## 2020-10-22 LAB
ALBUMIN SERPL-MCNC: 5.2 G/DL (ref 3.5–5.2)
ALBUMIN/GLOB SERPL: 2.5 G/DL
ALP SERPL-CCNC: 68 U/L (ref 39–117)
ALT SERPL-CCNC: 28 U/L (ref 1–33)
AST SERPL-CCNC: 31 U/L (ref 1–32)
BASOPHILS # BLD AUTO: 0.08 10*3/MM3 (ref 0–0.2)
BASOPHILS NFR BLD AUTO: 1.2 % (ref 0–1.5)
BILIRUB SERPL-MCNC: 0.3 MG/DL (ref 0–1.2)
BUN SERPL-MCNC: 18 MG/DL (ref 8–23)
BUN/CREAT SERPL: 28.6 (ref 7–25)
CALCIUM SERPL-MCNC: 10.6 MG/DL (ref 8.6–10.5)
CHLORIDE SERPL-SCNC: 101 MMOL/L (ref 98–107)
CHOLEST SERPL-MCNC: 170 MG/DL (ref 0–200)
CO2 SERPL-SCNC: 28.6 MMOL/L (ref 22–29)
CREAT SERPL-MCNC: 0.63 MG/DL (ref 0.57–1)
EOSINOPHIL # BLD AUTO: 0.07 10*3/MM3 (ref 0–0.4)
EOSINOPHIL NFR BLD AUTO: 1.1 % (ref 0.3–6.2)
ERYTHROCYTE [DISTWIDTH] IN BLOOD BY AUTOMATED COUNT: 12.8 % (ref 12.3–15.4)
GLOBULIN SER CALC-MCNC: 2.1 GM/DL
GLUCOSE SERPL-MCNC: 98 MG/DL (ref 65–99)
HBA1C MFR BLD: 5.9 % (ref 4.8–5.6)
HCT VFR BLD AUTO: 46.8 % (ref 34–46.6)
HDLC SERPL-MCNC: 62 MG/DL (ref 40–60)
HGB BLD-MCNC: 15.3 G/DL (ref 12–15.9)
IMM GRANULOCYTES # BLD AUTO: 0.03 10*3/MM3 (ref 0–0.05)
IMM GRANULOCYTES NFR BLD AUTO: 0.5 % (ref 0–0.5)
LDLC SERPL CALC-MCNC: 66 MG/DL (ref 0–100)
LDLC/HDLC SERPL: 0.89 {RATIO}
LYMPHOCYTES # BLD AUTO: 2.01 10*3/MM3 (ref 0.7–3.1)
LYMPHOCYTES NFR BLD AUTO: 30.3 % (ref 19.6–45.3)
MCH RBC QN AUTO: 29.3 PG (ref 26.6–33)
MCHC RBC AUTO-ENTMCNC: 32.7 G/DL (ref 31.5–35.7)
MCV RBC AUTO: 89.7 FL (ref 79–97)
MONOCYTES # BLD AUTO: 0.68 10*3/MM3 (ref 0.1–0.9)
MONOCYTES NFR BLD AUTO: 10.3 % (ref 5–12)
NEUTROPHILS # BLD AUTO: 3.76 10*3/MM3 (ref 1.7–7)
NEUTROPHILS NFR BLD AUTO: 56.6 % (ref 42.7–76)
NRBC BLD AUTO-RTO: 0 /100 WBC (ref 0–0.2)
PLATELET # BLD AUTO: 322 10*3/MM3 (ref 140–450)
POTASSIUM SERPL-SCNC: 4.9 MMOL/L (ref 3.5–5.2)
PROT SERPL-MCNC: 7.3 G/DL (ref 6–8.5)
RBC # BLD AUTO: 5.22 10*6/MM3 (ref 3.77–5.28)
SODIUM SERPL-SCNC: 138 MMOL/L (ref 136–145)
T4 FREE SERPL-MCNC: 1.35 NG/DL (ref 0.93–1.7)
TRIGL SERPL-MCNC: 265 MG/DL (ref 0–150)
TSH SERPL DL<=0.005 MIU/L-ACNC: 2.04 UIU/ML (ref 0.27–4.2)
VLDLC SERPL CALC-MCNC: 42 MG/DL (ref 5–40)
WBC # BLD AUTO: 6.63 10*3/MM3 (ref 3.4–10.8)

## 2020-11-06 RX ORDER — LEVETIRACETAM 500 MG/1
250 TABLET ORAL DAILY PRN
Qty: 30 TABLET | Refills: 0 | Status: SHIPPED | OUTPATIENT
Start: 2020-11-06 | End: 2021-01-12

## 2020-11-06 NOTE — TELEPHONE ENCOUNTER
Caller: Alexandria Yousif    Relationship: Self    Best call back number: [phone]    Medication needed:   Requested Prescriptions     Pending Prescriptions Disp Refills   • levETIRAcetam (KEPPRA) 500 MG tablet        Sig: Take 0.5 tablets by mouth As Needed.         Does the patient have less than a 3 day supply:  [x] Yes  [] No    What is the patient's preferred pharmacy: [facility] #[id number] [address] AT [facility] [address]  [phone] [facility] [phone]

## 2020-11-13 ENCOUNTER — TELEMEDICINE (OUTPATIENT)
Dept: FAMILY MEDICINE CLINIC | Facility: CLINIC | Age: 75
End: 2020-11-13

## 2020-11-13 ENCOUNTER — TELEPHONE (OUTPATIENT)
Dept: FAMILY MEDICINE CLINIC | Facility: CLINIC | Age: 75
End: 2020-11-13

## 2020-11-13 DIAGNOSIS — J01.90 ACUTE RHINOSINUSITIS: Primary | ICD-10-CM

## 2020-11-13 PROCEDURE — 99213 OFFICE O/P EST LOW 20 MIN: CPT | Performed by: NURSE PRACTITIONER

## 2020-11-13 RX ORDER — AMOXICILLIN 500 MG/1
500 CAPSULE ORAL 2 TIMES DAILY
Qty: 20 CAPSULE | Refills: 0 | Status: SHIPPED | OUTPATIENT
Start: 2020-11-13 | End: 2020-11-23

## 2020-11-13 NOTE — PROGRESS NOTES
Subjective   Alexandria Yousif is a 75 y.o. female.     No chief complaint on file.     CC: cough a couple of months    HPI new patient to me.  She scheduled a video visit for cough which has worsened and now has a sore throat and bilateral earache.  She has been taking Mucinex in the morning and Delsym at at bedtime for cough control.  She also uses nasal Flonase daily as well as nasal saline and Zyrtec.  She reports significant allergies but these are usually resolved or controlled with these medications and she is not having any improvement and is actually worsening.  She is also had a mild headache across her forehead.    She denies any Covid exposures.  Her and spouse are very careful and do not go anywhere without a mask and in fact have been wearing goggles and face shields when they go anywhere.  They do not get together with family members or friends.  Her spouse is not ill.      Cough has worsened and now has sore throat and ear ache        Social History     Tobacco Use   • Smoking status: Never Smoker   • Smokeless tobacco: Never Used   Substance Use Topics   • Alcohol use: Yes     Alcohol/week: 3.0 - 4.0 standard drinks     Types: 2 - 3 Glasses of wine, 1 Cans of beer per week     Comment: liquor-1 to 2 month   • Drug use: No       The following portions of the patient's history were reviewed and updated as appropriate: allergies, current medications, past family history, past medical history, past social history, past surgical history and problem list.    Review of Systems   Constitutional: Positive for fatigue. Negative for chills and fever.   HENT: Positive for congestion, ear pain (More like pressure), postnasal drip, rhinorrhea and sore throat.    Respiratory: Negative for chest tightness, shortness of breath and wheezing.    Cardiovascular: Negative for chest pain and palpitations.   Gastrointestinal: Negative for abdominal pain, diarrhea, nausea and vomiting.   Musculoskeletal: Negative for  arthralgias and myalgias.   Neurological: Positive for headaches. Negative for weakness.       Objective   not currently breastfeeding.  There is no height or weight on file to calculate BMI.    Physical Exam  Limited by video visit.  She is mildly ill appearing, not toxic and  does not seem to be distressed.  She seems alert and oriented and her mood and affect are normal, good historian of medical history.  No cough or dyspnea appreciated, able to complete sentences without problem. She is very congested sounding and appearing.  She is not weak or pale.       Assessment   Problem List Items Addressed This Visit     None      Visit Diagnoses     Acute rhinosinusitis    -  Primary           Procedures           Impression and Plan: Acute rhinosinusitis: Since she seems to be worsening instead of improving after about a month of upper respiratory symptoms I think is reasonable to start amoxicillin for 10 days.  She reports she usually does well with this.  She takes probiotics and eats yogurt daily when on an antibiotic.  She will continue other controlling medications as she is currently.  If she has any worsening she will let me know or seek more emergent treatment.    We discussed at length any risk of Covid exposure and it seems very unlikely that she would have Covid as she has been extremely cautious.  However if she does not improve or worsens then that would be a consideration.        Health Maintenance Due   Topic Date Due   • URINE MICROALBUMIN  08/08/2017   • ANNUAL WELLNESS VISIT  09/15/2018   • Pneumococcal Vaccine 65+ (2 of 2 - PPSV23) 09/15/2018   • DIABETIC EYE EXAM  10/28/2020     Patient gave consent today for a telehealth video visit as following recommendations of our governor and CDC during the COVID-19 pandemic.    14 min was spent in discussion with pt and greater than 50% of that time was spent counseling on mgmt of sx.      [email or web address] platform used with good audio quality.          EMR  Dragon/Transcription disclaimer:   Much of this encounter note is an electronic transcription/translation of spoken language to printed text. The electronic translation of spoken language may permit erroneous, or at times, nonsensical words or phrases to be inadvertently transcribed; Although I have reviewed the note for such errors, some may still exist.

## 2020-11-13 NOTE — TELEPHONE ENCOUNTER
Pt is experiencing a cough which shes had for a couple of months, sore throat, ears and eyes hurt, and general malaise. Pt doesn't have a temperature. Pt is sure its her allergies. Pt has been taking extra strength Mucinex 1200mg, and occasional Sudifed 30mg, and Delsum cough 12 hour. Pt advises that she still isn't feeling well and wants to know if there is anything else she can take.

## 2020-11-13 NOTE — PATIENT INSTRUCTIONS
Sinusitis, Adult  Sinusitis is soreness and swelling (inflammation) of your sinuses. Sinuses are hollow spaces in the bones around your face. They are located:  · Around your eyes.  · In the middle of your forehead.  · Behind your nose.  · In your cheekbones.  Your sinuses and nasal passages are lined with a fluid called mucus. Mucus drains out of your sinuses. Swelling can trap mucus in your sinuses. This lets germs (bacteria, virus, or fungus) grow, which leads to infection. Most of the time, this condition is caused by a virus.  What are the causes?  This condition is caused by:  · Allergies.  · Asthma.  · Germs.  · Things that block your nose or sinuses.  · Growths in the nose (nasal polyps).  · Chemicals or irritants in the air.  · Fungus (rare).  What increases the risk?  You are more likely to develop this condition if:  · You have a weak body defense system (immune system).  · You do a lot of swimming or diving.  · You use nasal sprays too much.  · You smoke.  What are the signs or symptoms?  The main symptoms of this condition are pain and a feeling of pressure around the sinuses. Other symptoms include:  · Stuffy nose (congestion).  · Runny nose (drainage).  · Swelling and warmth in the sinuses.  · Headache.  · Toothache.  · A cough that may get worse at night.  · Mucus that collects in the throat or the back of the nose (postnasal drip).  · Being unable to smell and taste.  · Being very tired (fatigue).  · A fever.  · Sore throat.  · Bad breath.  How is this diagnosed?  This condition is diagnosed based on:  · Your symptoms.  · Your medical history.  · A physical exam.  · Tests to find out if your condition is short-term (acute) or long-term (chronic). Your doctor may:  ? Check your nose for growths (polyps).  ? Check your sinuses using a tool that has a light (endoscope).  ? Check for allergies or germs.  ? Do imaging tests, such as an MRI or CT scan.  How is this treated?  Treatment for this condition  depends on the cause and whether it is short-term or long-term.  · If caused by a virus, your symptoms should go away on their own within 10 days. You may be given medicines to relieve symptoms. They include:  ? Medicines that shrink swollen tissue in the nose.  ? Medicines that treat allergies (antihistamines).  ? A spray that treats swelling of the nostrils.   ? Rinses that help get rid of thick mucus in your nose (nasal saline washes).  · If caused by bacteria, your doctor may wait to see if you will get better without treatment. You may be given antibiotic medicine if you have:  ? A very bad infection.  ? A weak body defense system.  · If caused by growths in the nose, you may need to have surgery.  Follow these instructions at home:  Medicines  · Take, use, or apply over-the-counter and prescription medicines only as told by your doctor. These may include nasal sprays.  · If you were prescribed an antibiotic medicine, take it as told by your doctor. Do not stop taking the antibiotic even if you start to feel better.  Hydrate and humidify    · Drink enough water to keep your pee (urine) pale yellow.  · Use a cool mist humidifier to keep the humidity level in your home above 50%.  · Breathe in steam for 10-15 minutes, 3-4 times a day, or as told by your doctor. You can do this in the bathroom while a hot shower is running.  · Try not to spend time in cool or dry air.  Rest  · Rest as much as you can.  · Sleep with your head raised (elevated).  · Make sure you get enough sleep each night.  General instructions    · Put a warm, moist washcloth on your face 3-4 times a day, or as often as told by your doctor. This will help with discomfort.  · Wash your hands often with soap and water. If there is no soap and water, use hand .  · Do not smoke. Avoid being around people who are smoking (secondhand smoke).  · Keep all follow-up visits as told by your doctor. This is important.  Contact a doctor if:  · You  have a fever.  · Your symptoms get worse.  · Your symptoms do not get better within 10 days.  Get help right away if:  · You have a very bad headache.  · You cannot stop throwing up (vomiting).  · You have very bad pain or swelling around your face or eyes.  · You have trouble seeing.  · You feel confused.  · Your neck is stiff.  · You have trouble breathing.  Summary  · Sinusitis is swelling of your sinuses. Sinuses are hollow spaces in the bones around your face.  · This condition is caused by tissues in your nose that become inflamed or swollen. This traps germs. These can lead to infection.  · If you were prescribed an antibiotic medicine, take it as told by your doctor. Do not stop taking it even if you start to feel better.  · Keep all follow-up visits as told by your doctor. This is important.  This information is not intended to replace advice given to you by your health care provider. Make sure you discuss any questions you have with your health care provider.  Document Released: 06/05/2009 Document Revised: 05/20/2019 Document Reviewed: 05/20/2019  [employer] Patient Education © 2020 [employer]

## 2020-11-23 RX ORDER — TRAZODONE HYDROCHLORIDE 50 MG/1
TABLET ORAL
Qty: 90 TABLET | Refills: 1 | Status: SHIPPED | OUTPATIENT
Start: 2020-11-23 | End: 2021-08-03

## 2020-11-23 RX ORDER — FENOFIBRATE 145 MG/1
TABLET, COATED ORAL
Qty: 90 TABLET | Refills: 0 | Status: SHIPPED | OUTPATIENT
Start: 2020-11-23 | End: 2021-02-23

## 2020-11-23 RX ORDER — ROSUVASTATIN CALCIUM 10 MG/1
10 TABLET, COATED ORAL NIGHTLY
Qty: 90 TABLET | Refills: 1 | Status: SHIPPED | OUTPATIENT
Start: 2020-11-23 | End: 2021-05-24

## 2020-11-28 ENCOUNTER — TELEPHONE (OUTPATIENT)
Dept: FAMILY MEDICINE CLINIC | Facility: CLINIC | Age: 75
End: 2020-11-28

## 2020-11-28 RX ORDER — BUDESONIDE AND FORMOTEROL FUMARATE DIHYDRATE 160; 4.5 UG/1; UG/1
2 AEROSOL RESPIRATORY (INHALATION)
Qty: 10.2 G | Refills: 0
Start: 2020-11-28 | End: 2021-08-23

## 2020-11-28 NOTE — TELEPHONE ENCOUNTER
Pt called on call. She is having recurrence of symptoms. She is botthered by cough, sore throat, ey and ear pain. She had several bad coughing spells yesterday and says it is the same today. Had maybe a couple of days feeling better after abx. Then yesterday morning ok but started feeling bad with chills, no fever. She has been taking temp and O sats, have been 96-97 F as usual she says and 93-97% O2.   She is doing OTCs like tylenol and pseudophed and delsym.   We talked about using mucinex or dimetapp, nasal saline for washing out congestion, tylenol for pain.   We talked about steroids and decided to do inhaled steroid because this has been effective for her before and she feels cough can be bad.   I talked to her pharmacy to make sure she can get an inhaler today, the symbicort is only $5 so can get today. I also sent spacer.   Will send to PCP.

## 2020-11-30 ENCOUNTER — TELEMEDICINE (OUTPATIENT)
Dept: FAMILY MEDICINE CLINIC | Facility: CLINIC | Age: 75
End: 2020-11-30

## 2020-11-30 VITALS — TEMPERATURE: 97 F | OXYGEN SATURATION: 97 %

## 2020-11-30 DIAGNOSIS — R05.9 COUGH: Primary | ICD-10-CM

## 2020-11-30 PROCEDURE — 99213 OFFICE O/P EST LOW 20 MIN: CPT | Performed by: INTERNAL MEDICINE

## 2020-11-30 RX ORDER — AZITHROMYCIN 250 MG/1
TABLET, FILM COATED ORAL
Qty: 6 TABLET | Refills: 0 | Status: SHIPPED | OUTPATIENT
Start: 2020-11-30 | End: 2021-02-19

## 2020-11-30 RX ORDER — BENZONATATE 200 MG/1
200 CAPSULE ORAL 3 TIMES DAILY PRN
Qty: 30 CAPSULE | Refills: 0 | Status: SHIPPED | OUTPATIENT
Start: 2020-11-30 | End: 2021-02-24

## 2020-11-30 NOTE — TELEPHONE ENCOUNTER
How long has she had sx's in total?  I know she had telemed visit a few weeks ago as well for similar sx's.  Maybe needs a video visit with me tomorrow?

## 2020-11-30 NOTE — TELEPHONE ENCOUNTER
Pt was called worsened 2/1/2wks ago and cough a couple months. You did a telehealth visit with the pt today. Where you ordered medication and ordered a cxr.

## 2020-11-30 NOTE — TELEPHONE ENCOUNTER
Pt not feeling well over the weekend with cough.  Dr. Kitchen called in symbicort inhaler.  Is she feeling better and are progression or improvement in her sx's?  It doesn't appear that she has been tested for Covid.  I wonder if she needs to get tested?

## 2020-11-30 NOTE — PROGRESS NOTES
Subjective   Alexandria Yousif is a 75 y.o. female who comes in today for   Chief Complaint   Patient presents with   • Cough   • Fatigue   .  You have chosen to receive care through a telehealth visit.  Do you consent to use a video/audio connection for your medical care today? Yes  You have chosen to receive care through a telephone visit. Do you consent to use a telephone visit for your medical care today? Yes  This visit has been rescheduled as a phone visit to comply with patient safety concerns in accordance with CDC recommendations. Total time of discussion was 15 minutes.    History of Present Illness   2 weeks ago she had a telemed visit with Yris due to cough and sore throat and was treated with amoxil.  Helped for 2 days.  Severe ST and cough returned  No fever.  Coughing is dry.  Started symbicort over the weekend.  Cough is slightly better.  Cough has been present for 2 months total.  No wheezing nor soa.        The following portions of the patient's history were reviewed and updated as appropriate: allergies, current medications, past family history, past medical history, past social history, past surgical history and problem list.    Review of Systems   Constitutional: Positive for fatigue. Negative for fever.   Respiratory: Positive for cough. Negative for shortness of breath and wheezing.    Cardiovascular: Negative.        Temp 97 °F (36.1 °C)   LMP  (LMP Unknown)   SpO2 97%     STEADI Fall Risk Assessment was completed, and patient is at LOW risk for falls.Assessment completed on:10/21/2020    PHQ-2/PHQ-9 Depression Screening 10/21/2020   Little interest or pleasure in doing things 0   Feeling down, depressed, or hopeless 0   Trouble falling or staying asleep, or sleeping too much -   Feeling tired or having little energy -   Poor appetite or overeating -   Feeling bad about yourself - or that you are a failure or have let yourself or your family down -   Trouble concentrating on things, such  as reading the newspaper or watching television -   Moving or speaking so slowly that other people could have noticed. Or the opposite - being so fidgety or restless that you have been moving around a lot more than usual -   Thoughts that you would be better off dead, or of hurting yourself in some way -   Total Score 0       Objective   Physical Exam  Constitutional:       Appearance: Normal appearance. She is well-developed.   HENT:      Head: Normocephalic and atraumatic.      Nose: Nose normal.   Eyes:      Conjunctiva/sclera: Conjunctivae normal.   Pulmonary:      Effort: Pulmonary effort is normal. No respiratory distress.   Neurological:      Mental Status: She is alert and oriented to person, place, and time.   Psychiatric:         Mood and Affect: Mood normal.         Behavior: Behavior normal.         Thought Content: Thought content normal.         Judgment: Judgment normal.           Current Outpatient Medications:   •  acetaminophen (TYLENOL) 500 MG tablet, Take 1,000 mg by mouth Every 6 (Six) Hours As Needed for Mild Pain ., Disp: , Rfl:   •  ALPRAZolam (XANAX) 0.5 MG tablet, TAKE 1 TABLET BY MOUTH EVERY DAY AS NEEDED FOR ANXIETY, Disp: 45 tablet, Rfl: 0  •  Alum Hydroxide-Mag Trisilicate (GAVISCON) 80-14.2 MG chewable tablet, Chew 1 tablet 4 (Four) Times a Day As Needed (Heartburn)., Disp: 224 each, Rfl: 11  •  aspirin 81 MG tablet, Take 81 mg by mouth Daily., Disp: , Rfl:   •  azithromycin (Zithromax Z-Jay) 250 MG tablet, Take 2 tablets by mouth on day 1, then 1 tablet daily on days 2-5, Disp: 6 tablet, Rfl: 0  •  benzonatate (TESSALON) 200 MG capsule, Take 1 capsule by mouth 3 (Three) Times a Day As Needed for Cough., Disp: 30 capsule, Rfl: 0  •  Biotin 10 MG tablet, , Disp: , Rfl:   •  budesonide-formoterol (Symbicort) 160-4.5 MCG/ACT inhaler, Inhale 2 puffs 2 (Two) Times a Day., Disp: 10.2 g, Rfl: 0  •  Cholecalciferol (D3 HIGH POTENCY) 2000 units capsule, , Disp: , Rfl:   •  desvenlafaxine  (PRISTIQ) 100 MG 24 hr tablet, TAKE 1 TABLET BY MOUTH DAILY, Disp: 90 tablet, Rfl: 1  •  fenofibrate (TRICOR) 145 MG tablet, TAKE 1 TABLET BY MOUTH EVERY DAY, Disp: 90 tablet, Rfl: 0  •  levETIRAcetam (KEPPRA) 500 MG tablet, Take 0.5 tablets by mouth Daily As Needed (headaches)., Disp: 30 tablet, Rfl: 0  •  metFORMIN ER (GLUCOPHAGE-XR) 500 MG 24 hr tablet, TAKE 1 TABLET BY MOUTH DAILY WITH LARGEST MEAL OF THE DAY AND A GLASS OF WATER, Disp: 90 tablet, Rfl: 01  •  metoprolol tartrate (LOPRESSOR) 25 MG tablet, Take 1 tablet by mouth Daily As Needed (Palpitations)., Disp: 60 tablet, Rfl: 11  •  naproxen (NAPROSYN) 500 MG tablet, Take 500 mg by mouth As Needed for Mild Pain ., Disp: , Rfl:   •  Omega-3 Fatty Acids (FISH OIL) 1200 MG capsule capsule, , Disp: , Rfl:   •  omeprazole (priLOSEC) 40 MG capsule, TAKE 1 CAPSULE BY MOUTH EVERY DAY, Disp: 90 capsule, Rfl: 0  •  PREMARIN 0.3 MG tablet, TAKE 1 TABLET BY MOUTH EVERY DAY, Disp: 90 tablet, Rfl: 2  •  rosuvastatin (CRESTOR) 10 MG tablet, TAKE 1 TABLET BY MOUTH EVERY NIGHT, Disp: 90 tablet, Rfl: 1  •  Spacer/Aero-Holding Chambers device, Use with all inhaled treatments, Disp: 1 each, Rfl: 0  •  spironolactone (ALDACTONE) 25 MG tablet, TAKE 1 TABLET BY MOUTH DAILY, Disp: 30 tablet, Rfl: 4  •  traZODone (DESYREL) 50 MG tablet, TAKE 1/2 TO 1 TABLET BY MOUTH AT BEDTIME AS NEEDED FOR SLEEP, Disp: 90 tablet, Rfl: 1  •  valACYclovir (VALTREX) 500 MG tablet, TAKE 2 TABLETS BY MOUTH TWICE DAILY, Disp: 4 tablet, Rfl: 0    Assessment/Plan   Diagnoses and all orders for this visit:    1. Cough (Primary)  -     XR Chest PA & Lateral; Future    Other orders  -     benzonatate (TESSALON) 200 MG capsule; Take 1 capsule by mouth 3 (Three) Times a Day As Needed for Cough.  Dispense: 30 capsule; Refill: 0  -     azithromycin (Zithromax Z-Jay) 250 MG tablet; Take 2 tablets by mouth on day 1, then 1 tablet daily on days 2-5  Dispense: 6 tablet; Refill: 0      Persistent cough for 2 mo with  acute onset of URI sx's along with it (malaise, ST)  She is going to continue symbicort and delsym.  I have ordered tessalon perles for the cough and will get a CXR.  She is going tomorrow to her [facility] for Cov[facility] testing.  I have sent in zpak to cover atypical bacteria.      Total time CC with VV 15 minutes         I have asked for the patient to return to clinic in 2day(s).

## 2020-11-30 NOTE — TELEPHONE ENCOUNTER
Called pt where she states she has some slight improvement. He cough has become more of non productive cough, pt denies chills or fever only complains of a sore throat.

## 2020-12-01 ENCOUNTER — HOSPITAL ENCOUNTER (OUTPATIENT)
Dept: GENERAL RADIOLOGY | Facility: HOSPITAL | Age: 75
Discharge: HOME | End: 2020-12-01
Admitting: INTERNAL MEDICINE

## 2020-12-01 DIAGNOSIS — R05.9 COUGH: ICD-10-CM

## 2020-12-01 PROCEDURE — 71046 X-RAY EXAM CHEST 2 VIEWS: CPT

## 2020-12-02 DIAGNOSIS — F41.9 ANXIETY: ICD-10-CM

## 2020-12-03 RX ORDER — OMEPRAZOLE 40 MG/1
CAPSULE, DELAYED RELEASE ORAL
Qty: 90 CAPSULE | Refills: 0 | Status: SHIPPED | OUTPATIENT
Start: 2020-12-03 | End: 2021-03-03

## 2020-12-03 RX ORDER — ALPRAZOLAM 0.5 MG/1
TABLET ORAL
Qty: 45 TABLET | Refills: 0 | Status: SHIPPED | OUTPATIENT
Start: 2020-12-03 | End: 2021-01-20

## 2021-01-02 RX ORDER — METFORMIN HYDROCHLORIDE 500 MG/1
TABLET, EXTENDED RELEASE ORAL
Qty: 90 TABLET | Refills: 1 | Status: SHIPPED | OUTPATIENT
Start: 2021-01-02 | End: 2021-07-03

## 2021-01-13 RX ORDER — LEVETIRACETAM 500 MG/1
TABLET ORAL
Qty: 45 TABLET | Refills: 0 | Status: SHIPPED | OUTPATIENT
Start: 2021-01-13 | End: 2021-08-12

## 2021-01-20 DIAGNOSIS — F41.9 ANXIETY: ICD-10-CM

## 2021-01-20 RX ORDER — ALPRAZOLAM 0.5 MG/1
TABLET ORAL
Qty: 30 TABLET | Refills: 0 | Status: SHIPPED | OUTPATIENT
Start: 2021-01-20 | End: 2021-03-24 | Stop reason: SDUPTHER

## 2021-02-19 ENCOUNTER — OFFICE VISIT (OUTPATIENT)
Dept: CARDIOLOGY | Facility: CLINIC | Age: 76
End: 2021-02-19

## 2021-02-19 VITALS
DIASTOLIC BLOOD PRESSURE: 78 MMHG | BODY MASS INDEX: 21.97 KG/M2 | WEIGHT: 140 LBS | OXYGEN SATURATION: 99 % | SYSTOLIC BLOOD PRESSURE: 118 MMHG | HEART RATE: 80 BPM | HEIGHT: 67 IN

## 2021-02-19 DIAGNOSIS — I49.3 PVC'S (PREMATURE VENTRICULAR CONTRACTIONS): ICD-10-CM

## 2021-02-19 DIAGNOSIS — I47.1 ATRIAL TACHYCARDIA (HCC): ICD-10-CM

## 2021-02-19 DIAGNOSIS — R00.2 PALPITATIONS: Primary | ICD-10-CM

## 2021-02-19 DIAGNOSIS — I47.1 SVT (SUPRAVENTRICULAR TACHYCARDIA) (HCC): ICD-10-CM

## 2021-02-19 PROCEDURE — 99213 OFFICE O/P EST LOW 20 MIN: CPT | Performed by: INTERNAL MEDICINE

## 2021-02-19 NOTE — PROGRESS NOTES
Date of Office Visit:  2021  Encounter Provider: Daquan Rhodes MD  Place of Service: [facility]  Patient Name: Alexandria Yousif  :1945    Chief complaint: Follow-up for palpitations, atrial tachycardia, SVT, and PVC's.    History of Present Illness:    I again had the pleasure of seeing the patient in cardiology office on 2021.  She is a very pleasant 75 year-old white female with a history of SVT, PVC's, and atrial tachycardia who presents for follow-up.  I initially saw the patient on 10/10/2017.  She had been having shortness of breath with exertion.  She also was noted to have a systolic murmur on exam.  She has an extensive family history of cardiac disease.  Her mother had 3 permanent pacemakers and CHF, her sister had a fatal MI at age 64, another sister had atrial fibrillation, and her father had a four-vessel CABG at age 62.  She underwent an exercise stress echocardiogram on 10/17/2017 which was normal.    The patient began experiencing palpitations in .  She had also been feeling lightheaded.  She had an echocardiogram and carotid artery Doppler ultrasound on 2019.  The carotid artery ultrasound was normal.  The echocardiogram showed an ejection fraction of 67% with grade 2 diastolic dysfunction.  A subsequent Holter monitor on 2019 showed 12 very brief runs of atrial tachycardia, with the longest being 7 beats in length.  She subsequently wore a 14-day ZIO patch starting on 2019.  This showed 5 episodes of SVT, with the longest being 11 beats in length.  She also had frequent PVCs (5% of the total) with rare ventricular bigeminy and trigeminy.  Of note, she did have a severe reaction to her ZIO Patch adhesive with a rash and blisters which actually required steroid injections and oral steroids afterwards.    The patient presents today for follow-up.  She has had her COVID-19 vaccine at this point.  She denied any chest pain  "or shortness of breath.  She still has intermittent palpitations which get worse with anxiety.  She has only taken the metoprolol very rarely.    Past Medical History:   Diagnosis Date   • Abdominal pain    • Anxiety and depression    • Arthritis    • Brain ischemia    • Cancer (CMS/HCC) March-2008    Scalp and face-basel   • Cervical herniated disc    • Coronary artery disease Spring, 2019    left ventricle-diastolic   • Diabetes mellitus (CMS/HCC) Spring 2019    Began Metformin   • GERD (gastroesophageal reflux disease)    • GERD (gastroesophageal reflux disease)    • Headache    • Heart murmur    • History of skin cancer    • Hyperlipidemia    • Hypertension    • Hypertriglyceridemia    • Injury of back    • Lumbar herniated disc    • Mental confusion    • Osteopenia    • Rheumatic fever     Age 17   • Shortness of breath    • TIA (transient ischemic attack)    • Tinnitus    • Vertigo        Past Surgical History:   Procedure Laterality Date   • CEREBRAL ANGIOGRAM N/A 10/8/2018    Procedure: CEREBRAL ANGIOGRAM AND VENOGRAM WITH INTRASINUS PRESSURE RECORDING;  Surgeon: Alfredo Caruso MD;  Location: [facility] HYBRID OR 18/19;  Service: Neurosurgery   • COLONOSCOPY      2010   • COLONOSCOPY N/A 2/17/2020    Procedure: COLONOSCOPY TO CECUM WITH COLD BIOPSY POLYPECTOMY;  Surgeon: Stan Flood MD;  Location: [facility] ENDOSCOPY;  Service: Gastroenterology;  Laterality: N/A;  PRE- FAMILY HX COLON POLYPS  POST- POLYP, HEMORRHOIDS   • ENDOSCOPY  02/27/2017    Esophagitis, gastritis, small hh   • HYSTERECTOMY     • KNEE SURGERY     • MOHS SURGERY     • OOPHORECTOMY     • TONSILLECTOMY AND ADENOIDECTOMY     • UPPER GASTROINTESTINAL ENDOSCOPY  2016    \"Extremely severe GERD\"       Current Outpatient Medications on File Prior to Visit   Medication Sig Dispense Refill   • acetaminophen (TYLENOL) 500 MG tablet Take 1,000 mg by mouth Every 6 (Six) Hours As Needed for Mild Pain .     • ALPRAZolam (XANAX) 0.5 MG tablet TAKE " 1 TABLET BY MOUTH EVERY DAY AS NEEDED FOR ANXIETY 30 tablet 0   • Alum Hydroxide-Mag Trisilicate (GAVISCON) 80-14.2 MG chewable tablet Chew 1 tablet 4 (Four) Times a Day As Needed (Heartburn). 224 each 11   • aspirin 81 MG tablet Take 81 mg by mouth Daily.     • azithromycin (Zithromax Z-Jay) 250 MG tablet Take 2 tablets by mouth on day 1, then 1 tablet daily on days 2-5 6 tablet 0   • benzonatate (TESSALON) 200 MG capsule Take 1 capsule by mouth 3 (Three) Times a Day As Needed for Cough. 30 capsule 0   • Biotin 10 MG tablet      • budesonide-formoterol (Symbicort) 160-4.5 MCG/ACT inhaler Inhale 2 puffs 2 (Two) Times a Day. 10.2 g 0   • Cholecalciferol (D3 HIGH POTENCY) 2000 units capsule      • desvenlafaxine (PRISTIQ) 100 MG 24 hr tablet TAKE 1 TABLET BY MOUTH DAILY 90 tablet 1   • fenofibrate (TRICOR) 145 MG tablet TAKE 1 TABLET BY MOUTH EVERY DAY 90 tablet 0   • levETIRAcetam (KEPPRA) 500 MG tablet TAKE 1/2 TABLET BY MOUTH DAILY AS NEEDED FOR HEADACHES 45 tablet 0   • metFORMIN ER (GLUCOPHAGE-XR) 500 MG 24 hr tablet TAKE 1 TABLET BY MOUTH DAILY WITH LARGEST MEAL OF THE DAY AND A GLASS OF WATER 90 tablet 01   • metoprolol tartrate (LOPRESSOR) 25 MG tablet Take 1 tablet by mouth Daily As Needed (Palpitations). 60 tablet 11   • naproxen (NAPROSYN) 500 MG tablet Take 500 mg by mouth As Needed for Mild Pain .     • Omega-3 Fatty Acids (FISH OIL) 1200 MG capsule capsule      • omeprazole (priLOSEC) 40 MG capsule TAKE 1 CAPSULE BY MOUTH EVERY DAY 90 capsule 0   • PREMARIN 0.3 MG tablet TAKE 1 TABLET BY MOUTH EVERY DAY 90 tablet 2   • rosuvastatin (CRESTOR) 10 MG tablet TAKE 1 TABLET BY MOUTH EVERY NIGHT 90 tablet 1   • Spacer/Aero-Holding Chambers device Use with all inhaled treatments 1 each 0   • spironolactone (ALDACTONE) 25 MG tablet TAKE 1 TABLET BY MOUTH DAILY 30 tablet 4   • traZODone (DESYREL) 50 MG tablet TAKE 1/2 TO 1 TABLET BY MOUTH AT BEDTIME AS NEEDED FOR SLEEP 90 tablet 1   • valACYclovir (VALTREX) 500 MG  tablet TAKE 2 TABLETS BY MOUTH TWICE DAILY 4 tablet 0     No current facility-administered medications on file prior to visit.      Allergies as of 02/19/2021 - Reviewed 02/19/2021   Allergen Reaction Noted   • Codeine Nausea Only 03/29/2016     Social History     Socioeconomic History   • Marital status:      Spouse name: Not on file   • Number of children: Not on file   • Years of education: Not on file   • Highest education level: Not on file   Occupational History   • Occupation: TEACHER (retired)   Tobacco Use   • Smoking status: Never Smoker   • Smokeless tobacco: Never Used   Substance and Sexual Activity   • Alcohol use: Yes     Alcohol/week: 3.0 - 4.0 standard drinks     Types: 2 - 3 Glasses of wine, 1 Cans of beer per week     Comment: liquor-1 to 2 month   • Drug use: No   • Sexual activity: Yes     Partners: Male     Birth control/protection: Post-menopausal     Family History   Problem Relation Age of Onset   • Uterine cancer Mother    • Heart disease Mother         Mother with 3 pacemakers over course of life, as well as CHF   • Diabetes Mother    • Skin cancer Father    • Heart disease Father         Father with 4 vessel CABG at 62   • Colon polyps Father         negative   • Heart disease Sister         Siter with fatal MI at age 64   • Atrial fibrillation Sister    • Diabetes Maternal Grandmother    • Heart disease Maternal Grandmother    • Stroke Maternal Grandmother    • Heart disease Paternal Grandmother    • Stroke Paternal Grandmother    • Heart disease Paternal Grandfather    • Stroke Paternal Grandfather    • Malig Hyperthermia Neg Hx        Review of Systems   Constitution: Positive for malaise/fatigue.   Cardiovascular: Positive for palpitations.   Musculoskeletal: Positive for joint pain and muscle cramps.   Gastrointestinal: Positive for abdominal pain.   Psychiatric/Behavioral: Positive for depression.   All other systems reviewed and are negative.     Objective:     Vitals:     "02/19/21 1020   Weight: 63.5 kg (140 lb)   Height: 170.2 cm (67.01\")     Body mass index is 21.92 kg/m².    Physical Exam   Constitutional: She is oriented to person, place, and time. She appears well-developed and well-nourished.   HENT:   Head: Normocephalic and atraumatic.   Eyes: Conjunctivae are normal.   Neck: Neck supple.   Cardiovascular: Normal rate and regular rhythm. Exam reveals no gallop and no friction rub.   Murmur heard.   Systolic murmur is present with a grade of 2/6 at the upper right sternal border, upper left sternal border and lower left sternal border.  Pulmonary/Chest: Effort normal and breath sounds normal.   Abdominal: Soft. There is no abdominal tenderness.   Musculoskeletal:         General: No edema.   Neurological: She is alert and oriented to person, place, and time.   Skin: Skin is warm.   Psychiatric: She has a normal mood and affect. Her behavior is normal.     Lab Review:   Procedures  Cardiac Procedures:  1.  Stress echocardiogram on 10/17/2017: Ejection fraction was 64%.  There was no ischemia.  There was no valvular disease.  2.  Carotid artery Doppler ultrasound on 5/2/2019: Normal.  3.  Echocardiogram on 5/2/2019: Ejection fraction was 67%.  There was mild LVH.  There was grade 2 diastolic dysfunction.  4.  Holter monitor on 6/6/2019: There were 12 very brief runs of atrial tachycardia, with the longest being 7 beats in length.  5.  ZIO Patch starting on 12/9/2019: There were 5 episodes of SVT, the longest 11 beats in length.  There were frequent PVCs, comprising up to 5% of the total.  There was rare ventricular bigeminy and trigeminy.  There was no ventricular tachycardia.    Assessment:       Diagnosis Plan   1. Palpitations     2. Atrial tachycardia (CMS/HCC)     3. SVT (supraventricular tachycardia) (CMS/HCC)     4. PVC's (premature ventricular contractions)       Plan:       I again reassured her that I think she is doing very well.  Her rhythm issues are benign in " general.  I also reassured her that these are more annoying from a symptomatic standpoint than dangerous.  She will continue on the metoprolol at 25 mg on an as-needed basis.  She did have grade 2 diastolic dysfunction on her echocardiogram previously.  However, she has no signs or symptoms of congestive heart failure.  Again, she did develop a severe reaction to the adhesive from the ZIO Patch with rash and blisters which required steroid injections and steroid pills afterwards.  I do not think she would be a good candidate for another long-term monitor other than a loop recorder because of this.    She does have a significant family history of multiple cardiac issues, including coronary artery disease, and this should be taken into account for any future symptoms as well.  For now, I will plan on seeing her back in the office in 8 months unless other issues arise.

## 2021-02-24 ENCOUNTER — HOSPITAL ENCOUNTER (OUTPATIENT)
Dept: GENERAL RADIOLOGY | Facility: HOSPITAL | Age: 76
Discharge: HOME | End: 2021-02-24
Admitting: NURSE PRACTITIONER

## 2021-02-24 ENCOUNTER — OFFICE VISIT (OUTPATIENT)
Dept: FAMILY MEDICINE CLINIC | Facility: CLINIC | Age: 76
End: 2021-02-24

## 2021-02-24 VITALS
BODY MASS INDEX: 22.44 KG/M2 | TEMPERATURE: 96.7 F | WEIGHT: 143 LBS | HEIGHT: 67 IN | OXYGEN SATURATION: 95 % | HEART RATE: 94 BPM | SYSTOLIC BLOOD PRESSURE: 152 MMHG | DIASTOLIC BLOOD PRESSURE: 97 MMHG

## 2021-02-24 DIAGNOSIS — M54.2 NECK PAIN, CHRONIC: Primary | ICD-10-CM

## 2021-02-24 DIAGNOSIS — G89.29 NECK PAIN, CHRONIC: Primary | ICD-10-CM

## 2021-02-24 PROCEDURE — 72040 X-RAY EXAM NECK SPINE 2-3 VW: CPT

## 2021-02-24 PROCEDURE — 99214 OFFICE O/P EST MOD 30 MIN: CPT | Performed by: NURSE PRACTITIONER

## 2021-02-24 RX ORDER — CYCLOBENZAPRINE HCL 5 MG
5 TABLET ORAL NIGHTLY PRN
Qty: 30 TABLET | Refills: 0 | Status: SHIPPED | OUTPATIENT
Start: 2021-02-24 | End: 2021-05-14

## 2021-02-24 RX ORDER — FENOFIBRATE 145 MG/1
TABLET, COATED ORAL
Qty: 90 TABLET | Refills: 1 | Status: SHIPPED | OUTPATIENT
Start: 2021-02-24 | End: 2021-08-20

## 2021-03-03 RX ORDER — OMEPRAZOLE 40 MG/1
CAPSULE, DELAYED RELEASE ORAL
Qty: 90 CAPSULE | Refills: 1 | Status: SHIPPED | OUTPATIENT
Start: 2021-03-03 | End: 2021-09-14

## 2021-03-04 ENCOUNTER — TELEPHONE (OUTPATIENT)
Dept: FAMILY MEDICINE CLINIC | Facility: CLINIC | Age: 76
End: 2021-03-04

## 2021-03-04 NOTE — TELEPHONE ENCOUNTER
Caller: Alexandria Yousif    Relationship: Self    Best call back number: [phone] (M)    What orders are you requesting (i.e. lab or imaging): MRI    In what timeframe would the patient need to come in: ASAP    Where will you receive your lab/imaging services:     Additional notes: PATIENT IS NEEDING TO HAVE AN MRI DONE BEFORE SHE CAN SEE HER NEUROSURGEON PER HIS OFFICE.    PLEASE CONTACT PATIENT TO ADVISE.          THANKS

## 2021-03-05 DIAGNOSIS — M54.2 NECK PAIN, CHRONIC: Primary | ICD-10-CM

## 2021-03-05 DIAGNOSIS — G89.29 NECK PAIN, CHRONIC: Primary | ICD-10-CM

## 2021-03-08 ENCOUNTER — TELEPHONE (OUTPATIENT)
Dept: FAMILY MEDICINE CLINIC | Facility: CLINIC | Age: 76
End: 2021-03-08

## 2021-03-08 RX ORDER — BENZONATATE 100 MG/1
200 CAPSULE ORAL 3 TIMES DAILY PRN
Qty: 30 CAPSULE | Refills: 0 | Status: SHIPPED | OUTPATIENT
Start: 2021-03-08 | End: 2021-08-05 | Stop reason: SDUPTHER

## 2021-03-08 NOTE — TELEPHONE ENCOUNTER
Have attempted to call the patient on several time without success to try and get more information as to how long the cough has been going on and to find out if it is something that needs to be addressed.

## 2021-03-08 NOTE — TELEPHONE ENCOUNTER
PATIENT IS ASKING FOR A REFILL ON HER BENZONATATE 200 MG FOR SEVERE COUGH.    PLEASE ADVISE  [phone]      [id number] - [facility] [address] AT [facility] [address] - [phone]  - [phone] FX

## 2021-03-08 NOTE — TELEPHONE ENCOUNTER
Pt states allergy related like it was last fall, she states it is mainly allergy cough a hack cough is what she defined it as. She denies fever or soa.

## 2021-03-08 NOTE — TELEPHONE ENCOUNTER
Sounds like we may have the wrong number?  She also uses mychart if you don't reach her.  Do need to know if this is a new cough or related to the one that started last fall?  And any associated sx's like fever, soa, productive or dry cough?

## 2021-03-09 DIAGNOSIS — Z23 IMMUNIZATION DUE: ICD-10-CM

## 2021-03-24 DIAGNOSIS — F41.9 ANXIETY: ICD-10-CM

## 2021-03-24 RX ORDER — ALPRAZOLAM 0.5 MG/1
0.5 TABLET ORAL DAILY PRN
Qty: 45 TABLET | Refills: 0 | Status: SHIPPED | OUTPATIENT
Start: 2021-03-24 | End: 2021-05-11

## 2021-03-24 NOTE — TELEPHONE ENCOUNTER
Caller: YousifAlexandria    Relationship: Self    Best call back number: [phone]    Medication needed:   Requested Prescriptions     Pending Prescriptions Disp Refills   • ALPRAZolam (XANAX) 0.5 MG tablet 30 tablet 0     Sig: Take 1 tablet by mouth Daily As Needed for Anxiety.       When do you need the refill by: *3/25/2021  What additional details did the patient provide when requesting the medication: HAS 5 DOSES. PATIENT STATED SHE IS SUPPOSED TO RECEIVE 45 TABLETS, NOT 30.     Does the patient have less than a 3 day supply:  [] Yes  [x] No    What is the patient's preferred pharmacy: [facility] #[id number] [address] AT [facility] [address] - [phone] [facility] [phone] FX

## 2021-04-20 RX ORDER — DESVENLAFAXINE 100 MG/1
100 TABLET, EXTENDED RELEASE ORAL DAILY
Qty: 90 TABLET | Refills: 1 | Status: SHIPPED | OUTPATIENT
Start: 2021-04-20 | End: 2021-10-19

## 2021-04-20 RX ORDER — CONJUGATED ESTROGENS 0.3 MG/1
TABLET, FILM COATED ORAL
Qty: 90 TABLET | Refills: 2 | Status: SHIPPED | OUTPATIENT
Start: 2021-04-20 | End: 2022-04-11

## 2021-04-26 ENCOUNTER — APPOINTMENT (OUTPATIENT)
Dept: MRI IMAGING | Facility: HOSPITAL | Age: 76
End: 2021-04-26

## 2021-05-08 DIAGNOSIS — F41.9 ANXIETY: ICD-10-CM

## 2021-05-11 RX ORDER — ALPRAZOLAM 0.5 MG/1
0.5 TABLET ORAL DAILY PRN
Qty: 45 TABLET | Refills: 0 | Status: SHIPPED | OUTPATIENT
Start: 2021-05-11 | End: 2021-06-25

## 2021-05-14 DIAGNOSIS — M54.2 NECK PAIN, CHRONIC: ICD-10-CM

## 2021-05-14 DIAGNOSIS — G89.29 NECK PAIN, CHRONIC: ICD-10-CM

## 2021-05-14 RX ORDER — CYCLOBENZAPRINE HCL 5 MG
5 TABLET ORAL NIGHTLY PRN
Qty: 30 TABLET | Refills: 0 | Status: SHIPPED | OUTPATIENT
Start: 2021-05-14 | End: 2021-08-23 | Stop reason: SDUPTHER

## 2021-05-24 RX ORDER — ROSUVASTATIN CALCIUM 10 MG/1
10 TABLET, COATED ORAL NIGHTLY
Qty: 90 TABLET | Refills: 0 | Status: SHIPPED | OUTPATIENT
Start: 2021-05-24 | End: 2021-08-23 | Stop reason: SDUPTHER

## 2021-06-18 ENCOUNTER — TELEPHONE (OUTPATIENT)
Dept: CARDIOLOGY | Facility: CLINIC | Age: 76
End: 2021-06-18

## 2021-06-18 ENCOUNTER — TRANSCRIBE ORDERS (OUTPATIENT)
Dept: ADMINISTRATIVE | Facility: HOSPITAL | Age: 76
End: 2021-06-18

## 2021-06-18 DIAGNOSIS — Z12.31 VISIT FOR SCREENING MAMMOGRAM: Primary | ICD-10-CM

## 2021-06-18 NOTE — TELEPHONE ENCOUNTER
Pt is going to have a minor dental procedure next week and asked if she needs antibiotics?    Thanks,  Kelly

## 2021-06-22 RX ORDER — SPIRONOLACTONE 25 MG/1
25 TABLET ORAL DAILY
Qty: 30 TABLET | Refills: 4 | Status: SHIPPED | OUTPATIENT
Start: 2021-06-22 | End: 2021-11-18

## 2021-06-23 DIAGNOSIS — F41.9 ANXIETY: ICD-10-CM

## 2021-06-25 RX ORDER — ALPRAZOLAM 0.5 MG/1
0.5 TABLET ORAL DAILY PRN
Qty: 45 TABLET | Refills: 0 | Status: SHIPPED | OUTPATIENT
Start: 2021-06-25 | End: 2021-08-04

## 2021-07-03 RX ORDER — METFORMIN HYDROCHLORIDE 500 MG/1
TABLET, EXTENDED RELEASE ORAL
Qty: 90 TABLET | Refills: 1 | Status: SHIPPED | OUTPATIENT
Start: 2021-07-03 | End: 2022-01-03

## 2021-07-19 DIAGNOSIS — E78.5 HYPERLIPIDEMIA, UNSPECIFIED HYPERLIPIDEMIA TYPE: Primary | ICD-10-CM

## 2021-07-19 DIAGNOSIS — R73.03 PREDIABETES: ICD-10-CM

## 2021-07-24 LAB
ALBUMIN SERPL-MCNC: 4.8 G/DL (ref 3.5–5.2)
ALBUMIN/GLOB SERPL: 2.4 G/DL
ALP SERPL-CCNC: 57 U/L (ref 39–117)
ALT SERPL-CCNC: 22 U/L (ref 1–33)
AST SERPL-CCNC: 25 U/L (ref 1–32)
BASOPHILS # BLD AUTO: 0.08 10*3/MM3 (ref 0–0.2)
BASOPHILS NFR BLD AUTO: 1.8 % (ref 0–1.5)
BILIRUB SERPL-MCNC: 0.2 MG/DL (ref 0–1.2)
BUN SERPL-MCNC: 18 MG/DL (ref 8–23)
BUN/CREAT SERPL: 28.1 (ref 7–25)
CALCIUM SERPL-MCNC: 9.7 MG/DL (ref 8.6–10.5)
CHLORIDE SERPL-SCNC: 102 MMOL/L (ref 98–107)
CHOLEST SERPL-MCNC: 164 MG/DL (ref 0–200)
CO2 SERPL-SCNC: 27 MMOL/L (ref 22–29)
CREAT SERPL-MCNC: 0.64 MG/DL (ref 0.57–1)
EOSINOPHIL # BLD AUTO: 0.11 10*3/MM3 (ref 0–0.4)
EOSINOPHIL NFR BLD AUTO: 2.5 % (ref 0.3–6.2)
ERYTHROCYTE [DISTWIDTH] IN BLOOD BY AUTOMATED COUNT: 12.7 % (ref 12.3–15.4)
GLOBULIN SER CALC-MCNC: 2 GM/DL
GLUCOSE SERPL-MCNC: 99 MG/DL (ref 65–99)
HBA1C MFR BLD: 6.1 % (ref 4.8–5.6)
HCT VFR BLD AUTO: 45.8 % (ref 34–46.6)
HDLC SERPL-MCNC: 51 MG/DL (ref 40–60)
HGB BLD-MCNC: 14.5 G/DL (ref 12–15.9)
IMM GRANULOCYTES # BLD AUTO: 0.02 10*3/MM3 (ref 0–0.05)
IMM GRANULOCYTES NFR BLD AUTO: 0.4 % (ref 0–0.5)
LDLC SERPL CALC-MCNC: 88 MG/DL (ref 0–100)
LDLC/HDLC SERPL: 1.66 {RATIO}
LYMPHOCYTES # BLD AUTO: 1.35 10*3/MM3 (ref 0.7–3.1)
LYMPHOCYTES NFR BLD AUTO: 30.2 % (ref 19.6–45.3)
MCH RBC QN AUTO: 28.8 PG (ref 26.6–33)
MCHC RBC AUTO-ENTMCNC: 31.7 G/DL (ref 31.5–35.7)
MCV RBC AUTO: 90.9 FL (ref 79–97)
MONOCYTES # BLD AUTO: 0.44 10*3/MM3 (ref 0.1–0.9)
MONOCYTES NFR BLD AUTO: 9.8 % (ref 5–12)
NEUTROPHILS # BLD AUTO: 2.47 10*3/MM3 (ref 1.7–7)
NEUTROPHILS NFR BLD AUTO: 55.3 % (ref 42.7–76)
NRBC BLD AUTO-RTO: 0 /100 WBC (ref 0–0.2)
PLATELET # BLD AUTO: 282 10*3/MM3 (ref 140–450)
POTASSIUM SERPL-SCNC: 4.4 MMOL/L (ref 3.5–5.2)
PROT SERPL-MCNC: 6.8 G/DL (ref 6–8.5)
RBC # BLD AUTO: 5.04 10*6/MM3 (ref 3.77–5.28)
SODIUM SERPL-SCNC: 139 MMOL/L (ref 136–145)
TRIGL SERPL-MCNC: 142 MG/DL (ref 0–150)
VLDLC SERPL CALC-MCNC: 25 MG/DL (ref 5–40)
WBC # BLD AUTO: 4.47 10*3/MM3 (ref 3.4–10.8)

## 2021-07-28 ENCOUNTER — HOSPITAL ENCOUNTER (OUTPATIENT)
Dept: MAMMOGRAPHY | Facility: HOSPITAL | Age: 76
Discharge: HOME | End: 2021-07-28
Admitting: INTERNAL MEDICINE

## 2021-07-28 DIAGNOSIS — Z12.31 VISIT FOR SCREENING MAMMOGRAM: ICD-10-CM

## 2021-07-28 PROCEDURE — 77067 SCR MAMMO BI INCL CAD: CPT

## 2021-07-28 PROCEDURE — 77063 BREAST TOMOSYNTHESIS BI: CPT

## 2021-07-30 DIAGNOSIS — R92.8 ABNORMAL MAMMOGRAM: Primary | ICD-10-CM

## 2021-08-03 RX ORDER — TRAZODONE HYDROCHLORIDE 50 MG/1
TABLET ORAL
Qty: 90 TABLET | Refills: 1 | Status: SHIPPED | OUTPATIENT
Start: 2021-08-03 | End: 2022-01-10

## 2021-08-04 DIAGNOSIS — F41.9 ANXIETY: ICD-10-CM

## 2021-08-04 NOTE — TELEPHONE ENCOUNTER
Rx Refill Note  Requested Prescriptions     Pending Prescriptions Disp Refills   • ALPRAZolam (XANAX) 0.5 MG tablet [Pharmacy Med Name: ALPRAZOLAM 0.5MG TABLETS] 45 tablet      Sig: TAKE 1 TABLET BY MOUTH DAILY AS NEEDED FOR ANXIETY      Last office visit with prescribing clinician: 10/21/2020      Next office visit with prescribing clinician: 8/23/2021            Gina Martell MA  08/04/21, 15:58 EDT

## 2021-08-05 RX ORDER — AMOXICILLIN AND CLAVULANATE POTASSIUM 875; 125 MG/1; MG/1
1 TABLET, FILM COATED ORAL 2 TIMES DAILY
Qty: 20 TABLET | Refills: 0 | Status: SHIPPED | OUTPATIENT
Start: 2021-08-05 | End: 2021-08-23

## 2021-08-05 RX ORDER — ALPRAZOLAM 0.5 MG/1
0.5 TABLET ORAL DAILY PRN
Qty: 45 TABLET | Refills: 0 | Status: SHIPPED | OUTPATIENT
Start: 2021-08-05 | End: 2021-09-20

## 2021-08-05 RX ORDER — BENZONATATE 100 MG/1
200 CAPSULE ORAL 3 TIMES DAILY PRN
Qty: 30 CAPSULE | Refills: 0 | Status: SHIPPED | OUTPATIENT
Start: 2021-08-05 | End: 2022-02-02

## 2021-08-06 ENCOUNTER — HOSPITAL ENCOUNTER (OUTPATIENT)
Dept: MAMMOGRAPHY | Facility: HOSPITAL | Age: 76
Discharge: HOME | End: 2021-08-06

## 2021-08-06 ENCOUNTER — HOSPITAL ENCOUNTER (OUTPATIENT)
Dept: ULTRASOUND IMAGING | Facility: HOSPITAL | Age: 76
Discharge: HOME | End: 2021-08-06

## 2021-08-06 DIAGNOSIS — R92.8 ABNORMAL MAMMOGRAM: ICD-10-CM

## 2021-08-06 PROCEDURE — G0279 TOMOSYNTHESIS, MAMMO: HCPCS

## 2021-08-06 PROCEDURE — 76642 ULTRASOUND BREAST LIMITED: CPT

## 2021-08-06 PROCEDURE — 77065 DX MAMMO INCL CAD UNI: CPT

## 2021-08-11 NOTE — TELEPHONE ENCOUNTER
Rx Refill Note  Requested Prescriptions     Pending Prescriptions Disp Refills   • levETIRAcetam (KEPPRA) 500 MG tablet [Pharmacy Med Name: LEVETIRACETAM 500MG TABLETS] 45 tablet 0     Sig: TAKE 1/2 TABLET BY MOUTH DAILY AS NEEDED FOR HEADACHES      Last office visit with prescribing clinician: 10/21/2020      Next office visit with prescribing clinician: 8/23/2021            Gina Martell MA  08/11/21, 08:23 EDT

## 2021-08-12 RX ORDER — LEVETIRACETAM 500 MG/1
TABLET ORAL
Qty: 45 TABLET | Refills: 0 | Status: SHIPPED | OUTPATIENT
Start: 2021-08-12 | End: 2021-11-18

## 2021-08-19 NOTE — TELEPHONE ENCOUNTER
Rx Refill Note  Requested Prescriptions     Pending Prescriptions Disp Refills   • fenofibrate (TRICOR) 145 MG tablet [Pharmacy Med Name: FENOFIBRATE 145MG TABLETS] 90 tablet 1     Sig: TAKE 1 TABLET BY MOUTH EVERY DAY      Last office visit with prescribing clinician: 10/21/2020      Next office visit with prescribing clinician: 8/23/2021            Gina Martell MA  08/19/21, 08:57 EDT

## 2021-08-20 RX ORDER — FENOFIBRATE 145 MG/1
TABLET, COATED ORAL
Qty: 90 TABLET | Refills: 1 | Status: SHIPPED | OUTPATIENT
Start: 2021-08-20 | End: 2022-02-17

## 2021-08-23 ENCOUNTER — OFFICE VISIT (OUTPATIENT)
Dept: FAMILY MEDICINE CLINIC | Facility: CLINIC | Age: 76
End: 2021-08-23

## 2021-08-23 VITALS
RESPIRATION RATE: 16 BRPM | BODY MASS INDEX: 21.53 KG/M2 | DIASTOLIC BLOOD PRESSURE: 80 MMHG | HEART RATE: 82 BPM | SYSTOLIC BLOOD PRESSURE: 128 MMHG | TEMPERATURE: 96.6 F | WEIGHT: 137.2 LBS | HEIGHT: 67 IN | OXYGEN SATURATION: 98 %

## 2021-08-23 DIAGNOSIS — F32.A DEPRESSION, UNSPECIFIED DEPRESSION TYPE: ICD-10-CM

## 2021-08-23 DIAGNOSIS — G89.29 NECK PAIN, CHRONIC: ICD-10-CM

## 2021-08-23 DIAGNOSIS — R73.03 PREDIABETES: ICD-10-CM

## 2021-08-23 DIAGNOSIS — E78.5 HYPERLIPIDEMIA, UNSPECIFIED HYPERLIPIDEMIA TYPE: Primary | ICD-10-CM

## 2021-08-23 DIAGNOSIS — G47.00 INSOMNIA, UNSPECIFIED TYPE: ICD-10-CM

## 2021-08-23 DIAGNOSIS — M54.2 NECK PAIN, CHRONIC: ICD-10-CM

## 2021-08-23 DIAGNOSIS — F41.9 ANXIETY: ICD-10-CM

## 2021-08-23 PROCEDURE — G0439 PPPS, SUBSEQ VISIT: HCPCS | Performed by: INTERNAL MEDICINE

## 2021-08-23 PROCEDURE — 99213 OFFICE O/P EST LOW 20 MIN: CPT | Performed by: INTERNAL MEDICINE

## 2021-08-23 RX ORDER — CYCLOBENZAPRINE HCL 5 MG
5 TABLET ORAL NIGHTLY PRN
Qty: 30 TABLET | Refills: 0 | Status: SHIPPED | OUTPATIENT
Start: 2021-08-23 | End: 2021-12-21

## 2021-08-23 RX ORDER — ROSUVASTATIN CALCIUM 10 MG/1
10 TABLET, COATED ORAL NIGHTLY
Qty: 90 TABLET | Refills: 1 | Status: SHIPPED | OUTPATIENT
Start: 2021-08-23 | End: 2022-02-24

## 2021-08-23 NOTE — PROGRESS NOTES
"Chief Complaint  Annual Exam ( awv )    Subjective          Alexandria Yousif presents to [facility]  History of Present Illness  Here for AWV and f/u on NIDDM and HTG/HL.  a1c increased and attributes it to inactivity related to less exercise from the heat and the new variant and not going to the gym.  On pristiq 100 mg qd and 0.5 mg xanax qd prn.  50 mg trazodone qhs for sleep.  Wanting a refill on flexeril to use as needed for neck pain.  HL on tricor and crestor.  NIDDM on metformin 500 mg XR 1 tab qd.  MMG was neg earlier this month  CN 2020 and repeat in 5 years.      Objective   Vital Signs:   /80   Pulse 82   Temp 96.6 °F (35.9 °C) (Temporal)   Resp 16   Ht 170.2 cm (67\")   Wt 62.2 kg (137 lb 3.2 oz)   SpO2 98%   BMI 21.49 kg/m²     Physical Exam  Vitals and nursing note reviewed.   Constitutional:       Appearance: Normal appearance. She is well-developed.   HENT:      Head: Normocephalic and atraumatic.      Right Ear: External ear normal.      Left Ear: External ear normal.   Eyes:      Extraocular Movements: Extraocular movements intact.      Conjunctiva/sclera: Conjunctivae normal.   Neck:      Vascular: No carotid bruit.   Cardiovascular:      Rate and Rhythm: Normal rate and regular rhythm.      Heart sounds: Normal heart sounds.      Comments: No bruits  Pulmonary:      Effort: Pulmonary effort is normal. No respiratory distress.      Breath sounds: Normal breath sounds. No wheezing or rales.   Abdominal:      General: Bowel sounds are normal. There is no distension.      Palpations: Abdomen is soft. There is no mass.      Tenderness: There is no abdominal tenderness.   Musculoskeletal:      Cervical back: Neck supple.   Lymphadenopathy:      Cervical: No cervical adenopathy.   Skin:     General: Skin is warm.   Neurological:      General: No focal deficit present.      Mental Status: She is alert and oriented to person, place, and time.   Psychiatric:       "   Mood and Affect: Mood normal.         Behavior: Behavior normal.         Thought Content: Thought content normal.         Judgment: Judgment normal.        Result Review :                Hemoglobin A1c (07/23/2021 09:02)  Lipid Panel With LDL / HDL Ratio (07/23/2021 09:02)  Comprehensive Metabolic Panel (07/23/2021 09:02)  CBC & Differential (07/23/2021 09:02)    Assessment and Plan    Diagnoses and all orders for this visit:    1. Hyperlipidemia, unspecified hyperlipidemia type (Primary)    2. Neck pain, chronic  -     cyclobenzaprine (FLEXERIL) 5 MG tablet; Take 1 tablet by mouth At Night As Needed for Muscle Spasms.  Dispense: 30 tablet; Refill: 0    3. Anxiety    4. Insomnia, unspecified type    5. Depression, unspecified depression type    6. Prediabetes    Other orders  -     rosuvastatin (CRESTOR) 10 MG tablet; Take 1 tablet by mouth Every Night.  Dispense: 90 tablet; Refill: 1        Follow Up   Return in about 6 months (around 2/23/2022).  Patient was given instructions and counseling regarding her condition or for health maintenance advice. Please see specific information pulled into the AVS if appropriate.     Prediabetes--continue metformin  mg qd and advised that she would benefit from more activity like walking at night or early morning  HL-continue crestor and tricor.  Labs reviewed and look good  Depression and anxiety--continue pristiq and prn xanax.  She is getting into a counselor for therapy   Insomnia--trazodone 50 mg qhs and if needed could take 1 1/2 tabs qhs  Neck pain--refill prn flexeril    Answers for HPI/ROS submitted by the patient on 8/18/2021  Please describe your symptoms.: Depression, anxiety severe social anxiety, fatigue, impatience,  Have you had these symptoms before?: Yes  How long have you been having these symptoms?: Greater than 2 weeks  Please list any medications you are currently taking for this condition.: Pristique, alprazalom .5  Please describe any probable  cause for these symptoms. : History of depression/anxiety. Made worse & long lasting by the pandemic.  What is the primary reason for your visit?: Other

## 2021-08-23 NOTE — PROGRESS NOTES
The ABCs of the Annual Wellness Visit  Subsequent Medicare Wellness Visit    Chief Complaint   Patient presents with   • Annual Exam      awv        Subjective   History of Present Illness:  Alexandria Yousif is a 76 y.o. female who presents for a Subsequent Medicare Wellness Visit.    HEALTH RISK ASSESSMENT    Recent Hospitalizations:  No hospitalization(s) within the last year.    Current Medical Providers:  Patient Care Team:  Anita Muse MD as PCP - General  Anita Muse MD as PCP - Family Medicine  Rajinder Corbett MD as Consulting Physician (Otolaryngology)  Daquan Rhodes MD as Consulting Physician (Cardiology)    Smoking Status:  Social History     Tobacco Use   Smoking Status Never Smoker   Smokeless Tobacco Never Used       Alcohol Consumption:  Social History     Substance and Sexual Activity   Alcohol Use Yes   • Alcohol/week: 3.0 - 4.0 standard drinks   • Types: 2 - 3 Glasses of wine, 1 Cans of beer per week    Comment: liquor-1 to 2 month       Depression Screen:   PHQ-2/PHQ-9 Depression Screening 8/23/2021   Little interest or pleasure in doing things 0   Feeling down, depressed, or hopeless 0   Trouble falling or staying asleep, or sleeping too much -   Feeling tired or having little energy -   Poor appetite or overeating -   Feeling bad about yourself - or that you are a failure or have let yourself or your family down -   Trouble concentrating on things, such as reading the newspaper or watching television -   Moving or speaking so slowly that other people could have noticed. Or the opposite - being so fidgety or restless that you have been moving around a lot more than usual -   Thoughts that you would be better off dead, or of hurting yourself in some way -   Total Score 0       Fall Risk Screen:  STEADI Fall Risk Assessment was completed, and patient is at LOW risk for falls.Assessment completed on:8/23/2021    Health Habits and Functional and Cognitive  Screening:  Functional & Cognitive Status 8/23/2021   Do you have difficulty preparing food and eating? No   Do you have difficulty bathing yourself, getting dressed or grooming yourself? No   Do you have difficulty using the toilet? No   Do you have difficulty moving around from place to place? No   Do you have trouble with steps or getting out of a bed or a chair? No   Current Diet Well Balanced Diet   Dental Exam Up to date   Eye Exam Up to date   Exercise (times per week) 0 times per week   Current Exercise Activities Include -   Do you need help using the phone?  No   Are you deaf or do you have serious difficulty hearing?  No   Do you need help with transportation? No   Do you need help shopping? No   Do you need help preparing meals?  No   Do you need help with housework?  No   Do you need help with laundry? No   Do you need help taking your medications? No   Do you need help managing money? No   Do you ever drive or ride in a car without wearing a seat belt? No   Have you felt unusual stress, anger or loneliness in the last month? No   Who do you live with? Spouse   If you need help, do you have trouble finding someone available to you? No   Have you been bothered in the last four weeks by sexual problems? No   Do you have difficulty concentrating, remembering or making decisions? No         Does the patient have evidence of cognitive impairment? No    Asprin use counseling:Taking ASA appropriately as indicated    Age-appropriate Screening Schedule:  Refer to the list below for future screening recommendations based on patient's age, sex and/or medical conditions. Orders for these recommended tests are listed in the plan section. The patient has been provided with a written plan.    Health Maintenance   Topic Date Due   • URINE MICROALBUMIN  Never done   • ZOSTER VACCINE (2 of 2) 10/30/2021 (Originally 3/27/2002)   • INFLUENZA VACCINE  10/01/2021   • HEMOGLOBIN A1C  01/23/2022   • DIABETIC EYE EXAM   01/25/2022   • DXA SCAN  06/26/2022   • LIPID PANEL  07/23/2022   • MAMMOGRAM  08/06/2023   • TDAP/TD VACCINES (3 - Td or Tdap) 10/21/2030   • DIABETIC FOOT EXAM  Discontinued          The following portions of the patient's history were reviewed and updated as appropriate: allergies, current medications, past family history, past medical history, past social history, past surgical history and problem list.    Outpatient Medications Prior to Visit   Medication Sig Dispense Refill   • acetaminophen (TYLENOL) 500 MG tablet Take 1,000 mg by mouth Every 6 (Six) Hours As Needed for Mild Pain .     • ALPRAZolam (XANAX) 0.5 MG tablet TAKE 1 TABLET BY MOUTH DAILY AS NEEDED FOR ANXIETY 45 tablet 0   • Alum Hydroxide-Mag Trisilicate (GAVISCON) 80-14.2 MG chewable tablet Chew 1 tablet 4 (Four) Times a Day As Needed (Heartburn). 224 each 11   • amoxicillin-clavulanate (Augmentin) 875-125 MG per tablet Take 1 tablet by mouth 2 (Two) Times a Day. 20 tablet 0   • aspirin 81 MG tablet Take 81 mg by mouth Daily.     • benzonatate (Tessalon Perles) 100 MG capsule Take 2 capsules by mouth 3 (Three) Times a Day As Needed for Cough. 30 capsule 0   • Biotin 10 MG tablet      • Cholecalciferol (D3 HIGH POTENCY) 2000 units capsule      • cyclobenzaprine (FLEXERIL) 5 MG tablet TAKE 1 TABLET BY MOUTH AT NIGHT AS NEEDED FOR MUSCLE SPASMS 30 tablet 0   • desvenlafaxine (PRISTIQ) 100 MG 24 hr tablet TAKE 1 TABLET BY MOUTH DAILY 90 tablet 1   • fenofibrate (TRICOR) 145 MG tablet TAKE 1 TABLET BY MOUTH EVERY DAY 90 tablet 1   • levETIRAcetam (KEPPRA) 500 MG tablet TAKE 1/2 TABLET BY MOUTH DAILY AS NEEDED FOR HEADACHES 45 tablet 0   • metFORMIN ER (GLUCOPHAGE-XR) 500 MG 24 hr tablet TAKE 1 TABLET BY MOUTH DAILY WITH LARGEST MEAL OF THE DAY AND A GLASS OF WATER 90 tablet 01   • metoprolol tartrate (LOPRESSOR) 25 MG tablet Take 1 tablet by mouth Daily As Needed (Palpitations). 60 tablet 11   • Omega-3 Fatty Acids (FISH OIL) 1200 MG capsule capsule       • omeprazole (priLOSEC) 40 MG capsule TAKE 1 CAPSULE BY MOUTH EVERY DAY 90 capsule 1   • Premarin 0.3 MG tablet TAKE 1 TABLET BY MOUTH EVERY DAY 90 tablet 2   • rosuvastatin (CRESTOR) 10 MG tablet TAKE 1 TABLET BY MOUTH EVERY NIGHT 90 tablet 0   • Spacer/Aero-Holding Chambers device Use with all inhaled treatments 1 each 0   • spironolactone (ALDACTONE) 25 MG tablet Take 1 tablet by mouth Daily. 30 tablet 4   • traZODone (DESYREL) 50 MG tablet TAKE 1/2 TO 1 TABLET BY MOUTH AT BEDTIME AS NEEDED FOR SLEEP 90 tablet 1   • tretinoin (RETIN-A) 0.025 % cream APPLY EXTERNALLY TO THE AFFECTED AREA EVERY NIGHT AT BEDTIME TO THE FACE     • valACYclovir (VALTREX) 500 MG tablet TAKE 2 TABLETS BY MOUTH TWICE DAILY 4 tablet 0   • budesonide-formoterol (Symbicort) 160-4.5 MCG/ACT inhaler Inhale 2 puffs 2 (Two) Times a Day. 10.2 g 0   • naproxen (NAPROSYN) 500 MG tablet Take 500 mg by mouth As Needed for Mild Pain .       No facility-administered medications prior to visit.       Patient Active Problem List   Diagnosis   • Hypercalcemia   • Anxiety   • Hyperlipidemia   • Visit for screening mammogram   • Encounter for cervical Pap smear with pelvic exam   • Encounter for breast cancer screening other than mammogram   • Musculoskeletal strain   • Screening for colon cancer   • Pap smear of vagina following gynecologic surgery   • Dyspnea   • Injection site reaction   • Prediabetes   • Abnormal brain MRI   • Vertigo   • Screen for colon cancer   • FH: colon polyps       Advanced Care Planning:  ACP discussion was held with the patient during this visit. Patient has an advance directive in EMR which is still valid.     Review of Systems    Compared to one year ago, the patient feels her physical health is the same.  Compared to one year ago, the patient feels her mental health is worse.    Reviewed chart for potential of high risk medication in the elderly: yes  Reviewed chart for potential of harmful drug interactions in the  "elderly:yes    Objective         Vitals:    08/23/21 1013   BP: 128/80   Pulse: 82   Resp: 16   Temp: 96.6 °F (35.9 °C)   TempSrc: Temporal   SpO2: 98%   Weight: 62.2 kg (137 lb 3.2 oz)   Height: 170.2 cm (67\")   PainSc: 0-No pain       Body mass index is 21.49 kg/m².  Discussed the patient's BMI with her. The BMI is in the acceptable range.    Physical Exam    Lab Results   Component Value Date    GLU 99 07/23/2021    CHLPL 164 07/23/2021    TRIG 142 07/23/2021    HDL 51 07/23/2021    LDL 88 07/23/2021    VLDL 25 07/23/2021    HGBA1C 6.10 (H) 07/23/2021        Assessment/Plan   Medicare Risks and Personalized Health Plan  CMS Preventative Services Quick Reference  Advance Directive Discussion  Breast Cancer/Mammogram Screening  Colon Cancer Screening  Dementia/Memory   Depression/Dysphoria  Diabetic Lab Screening   Fall Risk  Glaucoma Risk  Immunizations Discussed/Encouraged (specific immunizations; Pneumococcal 23 and Shingrix )    The above risks/problems have been discussed with the patient.  Pertinent information has been shared with the patient in the After Visit Summary.  Follow up plans and orders are seen below in the Assessment/Plan Section.    There are no diagnoses linked to this encounter.  Follow Up:  No follow-ups on file.     An After Visit Summary and PPPS were given to the patient.               Answers for HPI/ROS submitted by the patient on 8/18/2021  Please describe your symptoms.: Depression, anxiety severe social anxiety, fatigue, impatience,  Have you had these symptoms before?: Yes  How long have you been having these symptoms?: Greater than 2 weeks  Please list any medications you are currently taking for this condition.: Pristique, alprazalom .5  Please describe any probable cause for these symptoms. : History of depression/anxiety. Made worse & long lasting by the pandemic.  What is the primary reason for your visit?: Other    P23 and shingrix are due but she prefers to do the covid " booster and the flu shot at this time and wait on the other vaccinations  Screens neg for diabetes, falls  Depression worsened due to pandemic but she is getting into see a therapist and will continue pristiq  MMG and CN are up to date  Labs reviewed  Eye exam is yearly

## 2021-09-13 NOTE — TELEPHONE ENCOUNTER
Rx Refill Note  Requested Prescriptions     Pending Prescriptions Disp Refills   • omeprazole (priLOSEC) 40 MG capsule [Pharmacy Med Name: OMEPRAZOLE 40MG CAPSULES] 90 capsule 1     Sig: TAKE 1 CAPSULE BY MOUTH EVERY DAY      Last office visit with prescribing clinician: 8/23/2021      Next office visit with prescribing clinician: 2/2/2022       {TIP  Please add Last Relevant Lab Date if appropriate: 7/23/21    Gina Martell MA  09/13/21, 09:39 EDT

## 2021-09-14 RX ORDER — OMEPRAZOLE 40 MG/1
CAPSULE, DELAYED RELEASE ORAL
Qty: 90 CAPSULE | Refills: 1 | Status: SHIPPED | OUTPATIENT
Start: 2021-09-14 | End: 2022-07-25

## 2021-09-20 DIAGNOSIS — F41.9 ANXIETY: ICD-10-CM

## 2021-09-20 RX ORDER — ALPRAZOLAM 0.5 MG/1
0.5 TABLET ORAL DAILY PRN
Qty: 45 TABLET | Refills: 0 | Status: SHIPPED | OUTPATIENT
Start: 2021-09-20 | End: 2021-11-04

## 2021-09-20 NOTE — TELEPHONE ENCOUNTER
Rx Refill Note  Requested Prescriptions     Pending Prescriptions Disp Refills   • ALPRAZolam (XANAX) 0.5 MG tablet [Pharmacy Med Name: ALPRAZOLAM 0.5MG TABLETS] 45 tablet 0     Sig: TAKE 1 TABLET BY MOUTH DAILY AS NEEDED FOR ANXIETY      Last office visit with prescribing clinician: 8/23/2021      Next office visit with prescribing clinician: 2/2/2022            Gina Martell MA  09/20/21, 14:32 EDT

## 2021-09-25 ENCOUNTER — IMMUNIZATION (OUTPATIENT)
Dept: VACCINE CLINIC | Facility: HOSPITAL | Age: 76
End: 2021-09-25

## 2021-09-25 PROCEDURE — 0003A: CPT | Performed by: INTERNAL MEDICINE

## 2021-09-25 PROCEDURE — 0001A: CPT | Performed by: INTERNAL MEDICINE

## 2021-09-25 PROCEDURE — 91300 HC SARSCOV02 VAC 30MCG/0.3ML IM: CPT | Performed by: INTERNAL MEDICINE

## 2021-10-12 ENCOUNTER — TELEPHONE (OUTPATIENT)
Dept: CARDIOLOGY | Facility: CLINIC | Age: 76
End: 2021-10-12

## 2021-10-12 NOTE — TELEPHONE ENCOUNTER
"Pt is calling in for advise.    She has been having an increase in palpitations and frequency.  Last episode was last night.  Her heart rate at that time was around 92.  During the episode she felt lightheaded.  She does admit she is under a \"huge amount of stress\"  Last nights episode lasted about 10 minutes.    She is going on a bike trip this Friday and would like to discuss this further.  She is feeling very fatigued.    I have scheduled her with Claudia barnes tomorrow   Thanks  Maxine Abernathy RN  Triage nurse    "

## 2021-10-13 ENCOUNTER — OFFICE VISIT (OUTPATIENT)
Dept: CARDIOLOGY | Facility: CLINIC | Age: 76
End: 2021-10-13

## 2021-10-13 VITALS
BODY MASS INDEX: 21.82 KG/M2 | OXYGEN SATURATION: 97 % | WEIGHT: 139 LBS | SYSTOLIC BLOOD PRESSURE: 140 MMHG | DIASTOLIC BLOOD PRESSURE: 90 MMHG | HEIGHT: 67 IN | HEART RATE: 82 BPM

## 2021-10-13 DIAGNOSIS — I47.1 PSVT (PAROXYSMAL SUPRAVENTRICULAR TACHYCARDIA) (HCC): ICD-10-CM

## 2021-10-13 DIAGNOSIS — F06.4 ANXIETY DISORDER DUE TO GENERAL MEDICAL CONDITION: ICD-10-CM

## 2021-10-13 DIAGNOSIS — I47.1 PAT (PAROXYSMAL ATRIAL TACHYCARDIA) (HCC): ICD-10-CM

## 2021-10-13 DIAGNOSIS — R00.2 PALPITATIONS: Primary | ICD-10-CM

## 2021-10-13 DIAGNOSIS — I51.89 GRADE II DIASTOLIC DYSFUNCTION: ICD-10-CM

## 2021-10-13 DIAGNOSIS — I49.3 PVCS (PREMATURE VENTRICULAR CONTRACTIONS): ICD-10-CM

## 2021-10-13 PROBLEM — J06.9 ACUTE UPPER RESPIRATORY INFECTION: Status: ACTIVE | Noted: 2021-10-13

## 2021-10-13 PROBLEM — J04.0 ACUTE LARYNGITIS: Status: ACTIVE | Noted: 2021-10-13

## 2021-10-13 PROBLEM — R61 EXCESSIVE SWEATING: Status: ACTIVE | Noted: 2021-10-13

## 2021-10-13 PROBLEM — K21.9 GASTROESOPHAGEAL REFLUX DISEASE: Status: ACTIVE | Noted: 2021-10-13

## 2021-10-13 PROBLEM — M54.50 LOW BACK PAIN: Status: RESOLVED | Noted: 2021-10-13 | Resolved: 2021-10-13

## 2021-10-13 PROBLEM — R06.00 DYSPNEA: Status: RESOLVED | Noted: 2017-09-15 | Resolved: 2021-10-13

## 2021-10-13 PROBLEM — Z12.11 SCREEN FOR COLON CANCER: Status: RESOLVED | Noted: 2019-11-27 | Resolved: 2021-10-13

## 2021-10-13 PROBLEM — R22.40 MASS OF FOOT: Status: ACTIVE | Noted: 2021-10-13

## 2021-10-13 PROBLEM — R42 DIZZINESS: Status: RESOLVED | Noted: 2021-10-13 | Resolved: 2021-10-13

## 2021-10-13 PROBLEM — R22.40 MASS OF FOOT: Status: RESOLVED | Noted: 2021-10-13 | Resolved: 2021-10-13

## 2021-10-13 PROBLEM — I50.20: Status: ACTIVE | Noted: 2019-04-27

## 2021-10-13 PROBLEM — I50.20: Status: RESOLVED | Noted: 2019-04-27 | Resolved: 2021-10-13

## 2021-10-13 PROBLEM — M54.50 LOW BACK PAIN: Status: ACTIVE | Noted: 2021-10-13

## 2021-10-13 PROBLEM — R73.01 IMPAIRED FASTING GLUCOSE: Status: ACTIVE | Noted: 2021-10-13

## 2021-10-13 PROBLEM — Z83.71 FH: COLON POLYPS: Status: RESOLVED | Noted: 2019-11-27 | Resolved: 2021-10-13

## 2021-10-13 PROBLEM — R20.0 NUMBNESS OF LOWER EXTREMITY: Status: ACTIVE | Noted: 2021-10-13

## 2021-10-13 PROBLEM — M85.80 OSTEOPENIA: Status: ACTIVE | Noted: 2021-10-13

## 2021-10-13 PROBLEM — F32.A DEPRESSION: Status: ACTIVE | Noted: 2021-10-13

## 2021-10-13 PROBLEM — R42 DIZZINESS: Status: ACTIVE | Noted: 2021-10-13

## 2021-10-13 PROBLEM — R61 EXCESSIVE SWEATING: Status: RESOLVED | Noted: 2021-10-13 | Resolved: 2021-10-13

## 2021-10-13 PROBLEM — M54.2 NECK PAIN: Status: ACTIVE | Noted: 2021-10-13

## 2021-10-13 PROBLEM — T80.90XA INJECTION SITE REACTION: Status: RESOLVED | Noted: 2017-10-04 | Resolved: 2021-10-13

## 2021-10-13 PROBLEM — J06.9 ACUTE UPPER RESPIRATORY INFECTION: Status: RESOLVED | Noted: 2021-10-13 | Resolved: 2021-10-13

## 2021-10-13 PROBLEM — Z83.719 FH: COLON POLYPS: Status: RESOLVED | Noted: 2019-11-27 | Resolved: 2021-10-13

## 2021-10-13 PROBLEM — E88.09 HYPERPROTEINEMIA: Status: ACTIVE | Noted: 2021-10-13

## 2021-10-13 PROBLEM — J04.0 ACUTE LARYNGITIS: Status: RESOLVED | Noted: 2021-10-13 | Resolved: 2021-10-13

## 2021-10-13 PROBLEM — M54.2 NECK PAIN: Status: RESOLVED | Noted: 2021-10-13 | Resolved: 2021-10-13

## 2021-10-13 PROBLEM — G47.00 INSOMNIA: Status: ACTIVE | Noted: 2021-10-13

## 2021-10-13 PROCEDURE — 99214 OFFICE O/P EST MOD 30 MIN: CPT | Performed by: NURSE PRACTITIONER

## 2021-10-13 PROCEDURE — 93000 ELECTROCARDIOGRAM COMPLETE: CPT | Performed by: NURSE PRACTITIONER

## 2021-10-13 NOTE — PROGRESS NOTES
Date of Office Visit: 10/13/2021  Encounter Provider: GIOVANI Mccauley  Place of Service: [facility] CARDIOLOGY  Patient Name: Alexandria Yousif  :1945  Primary Cardiologist:      Chief Complaint   Patient presents with   • Palpitations   :     HPI: Alexandria Yousif is a pleasant 76 y.o. female who presents today for evaluation of palpitations.  She is a new patient to me and I reviewed her medical records. She is an established patient of Dr. Geoff Rhodes.  She has been diagnosed with SVT, PVCs, and atrial tachycardia.      In May 2019, she had a 2D echocardiogram with the following results: LVEF 67%, mild LVH, grade 2 diastolic dysfunction, mild mitral annular calcification, trace mitral valve regurgitation, and mild tricuspid regurgitation.  Carotid duplex at that time was normal.    She has had palpitations since 2019. In 2019, 24-hour Holter monitor showed 12 very brief runs of atrial tachycardia with the longest 7 beats in duration.  In 2019, she wore a Zio patch monitor (worn for 5 days) which showed 5 episodes of PSVT the longest 11 beats in duration and a 5% burden of PVCs.  She had a severe reaction to the Zio patch adhesive with rash and blisters requiring steroid injections and oral steroids afterwards.    In 2021, she saw Dr. Geoff Rhodes in the office.  She reported intermittent palpitations that worsened with anxiety and was only taking metoprolol as needed on a rare basis.    Patient called our office yesterday and spoke with our triage nurse.  She reported increased her palpitations.  She had an episode the night before and her heart rate was 92 and she felt lightheaded.  She reported that she has been under an increased amount of stress.  She is going on a bike trip this Friday and wanted to be seen in the office.    She describes that she has these flareups of palpitations that really scare her.  This past Friday she got  the flu shot and did not feel well over the weekend.  She noticed that her heart rate was staying around 92 bpm and she felt like that was elevated.  She also felt palpitations and had weakness.  She talked with her PCP who felt that she may be having a normal response to the flu shot.  She is to leave for [place] tomorrow to go on a bike trip and was very nervous with having palpitations.  Her palpitations have improved today.  Her blood pressure is borderline elevated which is unusual for her and her heart rate is 82 today.  I had a long discussion with her regarding her echocardiogram results and previous Holter monitors.      Past Medical History:   Diagnosis Date   • Anxiety and depression    • Arthritis    • Cancer ([facility]) March-2008    Scalp and face-basel   • Cervical herniated disc    • Diabetes mellitus ([facility]) Spring 2019    Began Metformin   • Fibrocystic breast    • GERD (gastroesophageal reflux disease)    • Grade II diastolic dysfunction    • Headache    • Heart murmur    • History of skin cancer    • Hyperlipidemia    • Hypertension    • Hypertriglyceridemia    • Injury of back    • Lumbar herniated disc    • Mental confusion    • Osteopenia    • PAT (paroxysmal atrial tachycardia) ([facility])     12 very brief runs of atrial tachycardia with the longest 7 beats in duration per 24-hour Holter monitor in June 2019   • PSVT (paroxysmal supraventricular tachycardia) ([facility])     5 episodes of PSVT the longest 11 beats in duration per Zio patch monitor in December 2019   • PVCs (premature ventricular contractions)     5% burden per Zio patch monitor in December 2019   • Rheumatic fever     Age 17   • TIA (transient ischemic attack)    • Tinnitus    • Vertigo        Past Surgical History:   Procedure Laterality Date   • CEREBRAL ANGIOGRAM N/A 10/8/2018    Procedure: CEREBRAL ANGIOGRAM AND VENOGRAM WITH INTRASINUS PRESSURE RECORDING;  Surgeon: Alfredo Caruso MD;  Location: [facility] 18/19;  Service:  "Neurosurgery   • COLONOSCOPY      2010   • COLONOSCOPY N/A 2/17/2020    Procedure: COLONOSCOPY TO CECUM WITH COLD BIOPSY POLYPECTOMY;  Surgeon: Stan Flood MD;  Location: [facility] ENDOSCOPY;  Service: Gastroenterology;  Laterality: N/A;  PRE- FAMILY HX COLON POLYPS  POST- POLYP, HEMORRHOIDS   • ENDOSCOPY  02/27/2017    Esophagitis, gastritis, small hh   • HYSTERECTOMY     • KNEE SURGERY     • MOHS SURGERY     • OOPHORECTOMY     • TONSILLECTOMY AND ADENOIDECTOMY     • UPPER GASTROINTESTINAL ENDOSCOPY  2016    \"Extremely severe GERD\"       Social History     Socioeconomic History   • Marital status:    Tobacco Use   • Smoking status: Never Smoker   • Smokeless tobacco: Never Used   Substance and Sexual Activity   • Alcohol use: Yes     Alcohol/week: 3.0 - 4.0 standard drinks     Types: 2 - 3 Glasses of wine, 1 Cans of beer per week     Comment: liquor-1 to 2 month   • Drug use: No   • Sexual activity: Yes     Partners: Male     Birth control/protection: Post-menopausal       Family History   Problem Relation Age of Onset   • Uterine cancer Mother    • Heart disease Mother         Mother with 3 pacemakers over course of life, as well as CHF   • Diabetes Mother    • Skin cancer Father    • Heart disease Father         Father with 4 vessel CABG at 62   • Colon polyps Father         negative   • Heart disease Sister         Siter with fatal MI at age 64   • Atrial fibrillation Sister    • Diabetes Maternal Grandmother    • Heart disease Maternal Grandmother    • Stroke Maternal Grandmother    • Heart disease Paternal Grandmother    • Stroke Paternal Grandmother    • Heart disease Paternal Grandfather    • Stroke Paternal Grandfather    • Malig Hyperthermia Neg Hx        The following portion of the patient's history were reviewed and updated as appropriate: past medical history, past surgical history, past social history, past family history, allergies, current medications, and problem list.    Review of " Systems   Constitutional: Negative.   Cardiovascular: Positive for palpitations.   Respiratory: Negative.    Hematologic/Lymphatic: Negative.    Neurological: Positive for dizziness.       Allergies   Allergen Reactions   • Codeine Nausea Only     BLURRED VISION, RASH         Current Outpatient Medications:   •  acetaminophen (TYLENOL) 500 MG tablet, Take 1,000 mg by mouth Every 6 (Six) Hours As Needed for Mild Pain ., Disp: , Rfl:   •  ALPRAZolam (XANAX) 0.5 MG tablet, TAKE 1 TABLET BY MOUTH DAILY AS NEEDED FOR ANXIETY, Disp: 45 tablet, Rfl: 0  •  Alum Hydroxide-Mag Trisilicate (GAVISCON) 80-14.2 MG chewable tablet, Chew 1 tablet 4 (Four) Times a Day As Needed (Heartburn)., Disp: 224 each, Rfl: 11  •  aspirin 81 MG tablet, Take 81 mg by mouth Daily., Disp: , Rfl:   •  benzonatate (Tessalon Perles) 100 MG capsule, Take 2 capsules by mouth 3 (Three) Times a Day As Needed for Cough., Disp: 30 capsule, Rfl: 0  •  Biotin 10 MG tablet, , Disp: , Rfl:   •  Cholecalciferol (D3 HIGH POTENCY) 2000 units capsule, , Disp: , Rfl:   •  cyclobenzaprine (FLEXERIL) 5 MG tablet, Take 1 tablet by mouth At Night As Needed for Muscle Spasms., Disp: 30 tablet, Rfl: 0  •  desvenlafaxine (PRISTIQ) 100 MG 24 hr tablet, TAKE 1 TABLET BY MOUTH DAILY, Disp: 90 tablet, Rfl: 1  •  fenofibrate (TRICOR) 145 MG tablet, TAKE 1 TABLET BY MOUTH EVERY DAY, Disp: 90 tablet, Rfl: 1  •  levETIRAcetam (KEPPRA) 500 MG tablet, TAKE 1/2 TABLET BY MOUTH DAILY AS NEEDED FOR HEADACHES, Disp: 45 tablet, Rfl: 0  •  metFORMIN ER (GLUCOPHAGE-XR) 500 MG 24 hr tablet, TAKE 1 TABLET BY MOUTH DAILY WITH LARGEST MEAL OF THE DAY AND A GLASS OF WATER, Disp: 90 tablet, Rfl: 01  •  metoprolol tartrate (LOPRESSOR) 25 MG tablet, Take 1 tablet by mouth Daily As Needed (Palpitations)., Disp: 60 tablet, Rfl: 11  •  Omega-3 Fatty Acids (FISH OIL) 1200 MG capsule capsule, , Disp: , Rfl:   •  omeprazole (priLOSEC) 40 MG capsule, TAKE 1 CAPSULE BY MOUTH EVERY DAY, Disp: 90 capsule,  "Rfl: 1  •  Premarin 0.3 MG tablet, TAKE 1 TABLET BY MOUTH EVERY DAY, Disp: 90 tablet, Rfl: 2  •  rosuvastatin (CRESTOR) 10 MG tablet, Take 1 tablet by mouth Every Night., Disp: 90 tablet, Rfl: 1  •  Spacer/Aero-Holding Chambers device, Use with all inhaled treatments, Disp: 1 each, Rfl: 0  •  spironolactone (ALDACTONE) 25 MG tablet, Take 1 tablet by mouth Daily., Disp: 30 tablet, Rfl: 4  •  traZODone (DESYREL) 50 MG tablet, TAKE 1/2 TO 1 TABLET BY MOUTH AT BEDTIME AS NEEDED FOR SLEEP, Disp: 90 tablet, Rfl: 1  •  tretinoin (RETIN-A) 0.025 % cream, APPLY EXTERNALLY TO THE AFFECTED AREA EVERY NIGHT AT BEDTIME TO THE FACE, Disp: , Rfl:   •  valACYclovir (VALTREX) 500 MG tablet, TAKE 2 TABLETS BY MOUTH TWICE DAILY, Disp: 4 tablet, Rfl: 0        Objective:     Vitals:    10/13/21 1108 10/13/21 1121   BP: 138/84 140/90   BP Location: Left arm Right arm   Pulse: 82    SpO2: 97%    Weight: 63 kg (139 lb)    Height: 170.2 cm (67\")      Body mass index is 21.77 kg/m².    PHYSICAL EXAM:    Vitals Reviewed.   General Appearance: No acute distress, well developed and well nourished.  Thin.   Eyes: Conjunctiva and lids: No erythema, swelling, or discharge. Sclera non-icteric. Glasses.   HENT: Atraumatic, normocephalic. External eyes, ears, and nose normal. No hearing loss noted. Mucous membranes normal. Lips not cyanotic. Neck supple with no tenderness. Wearing mask.   Respiratory: No signs of respiratory distress. Respiration rhythm and depth normal.   Clear to auscultation. No rales, crackles, rhonchi, or wheezing auscultated.   Cardiovascular:  Jugular Venous Pressure: Normal  Heart Rate and Rhythm: Normal, Heart Sounds: Normal S1 and S2. No S3 or S4 noted.  Murmurs: No murmurs noted. No rubs, thrills, or gallops.   Lower Extremities: No edema noted.  Gastrointestinal:  Abdomen soft, non-distended, non-tender.    Musculoskeletal: Normal movement of extremities.  Skin and Nails: General appearance normal. No pallor, cyanosis, " diaphoresis. Skin temperature normal. No clubbing of fingernails.   Psychiatric: Patient alert and oriented to person, place, and time. Speech and behavior appropriate. Normal mood and affect.       ECG 12 Lead    Date/Time: 10/13/2021 11:18 AM  Performed by: Claudia Goldman APRN  Authorized by: Claudia Goldman APRN   Comparison: compared with previous ECG from 12/9/2019  Similar to previous ECG  Rhythm: sinus rhythm  Rate: normal  BPM: 85  Conduction: conduction normal  ST Segments: ST segments normal  T Waves: T waves normal  QRS axis: normal  Other: no other findings    Clinical impression: normal ECG              Assessment:       Diagnosis Plan   1. Palpitations     2. PSVT (paroxysmal supraventricular tachycardia) (HCC)     3. PAT (paroxysmal atrial tachycardia) (HCC)     4. PVCs (premature ventricular contractions)     5. 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  Palpitations: She has a history of PSVT, PAT, and a 5% burden of PVCs.  I think her recent flareup of palpitations was mostly related to her immune response from the flu shot.  She is feeling better today EKG is normal.  I reviewed with her her previous monitors and work and avoid wearing another monitor because she had a severe reaction to the adhesive.  I recommended taking metoprolol 25 mg 1 tablet daily before bedtime.  She will update me in 1 week.    5.  Grade 2 Diastolic Dysfunction: Noted on EKG in 2019.  She is euvolemic and does not have heart failure symptoms.  I have reassured her about this diagnosis.    6.  Hypertension: I explained that good blood pressure control is essential to help with the diastolic dysfunction.  She would benefit from a blood pressure of 120s/80s or less.    7.  Anxiety: She feels an anxiety over having the palpitations could be making them worse and I agreed.  I hope that I was able to provide her some reassurance during this  visit.    8.  She will call me in 1 week with an update.  She is scheduled to see Dr. Rhodes November 2021 and we will keep that appointment.      As always, it has been a pleasure to participate in your patient's care. Thank you.       Sincerely,         GIOVANI Asher  [facility] Cardiology      · Dictated utilizing Dragon Dictation  · COVID-19 Precautions - Patient was compliant in wearing a mask. When I saw the patient, I used appropriate personal protective equipment (PPE) including mask and eye shield (standard procedure).  Additionally, I used gown and gloves if indicated.  Hand hygiene was completed before and after seeing the patient.

## 2021-10-14 PROBLEM — I51.89 GRADE II DIASTOLIC DYSFUNCTION: Status: ACTIVE | Noted: 2021-10-14

## 2021-10-18 NOTE — TELEPHONE ENCOUNTER
Rx Refill Note  Requested Prescriptions     Pending Prescriptions Disp Refills   • desvenlafaxine (PRISTIQ) 100 MG 24 hr tablet [Pharmacy Med Name: DESVENLAFAXINE ER SUCCINATE 100MG T] 90 tablet 1     Sig: TAKE 1 TABLET BY MOUTH DAILY      Last office visit with prescribing clinician: 8/23/2021      Next office visit with prescribing clinician: 2/2/2022            Jabari Arredondo MA  10/18/21, 10:34 EDT

## 2021-10-19 RX ORDER — DESVENLAFAXINE 100 MG/1
100 TABLET, EXTENDED RELEASE ORAL DAILY
Qty: 90 TABLET | Refills: 1 | Status: SHIPPED | OUTPATIENT
Start: 2021-10-19 | End: 2022-04-25

## 2021-10-21 ENCOUNTER — TELEPHONE (OUTPATIENT)
Dept: CARDIOLOGY | Facility: CLINIC | Age: 76
End: 2021-10-21

## 2021-10-21 NOTE — TELEPHONE ENCOUNTER
I spoke with patient via phone. She is doing much better and palpitations have improved. She feels that her blood pressure and heart are better. She will follow up with Dr. Rhodes in November.

## 2021-11-04 DIAGNOSIS — F41.9 ANXIETY: ICD-10-CM

## 2021-11-04 RX ORDER — ALPRAZOLAM 0.5 MG/1
0.5 TABLET ORAL DAILY PRN
Qty: 45 TABLET | Refills: 0 | Status: SHIPPED | OUTPATIENT
Start: 2021-11-04 | End: 2021-12-21

## 2021-11-04 NOTE — TELEPHONE ENCOUNTER
Rx Refill Note  Requested Prescriptions     Pending Prescriptions Disp Refills   • ALPRAZolam (XANAX) 0.5 MG tablet [Pharmacy Med Name: ALPRAZOLAM 0.5MG TABLETS] 45 tablet 0     Sig: TAKE 1 TABLET BY MOUTH DAILY AS NEEDED FOR ANXIETY      Last office visit with prescribing clinician: 8/23/2021      Next office visit with prescribing clinician: 2/2/2022            Gina Martell MA  11/04/21, 10:45 EDT

## 2021-11-17 ENCOUNTER — OFFICE VISIT (OUTPATIENT)
Dept: SPORTS MEDICINE | Facility: CLINIC | Age: 76
End: 2021-11-17

## 2021-11-17 VITALS
OXYGEN SATURATION: 96 % | WEIGHT: 138 LBS | DIASTOLIC BLOOD PRESSURE: 66 MMHG | BODY MASS INDEX: 21.66 KG/M2 | HEART RATE: 81 BPM | TEMPERATURE: 97.3 F | HEIGHT: 67 IN | SYSTOLIC BLOOD PRESSURE: 132 MMHG

## 2021-11-17 DIAGNOSIS — S69.92XA FINGER INJURY, LEFT, INITIAL ENCOUNTER: Primary | ICD-10-CM

## 2021-11-17 DIAGNOSIS — M20.012 MALLET DEFORMITY OF LEFT LITTLE FINGER: ICD-10-CM

## 2021-11-17 PROCEDURE — 99213 OFFICE O/P EST LOW 20 MIN: CPT | Performed by: FAMILY MEDICINE

## 2021-11-17 PROCEDURE — 73130 X-RAY EXAM OF HAND: CPT | Performed by: FAMILY MEDICINE

## 2021-11-17 NOTE — PROGRESS NOTES
"Chief Complaint  Finger Injury (LEFT PINKY; states that this was 2 weeks ago, she injured horseback riding )    Subjective          Alexandria Yousif presents to [facility]  History of Present Illness  But 2 weeks ago patient was horseback riding, had to pull on the rains pretty hard for period of time, later in the day or even the next day noted some pain over the dorsal aspect left fifth DIP.  Patient is a friend of Aracelis Keane, Aracelis saw her and felt that she needed to have an x-ray, was placed in a Stax splint.  Patient has noted area of mild erythema on the dorsal aspect left fifth DIP, not sure if this is from the splinter from the injury itself.  Objective   Vital Signs:   /66 (BP Location: Left arm, Patient Position: Sitting, Cuff Size: Adult)   Pulse 81   Temp 97.3 °F (36.3 °C) (Temporal)   Ht 170.2 cm (67.01\")   Wt 62.6 kg (138 lb)   SpO2 96%   BMI 21.61 kg/m²     Physical Exam  Vitals reviewed.   Constitutional:       Appearance: She is well-developed.   HENT:      Head: Normocephalic and atraumatic.   Eyes:      Conjunctiva/sclera: Conjunctivae normal.      Pupils: Pupils are equal, round, and reactive to light.   Cardiovascular:      Comments: No peripheral edema  Pulmonary:      Effort: Pulmonary effort is normal.   Musculoskeletal:      Comments: Left fifth finger with some soft tissue prominence mild erythema over the DIP, there is a slight flexion to the DIP.  Patient can extend but is weak.  Flexion is normal.  Ulnar radial side of the DIP ligaments are stable.   Skin:     General: Skin is warm and dry.   Neurological:      Mental Status: She is alert and oriented to person, place, and time.   Psychiatric:         Behavior: Behavior normal.        Result Review :                Left Hand X-Ray  Indication: Pain  AP, Lateral, and Oblique views    Findings:  No fracture  No bony lesion  Normal soft tissues  CMC arthritis.    No prior studies were " available for comparison.    Assessment and Plan    Diagnoses and all orders for this visit:    1. Finger injury, left, initial encounter (Primary)  -     XR Hand 3+ View Left    2. Mallet deformity of left little finger        Will be of mild I do think she has a mallet deformity of the extensor tendon at the DIP.  Will stay in Stax splint, reviewed with patient the importance of keeping the DIP in extension even when removing the splint to dry the skin, may use hydrocortisone cream, small amount once a day to the skin irritation.  I would like to see her back in 4 weeks.  I spent 30 minutes caring for Alexandria on this date of service. This time includes time spent by me in the following activities:preparing for the visit, obtaining and/or reviewing a separately obtained history, performing a medically appropriate examination and/or evaluation , ordering medications, tests, or procedures and documenting information in the medical record  Follow Up   No follow-ups on file.  Patient was given instructions and counseling regarding her condition or for health maintenance advice. Please see specific information pulled into the AVS if appropriate.

## 2021-11-18 ENCOUNTER — OFFICE VISIT (OUTPATIENT)
Dept: FAMILY MEDICINE CLINIC | Facility: CLINIC | Age: 76
End: 2021-11-18

## 2021-11-18 VITALS
DIASTOLIC BLOOD PRESSURE: 64 MMHG | RESPIRATION RATE: 16 BRPM | TEMPERATURE: 97.3 F | OXYGEN SATURATION: 98 % | SYSTOLIC BLOOD PRESSURE: 122 MMHG | WEIGHT: 140.2 LBS | HEIGHT: 67 IN | HEART RATE: 93 BPM | BODY MASS INDEX: 22 KG/M2

## 2021-11-18 DIAGNOSIS — R10.32 LEFT LOWER QUADRANT ABDOMINAL PAIN: Primary | ICD-10-CM

## 2021-11-18 PROCEDURE — 99214 OFFICE O/P EST MOD 30 MIN: CPT | Performed by: NURSE PRACTITIONER

## 2021-11-18 RX ORDER — LEVETIRACETAM 500 MG/1
TABLET ORAL
Qty: 45 TABLET | Refills: 0 | Status: SHIPPED | OUTPATIENT
Start: 2021-11-18 | End: 2022-02-14

## 2021-11-18 RX ORDER — SPIRONOLACTONE 25 MG/1
25 TABLET ORAL DAILY
Qty: 30 TABLET | Refills: 4 | Status: SHIPPED | OUTPATIENT
Start: 2021-11-18 | End: 2023-03-08

## 2021-11-18 RX ORDER — AMOXICILLIN AND CLAVULANATE POTASSIUM 875; 125 MG/1; MG/1
1 TABLET, FILM COATED ORAL 2 TIMES DAILY
Qty: 20 TABLET | Refills: 0 | Status: SHIPPED | OUTPATIENT
Start: 2021-11-18 | End: 2022-02-02

## 2021-11-18 NOTE — TELEPHONE ENCOUNTER
Rx Refill Note  Requested Prescriptions     Pending Prescriptions Disp Refills   • levETIRAcetam (KEPPRA) 500 MG tablet [Pharmacy Med Name: LEVETIRACETAM 500MG TABLETS] 45 tablet 0     Sig: TAKE 1/2 TABLET BY MOUTH DAILY AS NEEDED FOR HEADACHES   • spironolactone (ALDACTONE) 25 MG tablet [Pharmacy Med Name: SPIRONOLACTONE 25MG TABLETS] 30 tablet 4     Sig: TAKE 1 TABLET BY MOUTH DAILY      Last office visit with prescribing clinician: 8/23/2021      Next office visit with prescribing clinician: 2/2/2022            Gina Martell MA  11/18/21, 08:41 EST

## 2021-11-19 ENCOUNTER — OFFICE VISIT (OUTPATIENT)
Dept: CARDIOLOGY | Facility: CLINIC | Age: 76
End: 2021-11-19

## 2021-11-19 VITALS
DIASTOLIC BLOOD PRESSURE: 82 MMHG | HEIGHT: 67 IN | HEART RATE: 77 BPM | SYSTOLIC BLOOD PRESSURE: 124 MMHG | OXYGEN SATURATION: 98 % | BODY MASS INDEX: 21.5 KG/M2 | WEIGHT: 137 LBS

## 2021-11-19 DIAGNOSIS — I47.1 ATRIAL TACHYCARDIA (HCC): ICD-10-CM

## 2021-11-19 DIAGNOSIS — I47.1 PAROXYSMAL SVT (SUPRAVENTRICULAR TACHYCARDIA) (HCC): ICD-10-CM

## 2021-11-19 DIAGNOSIS — R00.2 PALPITATIONS: Primary | ICD-10-CM

## 2021-11-19 DIAGNOSIS — I49.3 PVC'S (PREMATURE VENTRICULAR CONTRACTIONS): ICD-10-CM

## 2021-11-19 LAB
ALBUMIN SERPL-MCNC: 5.2 G/DL (ref 3.7–4.7)
ALBUMIN/GLOB SERPL: 2.3 {RATIO} (ref 1.2–2.2)
ALP SERPL-CCNC: 74 IU/L (ref 44–121)
ALT SERPL-CCNC: 24 IU/L (ref 0–32)
AMYLASE SERPL-CCNC: 33 U/L (ref 31–110)
AST SERPL-CCNC: 24 IU/L (ref 0–40)
BASOPHILS # BLD AUTO: 0.1 X10E3/UL (ref 0–0.2)
BASOPHILS NFR BLD AUTO: 1 %
BILIRUB SERPL-MCNC: 0.2 MG/DL (ref 0–1.2)
BUN SERPL-MCNC: 19 MG/DL (ref 8–27)
BUN/CREAT SERPL: 26 (ref 12–28)
CALCIUM SERPL-MCNC: 10.9 MG/DL (ref 8.7–10.3)
CHLORIDE SERPL-SCNC: 101 MMOL/L (ref 96–106)
CO2 SERPL-SCNC: 25 MMOL/L (ref 20–29)
CREAT SERPL-MCNC: 0.73 MG/DL (ref 0.57–1)
EOSINOPHIL # BLD AUTO: 0.1 X10E3/UL (ref 0–0.4)
EOSINOPHIL NFR BLD AUTO: 2 %
ERYTHROCYTE [DISTWIDTH] IN BLOOD BY AUTOMATED COUNT: 13 % (ref 11.7–15.4)
GLOBULIN SER CALC-MCNC: 2.3 G/DL (ref 1.5–4.5)
GLUCOSE SERPL-MCNC: 96 MG/DL (ref 65–99)
HCT VFR BLD AUTO: 47.1 % (ref 34–46.6)
HGB BLD-MCNC: 15.4 G/DL (ref 11.1–15.9)
IMM GRANULOCYTES # BLD AUTO: 0 X10E3/UL (ref 0–0.1)
IMM GRANULOCYTES NFR BLD AUTO: 0 %
LIPASE SERPL-CCNC: 38 U/L (ref 14–85)
LYMPHOCYTES # BLD AUTO: 1.7 X10E3/UL (ref 0.7–3.1)
LYMPHOCYTES NFR BLD AUTO: 30 %
MCH RBC QN AUTO: 29.6 PG (ref 26.6–33)
MCHC RBC AUTO-ENTMCNC: 32.7 G/DL (ref 31.5–35.7)
MCV RBC AUTO: 91 FL (ref 79–97)
MONOCYTES # BLD AUTO: 0.6 X10E3/UL (ref 0.1–0.9)
MONOCYTES NFR BLD AUTO: 11 %
NEUTROPHILS # BLD AUTO: 3.2 X10E3/UL (ref 1.4–7)
NEUTROPHILS NFR BLD AUTO: 56 %
PLATELET # BLD AUTO: 321 X10E3/UL (ref 150–450)
POTASSIUM SERPL-SCNC: 4.7 MMOL/L (ref 3.5–5.2)
PROT SERPL-MCNC: 7.5 G/DL (ref 6–8.5)
RBC # BLD AUTO: 5.2 X10E6/UL (ref 3.77–5.28)
SODIUM SERPL-SCNC: 141 MMOL/L (ref 134–144)
WBC # BLD AUTO: 5.7 X10E3/UL (ref 3.4–10.8)

## 2021-11-19 PROCEDURE — 99213 OFFICE O/P EST LOW 20 MIN: CPT | Performed by: INTERNAL MEDICINE

## 2021-11-21 NOTE — PROGRESS NOTES
Date of Office Visit:  2021  Encounter Provider: Daquan Rhodes MD  Place of Service: [facility] CARDIOLOGY  Patient Name: Alexandria Yousif  :1945    Chief complaint: Follow-up for palpitations, atrial tachycardia, SVT, PVC's, and prior TIAs.    History of Present Illness:    I again had the pleasure of seeing the patient in cardiology office on 2021.  She is a very pleasant 76 year-old white female with a history of SVT, PVC's, and atrial tachycardia who presents for follow-up.  I initially saw the patient on 10/10/2017.  She had been having shortness of breath with exertion.  She also was noted to have a systolic murmur on exam.  She has an extensive family history of cardiac disease.  Her mother had 3 permanent pacemakers and CHF, her sister had a fatal MI at age 64, another sister had atrial fibrillation, and her father had a four-vessel CABG at age 62.  She underwent an exercise stress echocardiogram on 10/17/2017 which was normal.    The patient began experiencing palpitations in 2019.  She had also been feeling lightheaded.  She had an echocardiogram and carotid artery Doppler ultrasound on 2019.  The carotid artery ultrasound was normal.  The echocardiogram showed an ejection fraction of 67% with grade 2 diastolic dysfunction.  A subsequent Holter monitor on 2019 showed 12 very brief runs of atrial tachycardia, with the longest being 7 beats in length.  She subsequently wore a 14-day ZIO patch starting on 2019.  This showed 5 episodes of SVT, with the longest being 11 beats in length.  She also had frequent PVCs (5% of the total) with rare ventricular bigeminy and trigeminy.  Of note, she did have a severe reaction to her ZIO Patch adhesive with a rash and blisters which actually required steroid injections and oral steroids afterwards.    The patient presents today for follow-up.  She recently had an increase in her palpitations after  getting her flu vaccine.  She saw GIOVANI Asher, in our office, and she was put on 25 mg of metoprolol at night.  This did help significantly with the palpitations; however, she is very sensitive to the metoprolol, and it does cause substantial fatigue.  She has not had any chest pain or shortness of breath.    Past Medical History:   Diagnosis Date   • Allergic 1980s   • Anxiety and depression    • Arthritis    • Cancer ([facility]) March-2008    Scalp and face-basel   • Cervical herniated disc    • CHF (congestive heart failure) ([facility]) Spring 2019    Left ventricle   • Colon polyp 2-2020   • Coronary artery disease Spring, 2019    left ventricle-diastolic   • Diabetes mellitus ([facility]) Spring 2019    Began Metformin   • Fibrocystic breast    • Fracture of ankle 2016   • Fracture, foot 2016    Broken right ankle   • GERD (gastroesophageal reflux disease)    • Grade II diastolic dysfunction    • Headache    • Heart murmur    • History of skin cancer    • Hyperlipidemia    • Hypertension    • Hypertriglyceridemia    • Injury of back    • Injury of neck 1971    Hernieated disc when broke my back   • Low back pain 1971    Broken back   • Lumbar herniated disc    • Mental confusion    • Osteopenia    • Osteopenia    • PAT (paroxysmal atrial tachycardia) ([facility])     12 very brief runs of atrial tachycardia with the longest 7 beats in duration per 24-hour Holter monitor in June 2019   • PSVT (paroxysmal supraventricular tachycardia) ([facility])     5 episodes of PSVT the longest 11 beats in duration per Zio patch monitor in December 2019   • PVCs (premature ventricular contractions)     5% burden per Zio patch monitor in December 2019   • Rheumatic fever     Age 17   • Stroke ([facility]) 2018    Mild to moderat ischemia   • TIA (transient ischemic attack)    • Tinnitus    • Vertigo        Past Surgical History:   Procedure Laterality Date   • CEREBRAL ANGIOGRAM N/A 10/8/2018    Procedure: CEREBRAL ANGIOGRAM AND VENOGRAM WITH INTRASINUS  "PRESSURE RECORDING;  Surgeon: Alfredo Caruso MD;  Location: [facility] HYBRID OR 18/19;  Service: Neurosurgery   • COLONOSCOPY      2010   • COLONOSCOPY N/A 2/17/2020    Procedure: COLONOSCOPY TO CECUM WITH COLD BIOPSY POLYPECTOMY;  Surgeon: Stan Flood MD;  Location: [facility] ENDOSCOPY;  Service: Gastroenterology;  Laterality: N/A;  PRE- FAMILY HX COLON POLYPS  POST- POLYP, HEMORRHOIDS   • ENDOSCOPY  02/27/2017    Esophagitis, gastritis, small hh   • HYSTERECTOMY     • KNEE SURGERY     • MOHS SURGERY     • OOPHORECTOMY     • SUBTOTAL HYSTERECTOMY  1994    Full   • TONSILLECTOMY AND ADENOIDECTOMY     • TRIGGER POINT INJECTION  1990s    Spinal   • UPPER GASTROINTESTINAL ENDOSCOPY  2016    \"Extremely severe GERD\"       Current Outpatient Medications on File Prior to Visit   Medication Sig Dispense Refill   • acetaminophen (TYLENOL) 500 MG tablet Take 1,000 mg by mouth Every 6 (Six) Hours As Needed for Mild Pain .     • ALPRAZolam (XANAX) 0.5 MG tablet TAKE 1 TABLET BY MOUTH DAILY AS NEEDED FOR ANXIETY 45 tablet 0   • Alum Hydroxide-Mag Trisilicate (GAVISCON) 80-14.2 MG chewable tablet Chew 1 tablet 4 (Four) Times a Day As Needed (Heartburn). 224 each 11   • amoxicillin-clavulanate (Augmentin) 875-125 MG per tablet Take 1 tablet by mouth 2 (Two) Times a Day. 20 tablet 0   • aspirin 81 MG tablet Take 81 mg by mouth Daily.     • benzonatate (Tessalon Perles) 100 MG capsule Take 2 capsules by mouth 3 (Three) Times a Day As Needed for Cough. 30 capsule 0   • Biotin 10 MG tablet      • Cholecalciferol (D3 HIGH POTENCY) 2000 units capsule      • cyclobenzaprine (FLEXERIL) 5 MG tablet Take 1 tablet by mouth At Night As Needed for Muscle Spasms. 30 tablet 0   • desvenlafaxine (PRISTIQ) 100 MG 24 hr tablet TAKE 1 TABLET BY MOUTH DAILY 90 tablet 1   • fenofibrate (TRICOR) 145 MG tablet TAKE 1 TABLET BY MOUTH EVERY DAY 90 tablet 1   • levETIRAcetam (KEPPRA) 500 MG tablet TAKE 1/2 TABLET BY MOUTH DAILY AS NEEDED FOR " HEADACHES 45 tablet 0   • metFORMIN ER (GLUCOPHAGE-XR) 500 MG 24 hr tablet TAKE 1 TABLET BY MOUTH DAILY WITH LARGEST MEAL OF THE DAY AND A GLASS OF WATER 90 tablet 01   • metoprolol tartrate (LOPRESSOR) 25 MG tablet Take 1 tablet by mouth Daily As Needed (Palpitations). 60 tablet 11   • Omega-3 Fatty Acids (FISH OIL) 1200 MG capsule capsule      • omeprazole (priLOSEC) 40 MG capsule TAKE 1 CAPSULE BY MOUTH EVERY DAY 90 capsule 1   • Premarin 0.3 MG tablet TAKE 1 TABLET BY MOUTH EVERY DAY 90 tablet 2   • rosuvastatin (CRESTOR) 10 MG tablet Take 1 tablet by mouth Every Night. 90 tablet 1   • spironolactone (ALDACTONE) 25 MG tablet TAKE 1 TABLET BY MOUTH DAILY 30 tablet 4   • traZODone (DESYREL) 50 MG tablet TAKE 1/2 TO 1 TABLET BY MOUTH AT BEDTIME AS NEEDED FOR SLEEP 90 tablet 1   • tretinoin (RETIN-A) 0.025 % cream APPLY EXTERNALLY TO THE AFFECTED AREA EVERY NIGHT AT BEDTIME TO THE FACE     • valACYclovir (VALTREX) 500 MG tablet TAKE 2 TABLETS BY MOUTH TWICE DAILY 4 tablet 0     No current facility-administered medications on file prior to visit.     Allergies as of 11/19/2021 - Reviewed 11/19/2021   Allergen Reaction Noted   • Codeine Nausea Only 03/29/2016     Social History     Socioeconomic History   • Marital status:    Tobacco Use   • Smoking status: Never Smoker   • Smokeless tobacco: Never Used   Substance and Sexual Activity   • Alcohol use: Yes     Alcohol/week: 3.0 - 4.0 standard drinks     Types: 2 - 3 Glasses of wine, 1 Cans of beer per week     Comment: liquor-1 to 2 month   • Drug use: No   • Sexual activity: Yes     Partners: Male     Birth control/protection: Post-menopausal     Family History   Problem Relation Age of Onset   • Uterine cancer Mother    • Heart disease Mother         in her 70s   • Diabetes Mother         Late in life   • Arthritis Mother    • Cancer Mother         spine   • Hearing loss Mother         In her 70s   • Hyperlipidemia Mother         in her 60s   • Kidney disease  "Mother         ?   • Vision loss Mother         Macular Degeneration   • Skin cancer Father    • Heart disease Father         Father with 4 vessel CABG at 62   • Colon polyps Father         negative   • Cancer Father         skin   • Heart disease Sister         Heart attack-64   • Atrial fibrillation Sister    • Hyperlipidemia Sister         in her 50s   • Vision loss Sister         Macular Degeneration   • Diabetes Maternal Grandmother         Late in life   • Heart disease Maternal Grandmother         Stroke-80   • Stroke Maternal Grandmother    • Hyperlipidemia Maternal Grandmother         ?   • Kidney disease Maternal Grandmother         ?   • Heart disease Paternal Grandmother         CHF   • Stroke Paternal Grandmother    • Depression Paternal Grandmother    • Hyperlipidemia Paternal Grandmother         ?   • Heart disease Paternal Grandfather         Emphysema-heart disease   • Stroke Paternal Grandfather    • Malig Hyperthermia Neg Hx        Review of Systems   Cardiovascular: Positive for palpitations.   Musculoskeletal: Positive for joint pain and muscle cramps.   Gastrointestinal: Positive for nausea.   Psychiatric/Behavioral: Positive for depression. The patient is nervous/anxious.    All other systems reviewed and are negative.     Objective:     Vitals:    11/19/21 1302   BP: 124/82   Pulse: 77   SpO2: 98%   Weight: 62.1 kg (137 lb)   Height: 170.2 cm (67.01\")     Body mass index is 21.45 kg/m².    Physical Exam  Constitutional:       Appearance: She is well-developed.   HENT:      Head: Normocephalic and atraumatic.   Eyes:      Conjunctiva/sclera: Conjunctivae normal.   Cardiovascular:      Rate and Rhythm: Normal rate and regular rhythm.      Heart sounds: Murmur heard.    Systolic murmur is present with a grade of 2/6 at the upper right sternal border, upper left sternal border and lower left sternal border.  No friction rub. No gallop.    Pulmonary:      Effort: Pulmonary effort is normal.      " Breath sounds: Normal breath sounds.   Abdominal:      Palpations: Abdomen is soft.      Tenderness: There is no abdominal tenderness.   Musculoskeletal:      Cervical back: Neck supple.   Skin:     General: Skin is warm.   Neurological:      Mental Status: She is alert and oriented to person, place, and time.   Psychiatric:         Behavior: Behavior normal.       Lab Review:   Procedures  Cardiac Procedures:  1.  Stress echocardiogram on 10/17/2017: Ejection fraction was 64%.  There was no ischemia.  There was no valvular disease.  2.  Carotid artery Doppler ultrasound on 5/2/2019: Normal.  3.  Echocardiogram on 5/2/2019: Ejection fraction was 67%.  There was mild LVH.  There was grade 2 diastolic dysfunction.  4.  Holter monitor on 6/6/2019: There were 12 very brief runs of atrial tachycardia, with the longest being 7 beats in length.  5.  ZIO Patch starting on 12/9/2019: There were 5 episodes of SVT, the longest 11 beats in length.  There were frequent PVCs, comprising up to 5% of the total.  There was rare ventricular bigeminy and trigeminy.  There was no ventricular tachycardia.    Assessment:       Diagnosis Plan   1. Palpitations     2. Atrial tachycardia (HCC)     3. Paroxysmal SVT (supraventricular tachycardia) (HCC)     4. PVC's (premature ventricular contractions)       Plan:       I do suspect that the flu vaccine may have triggered some increase in her atrial tachycardia, PVCs, and SVT.  Since starting the metoprolol, she has had an excellent response, and the palpitations have been declined in number.  However, she developed severe fatigue with the metoprolol at 25 mg per night.  She is down to taking it 12.5 mg at night now.  The fatigue is better, although it is still present.  She feels like she is very sensitive to the metoprolol in general.    I reassured her again that her rhythm issues in the past have been benign.  They are more annoying than harmful.  She is going to continue the aspirin for  her history of TIAs in the past.  She did have grade 2 diastolic dysfunction on her echocardiogram previously.  However, she has no signs or symptoms of congestive heart failure.  Again, she did develop a severe reaction to the adhesive from the ZIO Patch with rash and blisters which required steroid injections and steroid pills afterwards.  I do not think she would be a good candidate for any long-term monitor other than a loop recorder in the future because of this.    I will plan on seeing her back in the office in 6 months unless other issues arise.

## 2021-11-22 ENCOUNTER — OFFICE VISIT (OUTPATIENT)
Dept: FAMILY MEDICINE CLINIC | Facility: CLINIC | Age: 76
End: 2021-11-22

## 2021-11-22 VITALS
WEIGHT: 142.9 LBS | HEIGHT: 67 IN | RESPIRATION RATE: 12 BRPM | BODY MASS INDEX: 22.43 KG/M2 | HEART RATE: 87 BPM | SYSTOLIC BLOOD PRESSURE: 149 MMHG | OXYGEN SATURATION: 97 % | DIASTOLIC BLOOD PRESSURE: 94 MMHG | TEMPERATURE: 96.6 F

## 2021-11-22 DIAGNOSIS — E55.9 VITAMIN D DEFICIENCY: ICD-10-CM

## 2021-11-22 DIAGNOSIS — R73.03 PREDIABETES: ICD-10-CM

## 2021-11-22 DIAGNOSIS — R73.01 IMPAIRED FASTING GLUCOSE: ICD-10-CM

## 2021-11-22 DIAGNOSIS — E88.09 HYPERPROTEINEMIA: ICD-10-CM

## 2021-11-22 DIAGNOSIS — E83.52 HYPERCALCEMIA: ICD-10-CM

## 2021-11-22 DIAGNOSIS — R10.32 LEFT LOWER QUADRANT ABDOMINAL PAIN: Primary | ICD-10-CM

## 2021-11-22 PROCEDURE — 99214 OFFICE O/P EST MOD 30 MIN: CPT | Performed by: NURSE PRACTITIONER

## 2021-11-22 RX ORDER — BUPROPION HYDROCHLORIDE 75 MG/1
75 TABLET ORAL DAILY
Qty: 30 TABLET | Refills: 2 | Status: SHIPPED | OUTPATIENT
Start: 2021-11-22 | End: 2022-08-31

## 2021-11-22 NOTE — PROGRESS NOTES
"Chief Complaint  Follow-up (discuss blood work)    Subjective          Alexandria Yousif presents to [facility]  Very pleasant patient here today follow-up left lower quadrant pain and tenderness without fever chills thought to be possibly a mild case of diverticulitis she did some clear liquids and now she is doing much better has a very tad amount of pain 1-2  And she still taking Augmentin she is improved nicely here.    Recent labs show that she has some borderline hyper calcium Vero  At least a couple years ago PTH were normal and previous proteins were evaluated were normal for any evidence of multiple myeloma.    Chronic depression anxiety she is except exacerbated by the winter months a little bit of Sad  stable she sees Dr. Muse,          Objective   Vital Signs:   /94   Pulse 87   Temp 96.6 °F (35.9 °C) (Infrared)   Resp 12   Ht 170.2 cm (67.01\")   Wt 64.8 kg (142 lb 14.4 oz)   SpO2 97%   BMI 22.37 kg/m²     Physical Exam  Vitals reviewed.   Constitutional:       Appearance: Normal appearance. She is well-developed.      Comments: Pleasant appears well   HENT:      Head: Normocephalic.      Nose: Nose normal.   Eyes:      General: No scleral icterus.     Conjunctiva/sclera: Conjunctivae normal.      Pupils: Pupils are equal, round, and reactive to light.   Neck:      Thyroid: No thyromegaly.      Vascular: No JVD.   Cardiovascular:      Rate and Rhythm: Normal rate and regular rhythm.      Heart sounds: Normal heart sounds. No murmur heard.  No friction rub. No gallop.    Pulmonary:      Effort: Pulmonary effort is normal. No respiratory distress.      Breath sounds: Normal breath sounds. No stridor. No wheezing or rales.   Abdominal:      General: Bowel sounds are normal. There is no distension.      Palpations: Abdomen is soft.      Tenderness: There is no abdominal tenderness.      Comments: No hepatosplenomegaly, no ascites, very minimal left lower quadrant " tenderness no guarding no rebound abdomen nondistended bowel sounds positive no right lower mid upper tenderness near normal exam left lower   Musculoskeletal:         General: No tenderness.      Cervical back: Neck supple.   Lymphadenopathy:      Cervical: No cervical adenopathy.   Skin:     General: Skin is warm and dry.      Findings: No erythema or rash.   Neurological:      Mental Status: She is alert and oriented to person, place, and time.      Deep Tendon Reflexes: Reflexes are normal and symmetric.   Psychiatric:         Mood and Affect: Mood normal.         Behavior: Behavior normal.         Thought Content: Thought content normal.         Judgment: Judgment normal.      Comments: Appropriate occasionally tearful to talk about depression        Result Review :                 Assessment and Plan    Diagnoses and all orders for this visit:    1. Hypercalcemia (Primary)  -     Vitamin D 25 Hydroxy  -     PTH, Intact & Calcium  -     JESUS MANUEL + PE  -     JESUS MANUEL & PE, Random Urine - Urine, Clean Catch    2. Prediabetes  -     Vitamin D 25 Hydroxy  -     PTH, Intact & Calcium  -     JESUS MANUEL + PE  -     JESUS MANUEL & PE, Random Urine - Urine, Clean Catch    3. Impaired fasting glucose  -     Vitamin D 25 Hydroxy  -     PTH, Intact & Calcium  -     JESUS MANUEL + PE  -     JESUS MANUEL & PE, Random Urine - Urine, Clean Catch    4. Hyperproteinemia  -     Vitamin D 25 Hydroxy  -     PTH, Intact & Calcium  -     JESUS MANUEL + PE  -     JESUS MANUEL & PE, Random Urine - Urine, Clean Catch    5. Vitamin D deficiency  -     Vitamin D 25 Hydroxy  -     PTH, Intact & Calcium  -     JESUS MANUEL + PE  -     JESUS MANUEL & PE, Random Urine - Urine, Clean Catch        Follow Up   No follow-ups on file.  Patient was given instructions and counseling regarding her condition or for health maintenance advice. Please see specific information pulled into the AVS if appropriate.     Finish the Augmentin,  Should you have recurrent abdominal pain we need further evaluation including a CAT scan and  possibly another colonoscopy is severe pain fever chills emergency room    Bright lights Pleasant music present pleasant aromas      Start bupropion 75 mg  1/2 tablet daily if need be can increase to 1 tablet daily after 1 or 2 weeks  If increased irritability agitation or insomnia stop the medication    Send me a message in 1 month on how you are doing and keep your follow-up appointments with Dr. Muse

## 2021-11-22 NOTE — PATIENT INSTRUCTIONS
Finish the Augmentin,  Should you have recurrent abdominal pain we need further evaluation including a CAT scan and possibly another colonoscopy is severe pain fever chills emergency room    Bright lights Pleasant music present pleasant aromas      Start bupropion 75 mg  1/2 tablet daily if need be can increase to 1 tablet daily after 1 or 2 weeks  If increased irritability agitation or insomnia stop the medication    Send me a message in 1 month on how you are doing and keep your follow-up appointments with Dr. Muse

## 2021-11-24 LAB
25(OH)D3+25(OH)D2 SERPL-MCNC: 72.1 NG/ML (ref 30–100)
ALBUMIN 24H MFR UR ELPH: 11.4 %
ALBUMIN SERPL ELPH-MCNC: 4.3 G/DL (ref 2.9–4.4)
ALBUMIN/GLOB SERPL: 1.4 {RATIO} (ref 0.7–1.7)
ALPHA1 GLOB 24H MFR UR ELPH: 10.6 %
ALPHA1 GLOB SERPL ELPH-MCNC: 0.3 G/DL (ref 0–0.4)
ALPHA2 GLOB 24H MFR UR ELPH: 17.9 %
ALPHA2 GLOB SERPL ELPH-MCNC: 0.9 G/DL (ref 0.4–1)
B-GLOBULIN MFR UR ELPH: 21.3 %
B-GLOBULIN SERPL ELPH-MCNC: 1.2 G/DL (ref 0.7–1.3)
CALCIUM SERPL-MCNC: 10.8 MG/DL (ref 8.7–10.3)
GAMMA GLOB 24H MFR UR ELPH: 38.8 %
GAMMA GLOB SERPL ELPH-MCNC: 0.7 G/DL (ref 0.4–1.8)
GLOBULIN SER-MCNC: 3.2 G/DL (ref 2.2–3.9)
HIV 1 & 2 AB SER-IMP: NORMAL
IGA SERPL-MCNC: 72 MG/DL (ref 64–422)
IGG SERPL-MCNC: 736 MG/DL (ref 586–1602)
IGM SERPL-MCNC: 20 MG/DL (ref 26–217)
INTACT PTH: ABNORMAL
INTERPRETATION SERPL IEP-IMP: ABNORMAL
INTERPRETATION UR IFE-IMP: NORMAL
LABORATORY COMMENT REPORT: ABNORMAL
M PROTEIN 24H MFR UR ELPH: NORMAL %
M PROTEIN SERPL ELPH-MCNC: ABNORMAL G/DL
PROT SERPL-MCNC: 7.5 G/DL (ref 6–8.5)
PROT UR-MCNC: <4 MG/DL
PTH-INTACT SERPL-MCNC: 15 PG/ML (ref 15–65)

## 2021-12-12 NOTE — TELEPHONE ENCOUNTER
Left message for pt   
On Friday she received the pneumonia vaccine and over the weekend her arm was extremely sore she could not lift it. Today it is better and able to move it. She read the VIS sheet and it said to report this to your PCP.   
rec that she alternate ice/moist heat 10 minutes at time and could take tylenol or advil.  Let me know if pain persists.    
<-------- Click here to INCLUDE CoVID-19 Discharge Instructions

## 2021-12-15 ENCOUNTER — OFFICE VISIT (OUTPATIENT)
Dept: SPORTS MEDICINE | Facility: CLINIC | Age: 76
End: 2021-12-15

## 2021-12-15 VITALS
OXYGEN SATURATION: 95 % | DIASTOLIC BLOOD PRESSURE: 70 MMHG | WEIGHT: 141 LBS | BODY MASS INDEX: 22.13 KG/M2 | TEMPERATURE: 97.7 F | SYSTOLIC BLOOD PRESSURE: 132 MMHG | HEIGHT: 67 IN | HEART RATE: 75 BPM

## 2021-12-15 DIAGNOSIS — M20.012 MALLET DEFORMITY OF LEFT LITTLE FINGER: Primary | ICD-10-CM

## 2021-12-15 PROCEDURE — 99213 OFFICE O/P EST LOW 20 MIN: CPT | Performed by: FAMILY MEDICINE

## 2021-12-15 PROCEDURE — 73140 X-RAY EXAM OF FINGER(S): CPT | Performed by: FAMILY MEDICINE

## 2021-12-15 NOTE — PROGRESS NOTES
"Chief Complaint  Finger Injury (mallet deformity; left pinky follow up 1 MONTH)    Subjective          Alexandria Yousif presents to [facility]  History of Present Illness  Follow-up mallet finger left fifth finger.  Patient states she has been very faithful in wearing her splint.  She still has some skin irritation of the dorsal aspect of the DIP.  She has been using hydrocortisone cream.    Objective   Vital Signs:   /70 (BP Location: Left arm, Patient Position: Sitting, Cuff Size: Adult)   Pulse 75   Temp 97.7 °F (36.5 °C) (Temporal)   Ht 170.2 cm (67.01\")   Wt 64 kg (141 lb)   SpO2 95%   BMI 22.08 kg/m²     Physical Exam  Vitals reviewed.   Constitutional:       Appearance: She is well-developed.   HENT:      Head: Normocephalic and atraumatic.   Eyes:      Conjunctiva/sclera: Conjunctivae normal.      Pupils: Pupils are equal, round, and reactive to light.   Cardiovascular:      Comments: No peripheral edema  Pulmonary:      Effort: Pulmonary effort is normal.   Musculoskeletal:      Comments: Procedure: X-ray left fifth finger   There is very good alignment of the DIP.  No evidence of fracture.    After the x-ray was done we removed the splint keeping her DIP in extension the entire time.  The area of skin irritation over the dorsal aspect shows no evidence of skin breakdown but there is some thinning of the skin.  Will suggest she discontinue hydrocortisone and may use Desitin instead.  There is no soft tissue swelling on today's exam.   Skin:     General: Skin is warm and dry.   Neurological:      Mental Status: She is alert and oriented to person, place, and time.   Psychiatric:         Behavior: Behavior normal.        Result Review :                 Assessment and Plan    Diagnoses and all orders for this visit:    1. 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  Follow-up here at that time, for repeat x-ray but certainly would like to see if there is any ongoing deformity and if there is any worsening skin changes.  I spent 20 minutes caring for Alexandria on this date of service. This time includes time spent by me in the following activities:preparing for the visit, reviewing tests, obtaining and/or reviewing a separately obtained history, performing a medically appropriate examination and/or evaluation , counseling and educating the patient/family/caregiver and documenting information in the medical record  Follow Up   No follow-ups on file.  Patient was given instructions and counseling regarding her condition or for health maintenance advice. Please see specific information pulled into the AVS if appropriate.

## 2021-12-20 DIAGNOSIS — G89.29 NECK PAIN, CHRONIC: ICD-10-CM

## 2021-12-20 DIAGNOSIS — M54.2 NECK PAIN, CHRONIC: ICD-10-CM

## 2021-12-20 NOTE — TELEPHONE ENCOUNTER
Rx Refill Note  Requested Prescriptions     Pending Prescriptions Disp Refills   • cyclobenzaprine (FLEXERIL) 5 MG tablet [Pharmacy Med Name: CYCLOBENZAPRINE 5MG TABLETS] 30 tablet 0     Sig: TAKE 1 TABLET BY MOUTH AT NIGHT AS NEEDED FOR MUSCLE SPASMS      Last office visit with prescribing clinician: 8/23/2021      Next office visit with prescribing clinician: 2/2/2022            Gina Martell MA  12/20/21, 11:03 EST

## 2021-12-21 DIAGNOSIS — F41.9 ANXIETY: ICD-10-CM

## 2021-12-21 RX ORDER — CYCLOBENZAPRINE HCL 5 MG
5 TABLET ORAL NIGHTLY PRN
Qty: 30 TABLET | Refills: 0 | Status: SHIPPED | OUTPATIENT
Start: 2021-12-21 | End: 2022-02-09

## 2021-12-21 RX ORDER — ALPRAZOLAM 0.5 MG/1
0.5 TABLET ORAL DAILY PRN
Qty: 45 TABLET | Refills: 0 | Status: SHIPPED | OUTPATIENT
Start: 2021-12-21 | End: 2022-02-09

## 2021-12-21 NOTE — TELEPHONE ENCOUNTER
Rx Refill Note  Requested Prescriptions     Pending Prescriptions Disp Refills   • ALPRAZolam (XANAX) 0.5 MG tablet [Pharmacy Med Name: ALPRAZOLAM 0.5MG TABLETS] 45 tablet      Sig: TAKE 1 TABLET BY MOUTH DAILY AS NEEDED FOR ANXIETY      Last office visit with prescribing clinician: 8/23/2021      Next office visit with prescribing clinician: 2/2/2022            Gina Martell MA  12/21/21, 12:07 EST

## 2022-01-03 RX ORDER — METFORMIN HYDROCHLORIDE 500 MG/1
TABLET, EXTENDED RELEASE ORAL
Qty: 90 TABLET | Refills: 1 | Status: SHIPPED | OUTPATIENT
Start: 2022-01-03 | End: 2022-07-12

## 2022-01-03 NOTE — TELEPHONE ENCOUNTER
Rx Refill Note  Requested Prescriptions     Pending Prescriptions Disp Refills   • metFORMIN ER (GLUCOPHAGE-XR) 500 MG 24 hr tablet [Pharmacy Med Name: METFORMIN ER 500MG 24HR TABS] 90 tablet 01     Sig: TAKE 1 TABLET BY MOUTH DAILY WITH LARGEST MEAL OF THE DAY AND A GLASS OF WATER      Last office visit with prescribing clinician: 8/23/2021      Next office visit with prescribing clinician: 2/2/2022       {TIP  Please add Last Relevant Lab Date if appropriate: 7/23/21    Gina Martell MA  01/03/22, 12:42 EST

## 2022-01-10 RX ORDER — TRAZODONE HYDROCHLORIDE 50 MG/1
TABLET ORAL
Qty: 90 TABLET | Refills: 1 | Status: SHIPPED | OUTPATIENT
Start: 2022-01-10 | End: 2022-07-12

## 2022-01-10 NOTE — TELEPHONE ENCOUNTER
Rx Refill Note  Requested Prescriptions     Pending Prescriptions Disp Refills   • traZODone (DESYREL) 50 MG tablet [Pharmacy Med Name: TRAZODONE 50MG TABLETS] 90 tablet 1     Sig: TAKE 1/2 TO 1 TABLET BY MOUTH AT BEDTIME AS NEEDED FOR SLEEP      Last office visit with prescribing clinician: 8/23/2021      Next office visit with prescribing clinician: 2/2/2022            Gina Martell MA  01/10/22, 08:31 EST

## 2022-01-12 ENCOUNTER — OFFICE VISIT (OUTPATIENT)
Dept: SPORTS MEDICINE | Facility: CLINIC | Age: 77
End: 2022-01-12

## 2022-01-12 VITALS
SYSTOLIC BLOOD PRESSURE: 136 MMHG | HEART RATE: 72 BPM | OXYGEN SATURATION: 97 % | WEIGHT: 140 LBS | TEMPERATURE: 97.5 F | HEIGHT: 67 IN | BODY MASS INDEX: 21.97 KG/M2 | DIASTOLIC BLOOD PRESSURE: 72 MMHG

## 2022-01-12 DIAGNOSIS — M20.012 MALLET DEFORMITY OF LEFT LITTLE FINGER: Primary | ICD-10-CM

## 2022-01-12 PROCEDURE — 73140 X-RAY EXAM OF FINGER(S): CPT | Performed by: FAMILY MEDICINE

## 2022-01-12 PROCEDURE — 99212 OFFICE O/P EST SF 10 MIN: CPT | Performed by: FAMILY MEDICINE

## 2022-01-12 NOTE — PROGRESS NOTES
"Chief Complaint  Finger Injury (follow up for her LT pinky 4 weeks)    Subjective          Alexandria Yousif presents to [facility]  History of Present Illness  Patient is now greater than 8 weeks out from mallet finger left fifth finger.  Has been in a stax splint.  Patient is not having any pain.  Objective   Vital Signs:   /72 (BP Location: Left arm, Patient Position: Sitting, Cuff Size: Adult)   Pulse 72   Temp 97.5 °F (36.4 °C) (Temporal)   Ht 170.2 cm (67.01\")   Wt 63.5 kg (140 lb)   SpO2 97%   BMI 21.92 kg/m²     Physical Exam  Vitals reviewed.   Constitutional:       Appearance: She is well-developed.   HENT:      Head: Normocephalic and atraumatic.   Eyes:      Conjunctiva/sclera: Conjunctivae normal.      Pupils: Pupils are equal, round, and reactive to light.   Cardiovascular:      Comments: No peripheral edema  Pulmonary:      Effort: Pulmonary effort is normal.   Musculoskeletal:      Comments: Left fifth finger with some postinflammatory skin changes dorsal and palmar but no active inflammation or cellulitis.  There is a small nodularity over the dorsal aspect of the DIP but patient has full flexion extension and motor is 5 out of 5.   Skin:     General: Skin is warm and dry.   Neurological:      Mental Status: She is alert and oriented to person, place, and time.   Psychiatric:         Behavior: Behavior normal.        Result Review :               Procedure: X-ray left fifth finger    No mallet deformity, minor arthritis changes.  Assessment and Plan    Diagnoses and all orders for this visit:    1. Mallet deformity of left little finger (Primary)  -     XR Finger 2+ View Left    Patient may now DC splint and follow-up as needed.  I would expect the postinflammatory skin changes to be present for several more months but follow-up here if these become painful or worsen in appearance.    Follow Up   No follow-ups on file.  Patient was given instructions and " counseling regarding her condition or for health maintenance advice. Please see specific information pulled into the AVS if appropriate.

## 2022-01-24 DIAGNOSIS — E78.5 HYPERLIPIDEMIA, UNSPECIFIED HYPERLIPIDEMIA TYPE: Primary | ICD-10-CM

## 2022-01-24 DIAGNOSIS — R73.03 PREDIABETES: ICD-10-CM

## 2022-01-27 LAB
ALBUMIN SERPL-MCNC: 5.2 G/DL (ref 3.7–4.7)
ALBUMIN/GLOB SERPL: 2.4 {RATIO} (ref 1.2–2.2)
ALP SERPL-CCNC: 61 IU/L (ref 44–121)
ALT SERPL-CCNC: 23 IU/L (ref 0–32)
AST SERPL-CCNC: 28 IU/L (ref 0–40)
BASOPHILS # BLD AUTO: 0.1 X10E3/UL (ref 0–0.2)
BASOPHILS NFR BLD AUTO: 1 %
BILIRUB SERPL-MCNC: 0.3 MG/DL (ref 0–1.2)
BUN SERPL-MCNC: 17 MG/DL (ref 8–27)
BUN/CREAT SERPL: 22 (ref 12–28)
CALCIUM SERPL-MCNC: 10.5 MG/DL (ref 8.7–10.3)
CHLORIDE SERPL-SCNC: 101 MMOL/L (ref 96–106)
CHOLEST SERPL-MCNC: 176 MG/DL (ref 100–199)
CO2 SERPL-SCNC: 23 MMOL/L (ref 20–29)
CREAT SERPL-MCNC: 0.78 MG/DL (ref 0.57–1)
EOSINOPHIL # BLD AUTO: 0.1 X10E3/UL (ref 0–0.4)
EOSINOPHIL NFR BLD AUTO: 3 %
ERYTHROCYTE [DISTWIDTH] IN BLOOD BY AUTOMATED COUNT: 13.2 % (ref 11.7–15.4)
GLOBULIN SER CALC-MCNC: 2.2 G/DL (ref 1.5–4.5)
GLUCOSE SERPL-MCNC: 108 MG/DL (ref 65–99)
HBA1C MFR BLD: 6.1 % (ref 4.8–5.6)
HCT VFR BLD AUTO: 48.4 % (ref 34–46.6)
HDLC SERPL-MCNC: 58 MG/DL
HGB BLD-MCNC: 15.4 G/DL (ref 11.1–15.9)
IMM GRANULOCYTES # BLD AUTO: 0 X10E3/UL (ref 0–0.1)
IMM GRANULOCYTES NFR BLD AUTO: 0 %
LDLC SERPL CALC-MCNC: 91 MG/DL (ref 0–99)
LDLC/HDLC SERPL: 1.6 RATIO (ref 0–3.2)
LYMPHOCYTES # BLD AUTO: 2 X10E3/UL (ref 0.7–3.1)
LYMPHOCYTES NFR BLD AUTO: 35 %
MCH RBC QN AUTO: 28.8 PG (ref 26.6–33)
MCHC RBC AUTO-ENTMCNC: 31.8 G/DL (ref 31.5–35.7)
MCV RBC AUTO: 91 FL (ref 79–97)
MICROALBUMIN UR-MCNC: 22.2 UG/ML
MONOCYTES # BLD AUTO: 0.6 X10E3/UL (ref 0.1–0.9)
MONOCYTES NFR BLD AUTO: 10 %
NEUTROPHILS # BLD AUTO: 2.8 X10E3/UL (ref 1.4–7)
NEUTROPHILS NFR BLD AUTO: 51 %
PLATELET # BLD AUTO: 291 X10E3/UL (ref 150–450)
POTASSIUM SERPL-SCNC: 4.4 MMOL/L (ref 3.5–5.2)
PROT SERPL-MCNC: 7.4 G/DL (ref 6–8.5)
RBC # BLD AUTO: 5.35 X10E6/UL (ref 3.77–5.28)
SODIUM SERPL-SCNC: 141 MMOL/L (ref 134–144)
TRIGL SERPL-MCNC: 154 MG/DL (ref 0–149)
VLDLC SERPL CALC-MCNC: 27 MG/DL (ref 5–40)
WBC # BLD AUTO: 5.6 X10E3/UL (ref 3.4–10.8)

## 2022-02-02 ENCOUNTER — OFFICE VISIT (OUTPATIENT)
Dept: FAMILY MEDICINE CLINIC | Facility: CLINIC | Age: 77
End: 2022-02-02

## 2022-02-02 VITALS
DIASTOLIC BLOOD PRESSURE: 74 MMHG | RESPIRATION RATE: 16 BRPM | SYSTOLIC BLOOD PRESSURE: 124 MMHG | OXYGEN SATURATION: 98 % | BODY MASS INDEX: 21.92 KG/M2 | TEMPERATURE: 98.6 F | HEIGHT: 67 IN | HEART RATE: 78 BPM

## 2022-02-02 DIAGNOSIS — E83.52 HYPERCALCEMIA: ICD-10-CM

## 2022-02-02 DIAGNOSIS — R73.01 IMPAIRED FASTING GLUCOSE: ICD-10-CM

## 2022-02-02 DIAGNOSIS — G47.00 INSOMNIA, UNSPECIFIED TYPE: ICD-10-CM

## 2022-02-02 DIAGNOSIS — Z23 ENCOUNTER FOR IMMUNIZATION: ICD-10-CM

## 2022-02-02 DIAGNOSIS — E78.5 HYPERLIPIDEMIA, UNSPECIFIED HYPERLIPIDEMIA TYPE: ICD-10-CM

## 2022-02-02 DIAGNOSIS — R71.8 ELEVATED HEMATOCRIT: Primary | ICD-10-CM

## 2022-02-02 DIAGNOSIS — K21.9 GASTROESOPHAGEAL REFLUX DISEASE, UNSPECIFIED WHETHER ESOPHAGITIS PRESENT: ICD-10-CM

## 2022-02-02 DIAGNOSIS — R77.8 ELEVATED TOTAL PROTEIN: ICD-10-CM

## 2022-02-02 PROCEDURE — 99214 OFFICE O/P EST MOD 30 MIN: CPT | Performed by: INTERNAL MEDICINE

## 2022-02-02 NOTE — PROGRESS NOTES
"Chief Complaint  Anxiety and Hyperlipidemia    Subjective          Alexandria Yousif presents to [facility]  History of Present Illness  Here for f/u on anxiety, palpitations, HTN, HL and borderline DM.  She recently started taking metoprolol 25 mg 1/2 tab daily and that has made a huge difference in how she is feeling.    Anxiety --takes pristiq 100 mg qd and  xanax qd prn, insomnia on trazodone 50 mg qhs;  Depression on wellbutrin 75 mg qd--recently added by Amador due to seasonal depression and that has helped.  HL on crestor and tricor 145 mg qd  NIDDM on metformin  mg qd and diet  GERD omeprazole 40 mg qd  Objective   Vital Signs:   /74   Pulse 78   Temp 98.6 °F (37 °C) (Temporal)   Resp 16   Ht 170.2 cm (67.01\")   SpO2 98%   BMI 21.92 kg/m²     Physical Exam  Vitals and nursing note reviewed.   Constitutional:       Appearance: Normal appearance. She is well-developed.   HENT:      Head: Normocephalic and atraumatic.      Right Ear: External ear normal.      Left Ear: External ear normal.   Eyes:      Extraocular Movements: Extraocular movements intact.      Conjunctiva/sclera: Conjunctivae normal.   Neck:      Vascular: No carotid bruit.   Cardiovascular:      Rate and Rhythm: Normal rate and regular rhythm.      Heart sounds: Normal heart sounds.      Comments: No bruits  Pulmonary:      Effort: Pulmonary effort is normal. No respiratory distress.      Breath sounds: Normal breath sounds. No wheezing or rales.   Abdominal:      General: Bowel sounds are normal. There is no distension.      Palpations: Abdomen is soft. There is no mass.      Tenderness: There is no abdominal tenderness.   Musculoskeletal:      Cervical back: Neck supple.   Lymphadenopathy:      Cervical: No cervical adenopathy.   Skin:     General: Skin is warm.   Neurological:      General: No focal deficit present.      Mental Status: She is alert and oriented to person, place, and time. "   Psychiatric:         Mood and Affect: Mood normal.         Behavior: Behavior normal.         Thought Content: Thought content normal.         Judgment: Judgment normal.        Result Review :                 Assessment and Plan    Diagnoses and all orders for this visit:    1. Elevated hematocrit (Primary)  -     Ambulatory Referral to Hematology    2. Elevated total protein  -     Ambulatory Referral to Hematology    3. Hypercalcemia  -     Ambulatory Referral to Hematology    4. Hyperlipidemia, unspecified hyperlipidemia type    5. Impaired fasting glucose    6. Gastroesophageal reflux disease, unspecified whether esophagitis present    7. Insomnia, unspecified type    8. Encounter for immunization        Follow Up   Return in about 6 months (around 8/2/2022).  Patient was given instructions and counseling regarding her condition or for health maintenance advice. Please see specific information pulled into the AVS if appropriate.     I have placed a referral to hematology at the [facility] group due to elevated hematocrit, elevated protein level and hypercalcemia.  So far her work-up from our practice has been negative.  Would like their expert opinion.  Anxiety is well controlled currently on Pristiq 100 mg and as needed Xanax.  Since starting the metoprolol, she has had to use very little Xanax.  We did discuss the long-term effects of Xanax and I have encouraged her to continue using less Xanax to help prevent any cognitive changes down the road.  We did get a contract updated and signed and her Jamarcus has been reviewed.  She will continue the Wellbutrin 75 mg in the morning and once the weather warms up in the sun is out longer, she probably will be oh to come off the Wellbutrin altogether.  She will continue the Crestor for hyperlipidemia and the Metformin for early diabetes.  I have ordered labs for her to do today.  Discussed a pneumonia booster but she has already had 2 pneumonia shots and she did have the  pneumococcal 24 at the age of 66 so we have decided to postpone and less recommendation change any further pneumonia shots.

## 2022-02-09 DIAGNOSIS — M54.2 NECK PAIN, CHRONIC: ICD-10-CM

## 2022-02-09 DIAGNOSIS — F41.9 ANXIETY: ICD-10-CM

## 2022-02-09 DIAGNOSIS — G89.29 NECK PAIN, CHRONIC: ICD-10-CM

## 2022-02-09 RX ORDER — CYCLOBENZAPRINE HCL 5 MG
5 TABLET ORAL NIGHTLY PRN
Qty: 30 TABLET | Refills: 0 | Status: SHIPPED | OUTPATIENT
Start: 2022-02-09 | End: 2022-03-16

## 2022-02-09 RX ORDER — ALPRAZOLAM 0.5 MG/1
0.5 TABLET ORAL DAILY PRN
Qty: 45 TABLET | Refills: 0 | Status: SHIPPED | OUTPATIENT
Start: 2022-02-09 | End: 2022-04-11

## 2022-02-09 NOTE — TELEPHONE ENCOUNTER
Rx Refill Note  Requested Prescriptions     Pending Prescriptions Disp Refills   • ALPRAZolam (XANAX) 0.5 MG tablet [Pharmacy Med Name: ALPRAZOLAM 0.5MG TABLETS] 45 tablet 0     Sig: TAKE 1 TABLET BY MOUTH DAILY AS NEEDED FOR ANXIETY   • cyclobenzaprine (FLEXERIL) 5 MG tablet [Pharmacy Med Name: CYCLOBENZAPRINE 5MG TABLETS] 30 tablet 0     Sig: TAKE 1 TABLET BY MOUTH AT NIGHT AS NEEDED FOR MUSCLE SPASMS      Last office visit with prescribing clinician: 2/2/2022      Next office visit with prescribing clinician: 8/31/2022            Gina Martell MA  02/09/22, 13:23 EST     Up to date contract.

## 2022-02-10 ENCOUNTER — LAB (OUTPATIENT)
Dept: OTHER | Facility: HOSPITAL | Age: 77
End: 2022-02-10

## 2022-02-10 ENCOUNTER — CONSULT (OUTPATIENT)
Dept: ONCOLOGY | Facility: CLINIC | Age: 77
End: 2022-02-10

## 2022-02-10 VITALS
BODY MASS INDEX: 21.88 KG/M2 | HEART RATE: 107 BPM | OXYGEN SATURATION: 94 % | SYSTOLIC BLOOD PRESSURE: 133 MMHG | TEMPERATURE: 97.5 F | WEIGHT: 139.4 LBS | RESPIRATION RATE: 16 BRPM | DIASTOLIC BLOOD PRESSURE: 85 MMHG | HEIGHT: 67 IN

## 2022-02-10 DIAGNOSIS — R79.89 LOW SERUM PARATHYROID HORMONE (PTH): ICD-10-CM

## 2022-02-10 DIAGNOSIS — E83.52 HYPERCALCEMIA: Primary | ICD-10-CM

## 2022-02-10 DIAGNOSIS — D75.1 POLYCYTHEMIA: ICD-10-CM

## 2022-02-10 LAB
BASOPHILS # BLD AUTO: 0.08 10*3/MM3 (ref 0–0.2)
BASOPHILS NFR BLD AUTO: 1.1 % (ref 0–1.5)
DEPRECATED RDW RBC AUTO: 41.6 FL (ref 37–54)
EOSINOPHIL # BLD AUTO: 0.07 10*3/MM3 (ref 0–0.4)
EOSINOPHIL NFR BLD AUTO: 1 % (ref 0.3–6.2)
ERYTHROCYTE [DISTWIDTH] IN BLOOD BY AUTOMATED COUNT: 13.2 % (ref 12.3–15.4)
HCT VFR BLD AUTO: 44.3 % (ref 34–46.6)
HGB BLD-MCNC: 14.9 G/DL (ref 12–15.9)
IMM GRANULOCYTES # BLD AUTO: 0.03 10*3/MM3 (ref 0–0.05)
IMM GRANULOCYTES NFR BLD AUTO: 0.4 % (ref 0–0.5)
LYMPHOCYTES # BLD AUTO: 2.49 10*3/MM3 (ref 0.7–3.1)
LYMPHOCYTES NFR BLD AUTO: 34.5 % (ref 19.6–45.3)
MCH RBC QN AUTO: 29.1 PG (ref 26.6–33)
MCHC RBC AUTO-ENTMCNC: 33.6 G/DL (ref 31.5–35.7)
MCV RBC AUTO: 86.5 FL (ref 79–97)
MONOCYTES # BLD AUTO: 0.7 10*3/MM3 (ref 0.1–0.9)
MONOCYTES NFR BLD AUTO: 9.7 % (ref 5–12)
NEUTROPHILS NFR BLD AUTO: 3.84 10*3/MM3 (ref 1.7–7)
NEUTROPHILS NFR BLD AUTO: 53.3 % (ref 42.7–76)
NRBC BLD AUTO-RTO: 0 /100 WBC (ref 0–0.2)
PLATELET # BLD AUTO: 292 10*3/MM3 (ref 140–450)
PMV BLD AUTO: 8.8 FL (ref 6–12)
RBC # BLD AUTO: 5.12 10*6/MM3 (ref 3.77–5.28)
WBC NRBC COR # BLD: 7.21 10*3/MM3 (ref 3.4–10.8)

## 2022-02-10 PROCEDURE — 85025 COMPLETE CBC W/AUTO DIFF WBC: CPT | Performed by: INTERNAL MEDICINE

## 2022-02-10 PROCEDURE — 99205 OFFICE O/P NEW HI 60 MIN: CPT | Performed by: INTERNAL MEDICINE

## 2022-02-10 PROCEDURE — 82375 ASSAY CARBOXYHB QUANT: CPT | Performed by: INTERNAL MEDICINE

## 2022-02-10 PROCEDURE — 82668 ASSAY OF ERYTHROPOIETIN: CPT | Performed by: INTERNAL MEDICINE

## 2022-02-10 PROCEDURE — 36415 COLL VENOUS BLD VENIPUNCTURE: CPT

## 2022-02-10 NOTE — PROGRESS NOTES
.     REASON FOR CONSULTATION:   Elevated hematocrit and hypercalcemia  Provide an opinion on any further workup or treatment                             REQUESTING PHYSICIAN: Anita Muse MD  RECORDS OBTAINED:  Records of the patient's history including those obtained from the referring provider were reviewed and summarized in detail.    HISTORY OF PRESENT ILLNESS:  The patient is a 76 y.o. year old female  who is here for follow-up with the above-mentioned history.    Reviewed last note from PCP, 2/2/2022.  That was a follow-up of anxiety and hyperlipidemia  She referred the patient to us for both hypercalcemia and polycythemia    HCT is intermittently high, but more often high than low.  Intermittently elevated calcium since 3/6/2019.  Prior to that, calcium high normal.  PTH has been low     No itching after hot showers, fever, chills, weight loss, night sweats    Past Medical History:   Diagnosis Date   • Allergic 1980s   • Anxiety and depression    • Arthritis    • Cancer ([facility]) March-2008    Scalp and face-basel   • Cervical herniated disc    • CHF (congestive heart failure) ([facility]) Spring 2019    Left ventricle   • Colon polyp 2-2020   • Coronary artery disease Spring, 2019    left ventricle-diastolic   • Diabetes mellitus ([facility]) Spring 2019    Began Metformin   • Fibrocystic breast    • Fracture of ankle 2016   • Fracture, foot 2016    Broken right ankle   • GERD (gastroesophageal reflux disease)    • Grade II diastolic dysfunction    • Headache    • Heart murmur    • History of skin cancer    • Hyperlipidemia    • Hypertension    • Hypertriglyceridemia    • Injury of back    • Injury of neck 1971    Hernieated disc when broke my back   • Low back pain 1971    Broken back   • Lumbar herniated disc    • Mental confusion    • Osteopenia    • Osteopenia    • PAT (paroxysmal atrial tachycardia) ([facility])     12 very brief runs of atrial tachycardia with the longest 7 beats in duration per 24-hour Holter monitor  "in June 2019   • PSVT (paroxysmal supraventricular tachycardia) ([facility])     5 episodes of PSVT the longest 11 beats in duration per Zio patch monitor in December 2019   • PVCs (premature ventricular contractions)     5% burden per Zio patch monitor in December 2019   • Rheumatic fever     Age 17   • Stroke ([facility]) 2018    Mild to moderat ischemia   • TIA (transient ischemic attack)    • Tinnitus    • Vertigo      Past Surgical History:   Procedure Laterality Date   • CEREBRAL ANGIOGRAM N/A 10/8/2018    Procedure: CEREBRAL ANGIOGRAM AND VENOGRAM WITH INTRASINUS PRESSURE RECORDING;  Surgeon: Alfredo Caruso MD;  Location: [facility] HYBRID OR 18/19;  Service: Neurosurgery   • COLONOSCOPY      2010   • COLONOSCOPY N/A 2/17/2020    Procedure: COLONOSCOPY TO CECUM WITH COLD BIOPSY POLYPECTOMY;  Surgeon: Stan Flood MD;  Location: [facility] ENDOSCOPY;  Service: Gastroenterology;  Laterality: N/A;  PRE- FAMILY HX COLON POLYPS  POST- POLYP, HEMORRHOIDS   • ENDOSCOPY  02/27/2017    Esophagitis, gastritis, small hh   • HYSTERECTOMY     • KNEE SURGERY     • MOHS SURGERY     • OOPHORECTOMY     • SUBTOTAL HYSTERECTOMY  1994    Full   • TONSILLECTOMY AND ADENOIDECTOMY     • TRIGGER POINT INJECTION  1990s    Spinal   • UPPER GASTROINTESTINAL ENDOSCOPY  2016    \"Extremely severe GERD\"       MEDICATIONS    Current Outpatient Medications:   •  acetaminophen (TYLENOL) 500 MG tablet, Take 1,000 mg by mouth Every 6 (Six) Hours As Needed for Mild Pain ., Disp: , Rfl:   •  ALPRAZolam (XANAX) 0.5 MG tablet, TAKE 1 TABLET BY MOUTH DAILY AS NEEDED FOR ANXIETY, Disp: 45 tablet, Rfl: 0  •  Alum Hydroxide-Mag Trisilicate (GAVISCON) 80-14.2 MG chewable tablet, Chew 1 tablet 4 (Four) Times a Day As Needed (Heartburn)., Disp: 224 each, Rfl: 11  •  aspirin 81 MG tablet, Take 81 mg by mouth Daily., Disp: , Rfl:   •  Biotin 10 MG tablet, , Disp: , Rfl:   •  buPROPion (WELLBUTRIN) 75 MG tablet, Take 1 tablet by mouth Daily. For energy and improved " moved, Disp: 30 tablet, Rfl: 2  •  Cholecalciferol (D3 HIGH POTENCY) 2000 units capsule, , Disp: , Rfl:   •  cyclobenzaprine (FLEXERIL) 5 MG tablet, TAKE 1 TABLET BY MOUTH AT NIGHT AS NEEDED FOR MUSCLE SPASMS, Disp: 30 tablet, Rfl: 0  •  desvenlafaxine (PRISTIQ) 100 MG 24 hr tablet, TAKE 1 TABLET BY MOUTH DAILY, Disp: 90 tablet, Rfl: 1  •  fenofibrate (TRICOR) 145 MG tablet, TAKE 1 TABLET BY MOUTH EVERY DAY, Disp: 90 tablet, Rfl: 1  •  levETIRAcetam (KEPPRA) 500 MG tablet, TAKE 1/2 TABLET BY MOUTH DAILY AS NEEDED FOR HEADACHES, Disp: 45 tablet, Rfl: 0  •  metFORMIN ER (GLUCOPHAGE-XR) 500 MG 24 hr tablet, TAKE 1 TABLET BY MOUTH DAILY WITH LARGEST MEAL OF THE DAY AND A GLASS OF WATER, Disp: 90 tablet, Rfl: 01  •  metoprolol tartrate (LOPRESSOR) 25 MG tablet, Take 1 tablet by mouth Daily As Needed (Palpitations)., Disp: 60 tablet, Rfl: 11  •  Omega-3 Fatty Acids (FISH OIL) 1200 MG capsule capsule, , Disp: , Rfl:   •  omeprazole (priLOSEC) 40 MG capsule, TAKE 1 CAPSULE BY MOUTH EVERY DAY, Disp: 90 capsule, Rfl: 1  •  Premarin 0.3 MG tablet, TAKE 1 TABLET BY MOUTH EVERY DAY, Disp: 90 tablet, Rfl: 2  •  rosuvastatin (CRESTOR) 10 MG tablet, Take 1 tablet by mouth Every Night., Disp: 90 tablet, Rfl: 1  •  spironolactone (ALDACTONE) 25 MG tablet, TAKE 1 TABLET BY MOUTH DAILY, Disp: 30 tablet, Rfl: 4  •  traZODone (DESYREL) 50 MG tablet, TAKE 1/2 TO 1 TABLET BY MOUTH AT BEDTIME AS NEEDED FOR SLEEP, Disp: 90 tablet, Rfl: 1  •  tretinoin (RETIN-A) 0.025 % cream, APPLY EXTERNALLY TO THE AFFECTED AREA EVERY NIGHT AT BEDTIME TO THE FACE, Disp: , Rfl:   •  valACYclovir (VALTREX) 500 MG tablet, TAKE 2 TABLETS BY MOUTH TWICE DAILY, Disp: 4 tablet, Rfl: 0    ALLERGIES:     Allergies   Allergen Reactions   • Codeine Nausea Only     BLURRED VISION, RASH       SOCIAL HISTORY:       Social History     Socioeconomic History   • Marital status:    Tobacco Use   • Smoking status: Never Smoker   • Smokeless tobacco: Never Used    Substance and Sexual Activity   • Alcohol use: Yes     Alcohol/week: 3.0 - 4.0 standard drinks     Types: 2 - 3 Glasses of wine, 1 Cans of beer per week     Comment: liquor-1 to 2 month   • Drug use: No   • Sexual activity: Yes     Partners: Male     Birth control/protection: Post-menopausal         FAMILY HISTORY:  Family History   Problem Relation Age of Onset   • Uterine cancer Mother    • Heart disease Mother         in her 70s   • Diabetes Mother         Late in life   • Arthritis Mother    • Cancer Mother         spine   • Hearing loss Mother         In her 70s   • Hyperlipidemia Mother         in her 60s   • Kidney disease Mother         ?   • Vision loss Mother         Macular Degeneration   • Skin cancer Father    • Heart disease Father         Father with 4 vessel CABG at 62   • Colon polyps Father         negative   • Cancer Father         skin   • Heart disease Sister         Heart attack-64   • Atrial fibrillation Sister    • Hyperlipidemia Sister         in her 50s   • Vision loss Sister         Macular Degeneration   • Diabetes Maternal Grandmother         Late in life   • Heart disease Maternal Grandmother         Stroke-80   • Stroke Maternal Grandmother    • Hyperlipidemia Maternal Grandmother         ?   • Kidney disease Maternal Grandmother         ?   • Heart disease Paternal Grandmother         CHF   • Stroke Paternal Grandmother    • Depression Paternal Grandmother    • Hyperlipidemia Paternal Grandmother         ?   • Heart disease Paternal Grandfather         Emphysema-heart disease   • Stroke Paternal Grandfather    • Malig Hyperthermia Neg Hx        REVIEW OF SYSTEMS:  Review of Systems   Constitutional: Negative for activity change.   HENT: Negative for nosebleeds and trouble swallowing.    Respiratory: Negative for shortness of breath and wheezing.    Cardiovascular: Negative for chest pain and palpitations.   Gastrointestinal: Negative for constipation, diarrhea and nausea.  "  Genitourinary: Negative for dysuria and hematuria.   Musculoskeletal: Negative for arthralgias and myalgias.   Skin: Negative for rash and wound.   Neurological: Negative for seizures and syncope.   Hematological: Negative for adenopathy. Does not bruise/bleed easily.   Psychiatric/Behavioral: Negative for confusion.              Vitals:    02/10/22 1310   BP: 133/85   Pulse: 107   Resp: 16   Temp: 97.5 °F (36.4 °C)   TempSrc: Temporal   SpO2: 94%   Weight: 63.2 kg (139 lb 6.4 oz)   Height: 170.2 cm (67.01\")   PainSc: 0-No pain     Current Status 2/10/2022   ECOG score 0      PHYSICAL EXAM:      CONSTITUTIONAL:  Vital signs reviewed.  No distress, looks comfortable.  EYES:  Conjunctivae and lids unremarkable.  PERRLA  EARS,NOSE,MOUTH,THROAT:  Ears and nose appear unremarkable.  Lips, teeth, gums appear unremarkable.  RESPIRATORY:  Normal respiratory effort.  Lungs clear to auscultation bilaterally.  CARDIOVASCULAR:  Normal S1, S2.  No murmurs rubs or gallops.  No significant lower extremity edema.  GASTROINTESTINAL: Abdomen appears unremarkable.  Nontender.  No hepatomegaly.  No splenomegaly.  LYMPHATIC:  No cervical, supraclavicular, axillary lymphadenopathy.  MUSCULOSKELETAL:  Unremarkable gait and station.  Unremarkable digits/nails.  No cyanosis or clubbing.  SKIN:  Warm.  No rashes.  PSYCHIATRIC:  Normal judgment and insight.  Normal mood and affect.      RECENT LABS:        WBC   Date Value Ref Range Status   02/10/2022 7.21 3.40 - 10.80 10*3/mm3 Final   01/26/2022 5.6 3.4 - 10.8 x10E3/uL Final   11/18/2021 5.7 3.4 - 10.8 x10E3/uL Final   07/23/2021 4.47 3.40 - 10.80 10*3/mm3 Final   10/21/2020 6.63 3.40 - 10.80 10*3/mm3 Final   09/25/2018 4.96 4.50 - 10.70 10*3/mm3 Final   08/01/2018 5.50 4.50 - 10.70 10*3/mm3 Final   05/18/2018 6.52 4.50 - 10.70 10*3/mm3 Final   10/27/2017 4.81 4.50 - 10.70 10*3/mm3 Final   09/11/2017 5.36 4.50 - 10.70 10*3/mm3 Final   03/25/2016 5.19 4.50 - 10.70 10*3/mm3 Final "   12/26/2015 13.13 (H) 4.50 - 10.70 K/Cumm Final   05/20/2015 6.30 4.50 - 10.70 K/Cumm Final     Hemoglobin   Date Value Ref Range Status   02/10/2022 14.9 12.0 - 15.9 g/dL Final   01/26/2022 15.4 11.1 - 15.9 g/dL Final   11/18/2021 15.4 11.1 - 15.9 g/dL Final   07/23/2021 14.5 12.0 - 15.9 g/dL Final   10/21/2020 15.3 12.0 - 15.9 g/dL Final   09/25/2018 15.3 11.9 - 15.5 g/dL Final   08/01/2018 15.7 (H) 11.9 - 15.5 g/dL Final   05/18/2018 15.7 (H) 11.9 - 15.5 g/dL Final   10/27/2017 14.6 11.9 - 15.5 g/dL Final   09/11/2017 15.4 11.9 - 15.5 g/dL Final   03/25/2016 15.2 11.9 - 15.5 g/dL Final   12/26/2015 14.1 11.9 - 15.5 g/dL Final   05/20/2015 15.0 11.9 - 15.5 g/dL Final     Platelets   Date Value Ref Range Status   02/10/2022 292 140 - 450 10*3/mm3 Final   01/26/2022 291 150 - 450 x10E3/uL Final   11/18/2021 321 150 - 450 x10E3/uL Final   07/23/2021 282 140 - 450 10*3/mm3 Final   10/21/2020 322 140 - 450 10*3/mm3 Final   09/25/2018 267 140 - 500 10*3/mm3 Final   08/01/2018 332 140 - 500 10*3/mm3 Final   05/18/2018 304 140 - 500 10*3/mm3 Final   10/27/2017 310 140 - 500 10*3/mm3 Final   09/11/2017 319 140 - 500 10*3/mm3 Final   03/25/2016 318 140 - 500 10*3/mm3 Final   12/26/2015 497 140 - 500 K/Cumm Final   05/20/2015 298 140 - 500 K/Cumm Final       Assessment/Plan   There are no diagnoses linked to this encounter.      Alexandria Yousif   *Polycythemia  · Since at least 5/20/2015: HCT mostly 47-48.5.  However, sometimes normal  · No splenomegaly.  No itching after hot showers.  · No smoking history.  · No symptoms concerning for sleep apnea.  · No underlying lung disease.    *Hypercalcemia  · Intermittent, since at least 3/6/2019 (but high normal prior to that)  · PTH low, suggesting this is not due to hyperparathyroidism (therefore, this could be due to malignancy).  · Check CT scans to look for malignancy as the cause of hypercalcemia.  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  Total time.  Same day.

## 2022-02-11 LAB — EPO SERPL-ACNC: 10.8 MIU/ML (ref 2.6–18.5)

## 2022-02-14 LAB — COHGB MFR BLD: 1.3 % (ref 0–3.6)

## 2022-02-14 RX ORDER — LEVETIRACETAM 500 MG/1
TABLET ORAL
Qty: 45 TABLET | Refills: 0 | Status: SHIPPED | OUTPATIENT
Start: 2022-02-14 | End: 2022-05-17

## 2022-02-14 NOTE — TELEPHONE ENCOUNTER
Rx Refill Note  Requested Prescriptions     Pending Prescriptions Disp Refills   • levETIRAcetam (KEPPRA) 500 MG tablet [Pharmacy Med Name: LEVETIRACETAM 500MG TABLETS] 45 tablet 0     Sig: TAKE 1/2 TABLET BY MOUTH DAILY AS NEEDED FOR HEADACHE      Last office visit with prescribing clinician: 2/2/2022      Next office visit with prescribing clinician: 8/31/2022       {TIP  Please add Last Relevant Lab Date if appropriate: 2/10/22    Gina Martell MA  02/14/22, 09:12 EST

## 2022-02-17 RX ORDER — FENOFIBRATE 145 MG/1
TABLET, COATED ORAL
Qty: 90 TABLET | Refills: 1 | Status: SHIPPED | OUTPATIENT
Start: 2022-02-17 | End: 2022-08-12

## 2022-02-17 NOTE — TELEPHONE ENCOUNTER
Rx Refill Note  Requested Prescriptions     Pending Prescriptions Disp Refills   • fenofibrate (TRICOR) 145 MG tablet [Pharmacy Med Name: FENOFIBRATE 145MG TABLETS] 90 tablet 1     Sig: TAKE 1 TABLET BY MOUTH EVERY DAY      Last office visit with prescribing clinician: 2/2/2022      Next office visit with prescribing clinician: 8/31/2022       {TIP  Please add Last Relevant Lab Date if appropriate: 1/26/22    Gina Martell MA  02/17/22, 08:14 EST

## 2022-02-23 NOTE — TELEPHONE ENCOUNTER
Rx Refill Note  Requested Prescriptions     Pending Prescriptions Disp Refills   • rosuvastatin (CRESTOR) 10 MG tablet [Pharmacy Med Name: ROSUVASTATIN 10MG TABLETS] 90 tablet 1     Sig: TAKE 1 TABLET BY MOUTH EVERY NIGHT      Last office visit with prescribing clinician: 2/2/2022      Next office visit with prescribing clinician: 8/31/2022       {TIP  Please add Last Relevant Lab Date if appropriate: 2/10/22    Gina Martell MA  02/23/22, 12:40 EST

## 2022-02-24 RX ORDER — ROSUVASTATIN CALCIUM 10 MG/1
10 TABLET, COATED ORAL NIGHTLY
Qty: 90 TABLET | Refills: 1 | Status: SHIPPED | OUTPATIENT
Start: 2022-02-24 | End: 2022-08-22

## 2022-02-28 ENCOUNTER — HOSPITAL ENCOUNTER (OUTPATIENT)
Dept: CT IMAGING | Facility: HOSPITAL | Age: 77
Discharge: HOME | End: 2022-02-28
Admitting: INTERNAL MEDICINE

## 2022-02-28 ENCOUNTER — APPOINTMENT (OUTPATIENT)
Dept: CT IMAGING | Facility: HOSPITAL | Age: 77
End: 2022-02-28

## 2022-02-28 DIAGNOSIS — E83.52 HYPERCALCEMIA: ICD-10-CM

## 2022-02-28 DIAGNOSIS — D75.1 POLYCYTHEMIA: ICD-10-CM

## 2022-02-28 DIAGNOSIS — R79.89 LOW SERUM PARATHYROID HORMONE (PTH): ICD-10-CM

## 2022-02-28 LAB — CREAT BLDA-MCNC: 0.6 MG/DL (ref 0.6–1.3)

## 2022-02-28 PROCEDURE — 71260 CT THORAX DX C+: CPT

## 2022-02-28 PROCEDURE — 25010000002 IOPAMIDOL 61 % SOLUTION: Performed by: INTERNAL MEDICINE

## 2022-02-28 PROCEDURE — 0 DIATRIZOATE MEGLUMINE & SODIUM PER 1 ML: Performed by: INTERNAL MEDICINE

## 2022-02-28 PROCEDURE — 82565 ASSAY OF CREATININE: CPT

## 2022-02-28 PROCEDURE — 74177 CT ABD & PELVIS W/CONTRAST: CPT

## 2022-02-28 RX ADMIN — IOPAMIDOL 90 ML: 612 INJECTION, SOLUTION INTRAVENOUS at 13:38

## 2022-02-28 RX ADMIN — DIATRIZOATE MEGLUMINE AND DIATRIZOATE SODIUM 30 ML: 660; 100 LIQUID ORAL; RECTAL at 12:30

## 2022-03-02 NOTE — PROGRESS NOTES
.     REASON FOR FOLLOWUP :   Elevated hematocrit and hypercalcemia    HISTORY OF PRESENT ILLNESS:  The patient is a 76 y.o. year old female  who is here for follow-up with the above-mentioned history.    No new problems.  Feels fine.  No fever, chills, weight loss, night sweats.  No complaints of itching after hot showers, strokelike symptoms, chest pain, SOA    Past Medical History:   Diagnosis Date   • Allergic 1980s   • Anxiety and depression    • Arthritis    • Cancer ([facility]) March-2008    Scalp and face-basel   • Cervical herniated disc    • CHF (congestive heart failure) ([facility]) Spring 2019    Left ventricle   • Colon polyp 2-2020   • Coronary artery disease Spring, 2019    left ventricle-diastolic   • Diabetes mellitus ([facility]) Spring 2019    Began Metformin   • Fibrocystic breast    • Fracture of ankle 2016   • Fracture, foot 2016    Broken right ankle   • GERD (gastroesophageal reflux disease)    • Grade II diastolic dysfunction    • Headache    • Heart murmur    • History of skin cancer    • Hyperlipidemia    • Hypertension    • Hypertriglyceridemia    • Infectious mononucleosis    • Injury of back    • Injury of neck 1971    Hernieated disc when broke my back   • Low back pain 1971    Broken back   • Lumbar herniated disc    • Mental confusion    • Osteopenia    • Osteopenia    • PAT (paroxysmal atrial tachycardia) ([facility])     12 very brief runs of atrial tachycardia with the longest 7 beats in duration per 24-hour Holter monitor in June 2019   • PSVT (paroxysmal supraventricular tachycardia) ([facility])     5 episodes of PSVT the longest 11 beats in duration per Zio patch monitor in December 2019   • PVCs (premature ventricular contractions)     5% burden per Zio patch monitor in December 2019   • Rheumatic fever     Age 17   • Stroke ([facility]) 2018    Mild to moderat ischemia   • TIA (transient ischemic attack)    • Tinnitus    • Vertigo      Past Surgical History:   Procedure Laterality Date   • CEREBRAL ANGIOGRAM  "N/A 10/8/2018    Procedure: CEREBRAL ANGIOGRAM AND VENOGRAM WITH INTRASINUS PRESSURE RECORDING;  Surgeon: Alfredo Caruso MD;  Location: [facility] HYBRID OR 18/19;  Service: Neurosurgery   • COLONOSCOPY      2010   • COLONOSCOPY N/A 2/17/2020    Procedure: COLONOSCOPY TO CECUM WITH COLD BIOPSY POLYPECTOMY;  Surgeon: Stan Flood MD;  Location: [facility] ENDOSCOPY;  Service: Gastroenterology;  Laterality: N/A;  PRE- FAMILY HX COLON POLYPS  POST- POLYP, HEMORRHOIDS   • ENDOSCOPY  02/27/2017    Esophagitis, gastritis, small hh   • HYSTERECTOMY     • KNEE SURGERY     • MOHS SURGERY     • OOPHORECTOMY     • SUBTOTAL HYSTERECTOMY  1994    Full   • TONSILLECTOMY AND ADENOIDECTOMY  1965   • TRIGGER POINT INJECTION  1990s    Spinal   • UPPER GASTROINTESTINAL ENDOSCOPY  2016    \"Extremely severe GERD\"       MEDICATIONS    Current Outpatient Medications:   •  acetaminophen (TYLENOL) 500 MG tablet, Take 1,000 mg by mouth Every 6 (Six) Hours As Needed for Mild Pain ., Disp: , Rfl:   •  ALPRAZolam (XANAX) 0.5 MG tablet, TAKE 1 TABLET BY MOUTH DAILY AS NEEDED FOR ANXIETY, Disp: 45 tablet, Rfl: 0  •  Alum Hydroxide-Mag Trisilicate (GAVISCON) 80-14.2 MG chewable tablet, Chew 1 tablet 4 (Four) Times a Day As Needed (Heartburn)., Disp: 224 each, Rfl: 11  •  aspirin 81 MG tablet, Take 81 mg by mouth Daily., Disp: , Rfl:   •  Biotin 10 MG tablet, , Disp: , Rfl:   •  buPROPion (WELLBUTRIN) 75 MG tablet, Take 1 tablet by mouth Daily. For energy and improved moved, Disp: 30 tablet, Rfl: 2  •  Cholecalciferol (D3 HIGH POTENCY) 2000 units capsule, , Disp: , Rfl:   •  cyclobenzaprine (FLEXERIL) 5 MG tablet, TAKE 1 TABLET BY MOUTH AT NIGHT AS NEEDED FOR MUSCLE SPASMS, Disp: 30 tablet, Rfl: 0  •  desvenlafaxine (PRISTIQ) 100 MG 24 hr tablet, TAKE 1 TABLET BY MOUTH DAILY, Disp: 90 tablet, Rfl: 1  •  fenofibrate (TRICOR) 145 MG tablet, TAKE 1 TABLET BY MOUTH EVERY DAY, Disp: 90 tablet, Rfl: 1  •  levETIRAcetam (KEPPRA) 500 MG tablet, TAKE " 1/2 TABLET BY MOUTH DAILY AS NEEDED FOR HEADACHE, Disp: 45 tablet, Rfl: 0  •  metFORMIN ER (GLUCOPHAGE-XR) 500 MG 24 hr tablet, TAKE 1 TABLET BY MOUTH DAILY WITH LARGEST MEAL OF THE DAY AND A GLASS OF WATER, Disp: 90 tablet, Rfl: 01  •  metoprolol tartrate (LOPRESSOR) 25 MG tablet, Take 1 tablet by mouth Daily As Needed (Palpitations)., Disp: 60 tablet, Rfl: 11  •  Omega-3 Fatty Acids (FISH OIL) 1200 MG capsule capsule, , Disp: , Rfl:   •  omeprazole (priLOSEC) 40 MG capsule, TAKE 1 CAPSULE BY MOUTH EVERY DAY, Disp: 90 capsule, Rfl: 1  •  Premarin 0.3 MG tablet, TAKE 1 TABLET BY MOUTH EVERY DAY, Disp: 90 tablet, Rfl: 2  •  rosuvastatin (CRESTOR) 10 MG tablet, TAKE 1 TABLET BY MOUTH EVERY NIGHT, Disp: 90 tablet, Rfl: 1  •  spironolactone (ALDACTONE) 25 MG tablet, TAKE 1 TABLET BY MOUTH DAILY, Disp: 30 tablet, Rfl: 4  •  traZODone (DESYREL) 50 MG tablet, TAKE 1/2 TO 1 TABLET BY MOUTH AT BEDTIME AS NEEDED FOR SLEEP, Disp: 90 tablet, Rfl: 1  •  tretinoin (RETIN-A) 0.025 % cream, APPLY EXTERNALLY TO THE AFFECTED AREA EVERY NIGHT AT BEDTIME TO THE FACE, Disp: , Rfl:   •  valACYclovir (VALTREX) 500 MG tablet, TAKE 2 TABLETS BY MOUTH TWICE DAILY, Disp: 4 tablet, Rfl: 0    ALLERGIES:     Allergies   Allergen Reactions   • Codeine Nausea Only     BLURRED VISION, RASH       SOCIAL HISTORY:       Social History     Socioeconomic History   • Marital status:      Spouse name: Saravanan   • Number of children: 1   • Years of education: College   Tobacco Use   • Smoking status: Never Smoker   • Smokeless tobacco: Never Used   Substance and Sexual Activity   • Alcohol use: Yes     Alcohol/week: 3.0 - 4.0 standard drinks     Types: 2 - 3 Glasses of wine, 1 Cans of beer per week     Comment: liquor-1 to 2 month   • Drug use: No   • Sexual activity: Yes     Partners: Male     Birth control/protection: Post-menopausal         FAMILY HISTORY:  Family History   Problem Relation Age of Onset   • Uterine cancer Mother    • Heart disease  Mother         in her 70s   • Diabetes Mother         Late in life   • Arthritis Mother    • Cancer Mother         spine   • Hearing loss Mother         In her 70s   • Hyperlipidemia Mother         in her 60s   • Kidney disease Mother         ?   • Vision loss Mother         Macular Degeneration   • Skin cancer Father    • Heart disease Father         Father with 4 vessel CABG at 62   • Colon polyps Father         negative   • Cancer Father         skin   • Heart disease Sister         Heart attack-64   • Atrial fibrillation Sister    • Hyperlipidemia Sister         in her 50s   • Vision loss Sister         Macular Degeneration   • Diabetes Maternal Grandmother         Late in life   • Heart disease Maternal Grandmother         Stroke-80   • Stroke Maternal Grandmother    • Hyperlipidemia Maternal Grandmother         ?   • Kidney disease Maternal Grandmother         ?   • Heart disease Paternal Grandmother         CHF   • Stroke Paternal Grandmother    • Depression Paternal Grandmother    • Hyperlipidemia Paternal Grandmother         ?   • Heart disease Paternal Grandfather         Emphysema-heart disease   • Stroke Paternal Grandfather    • Malig Hyperthermia Neg Hx        REVIEW OF SYSTEMS:  Review of Systems   Constitutional: Negative for activity change.   HENT: Negative for nosebleeds and trouble swallowing.    Respiratory: Negative for shortness of breath and wheezing.    Cardiovascular: Negative for chest pain and palpitations.   Gastrointestinal: Negative for constipation, diarrhea and nausea.   Genitourinary: Negative for dysuria and hematuria.   Musculoskeletal: Negative for arthralgias and myalgias.   Skin: Negative for rash and wound.   Neurological: Negative for seizures and syncope.   Hematological: Negative for adenopathy. Does not bruise/bleed easily.   Psychiatric/Behavioral: Negative for confusion.              Vitals:    03/03/22 1211   BP: 125/81   Pulse: 83   Resp: 17   Temp: 97.2 °F (36.2 °C)  "  TempSrc: Tympanic   SpO2: 95%   Weight: 63.3 kg (139 lb 9.6 oz)   Height: 170.2 cm (67.01\")   PainSc: 0-No pain     Current Status 3/3/2022   ECOG score 0      PHYSICAL EXAM:        CONSTITUTIONAL:  Vital signs reviewed.  No distress, looks comfortable.  EYES:  Conjunctiva and lids unremarkable.  PERRLA  EARS,NOSE,MOUTH,THROAT:  Ears and nose appear unremarkable.  Lips, teeth, gums appear unremarkable.  RESPIRATORY:  Normal respiratory effort.  Lungs clear to auscultation bilaterally.  CARDIOVASCULAR:  Normal S1, S2.  No murmurs rubs or gallops.  No significant lower extremity edema.  GASTROINTESTINAL: Abdomen appears unremarkable.  Nontender.  No hepatomegaly.  No splenomegaly.  LYMPHATIC:  No cervical, supraclavicular, axillary lymphadenopathy.  SKIN:  Warm.  No rashes.  PSYCHIATRIC:  Normal judgment and insight.  Normal mood and affect.       RECENT LABS:        WBC   Date Value Ref Range Status   03/03/2022 6.99 3.40 - 10.80 10*3/mm3 Final   02/10/2022 7.21 3.40 - 10.80 10*3/mm3 Final   01/26/2022 5.6 3.4 - 10.8 x10E3/uL Final   11/18/2021 5.7 3.4 - 10.8 x10E3/uL Final   07/23/2021 4.47 3.40 - 10.80 10*3/mm3 Final   10/21/2020 6.63 3.40 - 10.80 10*3/mm3 Final   09/25/2018 4.96 4.50 - 10.70 10*3/mm3 Final   08/01/2018 5.50 4.50 - 10.70 10*3/mm3 Final   05/18/2018 6.52 4.50 - 10.70 10*3/mm3 Final   10/27/2017 4.81 4.50 - 10.70 10*3/mm3 Final   09/11/2017 5.36 4.50 - 10.70 10*3/mm3 Final   03/25/2016 5.19 4.50 - 10.70 10*3/mm3 Final   12/26/2015 13.13 (H) 4.50 - 10.70 K/Cumm Final   05/20/2015 6.30 4.50 - 10.70 K/Cumm Final     Hemoglobin   Date Value Ref Range Status   03/03/2022 14.1 12.0 - 15.9 g/dL Final   02/10/2022 14.9 12.0 - 15.9 g/dL Final   01/26/2022 15.4 11.1 - 15.9 g/dL Final   11/18/2021 15.4 11.1 - 15.9 g/dL Final   07/23/2021 14.5 12.0 - 15.9 g/dL Final   10/21/2020 15.3 12.0 - 15.9 g/dL Final   09/25/2018 15.3 11.9 - 15.5 g/dL Final   08/01/2018 15.7 (H) 11.9 - 15.5 g/dL Final   05/18/2018 15.7 " (H) 11.9 - 15.5 g/dL Final   10/27/2017 14.6 11.9 - 15.5 g/dL Final   09/11/2017 15.4 11.9 - 15.5 g/dL Final   03/25/2016 15.2 11.9 - 15.5 g/dL Final   12/26/2015 14.1 11.9 - 15.5 g/dL Final   05/20/2015 15.0 11.9 - 15.5 g/dL Final     Platelets   Date Value Ref Range Status   03/03/2022 281 140 - 450 10*3/mm3 Final   02/10/2022 292 140 - 450 10*3/mm3 Final   01/26/2022 291 150 - 450 x10E3/uL Final   11/18/2021 321 150 - 450 x10E3/uL Final   07/23/2021 282 140 - 450 10*3/mm3 Final   10/21/2020 322 140 - 450 10*3/mm3 Final   09/25/2018 267 140 - 500 10*3/mm3 Final   08/01/2018 332 140 - 500 10*3/mm3 Final   05/18/2018 304 140 - 500 10*3/mm3 Final   10/27/2017 310 140 - 500 10*3/mm3 Final   09/11/2017 319 140 - 500 10*3/mm3 Final   03/25/2016 318 140 - 500 10*3/mm3 Final   12/26/2015 497 140 - 500 K/Cumm Final   05/20/2015 298 140 - 500 K/Cumm Final       Assessment/Plan   Pulmonary nodule, right  - CBC & Differential  - CT Chest Without Contrast Diagnostic  - CT Chest Without Contrast  - CBC & Differential  - Ambulatory Referral to Endocrinology        Alexandria Yousif   *Secondary polycythemia  · Since at least 5/20/2015: HCT mostly 47-48.5.  However, sometimes normal  · No splenomegaly.  No itching after hot showers.  · No smoking history.  · No symptoms concerning for sleep apnea.  · No underlying lung disease.  · Unremarkable: Erythropoietin, carbon monoxide  · JAK2 V617F and exon 12, negative  · Because this is secondary polycythemia, there is no set threshold to keep the hematocrit below.  We would not plan phlebotomies.  · 3/3/2022: HCT 42.7.    *Hypercalcemia  · Intermittent, since at least 3/6/2019 (but high normal prior to that)  · PTH low, suggesting this is not due to hyperparathyroidism (therefore, this could be due to malignancy).  · Check CT scans to look for malignancy as the cause of hypercalcemia.  Furthermore, a malignancy such as renal cell carcinoma could cause polycythemia and  hypercalcemia  · CT CAP with contrast, 2/28/2022: Sub-6 mm RLL pulmonary nodule, suggestive of a small endobronchial filling defect.  Radiologist recommended follow-up CT chest 12 months but noted this could be done in 3 months.  Never smoker.  No first-degree relatives with lung cancer.  Patient agrees with follow-up CT chest 1 year later.    Plan  · MD CBC 1 year.  1 week prior: CT chest without contrast  · We would not plan any phlebotomies.    31 minutes.  Total time.  Same day.

## 2022-03-03 ENCOUNTER — APPOINTMENT (OUTPATIENT)
Dept: ONCOLOGY | Facility: HOSPITAL | Age: 77
End: 2022-03-03

## 2022-03-03 ENCOUNTER — LAB (OUTPATIENT)
Dept: OTHER | Facility: HOSPITAL | Age: 77
End: 2022-03-03

## 2022-03-03 ENCOUNTER — OFFICE VISIT (OUTPATIENT)
Dept: ONCOLOGY | Facility: CLINIC | Age: 77
End: 2022-03-03

## 2022-03-03 VITALS
TEMPERATURE: 97.2 F | HEIGHT: 67 IN | DIASTOLIC BLOOD PRESSURE: 81 MMHG | OXYGEN SATURATION: 95 % | BODY MASS INDEX: 21.91 KG/M2 | HEART RATE: 83 BPM | WEIGHT: 139.6 LBS | RESPIRATION RATE: 17 BRPM | SYSTOLIC BLOOD PRESSURE: 125 MMHG

## 2022-03-03 DIAGNOSIS — E83.52 HYPERCALCEMIA: ICD-10-CM

## 2022-03-03 DIAGNOSIS — R79.89 LOW SERUM PARATHYROID HORMONE (PTH): ICD-10-CM

## 2022-03-03 DIAGNOSIS — R91.1 PULMONARY NODULE, RIGHT: Primary | ICD-10-CM

## 2022-03-03 DIAGNOSIS — D75.1 POLYCYTHEMIA: ICD-10-CM

## 2022-03-03 LAB
BASOPHILS # BLD AUTO: 0.05 10*3/MM3 (ref 0–0.2)
BASOPHILS NFR BLD AUTO: 0.7 % (ref 0–1.5)
DEPRECATED RDW RBC AUTO: 43.9 FL (ref 37–54)
EOSINOPHIL # BLD AUTO: 0.07 10*3/MM3 (ref 0–0.4)
EOSINOPHIL NFR BLD AUTO: 1 % (ref 0.3–6.2)
ERYTHROCYTE [DISTWIDTH] IN BLOOD BY AUTOMATED COUNT: 13.6 % (ref 12.3–15.4)
HCT VFR BLD AUTO: 42.7 % (ref 34–46.6)
HGB BLD-MCNC: 14.1 G/DL (ref 12–15.9)
IMM GRANULOCYTES # BLD AUTO: 0.03 10*3/MM3 (ref 0–0.05)
IMM GRANULOCYTES NFR BLD AUTO: 0.4 % (ref 0–0.5)
LYMPHOCYTES # BLD AUTO: 1.21 10*3/MM3 (ref 0.7–3.1)
LYMPHOCYTES NFR BLD AUTO: 17.3 % (ref 19.6–45.3)
MCH RBC QN AUTO: 28.9 PG (ref 26.6–33)
MCHC RBC AUTO-ENTMCNC: 33 G/DL (ref 31.5–35.7)
MCV RBC AUTO: 87.5 FL (ref 79–97)
MONOCYTES # BLD AUTO: 0.97 10*3/MM3 (ref 0.1–0.9)
MONOCYTES NFR BLD AUTO: 13.9 % (ref 5–12)
NEUTROPHILS NFR BLD AUTO: 4.66 10*3/MM3 (ref 1.7–7)
NEUTROPHILS NFR BLD AUTO: 66.7 % (ref 42.7–76)
NRBC BLD AUTO-RTO: 0 /100 WBC (ref 0–0.2)
PLATELET # BLD AUTO: 281 10*3/MM3 (ref 140–450)
PMV BLD AUTO: 9 FL (ref 6–12)
RBC # BLD AUTO: 4.88 10*6/MM3 (ref 3.77–5.28)
REF LAB TEST METHOD: NORMAL
WBC NRBC COR # BLD: 6.99 10*3/MM3 (ref 3.4–10.8)

## 2022-03-03 PROCEDURE — 36415 COLL VENOUS BLD VENIPUNCTURE: CPT

## 2022-03-03 PROCEDURE — 85025 COMPLETE CBC W/AUTO DIFF WBC: CPT | Performed by: INTERNAL MEDICINE

## 2022-03-03 PROCEDURE — 99214 OFFICE O/P EST MOD 30 MIN: CPT | Performed by: INTERNAL MEDICINE

## 2022-03-15 DIAGNOSIS — M54.2 NECK PAIN, CHRONIC: ICD-10-CM

## 2022-03-15 DIAGNOSIS — G89.29 NECK PAIN, CHRONIC: ICD-10-CM

## 2022-03-15 NOTE — TELEPHONE ENCOUNTER
Caller: YousifAlexandria    Relationship: Self    Best call back number: [phone]     Requested Prescriptions:   Requested Prescriptions     Pending Prescriptions Disp Refills   • valACYclovir (VALTREX) 500 MG tablet 4 tablet 0     Sig: Take 2 tablets by mouth 2 (Two) Times a Day.        Pharm[id number] [address] AT [facility] [address] - [phone] [facility] [phone] FX     Additional details provided by patient: PATIENT IS OUT OF MEDICATION , PATIENT TOOK MEDICATION DUE TO HAVING A REACTION TO SHINDEX  VACCINE AROUND HER MOUTH AND HIGH BLOOD PRESSURE AND PULSE RATE AND CHILLS   Does the patient have less than a 3 day supply:  [x] Yes  [] No    Saranya Bellamy Rep   03/15/22 12:47 EDT

## 2022-03-16 RX ORDER — CYCLOBENZAPRINE HCL 5 MG
5 TABLET ORAL NIGHTLY PRN
Qty: 15 TABLET | Refills: 0 | Status: SHIPPED | OUTPATIENT
Start: 2022-03-16 | End: 2022-05-31

## 2022-03-16 NOTE — TELEPHONE ENCOUNTER
Rx Refill Note  Requested Prescriptions     Pending Prescriptions Disp Refills   • cyclobenzaprine (FLEXERIL) 5 MG tablet [Pharmacy Med Name: CYCLOBENZAPRINE 5MG TABLETS] 30 tablet 0     Sig: TAKE 1 TABLET BY MOUTH AT NIGHT AS NEEDED FOR MUSCLE SPASMS      Last office visit with prescribing clinician: 2/2/2022      Next office visit with prescribing clinician: 3/15/2022            Gina Martell MA  03/16/22, 08:32 EDT

## 2022-03-17 NOTE — TELEPHONE ENCOUNTER
Please advise pt that this muscle relaxer works best if taken as needed and not nightly or daily.  It also can have cognitive changes and side effects if taken daily.  Recommend PRN use only.

## 2022-03-18 RX ORDER — VALACYCLOVIR HYDROCHLORIDE 500 MG/1
1000 TABLET, FILM COATED ORAL 2 TIMES DAILY
Qty: 4 TABLET | Refills: 0 | Status: SHIPPED | OUTPATIENT
Start: 2022-03-18 | End: 2022-04-29

## 2022-03-21 NOTE — TELEPHONE ENCOUNTER
Pt states had taken for Shingrix vaccination and it caused her to have blisters on her lips and nose.

## 2022-04-11 DIAGNOSIS — F41.9 ANXIETY: ICD-10-CM

## 2022-04-11 RX ORDER — ALPRAZOLAM 0.5 MG/1
0.5 TABLET ORAL DAILY PRN
Qty: 45 TABLET | Refills: 0 | Status: SHIPPED | OUTPATIENT
Start: 2022-04-11 | End: 2022-05-31

## 2022-04-11 RX ORDER — CONJUGATED ESTROGENS 0.3 MG/1
TABLET, FILM COATED ORAL
Qty: 90 TABLET | Refills: 2 | Status: SHIPPED | OUTPATIENT
Start: 2022-04-11 | End: 2023-01-05

## 2022-04-11 NOTE — TELEPHONE ENCOUNTER
Rx Refill Note  Requested Prescriptions     Pending Prescriptions Disp Refills   • ALPRAZolam (XANAX) 0.5 MG tablet [Pharmacy Med Name: ALPRAZOLAM 0.5MG TABLETS] 45 tablet 0     Sig: TAKE 1 TABLET BY MOUTH DAILY AS NEEDED FOR ANXIETY      Last office visit with prescribing clinician: 2/2/2022      Next office visit with prescribing clinician: 8/31/2022            Gina Martell MA  04/11/22, 14:57 EDT     Updated contract

## 2022-04-11 NOTE — TELEPHONE ENCOUNTER
Rx Refill Note  Requested Prescriptions     Pending Prescriptions Disp Refills   • Premarin 0.3 MG tablet [Pharmacy Med Name: PREMARIN 0.3MG TABLETS] 90 tablet 2     Sig: TAKE 1 TABLET BY MOUTH EVERY DAY      Last office visit with prescribing clinician: 2/2/2022      Next office visit with prescribing clinician: 8/31/2022            Gina Martell MA  04/11/22, 07:53 EDT

## 2022-04-18 NOTE — PROGRESS NOTES
Alexandria Njs, 77 y.o., Gender: female    Assessment/Plan    1.  Pt with finding of hypercalcemia dating back to at least early '16 based on available data and suspect has been present much longer.  There are some issues though with most of the prior data.  The main issue is related to the varying reference range for the calcium level within this health system.  To that end, I accept upper normal for serum calcium as 10.4 mg/dL.  In looking into this pt's situation, I also found there is a problem with the reference range for serum albumin, which I accept the normal range to be 3.6-5.1 g/dL with usual result being around 4.5 g/dL.  To me the actual problem here is that the pt's albumin is frequently over high normal to being outright elevated.  When that is taken into account by correcting the calcium for the albumin, usually the calcium is wnl or just slightly over normal by only 0.1-0.2 mg/dL, but not of significant concern and has never been >1 mg/dL over normal that I could find when all this is taken into account and reviewing all available data in this EMR.  Pt does not have findings consistent w/ hyperparathyroidism in fact the PTH is usually low as would be expected for the calcium levels as they are when the PTH happens to have been checked.  Pt also has normal Vit D status as of most recent labs.   It appears the abnormal calcium would not be of an endocrine nature as the first point and the abnormal albumin is the item that is skewing the results of the serum calcium most of the time.   The cause for the ongoing inc in albumin should be sought separately.  See nothing else to be done from endocrine aspect, as noted do not feel this is of an endocrine nature.         Time Counseled: 20  Minutes  Total Time: 35  Minutes    Return to office in:  back to PCP      HPI Summary    Pt is here for evaluation of hypercalcemia.  Pt reports she is aware of this for some time likely around the time I have noted of  "early '16.  Pt reports she moved to his area in '08, though do not have access to lab data prior to a certain date.  Pt reports recently was having evaluation of abnormal blood counts and during the testing for that the calcium level came up again.  Pt reports nothing was found as cause for the abnormal blood counts and pt had extensive testing via hematology.  Pt reports chronic fatigue, occ weakness, diffuse joint pains, prior dehydration so drinks about 64 oz of fluids a day, a R ankle fx few yrs ago w/ known cause, baseline heat intolerance, and chronic inc hair loss for many yrs.  Pt denies any mood problem, muscles aches, abdominal issues, increased thirst, or excessive urination.  Pt denies any hyper/hypothyroid complaints currently.  Pt denies any mental slowness.  Pt reports stable weight.  Pt reports height down about 0.5\".  Pt reports dairy in diet most days including milk.  Pt reports no problems w/ teeth.  Pt is without any other complaints currently.    Review of Systems  see HPI      Patient History    Past History (reviewed):    Medical:   Past Medical History:   Diagnosis Date   • Allergic 1980   • Anxiety and depression    • Basal cell carcinoma 03/2008    Scalp and face   • Cervical herniated disc    • Colon polyp 02/2020   • CVA (cerebral vascular accident) ([facility]) 2018    Mild to moderat ischemia   • Fibrocystic breast    • Fracture of ankle 2016    R ankle   • GERD (gastroesophageal reflux disease)    • Grade II diastolic dysfunction    • Headache    • Heart murmur    • Hyperalbuminemia 05/2015   • Hypercalcemia 03/2016    mild at best when corrected for ongoing abnormal albumin levels with normal albumin being up to 4.7 g/dL though that is an issue with reference range here   • Hyperlipidemia    • Hypertension    • Infectious mononucleosis    • Injury of neck 1971    Hernieated disc when broke my back   • Low back pain 1971    Broken back   • Lumbar herniated disc    • LVH (left ventricular " "hypertrophy) 2019   • OA (osteoarthritis)    • Osteopenia    • PAT (paroxysmal atrial tachycardia) ([facility])     12 very brief runs of atrial tachycardia with the longest 7 beats in duration per 24-hour Holter monitor in June 2019   • PSVT (paroxysmal supraventricular tachycardia) ([facility])     5 episodes of PSVT the longest 11 beats in duration per Zio patch monitor in December 2019   • PVCs (premature ventricular contractions)     5% burden per Zio patch monitor in December 2019   • Rheumatic fever     Age 17   • TIA (transient ischemic attack)    • Tinnitus    • Type II diabetes mellitus ([facility]) 2019    Began Metformin   • Vertigo        Surgery:   Past Surgical History:   Procedure Laterality Date   • CEREBRAL ANGIOGRAM N/A 10/8/2018    Procedure: CEREBRAL ANGIOGRAM AND VENOGRAM WITH INTRASINUS PRESSURE RECORDING;  Surgeon: Alfredo Caruso MD;  Location: [facility]BRID OR 18/19;  Service: Neurosurgery   • COLONOSCOPY      2010   • COLONOSCOPY N/A 2/17/2020    Procedure: COLONOSCOPY TO CECUM WITH COLD BIOPSY POLYPECTOMY;  Surgeon: Stan Flood MD;  Location: [facility] ENDOSCOPY;  Service: Gastroenterology;  Laterality: N/A;  PRE- FAMILY HX COLON POLYPS  POST- POLYP, HEMORRHOIDS   • ENDOSCOPY  02/27/2017    Esophagitis, gastritis, small hh   • HYSTERECTOMY     • KNEE SURGERY     • MOHS SURGERY     • OOPHORECTOMY     • SUBTOTAL HYSTERECTOMY  1994    Full   • TONSILLECTOMY AND ADENOIDECTOMY  1965   • TRIGGER POINT INJECTION  1990s    Spinal   • UPPER GASTROINTESTINAL ENDOSCOPY  2016    \"Extremely severe GERD\"          Social History (reviewed):  Smoking,  ETOH,  Drugs, , Occupation     Medication List:  Current medications were reviewed.    Allergies:    Allergies   Allergen Reactions   • Codeine Nausea Only     BLURRED VISION, RASH       Physical Exam    VITALS:    Vitals:    04/29/22 1352   BP: 132/88   Pulse: 75   Temp: 97.6 °F (36.4 °C)   SpO2: 99%     Body mass index is 21.9 kg/m².     GENERAL:  Looks " stated age, Well developed  HEAD/EYES:  N/C, A/T, Mask in place  EXTREMITIES:  FROM  CNS:  A&Ox3    Full exam not done today.      Labs/Imaging  All available lab and imaging data were reviewed.        Romaine Danielson MD, ANGELO, FACE, ECU  4/29/2022  14:46 EDT

## 2022-04-25 RX ORDER — DESVENLAFAXINE 100 MG/1
100 TABLET, EXTENDED RELEASE ORAL DAILY
Qty: 90 TABLET | Refills: 1 | Status: SHIPPED | OUTPATIENT
Start: 2022-04-25 | End: 2022-10-21

## 2022-04-25 NOTE — TELEPHONE ENCOUNTER
Rx Refill Note  Requested Prescriptions     Pending Prescriptions Disp Refills   • desvenlafaxine (PRISTIQ) 100 MG 24 hr tablet [Pharmacy Med Name: DESVENLAFAXINE ER SUCCINATE 100MG T] 90 tablet 1     Sig: TAKE 1 TABLET BY MOUTH DAILY      Last office visit with prescribing clinician: 2/2/2022      Next office visit with prescribing clinician: 8/31/2022       {TIP  Please add Last Relevant Lab Date if appropriate: 1/26/22    Gina Martell MA  04/25/22, 10:46 EDT

## 2022-04-29 ENCOUNTER — OFFICE VISIT (OUTPATIENT)
Dept: ENDOCRINOLOGY | Age: 77
End: 2022-04-29

## 2022-04-29 VITALS
HEART RATE: 75 BPM | OXYGEN SATURATION: 99 % | TEMPERATURE: 97.6 F | SYSTOLIC BLOOD PRESSURE: 132 MMHG | DIASTOLIC BLOOD PRESSURE: 88 MMHG | HEIGHT: 67 IN | WEIGHT: 139.8 LBS | BODY MASS INDEX: 21.94 KG/M2

## 2022-04-29 DIAGNOSIS — E83.52 HYPERCALCEMIA: ICD-10-CM

## 2022-04-29 DIAGNOSIS — E88.09 HYPERALBUMINEMIA: Primary | ICD-10-CM

## 2022-04-29 PROBLEM — E11.9 TYPE II DIABETES MELLITUS: Status: ACTIVE | Noted: 2019-01-01

## 2022-04-29 PROBLEM — R73.03 PREDIABETES: Status: RESOLVED | Noted: 2018-05-29 | Resolved: 2022-04-29

## 2022-04-29 PROCEDURE — 99203 OFFICE O/P NEW LOW 30 MIN: CPT | Performed by: INTERNAL MEDICINE

## 2022-04-29 NOTE — PATIENT INSTRUCTIONS
As discussed from what I see from 2016 to present is that the calcium has been normal to mildly elevated at best, but the albumin has also been high normal to elevated and when that is taken into account as a correction for the serum calcium, the calcium is usually normal to just slightly over normal.  Another point is that there is not one standard reference range for these two items in this system and that is a separate issue.  For serum calcium the upper normal should be 10.4 mg/dL and there would not be a concern unless that is consistently >1 mg/dL over normal.  Also I see the parathyroid function is usually in accordance with the serum calcium being normal when the serum calcium is normal and being low normal when it is over normal.  The vitamin D status is also normal on recent check.  I see no endocrine concern with these findings as they are mostly stable since 2016 to most recently.  If there is any further question to answer would be why is the albumin high normal to elevated most of the time.  Unsure if there is any clinical cause for that, but if there is further question on that item a hepatologist would be the one to discuss that with.

## 2022-05-16 NOTE — TELEPHONE ENCOUNTER
Rx Refill Note  Requested Prescriptions     Pending Prescriptions Disp Refills   • levETIRAcetam (KEPPRA) 500 MG tablet [Pharmacy Med Name: LEVETIRACETAM 500MG TABLETS] 45 tablet 0     Sig: TAKE 1/2 TABLET BY MOUTH DAILY AS NEEDED FOR HEADACHE      Last office visit with prescribing clinician: 2/2/2022      Next office visit with prescribing clinician: 8/31/2022            Gina Martell MA  05/16/22, 09:39 EDT

## 2022-05-17 RX ORDER — LEVETIRACETAM 500 MG/1
TABLET ORAL
Qty: 45 TABLET | Refills: 0 | Status: SHIPPED | OUTPATIENT
Start: 2022-05-17 | End: 2022-08-12

## 2022-05-24 DIAGNOSIS — G89.29 NECK PAIN, CHRONIC: ICD-10-CM

## 2022-05-24 DIAGNOSIS — M54.2 NECK PAIN, CHRONIC: ICD-10-CM

## 2022-05-24 DIAGNOSIS — F41.9 ANXIETY: ICD-10-CM

## 2022-05-24 NOTE — TELEPHONE ENCOUNTER
Rx Refill Note  Requested Prescriptions     Pending Prescriptions Disp Refills   • cyclobenzaprine (FLEXERIL) 5 MG tablet [Pharmacy Med Name: CYCLOBENZAPRINE 5MG TABLETS] 15 tablet 0     Sig: TAKE 1 TABLET BY MOUTH AT NIGHT AS NEEDED FOR MUSCLE SPASMS   • ALPRAZolam (XANAX) 0.5 MG tablet [Pharmacy Med Name: ALPRAZOLAM 0.5MG TABLETS] 45 tablet      Sig: TAKE 1 TABLET BY MOUTH DAILY AS NEEDED FOR ANXIETY      Last office visit with prescribing clinician: 2/2/2022      Next office visit with prescribing clinician: 8/31/2022            Gina Martell MA  05/24/22, 14:08 EDT

## 2022-05-31 RX ORDER — CYCLOBENZAPRINE HCL 5 MG
5 TABLET ORAL NIGHTLY PRN
Qty: 15 TABLET | Refills: 0 | Status: SHIPPED | OUTPATIENT
Start: 2022-05-31 | End: 2022-10-10 | Stop reason: SDUPTHER

## 2022-05-31 RX ORDER — ALPRAZOLAM 0.5 MG/1
0.5 TABLET ORAL DAILY PRN
Qty: 45 TABLET | Refills: 0 | Status: SHIPPED | OUTPATIENT
Start: 2022-05-31 | End: 2022-07-27

## 2022-06-03 ENCOUNTER — OFFICE VISIT (OUTPATIENT)
Dept: CARDIOLOGY | Facility: CLINIC | Age: 77
End: 2022-06-03

## 2022-06-03 VITALS
BODY MASS INDEX: 22.13 KG/M2 | HEIGHT: 67 IN | SYSTOLIC BLOOD PRESSURE: 122 MMHG | HEART RATE: 68 BPM | DIASTOLIC BLOOD PRESSURE: 78 MMHG | WEIGHT: 141 LBS | OXYGEN SATURATION: 99 %

## 2022-06-03 DIAGNOSIS — I47.1 ATRIAL TACHYCARDIA: ICD-10-CM

## 2022-06-03 DIAGNOSIS — I49.3 PVC'S (PREMATURE VENTRICULAR CONTRACTIONS): ICD-10-CM

## 2022-06-03 DIAGNOSIS — I47.1 PAROXYSMAL SVT (SUPRAVENTRICULAR TACHYCARDIA): ICD-10-CM

## 2022-06-03 DIAGNOSIS — R00.2 PALPITATIONS: Primary | ICD-10-CM

## 2022-06-03 DIAGNOSIS — G45.9 TIA (TRANSIENT ISCHEMIC ATTACK): ICD-10-CM

## 2022-06-03 PROCEDURE — 99213 OFFICE O/P EST LOW 20 MIN: CPT | Performed by: INTERNAL MEDICINE

## 2022-06-19 NOTE — PROGRESS NOTES
Date of Office Visit: 2022  Encounter Provider: Daquan Rhodes MD  Place of Service: [facility] CARDIOLOGY  Patient Name: Alexandria Yousif  :1945    Chief complaint: Follow-up for palpitations, atrial tachycardia, SVT, PVC's, and prior TIAs.    History of Present Illness:    I again had the pleasure of seeing the patient in cardiology office on 2022.  She is a very pleasant 77 year-old white female with a history of SVT, PVC's, and atrial tachycardia who presents for follow-up.  I initially saw the patient on 10/10/2017.  She had been having shortness of breath with exertion.  She also was noted to have a systolic murmur on exam.  She has an extensive family history of cardiac disease.  Her mother had 3 permanent pacemakers and CHF, her sister had a fatal MI at age 64, another sister had atrial fibrillation, and her father had a four-vessel CABG at age 62.  She underwent an exercise stress echocardiogram on 10/17/2017 which was normal.    The patient began experiencing palpitations in .  She had also been feeling lightheaded.  She had an echocardiogram and carotid artery Doppler ultrasound on 2019.  The carotid artery ultrasound was normal.  The echocardiogram showed an ejection fraction of 67% with grade 2 diastolic dysfunction.  A subsequent Holter monitor on 2019 showed 12 very brief runs of atrial tachycardia, with the longest being 7 beats in length.  She subsequently wore a 14-day ZIO patch starting on 2019.  This showed 5 episodes of SVT, with the longest being 11 beats in length.  She also had frequent PVCs (5% of the total) with rare ventricular bigeminy and trigeminy.  Of note, she did have a severe reaction to her ZIO Patch adhesive with a rash and blisters which actually required steroid injections and oral steroids afterwards.    The patient presents today for follow-up.  She is currently taking 12.5 mg of metoprolol at night, which  is helped the palpitations.  These are less frequent in general since starting the metoprolol.  She did get excessive fatigue with the 25 mg dose.  Her blood pressure in general has been good, and is 122/78 today.  She has not had any chest pain.  She still has shortness of breath mainly from allergies, but nothing new.    Past Medical History:   Diagnosis Date   • Allergic 1980   • Anxiety and depression    • Basal cell carcinoma 03/2008    Scalp and face   • Cervical herniated disc    • Colon polyp 02/2020   • CVA (cerebral vascular accident) ([facility]) 2018    Mild to moderat ischemia   • Fibrocystic breast    • Fracture of ankle 2016    R ankle   • GERD (gastroesophageal reflux disease)    • Grade II diastolic dysfunction    • Headache    • Heart murmur    • Hyperalbuminemia 05/2015   • Hypercalcemia 03/2016    mild at best when corrected for ongoing abnormal albumin levels with normal albumin being up to 4.7 g/dL though that is an issue with reference range here   • Hyperlipidemia    • Hypertension    • Infectious mononucleosis    • Injury of neck 1971    Hernieated disc when broke my back   • Low back pain 1971    Broken back   • Lumbar herniated disc    • LVH (left ventricular hypertrophy) 2019   • OA (osteoarthritis)    • Osteopenia    • PAT (paroxysmal atrial tachycardia) ([facility])     12 very brief runs of atrial tachycardia with the longest 7 beats in duration per 24-hour Holter monitor in June 2019   • PSVT (paroxysmal supraventricular tachycardia) ([facility])     5 episodes of PSVT the longest 11 beats in duration per Zio patch monitor in December 2019   • PVCs (premature ventricular contractions)     5% burden per Zio patch monitor in December 2019   • Rheumatic fever     Age 17   • TIA (transient ischemic attack)    • Tinnitus    • Type II diabetes mellitus ([facility]) 2019    Began Metformin   • Vertigo        Past Surgical History:   Procedure Laterality Date   • CEREBRAL ANGIOGRAM N/A 10/8/2018    Procedure: CEREBRAL  "ANGIOGRAM AND VENOGRAM WITH INTRASINUS PRESSURE RECORDING;  Surgeon: Alfredo Caruso MD;  Location: [facility] HYBRID OR 18/19;  Service: Neurosurgery   • COLONOSCOPY      2010   • COLONOSCOPY N/A 2/17/2020    Procedure: COLONOSCOPY TO CECUM WITH COLD BIOPSY POLYPECTOMY;  Surgeon: Stan Flood MD;  Location: [facility] ENDOSCOPY;  Service: Gastroenterology;  Laterality: N/A;  PRE- FAMILY HX COLON POLYPS  POST- POLYP, HEMORRHOIDS   • ENDOSCOPY  02/27/2017    Esophagitis, gastritis, small hh   • HYSTERECTOMY     • KNEE SURGERY     • MOHS SURGERY     • OOPHORECTOMY     • SUBTOTAL HYSTERECTOMY  1994    Full   • TONSILLECTOMY AND ADENOIDECTOMY  1965   • TRIGGER POINT INJECTION  1990s    Spinal   • UPPER GASTROINTESTINAL ENDOSCOPY  2016    \"Extremely severe GERD\"       Current Outpatient Medications on File Prior to Visit   Medication Sig Dispense Refill   • acetaminophen (TYLENOL) 500 MG tablet Take 1,000 mg by mouth Every 6 (Six) Hours As Needed for Mild Pain .     • ALPRAZolam (XANAX) 0.5 MG tablet TAKE 1 TABLET BY MOUTH DAILY AS NEEDED FOR ANXIETY 45 tablet 0   • Alum Hydroxide-Mag Trisilicate (GAVISCON) 80-14.2 MG chewable tablet Chew 1 tablet 4 (Four) Times a Day As Needed (Heartburn). 224 each 11   • aspirin 81 MG tablet Take 81 mg by mouth Daily.     • Biotin 10 MG tablet      • buPROPion (WELLBUTRIN) 75 MG tablet Take 1 tablet by mouth Daily. For energy and improved moved 30 tablet 2   • Cholecalciferol 50 MCG (2000 UT) capsule      • cyclobenzaprine (FLEXERIL) 5 MG tablet TAKE 1 TABLET BY MOUTH AT NIGHT AS NEEDED FOR MUSCLE SPASMS 15 tablet 0   • desvenlafaxine (PRISTIQ) 100 MG 24 hr tablet TAKE 1 TABLET BY MOUTH DAILY 90 tablet 1   • fenofibrate (TRICOR) 145 MG tablet TAKE 1 TABLET BY MOUTH EVERY DAY 90 tablet 1   • levETIRAcetam (KEPPRA) 500 MG tablet TAKE 1/2 TABLET BY MOUTH DAILY AS NEEDED FOR HEADACHE 45 tablet 0   • metFORMIN ER (GLUCOPHAGE-XR) 500 MG 24 hr tablet TAKE 1 TABLET BY MOUTH DAILY WITH " LARGEST MEAL OF THE DAY AND A GLASS OF WATER 90 tablet 01   • metoprolol tartrate (LOPRESSOR) 25 MG tablet Take 1 tablet by mouth Daily As Needed (Palpitations). 60 tablet 11   • Omega-3 Fatty Acids (FISH OIL) 1200 MG capsule capsule      • omeprazole (priLOSEC) 40 MG capsule TAKE 1 CAPSULE BY MOUTH EVERY DAY 90 capsule 1   • Premarin 0.3 MG tablet TAKE 1 TABLET BY MOUTH EVERY DAY 90 tablet 2   • rosuvastatin (CRESTOR) 10 MG tablet TAKE 1 TABLET BY MOUTH EVERY NIGHT 90 tablet 1   • spironolactone (ALDACTONE) 25 MG tablet TAKE 1 TABLET BY MOUTH DAILY 30 tablet 4   • traZODone (DESYREL) 50 MG tablet TAKE 1/2 TO 1 TABLET BY MOUTH AT BEDTIME AS NEEDED FOR SLEEP 90 tablet 1   • tretinoin (RETIN-A) 0.025 % cream APPLY EXTERNALLY TO THE AFFECTED AREA EVERY NIGHT AT BEDTIME TO THE FACE       No current facility-administered medications on file prior to visit.     Allergies as of 06/03/2022 - Reviewed 06/03/2022   Allergen Reaction Noted   • Codeine Nausea Only 03/29/2016     Social History     Socioeconomic History   • Marital status:      Spouse name: Saravanan   • Number of children: 1   • Years of education: College   Tobacco Use   • Smoking status: Never Smoker   • Smokeless tobacco: Never Used   Substance and Sexual Activity   • Alcohol use: Yes     Alcohol/week: 3.0 - 4.0 standard drinks     Types: 2 - 3 Glasses of wine, 1 Cans of beer per week     Comment: liquor-1 to 2 month   • Drug use: No   • Sexual activity: Yes     Partners: Male     Birth control/protection: Post-menopausal     Family History   Problem Relation Age of Onset   • Uterine cancer Mother    • Heart disease Mother         in her 70s   • Diabetes Mother         Late in life   • Arthritis Mother    • Cancer Mother         spine   • Hearing loss Mother         In her 70s   • Hyperlipidemia Mother         in her 60s   • Kidney disease Mother         ?   • Vision loss Mother         Macular Degeneration   • Skin cancer Father    • Heart disease Father   "       Father with 4 vessel CABG at 62   • Colon polyps Father         negative   • Cancer Father         skin   • Heart disease Sister         Heart attack-64   • Atrial fibrillation Sister    • Hyperlipidemia Sister         in her 50s   • Vision loss Sister         Macular Degeneration   • Diabetes Maternal Grandmother         Late in life   • Heart disease Maternal Grandmother         Stroke-80   • Stroke Maternal Grandmother    • Hyperlipidemia Maternal Grandmother         ?   • Kidney disease Maternal Grandmother         ?   • Heart disease Paternal Grandmother         CHF   • Stroke Paternal Grandmother    • Depression Paternal Grandmother    • Hyperlipidemia Paternal Grandmother         ?   • Heart disease Paternal Grandfather         Emphysema-heart disease   • Stroke Paternal Grandfather    • Malig Hyperthermia Neg Hx        Review of Systems   Cardiovascular: Positive for palpitations.   Respiratory: Positive for cough and shortness of breath.    Musculoskeletal: Positive for joint pain and muscle cramps.   Gastrointestinal: Positive for nausea.   Neurological: Positive for excessive daytime sleepiness.   Psychiatric/Behavioral: Positive for depression. The patient is nervous/anxious.    All other systems reviewed and are negative.     Objective:     Vitals:    06/03/22 1159   BP: 122/78   Pulse: 68   SpO2: 99%   Weight: 64 kg (141 lb)   Height: 170.2 cm (67.01\")     Body mass index is 22.08 kg/m².    Physical Exam  Constitutional:       Appearance: She is well-developed.   HENT:      Head: Normocephalic and atraumatic.   Eyes:      Conjunctiva/sclera: Conjunctivae normal.   Cardiovascular:      Rate and Rhythm: Normal rate and regular rhythm.      Heart sounds: Murmur heard.    Systolic murmur is present with a grade of 2/6 at the upper right sternal border, upper left sternal border and lower left sternal border.    No friction rub. No gallop.   Pulmonary:      Effort: Pulmonary effort is normal.      " Breath sounds: Normal breath sounds.   Abdominal:      Palpations: Abdomen is soft.      Tenderness: There is no abdominal tenderness.   Musculoskeletal:      Cervical back: Neck supple.   Skin:     General: Skin is warm.   Neurological:      Mental Status: She is alert and oriented to person, place, and time.   Psychiatric:         Behavior: Behavior normal.       Lab Review:   Procedures  Cardiac Procedures:  1.  Stress echocardiogram on 10/17/2017: Ejection fraction was 64%.  There was no ischemia.  There was no valvular disease.  2.  Carotid artery Doppler ultrasound on 5/2/2019: Normal.  3.  Echocardiogram on 5/2/2019: Ejection fraction was 67%.  There was mild LVH.  There was grade 2 diastolic dysfunction.  4.  Holter monitor on 6/6/2019: There were 12 very brief runs of atrial tachycardia, with the longest being 7 beats in length.  5.  ZIO Patch starting on 12/9/2019: There were 5 episodes of SVT, the longest 11 beats in length.  There were frequent PVCs, comprising up to 5% of the total.  There was rare ventricular bigeminy and trigeminy.  There was no ventricular tachycardia.    Assessment:       Diagnosis Plan   1. Palpitations     2. Atrial tachycardia (HCC)     3. Paroxysmal SVT (supraventricular tachycardia) (HCC)     4. PVC's (premature ventricular contractions)     5. TIA (transient ischemic attack)       Plan:      She is very sensitive to the metoprolol, and the 25 mg dose was too much for her.  This caused excessive fatigue.  She still gets a small amount of fatigue with the 12.5 mg dose, although this does help her palpitations significantly.  This is also tolerable for her.  Her rhythm issues have been benign in the past, and are more annoying than harmful.  She will continue on the aspirin for her history of TIAs.  She does not have any signs or symptoms of congestive heart failure currently.  Again, she did develop a severe reaction to the adhesive from the ZIO Patch with rash and blisters  which required steroid injections and steroid pills afterwards.  I do not think she would be a good candidate for any long-term monitor other than a loop recorder in the future because of this.    I will plan on seeing her back in the office in 6 months unless other issues arise.

## 2022-07-12 RX ORDER — METFORMIN HYDROCHLORIDE 500 MG/1
TABLET, EXTENDED RELEASE ORAL
Qty: 90 TABLET | Refills: 1 | Status: SHIPPED | OUTPATIENT
Start: 2022-07-12 | End: 2023-01-05

## 2022-07-12 RX ORDER — TRAZODONE HYDROCHLORIDE 50 MG/1
TABLET ORAL
Qty: 90 TABLET | Refills: 1 | Status: SHIPPED | OUTPATIENT
Start: 2022-07-12 | End: 2023-01-05

## 2022-07-12 NOTE — TELEPHONE ENCOUNTER
Rx Refill Note  Requested Prescriptions     Pending Prescriptions Disp Refills   • traZODone (DESYREL) 50 MG tablet [Pharmacy Med Name: TRAZODONE 50MG TABLETS] 90 tablet 1     Sig: TAKE 1/2 TO 1 TABLET BY MOUTH AT BEDTIME AS NEEDED FOR SLEEP   • metFORMIN ER (GLUCOPHAGE-XR) 500 MG 24 hr tablet [Pharmacy Med Name: METFORMIN ER 500MG 24HR TABS] 90 tablet 01     Sig: TAKE 1 TABLET BY MOUTH DAILY WITH LARGEST MEAL OF THE DAY AND A GLASS OF WATER      Last office visit with prescribing clinician: 2/2/2022      Next office visit with prescribing clinician: 8/31/2022       {TIP  Please add Last Relevant Lab Date if appropriate: 1/26/22    Gina Martell MA  07/12/22, 08:38 EDT

## 2022-07-24 DIAGNOSIS — F41.9 ANXIETY: ICD-10-CM

## 2022-07-25 RX ORDER — OMEPRAZOLE 40 MG/1
CAPSULE, DELAYED RELEASE ORAL
Qty: 90 CAPSULE | Refills: 1 | Status: SHIPPED | OUTPATIENT
Start: 2022-07-25 | End: 2023-01-18

## 2022-07-25 NOTE — TELEPHONE ENCOUNTER
Rx Refill Note  Requested Prescriptions     Pending Prescriptions Disp Refills   • omeprazole (priLOSEC) 40 MG capsule [Pharmacy Med Name: OMEPRAZOLE 40MG CAPSULES] 90 capsule 1     Sig: TAKE 1 CAPSULE BY MOUTH EVERY DAY      Last office visit with prescribing clinician: 2/2/2022      Next office visit with prescribing clinician: 7/24/2022            Gina Martell MA  07/25/22, 13:08 EDT

## 2022-07-25 NOTE — TELEPHONE ENCOUNTER
Rx Refill Note  Requested Prescriptions     Pending Prescriptions Disp Refills   • ALPRAZolam (XANAX) 0.5 MG tablet [Pharmacy Med Name: ALPRAZOLAM 0.5MG TABLETS] 45 tablet      Sig: TAKE 1 TABLET BY MOUTH DAILY AS NEEDED FOR ANXIETY      Last office visit with prescribing clinician: 2/2/2022      Next office visit with prescribing clinician: 8/31/2022            Gina Martell MA  07/25/22, 16:28 EDT     Updated contract

## 2022-07-27 RX ORDER — ALPRAZOLAM 0.5 MG/1
0.5 TABLET ORAL DAILY PRN
Qty: 45 TABLET | Refills: 0 | Status: SHIPPED | OUTPATIENT
Start: 2022-07-27 | End: 2022-09-21 | Stop reason: SDUPTHER

## 2022-08-10 ENCOUNTER — TRANSCRIBE ORDERS (OUTPATIENT)
Dept: ADMINISTRATIVE | Facility: HOSPITAL | Age: 77
End: 2022-08-10

## 2022-08-10 DIAGNOSIS — Z12.31 SCREENING MAMMOGRAM, ENCOUNTER FOR: Primary | ICD-10-CM

## 2022-08-12 RX ORDER — FENOFIBRATE 145 MG/1
TABLET, COATED ORAL
Qty: 90 TABLET | Refills: 1 | Status: SHIPPED | OUTPATIENT
Start: 2022-08-12 | End: 2023-02-17

## 2022-08-12 RX ORDER — LEVETIRACETAM 500 MG/1
TABLET ORAL
Qty: 45 TABLET | Refills: 0 | Status: SHIPPED | OUTPATIENT
Start: 2022-08-12 | End: 2022-12-27 | Stop reason: SDUPTHER

## 2022-08-12 NOTE — TELEPHONE ENCOUNTER
Rx Refill Note  Requested Prescriptions     Pending Prescriptions Disp Refills   • levETIRAcetam (KEPPRA) 500 MG tablet [Pharmacy Med Name: LEVETIRACETAM 500MG TABLETS] 45 tablet 0     Sig: TAKE 1/2 TABLET BY MOUTH DAILY AS NEEDED FOR HEADACHE   • fenofibrate (TRICOR) 145 MG tablet [Pharmacy Med Name: FENOFIBRATE 145MG TABLETS] 90 tablet 1     Sig: TAKE 1 TABLET BY MOUTH EVERY DAY      Last office visit with prescribing clinician: 2/2/2022      Next office visit with prescribing clinician: 8/31/2022       {TIP  Please add Last Relevant Lab Date if appropriate: 1/26/22    Gina Martell MA  08/12/22, 16:36 EDT

## 2022-08-17 ENCOUNTER — HOSPITAL ENCOUNTER (OUTPATIENT)
Dept: MAMMOGRAPHY | Facility: HOSPITAL | Age: 77
Discharge: HOME | End: 2022-08-17
Admitting: INTERNAL MEDICINE

## 2022-08-17 DIAGNOSIS — Z12.31 SCREENING MAMMOGRAM, ENCOUNTER FOR: ICD-10-CM

## 2022-08-17 PROCEDURE — 77067 SCR MAMMO BI INCL CAD: CPT

## 2022-08-17 PROCEDURE — 77063 BREAST TOMOSYNTHESIS BI: CPT

## 2022-08-19 DIAGNOSIS — R73.03 PREDIABETES: ICD-10-CM

## 2022-08-19 DIAGNOSIS — E78.5 HYPERLIPIDEMIA, UNSPECIFIED HYPERLIPIDEMIA TYPE: Primary | ICD-10-CM

## 2022-08-22 RX ORDER — ROSUVASTATIN CALCIUM 10 MG/1
10 TABLET, COATED ORAL NIGHTLY
Qty: 90 TABLET | Refills: 1 | Status: SHIPPED | OUTPATIENT
Start: 2022-08-22 | End: 2023-02-17

## 2022-08-22 NOTE — TELEPHONE ENCOUNTER
Rx Refill Note  Requested Prescriptions     Pending Prescriptions Disp Refills   • rosuvastatin (CRESTOR) 10 MG tablet [Pharmacy Med Name: ROSUVASTATIN 10MG TABLETS] 90 tablet 1     Sig: TAKE 1 TABLET BY MOUTH EVERY NIGHT      Last office visit with prescribing clinician: 2/2/2022      Next office visit with prescribing clinician: 8/31/2022       {TIP  Please add Last Relevant Lab Date if appropriate: 1/26/22    Gina Martell MA  08/22/22, 10:34 EDT

## 2022-08-31 ENCOUNTER — OFFICE VISIT (OUTPATIENT)
Dept: FAMILY MEDICINE CLINIC | Facility: CLINIC | Age: 77
End: 2022-08-31

## 2022-08-31 VITALS
BODY MASS INDEX: 21.39 KG/M2 | HEIGHT: 67 IN | TEMPERATURE: 97.2 F | HEART RATE: 81 BPM | DIASTOLIC BLOOD PRESSURE: 88 MMHG | OXYGEN SATURATION: 97 % | WEIGHT: 136.3 LBS | SYSTOLIC BLOOD PRESSURE: 130 MMHG

## 2022-08-31 DIAGNOSIS — F32.A DEPRESSION, UNSPECIFIED DEPRESSION TYPE: ICD-10-CM

## 2022-08-31 DIAGNOSIS — S90.852A SPLINTER OF LEFT FOOT, INITIAL ENCOUNTER: ICD-10-CM

## 2022-08-31 DIAGNOSIS — E78.5 HYPERLIPIDEMIA, UNSPECIFIED HYPERLIPIDEMIA TYPE: Primary | ICD-10-CM

## 2022-08-31 DIAGNOSIS — F41.9 ANXIETY: ICD-10-CM

## 2022-08-31 DIAGNOSIS — R73.01 IMPAIRED FASTING GLUCOSE: ICD-10-CM

## 2022-08-31 DIAGNOSIS — J30.2 SEASONAL ALLERGIES: ICD-10-CM

## 2022-08-31 PROCEDURE — 1170F FXNL STATUS ASSESSED: CPT | Performed by: INTERNAL MEDICINE

## 2022-08-31 PROCEDURE — 1160F RVW MEDS BY RX/DR IN RCRD: CPT | Performed by: INTERNAL MEDICINE

## 2022-08-31 PROCEDURE — 99213 OFFICE O/P EST LOW 20 MIN: CPT | Performed by: INTERNAL MEDICINE

## 2022-08-31 PROCEDURE — G0439 PPPS, SUBSEQ VISIT: HCPCS | Performed by: INTERNAL MEDICINE

## 2022-08-31 RX ORDER — AMOXICILLIN AND CLAVULANATE POTASSIUM 875; 125 MG/1; MG/1
1 TABLET, FILM COATED ORAL 2 TIMES DAILY
Qty: 14 TABLET | Refills: 0 | Status: SHIPPED | OUTPATIENT
Start: 2022-08-31 | End: 2022-11-03

## 2022-08-31 RX ORDER — BENZONATATE 200 MG/1
200 CAPSULE ORAL 3 TIMES DAILY PRN
Qty: 30 CAPSULE | Refills: 0 | Status: SHIPPED | OUTPATIENT
Start: 2022-08-31 | End: 2022-11-03

## 2022-08-31 NOTE — PROGRESS NOTES
The ABCs of the Annual Wellness Visit  Subsequent Medicare Wellness Visit    Chief Complaint   Patient presents with   • Medicare Wellness-subsequent     Cough, congestion, drainage   Splinter in bottom of foot      Subjective    History of Present Illness:  Alexandria Yousif is a 77 y.o. female who presents for a Subsequent Medicare Wellness Visit.    The following portions of the patient's history were reviewed and   updated as appropriate: allergies, current medications, past family history, past medical history, past social history, past surgical history and problem list.    Compared to one year ago, the patient feels her physical   health is the same.    Compared to one year ago, the patient feels her mental   health is better.    Recent Hospitalizations:  She was not admitted to the hospital during the last year.       Current Medical Providers:  Patient Care Team:  Anita Muse MD as PCP - General  Anita Muse MD as PCP - Family Medicine  Aric, Rajinder Figueredo MD as Consulting Physician (Otolaryngology)  Daquan Rhodes MD as Consulting Physician (Cardiology)  Amador Pierce II, MD as Consulting Physician (Hematology and Oncology)    Outpatient Medications Prior to Visit   Medication Sig Dispense Refill   • acetaminophen (TYLENOL) 500 MG tablet Take 1,000 mg by mouth Every 6 (Six) Hours As Needed for Mild Pain .     • ALPRAZolam (XANAX) 0.5 MG tablet TAKE 1 TABLET BY MOUTH DAILY AS NEEDED FOR ANXIETY 45 tablet 0   • Alum Hydroxide-Mag Trisilicate (GAVISCON) 80-14.2 MG chewable tablet Chew 1 tablet 4 (Four) Times a Day As Needed (Heartburn). 224 each 11   • aspirin 81 MG tablet Take 81 mg by mouth Daily.     • Biotin 10 MG tablet      • desvenlafaxine (PRISTIQ) 100 MG 24 hr tablet TAKE 1 TABLET BY MOUTH DAILY 90 tablet 1   • fenofibrate (TRICOR) 145 MG tablet TAKE 1 TABLET BY MOUTH EVERY DAY 90 tablet 1   • levETIRAcetam (KEPPRA) 500 MG tablet TAKE 1/2 TABLET BY MOUTH DAILY AS NEEDED FOR  HEADACHE 45 tablet 0   • metFORMIN ER (GLUCOPHAGE-XR) 500 MG 24 hr tablet TAKE 1 TABLET BY MOUTH DAILY WITH LARGEST MEAL OF THE DAY AND A GLASS OF WATER 90 tablet 01   • metoprolol tartrate (LOPRESSOR) 25 MG tablet Take 1 tablet by mouth Daily As Needed (Palpitations). 60 tablet 11   • Omega-3 Fatty Acids (FISH OIL) 1200 MG capsule capsule      • omeprazole (priLOSEC) 40 MG capsule TAKE 1 CAPSULE BY MOUTH EVERY DAY 90 capsule 1   • Premarin 0.3 MG tablet TAKE 1 TABLET BY MOUTH EVERY DAY 90 tablet 2   • rosuvastatin (CRESTOR) 10 MG tablet TAKE 1 TABLET BY MOUTH EVERY NIGHT 90 tablet 1   • spironolactone (ALDACTONE) 25 MG tablet TAKE 1 TABLET BY MOUTH DAILY 30 tablet 4   • traZODone (DESYREL) 50 MG tablet TAKE 1/2 TO 1 TABLET BY MOUTH AT BEDTIME AS NEEDED FOR SLEEP 90 tablet 1   • buPROPion (WELLBUTRIN) 75 MG tablet Take 1 tablet by mouth Daily. For energy and improved moved 30 tablet 2   • Cholecalciferol 50 MCG (2000 UT) capsule      • cyclobenzaprine (FLEXERIL) 5 MG tablet TAKE 1 TABLET BY MOUTH AT NIGHT AS NEEDED FOR MUSCLE SPASMS 15 tablet 0   • tretinoin (RETIN-A) 0.025 % cream APPLY EXTERNALLY TO THE AFFECTED AREA EVERY NIGHT AT BEDTIME TO THE FACE       No facility-administered medications prior to visit.       No opioid medication identified on active medication list. I have reviewed chart for other potential  high risk medication/s and harmful drug interactions in the elderly.          Aspirin is on active medication list. Aspirin use is indicated based on review of current medical condition/s. Pros and cons of this therapy have been discussed today. Benefits of this medication outweigh potential harm.  Patient has been encouraged to continue taking this medication.  .      Patient Active Problem List   Diagnosis   • Hypercalcemia   • Hyperlipidemia   • Abnormal brain MRI   • Vertigo   • Anxiety disorder due to general medical condition   • Depression   • Gastroesophageal reflux disease   • Impaired fasting  "glucose   • Insomnia   • Numbness of lower extremity   • Osteopenia   • Grade II diastolic dysfunction   • PVCs (premature ventricular contractions)   • PSVT (paroxysmal supraventricular tachycardia) (HCC)   • PAT (paroxysmal atrial tachycardia) (HCC)   • Polycythemia   • Pulmonary nodule, right   • Hyperalbuminemia   • Type II diabetes mellitus (HCC)     Advance Care Planning  Advance Directive is on file.  ACP discussion was held with the patient during this visit. Patient has an advance directive in EMR which is still valid.           Objective    Vitals:    08/31/22 1001   BP: 130/88   Pulse: 81   Temp: 97.2 °F (36.2 °C)   SpO2: 97%   Weight: 61.8 kg (136 lb 4.8 oz)   Height: 170.2 cm (67.01\")     Estimated body mass index is 21.34 kg/m² as calculated from the following:    Height as of this encounter: 170.2 cm (67.01\").    Weight as of this encounter: 61.8 kg (136 lb 4.8 oz).    BMI is within normal parameters. No other follow-up for BMI required.      Does the patient have evidence of cognitive impairment? No    Physical Exam  Lab Results   Component Value Date    CHLPL 163 08/22/2022    TRIG 169 (H) 08/22/2022    HDL 52 08/22/2022    LDL 82 08/22/2022    VLDL 29 08/22/2022    HGBA1C 6.2 (H) 08/22/2022            HEALTH RISK ASSESSMENT    Smoking Status:  Social History     Tobacco Use   Smoking Status Never Smoker   Smokeless Tobacco Never Used     Alcohol Consumption:  Social History     Substance and Sexual Activity   Alcohol Use Yes   • Alcohol/week: 3.0 - 4.0 standard drinks   • Types: 2 - 3 Glasses of wine, 1 Cans of beer per week    Comment: liquor-1 to 2 month     Fall Risk Screen:    OMAYRAADI Fall Risk Assessment was completed, and patient is at LOW risk for falls.Assessment completed on:8/31/2022    Depression Screening:  PHQ-2/PHQ-9 Depression Screening 3/3/2022   Retired PHQ-9 Total Score 0   Retired Total Score 0       Health Habits and Functional and Cognitive Screening:  Functional & Cognitive " Status 8/31/2022   Do you have difficulty preparing food and eating? No   Do you have difficulty bathing yourself, getting dressed or grooming yourself? No   Do you have difficulty using the toilet? No   Do you have difficulty moving around from place to place? No   Do you have trouble with steps or getting out of a bed or a chair? Yes   Current Diet Well Balanced Diet   Dental Exam Up to date   Eye Exam Up to date   Exercise (times per week) 7 times per week   Current Exercises Include Walking   Current Exercise Activities Include -   Do you need help using the phone?  No   Are you deaf or do you have serious difficulty hearing?  Yes   Do you need help with transportation? No   Do you need help shopping? No   Do you need help preparing meals?  No   Do you need help with housework?  Yes   Do you need help with laundry? No   Do you need help taking your medications? No   Do you need help managing money? No   Do you ever drive or ride in a car without wearing a seat belt? No   Have you felt unusual stress, anger or loneliness in the last month? Yes   Who do you live with? Spouse   If you need help, do you have trouble finding someone available to you? No   Have you been bothered in the last four weeks by sexual problems? No   Do you have difficulty concentrating, remembering or making decisions? Yes       Age-appropriate Screening Schedule:  Refer to the list below for future screening recommendations based on patient's age, sex and/or medical conditions. Orders for these recommended tests are listed in the plan section. The patient has been provided with a written plan.    Health Maintenance   Topic Date Due   • DXA SCAN  06/26/2022   • INFLUENZA VACCINE  10/01/2022   • URINE MICROALBUMIN  01/26/2023   • HEMOGLOBIN A1C  02/22/2023   • DIABETIC EYE EXAM  05/02/2023   • LIPID PANEL  08/22/2023   • MAMMOGRAM  08/17/2024   • TDAP/TD VACCINES (3 - Td or Tdap) 10/21/2030   • ZOSTER VACCINE  Completed               Assessment & Plan   CMS Preventative Services Quick Reference  Risk Factors Identified During Encounter  Cardiovascular Disease  Depression/Dysphoria  Hearing Problem  Immunizations Discussed/Encouraged (specific Immunizations; Prevnar 20 (Pneumococcal 20-valent conjugate)  macular degeneration  The above risks/problems have been discussed with the patient.  Follow up actions/plans if indicated are seen below in the Assessment/Plan Section.  Pertinent information has been shared with the patient in the After Visit Summary.    Diagnoses and all orders for this visit:    1. Hyperlipidemia, unspecified hyperlipidemia type (Primary)    2. Impaired fasting glucose    3. Depression, unspecified depression type        Follow Up:   No follow-ups on file.     An After Visit Summary and PPPS were made available to the patient.

## 2022-08-31 NOTE — PROGRESS NOTES
"Chief Complaint  Medicare Wellness-subsequent (Cough, congestion, drainage /Splinter in bottom of foot)    Subjective        Alexandria Yousif presents to [facility]  History of Present Illness  Here for AWV and also wants to discuss a splinter in her foot and also she is having cough, congestion, and PND that she has had for weeks.  Her  had C19 in mid June.  She never got C19.  Thinks her sx are related to mold and meldew and seasonal allergies.  Clear congestion.  Coughing is severe and is mostly morning and it is productive of clear phlegm.  Ha and watery eyes.  She has tried otc mucinex and decongestants but that made her heart race so she stopped it.  Last week, taking mucinex and one sudafed 4 hour.  No fever.  Left foot has a splinter in it for 4 days.  Td 2020.    Objective   Vital Signs:  /88   Pulse 81   Temp 97.2 °F (36.2 °C)   Ht 170.2 cm (67.01\")   Wt 61.8 kg (136 lb 4.8 oz)   SpO2 97%   BMI 21.34 kg/m²   Estimated body mass index is 21.34 kg/m² as calculated from the following:    Height as of this encounter: 170.2 cm (67.01\").    Weight as of this encounter: 61.8 kg (136 lb 4.8 oz).    BMI is within normal parameters. No other follow-up for BMI required.    CBC & Differential (08/22/2022 09:37)  Comprehensive Metabolic Panel (08/22/2022 09:37)  Lipid Panel With LDL / HDL Ratio (08/22/2022 09:37)  Hemoglobin A1c (08/22/2022 09:37)    Physical Exam  Vitals and nursing note reviewed.   Constitutional:       Appearance: Normal appearance. She is well-developed.   HENT:      Head: Normocephalic and atraumatic.      Right Ear: Tympanic membrane, ear canal and external ear normal.      Left Ear: Tympanic membrane, ear canal and external ear normal.      Mouth/Throat:      Mouth: Mucous membranes are moist.   Eyes:      Extraocular Movements: Extraocular movements intact.      Conjunctiva/sclera: Conjunctivae normal.   Neck:      Vascular: No carotid bruit. "   Cardiovascular:      Rate and Rhythm: Normal rate and regular rhythm.      Heart sounds: Normal heart sounds.      Comments: No bruits  Pulmonary:      Effort: Pulmonary effort is normal. No respiratory distress.      Breath sounds: Normal breath sounds. No wheezing or rales.   Abdominal:      General: Bowel sounds are normal. There is no distension.      Palpations: Abdomen is soft. There is no mass.      Tenderness: There is no abdominal tenderness.   Musculoskeletal:      Cervical back: Neck supple.   Lymphadenopathy:      Cervical: No cervical adenopathy.   Skin:     General: Skin is warm.   Neurological:      General: No focal deficit present.      Mental Status: She is alert and oriented to person, place, and time.   Psychiatric:         Mood and Affect: Mood normal.         Behavior: Behavior normal.         Thought Content: Thought content normal.         Judgment: Judgment normal.        Result Review :             I cleaned the bottom of her left foot at the ball of the foot with Betadine and alcohol.  Using a small needle I tried to open the splinter entrance.  I was able to gain access but my tweezers were so large that I could not grasp the splinter.  She tolerated the procedure well.  I have sent her to podiatry for removal.     Assessment and Plan   Diagnoses and all orders for this visit:    1. Hyperlipidemia, unspecified hyperlipidemia type (Primary)    2. Impaired fasting glucose    3. Depression, unspecified depression type    4. Splinter of left foot, initial encounter    5. Seasonal allergies    6. Anxiety    Other orders  -     benzonatate (TESSALON) 200 MG capsule; Take 1 capsule by mouth 3 (Three) Times a Day As Needed for Cough.  Dispense: 30 capsule; Refill: 0  -     amoxicillin-clavulanate (Augmentin) 875-125 MG per tablet; Take 1 tablet by mouth 2 (Two) Times a Day.  Dispense: 14 tablet; Refill: 0    she just got a new dog and plans to do more walking --hoping to lower a1c.  Her exam is  normal and I don't see evidence of infection.  Will treat with tessalon perles tid prn.  Will also treat the FB in foot with augmentin and this will cover URI if there should be any infection.  Labs reviewed.  Referral to Dr. doan and Dr. Kitchen's group for splinter removal.  She will continue to minimize the use of Xanax.  She has a long history of anxiety and depression.  For the most part, her anxiety is better controlled.  She is using a half of a Xanax 0.5 mg as needed.  She understands the risk of using a benzodiazepine class with memory and cognition.  She is definitely decreasing the frequency of use of the Xanax.  She is tolerating fenofibrate 145 mg daily for hypertriglyceridemia.  Her triglycerides are acceptable.  She is also doing well on the metformin extended release 500 mg daily.  She sees cardiology for paroxysmal SVT.  She has metoprolol that she can use for that.         Follow Up   No follow-ups on file.  Patient was given instructions and counseling regarding her condition or for health maintenance advice. Please see specific information pulled into the AVS if appropriate.

## 2022-09-16 ENCOUNTER — TELEPHONE (OUTPATIENT)
Dept: CARDIOLOGY | Facility: CLINIC | Age: 77
End: 2022-09-16

## 2022-09-16 NOTE — TELEPHONE ENCOUNTER
09/16/22   Pt called she is needing refill on Metoprolol 35 mg to [place].   She also wants to kknow what Physician Dr EVANS recommends her to see in November at her scheduled appt with DR EVANS.   Tyshawn SAINZ

## 2022-09-21 DIAGNOSIS — F41.9 ANXIETY: ICD-10-CM

## 2022-09-21 NOTE — TELEPHONE ENCOUNTER
Caller: Yousif Adonayalondra YO    Relationship: Self    Best call back number: [phone]      Requested Prescriptions:   Requested Prescriptions     Pending Prescriptions Disp Refills   • ALPRAZolam (XANAX) 0.5 MG tablet 45 tablet 0     Sig: Take 1 tablet by mouth Daily As Needed for Anxiety.        Pharm[facility] be sent[id number] [address] AT [facility] [address]  [phone] [facility] [phone] FX     Additional details provided by patient: HAS 3 DAYS LEFT.    Does the patient have less than a 3 day supply:  [x] Yes  [] No    Hailey Costa, PCT   09/21/22 12:18 EDT

## 2022-09-21 NOTE — TELEPHONE ENCOUNTER
Rx Refill Note  Requested Prescriptions     Pending Prescriptions Disp Refills   • ALPRAZolam (XANAX) 0.5 MG tablet 45 tablet 0     Sig: Take 1 tablet by mouth Daily As Needed for Anxiety.      Last office visit with prescribing clinician: 8/31/2022      Next office visit with prescribing clinician: Visit date not found            Gina Martell MA  09/21/22, 14:45 EDT     Updated contract

## 2022-09-22 RX ORDER — ALPRAZOLAM 0.5 MG/1
0.5 TABLET ORAL DAILY PRN
Qty: 45 TABLET | Refills: 0 | Status: SHIPPED | OUTPATIENT
Start: 2022-09-22 | End: 2022-11-14

## 2022-10-10 DIAGNOSIS — G89.29 NECK PAIN, CHRONIC: ICD-10-CM

## 2022-10-10 DIAGNOSIS — M54.2 NECK PAIN, CHRONIC: ICD-10-CM

## 2022-10-10 RX ORDER — CYCLOBENZAPRINE HCL 5 MG
5 TABLET ORAL NIGHTLY PRN
Qty: 15 TABLET | Refills: 0 | Status: SHIPPED | OUTPATIENT
Start: 2022-10-10

## 2022-10-10 NOTE — TELEPHONE ENCOUNTER
Rx Refill Note  Requested Prescriptions     Pending Prescriptions Disp Refills   • cyclobenzaprine (FLEXERIL) 5 MG tablet 15 tablet 0     Sig: Take 1 tablet by mouth At Night As Needed for Muscle Spasms.      Last office visit with prescribing clinician: 8/31/2022      Next office visit with prescribing clinician: Visit date not found            Gina Martell MA  10/10/22, 12:08 EDT

## 2022-10-10 NOTE — TELEPHONE ENCOUNTER
Caller: YousifAlexandria    Relationship: Self    Best call back number:[phone]    Requested Prescriptions:   Requested Prescriptions     Pending Prescriptions Disp Refills   • cyclobenzaprine (FLEXERIL) 5 MG tablet 15 tablet 0     Sig: Take 1 tablet by mouth At Night As Needed for Muscle Spasms.        Pharmacy where request should be sent: [id number] [place] [address] AT [facility] [address]  [phone] [facility] [phone] FX     Additional details provided by patient: IS HAVING A FLARE UP     Does the patient have less than a 3 day supply:  [x] Yes  [] No    Nicole Sanchez   10/10/22 11:55 EDT

## 2022-10-21 RX ORDER — DESVENLAFAXINE 100 MG/1
100 TABLET, EXTENDED RELEASE ORAL DAILY
Qty: 90 TABLET | Refills: 1 | Status: SHIPPED | OUTPATIENT
Start: 2022-10-21

## 2022-10-26 ENCOUNTER — TELEPHONE (OUTPATIENT)
Dept: FAMILY MEDICINE CLINIC | Facility: CLINIC | Age: 77
End: 2022-10-26

## 2022-10-26 NOTE — TELEPHONE ENCOUNTER
Caller: Alexandria Yousif    Relationship to patient: Self    Best call back number: [phone]    Date of positive COVID19 test:  HOME TEST WAS NEGATIVE ON 10/25TH AND 10/26TH    COVID19 symptoms: SINCE Monday SORE THROAT, HEADACHE, COUGH, RUNNY NOSE, DIARRHEA    Additional information or concerns: REQUESTING A CALL TO DISCUSS IF SHE NEEDS A PCR TEST OR WHAT DR BECK RECOMENDS

## 2022-11-03 ENCOUNTER — OFFICE VISIT (OUTPATIENT)
Dept: FAMILY MEDICINE CLINIC | Facility: CLINIC | Age: 77
End: 2022-11-03

## 2022-11-03 VITALS
RESPIRATION RATE: 16 BRPM | BODY MASS INDEX: 21.72 KG/M2 | WEIGHT: 138.4 LBS | SYSTOLIC BLOOD PRESSURE: 124 MMHG | HEART RATE: 76 BPM | DIASTOLIC BLOOD PRESSURE: 78 MMHG | OXYGEN SATURATION: 96 % | TEMPERATURE: 97.7 F | HEIGHT: 67 IN

## 2022-11-03 DIAGNOSIS — G93.31 POSTVIRAL FATIGUE SYNDROME: ICD-10-CM

## 2022-11-03 DIAGNOSIS — R09.81 HEAD CONGESTION: Primary | ICD-10-CM

## 2022-11-03 DIAGNOSIS — R10.30 LOWER ABDOMINAL PAIN: ICD-10-CM

## 2022-11-03 PROCEDURE — 99213 OFFICE O/P EST LOW 20 MIN: CPT | Performed by: FAMILY MEDICINE

## 2022-11-03 NOTE — PROGRESS NOTES
"Chief Complaint  Follow-up ( f/U for acute cough and acute UTI )    Subjective        Alexandria Yousif presents to [facility]  History of Present Illness  Ms. Yousif is a patient of Dr. Muse in the office and is presenting today related to her visit to the  and she was diagnosed with UTI and acute cough. She was given ciprofloxacin. Thinks she is having some GI upset from the abx. Feels better though. Her urine culture was negative.   Feels better but feels terribly tired.   She is taking the flonase.     Ms. Yousif has a history of SVT, PVCs, atrial tachycardia treated with low-dose metoprolol tartrate.  She also has history of TIA for which she has been counseled to remain on aspirin.  She follows with cardiology.  She also has a history of secondary polycythemia and hypercalcemia.  She follows with hematology but not recommended for phlebotomy.  She has been seen by endocrinology for her hypercalcemia and thought to be related to her high normal albumin.  She also has history of type 2 diabetes, hyperlipidemia, GERD, insomnia, depression and anxiety related to her medical conditions and osteopenia.  Her last A1c was in August and was 6.2%.    Objective   Vital Signs:  /78 (BP Location: Right arm, Patient Position: Sitting, Cuff Size: Adult)   Pulse 76   Temp 97.7 °F (36.5 °C) (Infrared)   Resp 16   Ht 170.2 cm (67.01\")   Wt 62.8 kg (138 lb 6.4 oz)   SpO2 96%   BMI 21.67 kg/m²   Estimated body mass index is 21.67 kg/m² as calculated from the following:    Height as of this encounter: 170.2 cm (67.01\").    Weight as of this encounter: 62.8 kg (138 lb 6.4 oz).    BMI is within normal parameters. No other follow-up for BMI required.      Physical Exam  Vitals reviewed.   Constitutional:       General: She is not in acute distress.     Appearance: Normal appearance. She is normal weight. She is not ill-appearing or toxic-appearing.   HENT:      Nose: Congestion " present.      Mouth/Throat:      Mouth: Mucous membranes are moist.      Pharynx: Oropharynx is clear. No posterior oropharyngeal erythema.   Eyes:      General: No scleral icterus.        Right eye: No discharge.         Left eye: No discharge.      Conjunctiva/sclera: Conjunctivae normal.   Cardiovascular:      Rate and Rhythm: Normal rate and regular rhythm.      Heart sounds: Murmur heard.     No friction rub. No gallop.      Comments: 2/6 murmur. Best heard over the LUSB.   Pulmonary:      Effort: Pulmonary effort is normal. No respiratory distress.      Breath sounds: Normal breath sounds. No wheezing.   Neurological:      Mental Status: She is alert and oriented to person, place, and time.   Psychiatric:         Behavior: Behavior normal.        Result Review :                Assessment and Plan   Diagnoses and all orders for this visit:    1. Head congestion (Primary)    2. Lower abdominal pain             Follow Up   No follow-ups on file.  Patient was given instructions and counseling regarding her condition or for health maintenance advice. Please see specific information pulled into the AVS if appropriate.     She continues to have some congestion. She is taking the flonase but discussed altering how she administers this. She can continue taking this and use concurrently her mucinx and/or pseudofed prn.   Discussed symptoms and side effects especially considering her tachycardia tendency.     Some fatigue likely postviral. Looks like she has historically been on 12.5 mg of metoprolol but now 25 mg in the chart. She may need to have med adjusted again as she thinks it is contributing as well.     Her abdomen started hurting yesterday. Feels it is from the cipro. This is likely. She is overall feeling better. No diarrhea. Keeps us informed if worsening. Stay on the probiotic, keep the diet bland for the next few days to be gentle on the GI tract. She agrees.     Of note pt says she did get her pneumonia  shot with her  in 2/2022. Says no reason she would not have gotten but not recorded. Wondering if she did get this but wanted to be sure she was vaccinated adequately and did another booster when should she get it and I told her to wait a full year from when she thinks she got the previous one so next February.

## 2022-11-04 ENCOUNTER — OFFICE VISIT (OUTPATIENT)
Dept: CARDIOLOGY | Facility: CLINIC | Age: 77
End: 2022-11-04

## 2022-11-04 VITALS
WEIGHT: 139 LBS | BODY MASS INDEX: 22.34 KG/M2 | SYSTOLIC BLOOD PRESSURE: 136 MMHG | HEIGHT: 66 IN | HEART RATE: 71 BPM | DIASTOLIC BLOOD PRESSURE: 72 MMHG | OXYGEN SATURATION: 97 %

## 2022-11-04 DIAGNOSIS — R94.31 ABNORMAL EKG: ICD-10-CM

## 2022-11-04 DIAGNOSIS — I47.1 PAROXYSMAL SVT (SUPRAVENTRICULAR TACHYCARDIA): ICD-10-CM

## 2022-11-04 DIAGNOSIS — R00.2 PALPITATIONS: ICD-10-CM

## 2022-11-04 DIAGNOSIS — I45.10 RBBB: Primary | ICD-10-CM

## 2022-11-04 PROCEDURE — 99214 OFFICE O/P EST MOD 30 MIN: CPT | Performed by: NURSE PRACTITIONER

## 2022-11-04 PROCEDURE — 93000 ELECTROCARDIOGRAM COMPLETE: CPT | Performed by: NURSE PRACTITIONER

## 2022-11-04 NOTE — PROGRESS NOTES
"    CARDIOLOGY        Patient Name: Alexandria Yousif  :1945  Age: 77 y.o.  Primary Cardiologist: Geoff Rhodes MD  Encounter Provider:  GIOVANI Olivarez    Date of Service: 22            CHIEF COMPLAINT / REASON FOR OFFICE VISIT     Palpitations (1 yr f/u)      HISTORY OF PRESENT ILLNESS       HPI  Alexandria Yousif is a 77 y.o. female who presents today for semiannual evaluation.     Pt has a  history significant for palpitations, atrial tachycardia, SVT, PVCs, prior TIA.    Patient reports that she is doing fairly well since last assessment.  She does note that her recent metoprolol refill was sent in incorrectly.  Patient was taking metoprolol tartrate 25 mg twice daily, prescription sent in for daily.  She states since only taking this medication daily she has had increased heart palpitations and dyspnea.  She is currently on Cipro for UTI and URI.  Currently denies chest discomfort, lightheadedness, lower extremity edema, fatigue.      The following portions of the patient's history were reviewed and updated as appropriate: allergies, current medications, past family history, past medical history, past social history, past surgical history and problem list.      VITAL SIGNS     Visit Vitals  /72 (BP Location: Left arm, Patient Position: Sitting, Cuff Size: Adult)   Pulse 71   Ht 167.6 cm (66\")   Wt 63 kg (139 lb)   LMP  (LMP Unknown)   SpO2 97%   BMI 22.44 kg/m²         Wt Readings from Last 3 Encounters:   22 63 kg (139 lb)   22 62.8 kg (138 lb 6.4 oz)   22 61.8 kg (136 lb 4.8 oz)     Body mass index is 22.44 kg/m².      REVIEW OF SYSTEMS   Review of Systems   Constitutional: Negative for chills, fever, weight gain and weight loss.   Cardiovascular: Positive for irregular heartbeat and palpitations. Negative for leg swelling.   Respiratory: Positive for shortness of breath. Negative for cough, snoring and wheezing.    Hematologic/Lymphatic: Negative for bleeding " problem. Does not bruise/bleed easily.   Skin: Negative for color change.   Musculoskeletal: Negative for falls, joint pain and myalgias.   Gastrointestinal: Negative for melena.   Genitourinary: Negative for hematuria.   Neurological: Negative for excessive daytime sleepiness.   Psychiatric/Behavioral: Negative for depression. The patient is not nervous/anxious.            PHYSICAL EXAMINATION     Vitals and nursing note reviewed.   Constitutional:       Appearance: Normal appearance. Well-developed.   Eyes:      Conjunctiva/sclera: Conjunctivae normal.   Neck:      Vascular: No carotid bruit.   Pulmonary:      Breath sounds: Normal breath sounds.   Cardiovascular:      Normal rate. Regular rhythm. Normal S1 with normal intensity. Normal S2 with normal intensity.      Murmurs: There is no murmur.      No gallop. No click. No rub.   Edema:     Peripheral edema absent.   Musculoskeletal: Normal range of motion. Skin:     General: Skin is warm and dry.   Neurological:      Mental Status: Alert and oriented to person, place, and time.      GCS: GCS eye subscore is 4. GCS verbal subscore is 5. GCS motor subscore is 6.   Psychiatric:         Speech: Speech normal.         Behavior: Behavior normal.         Thought Content: Thought content normal.         Judgment: Judgment normal.           REVIEWED DATA       ECG 12 Lead    Date/Time: 11/4/2022 11:08 AM  Performed by: Emma Nova APRN  Authorized by: Emma Nova APRN   Comparison: compared with previous ECG from 10/13/2021  Rhythm: sinus rhythm  Rate: normal  BPM: 71  Conduction: right bundle branch block  ST Segments: ST segments normal  T Waves: T waves normal  QRS axis: normal    Clinical impression: abnormal EKG            Cardiac Procedures:  1. Stress echocardiogram 10/17/2017.  LVEF 64%.  No ischemia.  No valvular disease.  2. Carotid artery Doppler 5/2/2019.  Normal.  3. Echocardiogram 5/2/2019.  LVEF 67%.  Mild LVH.  Grade 2 diastolic  dysfunction.  4. Holter monitor 6/6/2019.  2 very brief runs of atrial tachycardia.  Longest being 7 beats in length.  5. ZIO monitor 12/9/2019.  5 episodes of SVT with longest lasting 11 beats.  Recurrent PVCs comprising 5% of total.  Rare ventricular bigeminy and trigeminy.  No ventricular tachycardia.  Of note, patient had significant reaction to adhesive with rash on blood sugars which actually required steroid injections and oral steroids    Lipid Panel    Lipid Panel 1/26/22 8/22/22   Total Cholesterol 176 163   Triglycerides 154 (A) 169 (A)   HDL Cholesterol 58 52   VLDL Cholesterol 27 29   LDL Cholesterol  91 82   LDL/HDL Ratio 1.6 1.6   (A) Abnormal value       Comments are available for some flowsheets but are not being displayed.           BUN   Date Value Ref Range Status   08/22/2022 15 8 - 27 mg/dL Final   09/25/2018 12 8 - 23 mg/dL Final     Creatinine   Date Value Ref Range Status   08/22/2022 0.65 0.57 - 1.00 mg/dL Final   02/28/2022 0.60 0.60 - 1.30 mg/dL Final     Comment:     Serial Number: [id number]:  [id number]     Potassium   Date Value Ref Range Status   08/22/2022 4.8 3.5 - 5.2 mmol/L Final   09/25/2018 4.2 3.5 - 5.2 mmol/L Final     ALT (SGPT)   Date Value Ref Range Status   08/22/2022 26 0 - 32 IU/L Final     AST (SGOT)   Date Value Ref Range Status   08/22/2022 28 0 - 40 IU/L Final           ASSESSMENT & PLAN     Diagnoses and all orders for this visit:    1. RBBB (Primary)  -     Adult Transthoracic Echo Complete W/ Cont if Necessary Per Protocol; Future  2. Abnormal EKG  · Patient with new right bundle branch block on ECG compared to prior.  Patient is fortunately asymptomatic although she does have some dyspnea but thinks that this was because her metoprolol dosing is incorrect.  We will get echocardiogram to further assess the structure and function of the heart.  -     Adult Transthoracic Echo Complete W/ Cont if Necessary Per Protocol; Future    3. Palpitations  · Palpitations and  dyspnea have increased since metoprolol was refilled.  She was originally taking metoprolol tartrate 25 mg twice daily and prescription was inadvertently only sent in for daily.  She states that since decreasing the dosage she has noticed an increase in her symptoms.  Correct refill for metoprolol tartrate 25 mg twice daily was sent to the pharmacy.  Patient will let me know if this does not improve her symptoms.    4. Paroxysmal SVT (supraventricular tachycardia) (HCC)    Other orders  -     metoprolol tartrate (LOPRESSOR) 25 MG tablet; Take 1 tablet by mouth 2 (Two) Times a Day.  Dispense: 180 tablet; Refill: 3  -     ECG 12 Lead          Return in about 6 months (around 5/4/2023) for Dr. Lynch-transitioning from Dr. Rhodes.    Future Appointments       Provider Department Center    11/29/2022 10:45 AM BANDAR LCG ECHO/VAS FRONT [facility] OUTPATIENT ECHOCARDIOGRAPHY [facility]    2/23/2023 1:30 PM BANDAR UCE CT 1 [facility] CT E    3/2/2023 10:30 AM LAB CHAIR 1 CBC LAB [facility] ONC CBC LAB EP [facility]    3/2/2023 11:00 AM Amador Pierce II, MD [facility] HEMATOLOGY & ONCOLOGY [facility]    5/4/2023 10:00 AM Judy Lynch MD [facility] CARDIOLOGY [facility]    5/9/2023 10:30 AM Anita Muse MD [facility] PRIMARY CARE [facility]    9/5/2023 10:00 AM Anita Muse MD [facility] PRIMARY CARE [facility]                MEDICATIONS         Discharge Medications          Accurate as of November 4, 2022 11:54 AM. If you have any questions, ask your nurse or doctor.            Changes to Medications      Instructions Start Date   metoprolol tartrate 25 MG tablet  Commonly known as: LOPRESSOR  What changed:   · when to take this  · reasons to take this  Changed by: GIOVANI Olivarez   25 mg, Oral, 2 Times Daily         Continue These Medications      Instructions Start Date   acetaminophen 500 MG  tablet  Commonly known as: TYLENOL   1,000 mg, Oral, Every 6 Hours PRN      ALPRAZolam 0.5 MG tablet  Commonly known as: XANAX   0.5 mg, Oral, Daily PRN      Alum Hydroxide-Mag Trisilicate 80-14.2 MG chewable tablet  Commonly known as: Gaviscon   1 tablet, Oral, 4 Times Daily PRN      aspirin 81 MG tablet   81 mg, Oral, Daily      Biotin 10 MG tablet   No dose, route, or frequency recorded.      Cholecalciferol 50 MCG (2000 UT) capsule   No dose, route, or frequency recorded.      cyclobenzaprine 5 MG tablet  Commonly known as: FLEXERIL   5 mg, Oral, Nightly PRN      desvenlafaxine 100 MG 24 hr tablet  Commonly known as: PRISTIQ   100 mg, Oral, Daily      fenofibrate 145 MG tablet  Commonly known as: TRICOR   TAKE 1 TABLET BY MOUTH EVERY DAY      fish oil 1200 MG capsule capsule   No dose, route, or frequency recorded.      fluticasone 50 MCG/ACT nasal spray  Commonly known as: FLONASE   2 sprays, Nasal, Daily      levETIRAcetam 500 MG tablet  Commonly known as: KEPPRA   TAKE 1/2 TABLET BY MOUTH DAILY AS NEEDED FOR HEADACHE      metFORMIN  MG 24 hr tablet  Commonly known as: GLUCOPHAGE-XR   TAKE 1 TABLET BY MOUTH DAILY WITH LARGEST MEAL OF THE DAY AND A GLASS OF WATER      omeprazole 40 MG capsule  Commonly known as: priLOSEC   TAKE 1 CAPSULE BY MOUTH EVERY DAY      Premarin 0.3 MG tablet  Generic drug: estrogens (conjugated)   TAKE 1 TABLET BY MOUTH EVERY DAY      rosuvastatin 10 MG tablet  Commonly known as: CRESTOR   10 mg, Oral, Nightly      spironolactone 25 MG tablet  Commonly known as: ALDACTONE   25 mg, Oral, Daily      traZODone 50 MG tablet  Commonly known as: DESYREL   TAKE 1/2 TO 1 TABLET BY MOUTH AT BEDTIME AS NEEDED FOR SLEEP      tretinoin 0.025 % cream  Commonly known as: RETIN-A   APPLY EXTERNALLY TO THE AFFECTED AREA EVERY NIGHT AT BEDTIME TO THE FACE                 **Dragon Disclaimer:   Much of this encounter note is an electronic transcription/translation of spoken language to printed  text. The electronic translation of spoken language may permit erroneous, or at times, nonsensical words or phrases to be inadvertently transcribed. Although I have reviewed the note for such errors, some may still exist.

## 2022-11-09 ENCOUNTER — TELEPHONE (OUTPATIENT)
Dept: CARDIOLOGY | Facility: CLINIC | Age: 77
End: 2022-11-09

## 2022-11-09 NOTE — TELEPHONE ENCOUNTER
"Pt called and stated that due to the RBBB on her EKG, she was wondering if she is okay to proceed with a \"full blown\" colonoscopy. She states she has friends that have cardiovascular problems and they have been advised against colonoscopies.     I informed pt that once she sees her GI MD and they order the colonoscopy, they will reach out to our office to obtain cardiac clearance and it will be determined at that time whether or not she is okay to proceed. Pt verbalized understanding. // HG  "

## 2022-11-13 DIAGNOSIS — F41.9 ANXIETY: ICD-10-CM

## 2022-11-14 NOTE — TELEPHONE ENCOUNTER
Rx Refill Note  Requested Prescriptions     Pending Prescriptions Disp Refills   • ALPRAZolam (XANAX) 0.5 MG tablet [Pharmacy Med Name: ALPRAZOLAM 0.5MG TABLETS] 45 tablet 0     Sig: TAKE 1 TABLET BY MOUTH DAILY AS NEEDED FOR ANXIETY      Last office visit with prescribing clinician: 8/31/2022      Next office visit with prescribing clinician: 5/9/2023            Gina Martell MA  11/14/22, 14:56 EST     Updated contract

## 2022-11-15 RX ORDER — ALPRAZOLAM 0.5 MG/1
0.5 TABLET ORAL DAILY PRN
Qty: 45 TABLET | Refills: 0 | Status: SHIPPED | OUTPATIENT
Start: 2022-11-15 | End: 2022-12-31

## 2022-11-21 ENCOUNTER — HOSPITAL ENCOUNTER (OUTPATIENT)
Dept: CARDIOLOGY | Facility: HOSPITAL | Age: 77
Discharge: HOME | End: 2022-11-21
Admitting: NURSE PRACTITIONER

## 2022-11-21 VITALS
HEIGHT: 66 IN | DIASTOLIC BLOOD PRESSURE: 78 MMHG | HEART RATE: 68 BPM | BODY MASS INDEX: 22.34 KG/M2 | WEIGHT: 139 LBS | SYSTOLIC BLOOD PRESSURE: 146 MMHG

## 2022-11-21 DIAGNOSIS — I45.10 RBBB: ICD-10-CM

## 2022-11-21 DIAGNOSIS — R94.31 ABNORMAL EKG: ICD-10-CM

## 2022-11-21 LAB
AORTIC ARCH: 2.3 CM
ASCENDING AORTA: 2.7 CM
BH CV ECHO MEAS - ACS: 1.39 CM
BH CV ECHO MEAS - AI P1/2T: 413.8 MSEC
BH CV ECHO MEAS - AO MAX PG: 10.2 MMHG
BH CV ECHO MEAS - AO MEAN PG: 5.4 MMHG
BH CV ECHO MEAS - AO ROOT DIAM: 2.7 CM
BH CV ECHO MEAS - AO V2 MAX: 159.9 CM/SEC
BH CV ECHO MEAS - AO V2 VTI: 37.8 CM
BH CV ECHO MEAS - AVA(I,D): 1.67 CM2
BH CV ECHO MEAS - EDV(CUBED): 52.6 ML
BH CV ECHO MEAS - EDV(MOD-SP2): 59 ML
BH CV ECHO MEAS - EDV(MOD-SP4): 60 ML
BH CV ECHO MEAS - EF(MOD-BP): 60.7 %
BH CV ECHO MEAS - EF(MOD-SP2): 61 %
BH CV ECHO MEAS - EF(MOD-SP4): 61.7 %
BH CV ECHO MEAS - ESV(CUBED): 16.2 ML
BH CV ECHO MEAS - ESV(MOD-SP2): 23 ML
BH CV ECHO MEAS - ESV(MOD-SP4): 23 ML
BH CV ECHO MEAS - FS: 32.4 %
BH CV ECHO MEAS - IVS/LVPW: 1.04 CM
BH CV ECHO MEAS - IVSD: 0.99 CM
BH CV ECHO MEAS - LAT PEAK E' VEL: 8.7 CM/SEC
BH CV ECHO MEAS - LV DIASTOLIC VOL/BSA (35-75): 35 CM2
BH CV ECHO MEAS - LV MASS(C)D: 110.4 GRAMS
BH CV ECHO MEAS - LV MAX PG: 3.7 MMHG
BH CV ECHO MEAS - LV MEAN PG: 1.76 MMHG
BH CV ECHO MEAS - LV SYSTOLIC VOL/BSA (12-30): 13.4 CM2
BH CV ECHO MEAS - LV V1 MAX: 96.4 CM/SEC
BH CV ECHO MEAS - LV V1 VTI: 20.8 CM
BH CV ECHO MEAS - LVIDD: 3.7 CM
BH CV ECHO MEAS - LVIDS: 2.5 CM
BH CV ECHO MEAS - LVOT AREA: 3 CM2
BH CV ECHO MEAS - LVOT DIAM: 1.97 CM
BH CV ECHO MEAS - LVPWD: 0.95 CM
BH CV ECHO MEAS - MED PEAK E' VEL: 6.5 CM/SEC
BH CV ECHO MEAS - MV A DUR: 0.12 SEC
BH CV ECHO MEAS - MV A MAX VEL: 76.7 CM/SEC
BH CV ECHO MEAS - MV DEC SLOPE: 516.4 CM/SEC2
BH CV ECHO MEAS - MV DEC TIME: 0.2 MSEC
BH CV ECHO MEAS - MV E MAX VEL: 82.3 CM/SEC
BH CV ECHO MEAS - MV E/A: 1.07
BH CV ECHO MEAS - MV MAX PG: 4.1 MMHG
BH CV ECHO MEAS - MV MEAN PG: 1.43 MMHG
BH CV ECHO MEAS - MV P1/2T: 61.6 MSEC
BH CV ECHO MEAS - MV V2 VTI: 27.2 CM
BH CV ECHO MEAS - MVA(P1/2T): 3.6 CM2
BH CV ECHO MEAS - MVA(VTI): 2.33 CM2
BH CV ECHO MEAS - PA ACC TIME: 0.12 SEC
BH CV ECHO MEAS - PA PR(ACCEL): 24.3 MMHG
BH CV ECHO MEAS - PA V2 MAX: 82.8 CM/SEC
BH CV ECHO MEAS - PULM A REVS DUR: 0.15 SEC
BH CV ECHO MEAS - PULM A REVS VEL: 30 CM/SEC
BH CV ECHO MEAS - PULM DIAS VEL: 35.6 CM/SEC
BH CV ECHO MEAS - PULM S/D: 1.33
BH CV ECHO MEAS - PULM SYS VEL: 47.1 CM/SEC
BH CV ECHO MEAS - QP/QS: 0.4
BH CV ECHO MEAS - RAP SYSTOLE: 3 MMHG
BH CV ECHO MEAS - RV MAX PG: 1.88 MMHG
BH CV ECHO MEAS - RV V1 MAX: 68.6 CM/SEC
BH CV ECHO MEAS - RV V1 VTI: 14.6 CM
BH CV ECHO MEAS - RVOT DIAM: 1.48 CM
BH CV ECHO MEAS - RVSP: 26.9 MMHG
BH CV ECHO MEAS - SI(MOD-SP2): 21 ML/M2
BH CV ECHO MEAS - SI(MOD-SP4): 21.6 ML/M2
BH CV ECHO MEAS - SUP REN AO DIAM: 1.7 CM
BH CV ECHO MEAS - SV(LVOT): 63.2 ML
BH CV ECHO MEAS - SV(MOD-SP2): 36 ML
BH CV ECHO MEAS - SV(MOD-SP4): 37 ML
BH CV ECHO MEAS - SV(RVOT): 25.1 ML
BH CV ECHO MEAS - TAPSE (>1.6): 1.96 CM
BH CV ECHO MEAS - TR MAX PG: 23.9 MMHG
BH CV ECHO MEAS - TR MAX VEL: 244.3 CM/SEC
BH CV ECHO MEASUREMENTS AVERAGE E/E' RATIO: 10.83
BH CV XLRA - RV BASE: 2.8 CM
BH CV XLRA - RV LENGTH: 5.8 CM
BH CV XLRA - RV MID: 2.14 CM
BH CV XLRA - TDI S': 14.6 CM/SEC
LEFT ATRIUM VOLUME INDEX: 15.2 ML/M2
MAXIMAL PREDICTED HEART RATE: 144 BPM
SINUS: 2.9 CM
STJ: 2.6 CM
STRESS TARGET HR: 122 BPM

## 2022-11-21 PROCEDURE — 93306 TTE W/DOPPLER COMPLETE: CPT

## 2022-11-21 PROCEDURE — 93306 TTE W/DOPPLER COMPLETE: CPT | Performed by: INTERNAL MEDICINE

## 2022-11-23 ENCOUNTER — TELEPHONE (OUTPATIENT)
Dept: CARDIOLOGY | Facility: CLINIC | Age: 77
End: 2022-11-23

## 2022-11-23 NOTE — TELEPHONE ENCOUNTER
Elke,    Called patient. Answered her questions. She stated I answered her questions sufficiently and she has no further questions at this time.     Thank you,    Dipika Dial RN  Triage [facility]  11/23/22 14:34 EST

## 2022-11-23 NOTE — TELEPHONE ENCOUNTER
Pt called and LVM requesting a call to review echo results. She states she knows she was told by GENARO that it was normal, however upon review of the report through [facility] she saw that some things look different than what she was told. Please advise. // HG

## 2022-11-23 NOTE — TELEPHONE ENCOUNTER
Triage- please see what her questions are. Also let her know that I reviewed her echo which shows her heart is strong.  Please explain with normal ejection fraction ranges which is 50-70%.  She has no significant valve disease.  Trace to mild valvular regurgitation or leakage is not significant.  We typically can see this in €30 and it does not cause symptoms.  Her echo was overall normal for her age.    If she has any additional questions, I am happy to give her a call since I am covering Iona's messages today

## 2022-12-27 NOTE — TELEPHONE ENCOUNTER
Caller: Alexandria Yousif    Relationship: Self    Best call back number: [phone]    Requested Prescriptions:   Requested Prescriptions     Pending Prescriptions Disp Refills   • levETIRAcetam (KEPPRA) 500 MG tablet 45 tablet 0        Pharmacy where request should be sent: [id number] [place], [place] - [address] AT [facility] [address]  [place] - [phone] [facility] [phone] FX     Additional details provided by patient: LESS THAN 3 DAY SUPPLY    Does the patient have less than a 3 day supply:  [x] Yes  [] No    Would you like a call back once the refill request has been completed: [] Yes [x] No    If the office needs to give you a call back, can they leave a voicemail: [] Yes [x] No    Saranya Beth Rep   12/27/22 11:41 EST

## 2022-12-27 NOTE — TELEPHONE ENCOUNTER
Rx Refill Note  Requested Prescriptions     Pending Prescriptions Disp Refills   • levETIRAcetam (KEPPRA) 500 MG tablet 45 tablet 0      Last office visit with prescribing clinician: 8/31/2022   Last telemedicine visit with prescribing clinician: 5/9/2023   Next office visit with prescribing clinician: 5/9/2023       {TIP  Please add Last Relevant Lab Date if appropriate: 10/27/22                 Would you like a call back once the refill request has been completed: [] Yes [] No    If the office needs to give you a call back, can they leave a voicemail: [] Yes [] No    Bindu Mulligan MA  12/27/22, 14:51 EST

## 2022-12-29 DIAGNOSIS — F41.9 ANXIETY: ICD-10-CM

## 2022-12-30 RX ORDER — LEVETIRACETAM 500 MG/1
TABLET ORAL
Qty: 45 TABLET | Refills: 0 | Status: SHIPPED | OUTPATIENT
Start: 2022-12-30 | End: 2023-01-10 | Stop reason: SDUPTHER

## 2022-12-30 NOTE — TELEPHONE ENCOUNTER
Rx Refill Note  Requested Prescriptions     Pending Prescriptions Disp Refills   • ALPRAZolam (XANAX) 0.5 MG tablet [Pharmacy Med Name: ALPRAZOLAM 0.5MG TABLETS] 45 tablet      Sig: TAKE 1 TABLET BY MOUTH DAILY AS NEEDED FOR ANXIETY      Last office visit with prescribing clinician: 8/31/2022   Last telemedicine visit with prescribing clinician: 5/9/2023   Next office visit with prescribing clinician: 5/9/2023       {TIP  Please add Last Relevant Lab Date if appropriate: 10/27/22                 Would you like a call back once the refill request has been completed: [] Yes [] No    If the office needs to give you a call back, can they leave a voicemail: [] Yes [] No    Bindu Mulligan MA  12/30/22, 09:04 EST

## 2022-12-31 RX ORDER — ALPRAZOLAM 0.5 MG/1
0.5 TABLET ORAL DAILY PRN
Qty: 45 TABLET | Refills: 0 | Status: SHIPPED | OUTPATIENT
Start: 2022-12-31 | End: 2023-02-20

## 2023-01-05 RX ORDER — TRAZODONE HYDROCHLORIDE 50 MG/1
TABLET ORAL
Qty: 90 TABLET | Refills: 1 | Status: SHIPPED | OUTPATIENT
Start: 2023-01-05

## 2023-01-05 RX ORDER — CONJUGATED ESTROGENS 0.3 MG/1
TABLET, FILM COATED ORAL
Qty: 90 TABLET | Refills: 2 | Status: SHIPPED | OUTPATIENT
Start: 2023-01-05

## 2023-01-05 RX ORDER — METFORMIN HYDROCHLORIDE 500 MG/1
TABLET, EXTENDED RELEASE ORAL
Qty: 90 TABLET | Refills: 1 | Status: SHIPPED | OUTPATIENT
Start: 2023-01-05

## 2023-01-10 ENCOUNTER — TELEPHONE (OUTPATIENT)
Dept: CARDIOLOGY | Facility: CLINIC | Age: 78
End: 2023-01-10
Payer: MEDICARE

## 2023-01-10 RX ORDER — LEVETIRACETAM 500 MG/1
TABLET ORAL
Qty: 45 TABLET | Refills: 1 | Status: SHIPPED | OUTPATIENT
Start: 2023-01-10

## 2023-01-10 NOTE — TELEPHONE ENCOUNTER
I spoke with Alexandria Yousif and updated pt on recommendations from provider.  Pt verbalized understanding and has no further questions at this time.    Thank you,    Hetal Clemens, RN  Triage [facility]  01/10/23 11:15 EST

## 2023-01-10 NOTE — TELEPHONE ENCOUNTER
Pt called this morning with concern over recent symptoms prior to a 7-8 hour drive planned for tomorrow.    She says since Sunday, she's been experiencing palpitations, fatigue, DONG, and since this morning has been feeling dizzy when she bends over.  These symptoms come and go.  She denies CP, arm pain, jaw pain, nausea, and edema.    Her highest BP this week was 151/98.  She's been running 140s/80-90s, HR 70-80s.    She's taking 25 mg metoprolol BID and 25 mg spironolactone QD as prescribed.    Do you have any recommendations for this patient?    Thank you,    Hetal Clemens RN  [facility] Triage  01/10/23  10:46 EST

## 2023-01-18 RX ORDER — OMEPRAZOLE 40 MG/1
CAPSULE, DELAYED RELEASE ORAL
Qty: 90 CAPSULE | Refills: 1 | Status: SHIPPED | OUTPATIENT
Start: 2023-01-18

## 2023-01-18 NOTE — TELEPHONE ENCOUNTER
Rx Refill Note  Requested Prescriptions     Pending Prescriptions Disp Refills   • omeprazole (priLOSEC) 40 MG capsule [Pharmacy Med Name: OMEPRAZOLE 40MG CAPSULES] 90 capsule 1     Sig: TAKE 1 CAPSULE BY MOUTH EVERY DAY      Last office visit with prescribing clinician: 8/31/2022   Last telemedicine visit with prescribing clinician: 5/9/2023   Next office visit with prescribing clinician: 5/9/2023                         Would you like a call back once the refill request has been completed: [] Yes [] No    If the office needs to give you a call back, can they leave a voicemail: [] Yes [] No    Gina Martell MA  01/18/23, 09:14 EST

## 2023-02-17 RX ORDER — FENOFIBRATE 145 MG/1
TABLET, COATED ORAL
Qty: 90 TABLET | Refills: 1 | Status: SHIPPED | OUTPATIENT
Start: 2023-02-17

## 2023-02-17 RX ORDER — ROSUVASTATIN CALCIUM 10 MG/1
10 TABLET, COATED ORAL NIGHTLY
Qty: 90 TABLET | Refills: 1 | Status: SHIPPED | OUTPATIENT
Start: 2023-02-17

## 2023-02-18 DIAGNOSIS — F41.9 ANXIETY: ICD-10-CM

## 2023-02-20 NOTE — TELEPHONE ENCOUNTER
Rx Refill Note  Requested Prescriptions     Pending Prescriptions Disp Refills   • ALPRAZolam (XANAX) 0.5 MG tablet [Pharmacy Med Name: ALPRAZOLAM 0.5MG TABLETS] 45 tablet      Sig: TAKE 1 TABLET BY MOUTH DAILY AS NEEDED FOR ANXIETY      Last office visit with prescribing clinician: 8/31/2022   Last telemedicine visit with prescribing clinician: 5/9/2023   Next office visit with prescribing clinician: 5/9/2023                         Would you like a call back once the refill request has been completed: [] Yes [] No    If the office needs to give you a call back, can they leave a voicemail: [] Yes [] No    Gina Martell MA  02/20/23, 13:42 EST     Updated contract

## 2023-02-22 ENCOUNTER — TELEPHONE (OUTPATIENT)
Dept: FAMILY MEDICINE CLINIC | Facility: CLINIC | Age: 78
End: 2023-02-22

## 2023-02-22 RX ORDER — ALPRAZOLAM 0.5 MG/1
0.5 TABLET ORAL DAILY PRN
Qty: 45 TABLET | Refills: 1 | Status: SHIPPED | OUTPATIENT
Start: 2023-02-22

## 2023-02-22 NOTE — TELEPHONE ENCOUNTER
Caller: Alexandria Yousif    Relationship: Self    Best call back number:[phone]     Requested Prescriptions:   valACYclovir (VALTREX) 500 MG tablet [        Additional details provided by patient: PATIENT IS OUT OF THIS MEDICATION.     NOT ON PATIENTS CURRENT   MED CHART   Does the patient have less than a 3 day supply:  [x] Yes  [] No    Would you like a call back once the refill request has been completed: [x] Yes [] No    If the office needs to give you a call back, can they leave a voicemail: [x] Yes [] No    Saranya Barrera Rep   02/22/23 12:59 EST

## 2023-02-23 ENCOUNTER — HOSPITAL ENCOUNTER (OUTPATIENT)
Dept: CT IMAGING | Facility: HOSPITAL | Age: 78
Discharge: HOME | End: 2023-02-23
Admitting: INTERNAL MEDICINE
Payer: MEDICARE

## 2023-02-23 DIAGNOSIS — R91.1 PULMONARY NODULE, RIGHT: ICD-10-CM

## 2023-02-23 PROCEDURE — 71250 CT THORAX DX C-: CPT

## 2023-02-23 RX ORDER — VALACYCLOVIR HYDROCHLORIDE 500 MG/1
1000 TABLET, FILM COATED ORAL 2 TIMES DAILY
Qty: 4 TABLET | Refills: 3 | Status: SHIPPED | OUTPATIENT
Start: 2023-02-23

## 2023-03-01 NOTE — PROGRESS NOTES
.     REASON FOR FOLLOWUP :   Elevated hematocrit and hypercalcemia    HISTORY OF PRESENT ILLNESS:  The patient is a 77 y.o. year old female  who is here for follow-up with the above-mentioned history.    No new problems.  Feeling fine other than chronic depression.  No SOA.      Past Medical History:   Diagnosis Date   • Allergic 1980   • Anxiety and depression    • Basal cell carcinoma 03/2008    Scalp and face   • Cervical herniated disc    • Colon polyp 02/2020   • CVA (cerebral vascular accident) ([facility]) 2018    Mild to moderat ischemia   • Fibrocystic breast    • Fracture of ankle 2016    R ankle   • GERD (gastroesophageal reflux disease)    • Grade II diastolic dysfunction    • Headache    • Heart murmur    • Hyperalbuminemia 05/2015   • Hypercalcemia 03/2016    mild at best when corrected for ongoing abnormal albumin levels with normal albumin being up to 4.7 g/dL though that is an issue with reference range here   • Hyperlipidemia    • Hypertension    • Infectious mononucleosis    • Injury of neck 1971    Hernieated disc when broke my back   • Low back pain 1971    Broken back   • Lumbar herniated disc    • LVH (left ventricular hypertrophy) 2019   • OA (osteoarthritis)    • Osteopenia    • PAT (paroxysmal atrial tachycardia) ([facility])     12 very brief runs of atrial tachycardia with the longest 7 beats in duration per 24-hour Holter monitor in June 2019   • PSVT (paroxysmal supraventricular tachycardia) ([facility])     5 episodes of PSVT the longest 11 beats in duration per Zio patch monitor in December 2019   • PVCs (premature ventricular contractions)     5% burden per Zio patch monitor in December 2019   • Rheumatic fever     Age 17   • TIA (transient ischemic attack)    • Tinnitus    • Type II diabetes mellitus ([facility]) 2019    Began Metformin   • Vertigo      Past Surgical History:   Procedure Laterality Date   • CEREBRAL ANGIOGRAM N/A 10/8/2018    Procedure: CEREBRAL ANGIOGRAM AND VENOGRAM WITH INTRASINUS  "PRESSURE RECORDING;  Surgeon: Alfredo Caruso MD;  Location: [facility] HYBRID OR 18/19;  Service: Neurosurgery   • COLONOSCOPY      2010   • COLONOSCOPY N/A 2/17/2020    Procedure: COLONOSCOPY TO CECUM WITH COLD BIOPSY POLYPECTOMY;  Surgeon: Stan Flood MD;  Location: [facility] ENDOSCOPY;  Service: Gastroenterology;  Laterality: N/A;  PRE- FAMILY HX COLON POLYPS  POST- POLYP, HEMORRHOIDS   • ENDOSCOPY  02/27/2017    Esophagitis, gastritis, small hh   • HYSTERECTOMY     • KNEE SURGERY     • MOHS SURGERY     • OOPHORECTOMY     • SUBTOTAL HYSTERECTOMY  1994    Full   • TONSILLECTOMY AND ADENOIDECTOMY  1965   • TRIGGER POINT INJECTION  1990s    Spinal   • UPPER GASTROINTESTINAL ENDOSCOPY  2016    \"Extremely severe GERD\"       MEDICATIONS    Current Outpatient Medications:   •  acetaminophen (TYLENOL) 500 MG tablet, Take 2 tablets by mouth Every 6 (Six) Hours As Needed for Mild Pain., Disp: , Rfl:   •  ALPRAZolam (XANAX) 0.5 MG tablet, TAKE 1 TABLET BY MOUTH DAILY AS NEEDED FOR ANXIETY, Disp: 45 tablet, Rfl: 1  •  Alum Hydroxide-Mag Trisilicate (GAVISCON) 80-14.2 MG chewable tablet, Chew 1 tablet 4 (Four) Times a Day As Needed (Heartburn)., Disp: 224 each, Rfl: 11  •  aspirin 81 MG tablet, Take 1 tablet by mouth Daily., Disp: , Rfl:   •  Biotin 10 MG tablet, , Disp: , Rfl:   •  Cholecalciferol 50 MCG (2000 UT) capsule, , Disp: , Rfl:   •  cyclobenzaprine (FLEXERIL) 5 MG tablet, Take 1 tablet by mouth At Night As Needed for Muscle Spasms., Disp: 15 tablet, Rfl: 0  •  desvenlafaxine (PRISTIQ) 100 MG 24 hr tablet, TAKE 1 TABLET BY MOUTH DAILY, Disp: 90 tablet, Rfl: 1  •  fenofibrate (TRICOR) 145 MG tablet, TAKE 1 TABLET BY MOUTH EVERY DAY, Disp: 90 tablet, Rfl: 1  •  levETIRAcetam (KEPPRA) 500 MG tablet, Take 1/2 tablet as needed for headache, Disp: 45 tablet, Rfl: 1  •  metFORMIN ER (GLUCOPHAGE-XR) 500 MG 24 hr tablet, TAKE 1 TABLET BY MOUTH DAILY WITH LARGEST MEAL OF THE DAY AND A GLASS OF WATER, Disp: 90 tablet, " Rfl: 01  •  metoprolol tartrate (LOPRESSOR) 25 MG tablet, Take 1 tablet by mouth 2 (Two) Times a Day., Disp: 180 tablet, Rfl: 3  •  Omega-3 Fatty Acids (FISH OIL) 1200 MG capsule capsule, , Disp: , Rfl:   •  omeprazole (priLOSEC) 40 MG capsule, TAKE 1 CAPSULE BY MOUTH EVERY DAY, Disp: 90 capsule, Rfl: 1  •  Premarin 0.3 MG tablet, TAKE 1 TABLET BY MOUTH EVERY DAY, Disp: 90 tablet, Rfl: 2  •  rosuvastatin (CRESTOR) 10 MG tablet, TAKE 1 TABLET BY MOUTH EVERY NIGHT, Disp: 90 tablet, Rfl: 1  •  spironolactone (ALDACTONE) 25 MG tablet, TAKE 1 TABLET BY MOUTH DAILY, Disp: 30 tablet, Rfl: 4  •  traZODone (DESYREL) 50 MG tablet, TAKE 1/2 TO 1 TABLET BY MOUTH AT BEDTIME AS NEEDED FOR SLEEP, Disp: 90 tablet, Rfl: 1  •  valACYclovir (VALTREX) 500 MG tablet, Take 2 tablets by mouth 2 (Two) Times a Day., Disp: 4 tablet, Rfl: 3  •  fluticasone (FLONASE) 50 MCG/ACT nasal spray, 2 sprays into the nostril(s) as directed by provider Daily for 30 days., Disp: 16 g, Rfl: 0  •  tretinoin (RETIN-A) 0.025 % cream, APPLY EXTERNALLY TO THE AFFECTED AREA EVERY NIGHT AT BEDTIME TO THE FACE, Disp: , Rfl:     ALLERGIES:     Allergies   Allergen Reactions   • Codeine Nausea Only     BLURRED VISION, RASH       SOCIAL HISTORY:       Social History     Socioeconomic History   • Marital status:      Spouse name: Saravanan   • Number of children: 1   • Years of education: College   Tobacco Use   • Smoking status: Never   • Smokeless tobacco: Never   Substance and Sexual Activity   • Alcohol use: Yes     Alcohol/week: 3.0 - 4.0 standard drinks     Types: 2 - 3 Glasses of wine, 1 Cans of beer per week     Comment: liquor-1 to 2 month   • Drug use: No   • Sexual activity: Yes     Partners: Male     Birth control/protection: Post-menopausal         FAMILY HISTORY:  Family History   Problem Relation Age of Onset   • Uterine cancer Mother    • Heart disease Mother         in her 70s   • Diabetes Mother         Late in life   • Arthritis Mother    • Cancer  "Mother         spine   • Hearing loss Mother         In her 70s   • Hyperlipidemia Mother         in her 60s   • Kidney disease Mother         ?   • Vision loss Mother         Macular Degeneration   • Skin cancer Father    • Heart disease Father         Father with 4 vessel CABG at 62   • Colon polyps Father         negative   • Cancer Father         skin   • Heart disease Sister         Heart attack-64   • Atrial fibrillation Sister    • Hyperlipidemia Sister         in her 50s   • Vision loss Sister         Macular Degeneration   • Diabetes Maternal Grandmother         Late in life   • Heart disease Maternal Grandmother         Stroke-80   • Stroke Maternal Grandmother    • Hyperlipidemia Maternal Grandmother         ?   • Kidney disease Maternal Grandmother         ?   • Heart disease Paternal Grandmother         CHF   • Stroke Paternal Grandmother    • Depression Paternal Grandmother    • Hyperlipidemia Paternal Grandmother         ?   • Heart disease Paternal Grandfather         Emphysema-heart disease   • Stroke Paternal Grandfather    • Malig Hyperthermia Neg Hx        REVIEW OF SYSTEMS:  Review of Systems   Constitutional: Negative for activity change.   HENT: Negative for nosebleeds and trouble swallowing.    Respiratory: Negative for shortness of breath and wheezing.    Cardiovascular: Negative for chest pain and palpitations.   Gastrointestinal: Negative for constipation, diarrhea and nausea.   Genitourinary: Negative for dysuria and hematuria.   Musculoskeletal: Negative for arthralgias and myalgias.   Skin: Negative for rash and wound.   Neurological: Negative for seizures and syncope.   Hematological: Negative for adenopathy. Does not bruise/bleed easily.   Psychiatric/Behavioral: Negative for confusion.              Vitals:    03/02/23 1059   BP: 129/82   Pulse: 66   Resp: 16   Temp: 97.7 °F (36.5 °C)   TempSrc: Temporal   SpO2: 97%   Weight: 62 kg (136 lb 11.2 oz)   Height: 167 cm (65.75\")   PainSc: " 0-No pain     Current Status 3/2/2023   ECOG score 0      PHYSICAL EXAM:        CONSTITUTIONAL:  Vital signs reviewed.  No distress, looks comfortable.  EYES:  Conjunctiva and lids unremarkable.  PERRLA  EARS,NOSE,MOUTH,THROAT:  Ears and nose appear unremarkable.  Lips, teeth, gums appear unremarkable.  RESPIRATORY:  Normal respiratory effort.  Lungs clear to auscultation bilaterally.  CARDIOVASCULAR:  Normal S1, S2.  No murmurs rubs or gallops.  No significant lower extremity edema.  GASTROINTESTINAL: Abdomen appears unremarkable.  Nontender.  No hepatomegaly.  No splenomegaly.  LYMPHATIC:  No cervical, supraclavicular, axillary lymphadenopathy.  SKIN:  Warm.  No rashes.  PSYCHIATRIC:  Normal judgment and insight.  Normal mood and affect.          RECENT LABS:        WBC   Date Value Ref Range Status   03/02/2023 5.71 3.40 - 10.80 10*3/mm3 Final   08/22/2022 5.1 3.4 - 10.8 x10E3/uL Final   03/03/2022 6.99 3.40 - 10.80 10*3/mm3 Final   02/10/2022 7.21 3.40 - 10.80 10*3/mm3 Final   01/26/2022 5.6 3.4 - 10.8 x10E3/uL Final   11/18/2021 5.7 3.4 - 10.8 x10E3/uL Final   07/23/2021 4.47 3.40 - 10.80 10*3/mm3 Final   10/21/2020 6.63 3.40 - 10.80 10*3/mm3 Final   09/25/2018 4.96 4.50 - 10.70 10*3/mm3 Final   08/01/2018 5.50 4.50 - 10.70 10*3/mm3 Final   05/18/2018 6.52 4.50 - 10.70 10*3/mm3 Final   10/27/2017 4.81 4.50 - 10.70 10*3/mm3 Final   09/11/2017 5.36 4.50 - 10.70 10*3/mm3 Final   03/25/2016 5.19 4.50 - 10.70 10*3/mm3 Final   12/26/2015 13.13 (H) 4.50 - 10.70 K/Cumm Final   05/20/2015 6.30 4.50 - 10.70 K/Cumm Final     Hemoglobin   Date Value Ref Range Status   03/02/2023 14.4 12.0 - 15.9 g/dL Final   08/22/2022 14.8 11.1 - 15.9 g/dL Final   03/03/2022 14.1 12.0 - 15.9 g/dL Final   02/10/2022 14.9 12.0 - 15.9 g/dL Final   01/26/2022 15.4 11.1 - 15.9 g/dL Final   11/18/2021 15.4 11.1 - 15.9 g/dL Final   07/23/2021 14.5 12.0 - 15.9 g/dL Final   10/21/2020 15.3 12.0 - 15.9 g/dL Final   09/25/2018 15.3 11.9 - 15.5 g/dL  Final   08/01/2018 15.7 (H) 11.9 - 15.5 g/dL Final   05/18/2018 15.7 (H) 11.9 - 15.5 g/dL Final   10/27/2017 14.6 11.9 - 15.5 g/dL Final   09/11/2017 15.4 11.9 - 15.5 g/dL Final   03/25/2016 15.2 11.9 - 15.5 g/dL Final   12/26/2015 14.1 11.9 - 15.5 g/dL Final   05/20/2015 15.0 11.9 - 15.5 g/dL Final     Platelets   Date Value Ref Range Status   03/02/2023 304 140 - 450 10*3/mm3 Final   08/22/2022 309 150 - 450 x10E3/uL Final   03/03/2022 281 140 - 450 10*3/mm3 Final   02/10/2022 292 140 - 450 10*3/mm3 Final   01/26/2022 291 150 - 450 x10E3/uL Final   11/18/2021 321 150 - 450 x10E3/uL Final   07/23/2021 282 140 - 450 10*3/mm3 Final   10/21/2020 322 140 - 450 10*3/mm3 Final   09/25/2018 267 140 - 500 10*3/mm3 Final   08/01/2018 332 140 - 500 10*3/mm3 Final   05/18/2018 304 140 - 500 10*3/mm3 Final   10/27/2017 310 140 - 500 10*3/mm3 Final   09/11/2017 319 140 - 500 10*3/mm3 Final   03/25/2016 318 140 - 500 10*3/mm3 Final   12/26/2015 497 140 - 500 K/Cumm Final   05/20/2015 298 140 - 500 K/Cumm Final       Assessment & Plan   There are no diagnoses linked to this encounter.      Alexandria Yousif   *Secondary polycythemia  · Since at least 5/20/2015: HCT mostly 47-48.5.  However, sometimes normal  · No splenomegaly.  No itching after hot showers.  · No smoking history.  · No symptoms concerning for sleep apnea.  · No underlying lung disease.  · Unremarkable: Erythropoietin, carbon monoxide  · JAK2 [id number] and exon 12, negative  · Because this is secondary polycythemia, there is no set threshold to keep the hematocrit below.  We would not plan phlebotomies.  · 3/3/2022: HCT 42.7.  · 3/2/2023: HCT 43.9    *Hypercalcemia  · Intermittent, since at least 3/6/2019 (but high normal prior to that)  · PTH low, suggesting this is not due to hyperparathyroidism (therefore, this could be due to malignancy).  · Check CT scans to look for malignancy as the cause of hypercalcemia.  Furthermore, a malignancy such as renal cell carcinoma  could cause polycythemia and hypercalcemia  · CT CAP with contrast, 2/28/2022: Sub-6 mm RLL pulmonary nodule, suggestive of a small endobronchial filling defect.  Radiologist recommended follow-up CT chest 12 months but noted this could be done in 3 months.  Never smoker.  No first-degree relatives with lung cancer.  Patient agrees with follow-up CT chest 1 year later.  · Defer any further follow-up/work-up of hypercalcemia to PCP.    *Pulmonary nodule with possible small endobronchial filling defect  · Initially found on CT 2/28/2022: Sub-6 mm RLL pulmonary nodule, suggestive of a small endobronchial filling defect.  Radiologist recommended follow-up CT chest 12 months but noted this could be done in 3 months.  Never smoker.  No first-degree relatives with lung cancer.  Patient agrees with follow-up CT chest 1 year later.  · CT 2/23/2023: Endobronchial filling defect RLL slightly less conspicuous.  Punctate RML nodule unchanged.  0.3 cm lingula nodule new but appears to correspond to a bronchial and is favored to reflect a focus of mucous plugging.  Radiologist recommended follow-up CT chest in 6 months.  · Although this endobronchial filling defect looks slightly less conspicuous, it is still present after a 1 year follow-up.  Therefore, I have asked her to see a pulmonologist to see if she needs a bronchoscopy.  Since she is establishing care with them, I would leave it to them to perform any further follow-up that may be needed regarding pulmonary nodules.  I do not think she needs to keep seeing me since she is now going to see pulmonary    *Hepatic steatosis incidentally found on CT 2/23/2023.  Not mentioned on prior CTs.  · She is very near ideal body weight.  Only rare alcohol use.  States she already eats very healthy.  However, she does have diabetes and hyperlipidemia.    Plan  · Referral to pulmonary  · No further follow-up here  · We would not plan any phlebotomies.    41 minutes.  Total time.  Same  day.  We discussed her depression.  We also discussed the finding of fatty liver on the CT.

## 2023-03-02 ENCOUNTER — OFFICE VISIT (OUTPATIENT)
Dept: ONCOLOGY | Facility: CLINIC | Age: 78
End: 2023-03-02
Payer: MEDICARE

## 2023-03-02 ENCOUNTER — LAB (OUTPATIENT)
Dept: OTHER | Facility: HOSPITAL | Age: 78
End: 2023-03-02
Payer: MEDICARE

## 2023-03-02 VITALS
WEIGHT: 136.7 LBS | HEIGHT: 66 IN | SYSTOLIC BLOOD PRESSURE: 129 MMHG | RESPIRATION RATE: 16 BRPM | TEMPERATURE: 97.7 F | DIASTOLIC BLOOD PRESSURE: 82 MMHG | OXYGEN SATURATION: 97 % | BODY MASS INDEX: 21.97 KG/M2 | HEART RATE: 66 BPM

## 2023-03-02 DIAGNOSIS — R91.1 PULMONARY NODULE, RIGHT: ICD-10-CM

## 2023-03-02 DIAGNOSIS — R91.1 PULMONARY NODULE, RIGHT: Primary | ICD-10-CM

## 2023-03-02 LAB
BASOPHILS # BLD AUTO: 0.07 10*3/MM3 (ref 0–0.2)
BASOPHILS NFR BLD AUTO: 1.2 % (ref 0–1.5)
DEPRECATED RDW RBC AUTO: 43.4 FL (ref 37–54)
EOSINOPHIL # BLD AUTO: 0.08 10*3/MM3 (ref 0–0.4)
EOSINOPHIL NFR BLD AUTO: 1.4 % (ref 0.3–6.2)
ERYTHROCYTE [DISTWIDTH] IN BLOOD BY AUTOMATED COUNT: 13.5 % (ref 12.3–15.4)
HCT VFR BLD AUTO: 43.9 % (ref 34–46.6)
HGB BLD-MCNC: 14.4 G/DL (ref 12–15.9)
IMM GRANULOCYTES # BLD AUTO: 0.04 10*3/MM3 (ref 0–0.05)
IMM GRANULOCYTES NFR BLD AUTO: 0.7 % (ref 0–0.5)
LYMPHOCYTES # BLD AUTO: 1.89 10*3/MM3 (ref 0.7–3.1)
LYMPHOCYTES NFR BLD AUTO: 33.1 % (ref 19.6–45.3)
MCH RBC QN AUTO: 28.9 PG (ref 26.6–33)
MCHC RBC AUTO-ENTMCNC: 32.8 G/DL (ref 31.5–35.7)
MCV RBC AUTO: 88.2 FL (ref 79–97)
MONOCYTES # BLD AUTO: 0.59 10*3/MM3 (ref 0.1–0.9)
MONOCYTES NFR BLD AUTO: 10.3 % (ref 5–12)
NEUTROPHILS NFR BLD AUTO: 3.04 10*3/MM3 (ref 1.7–7)
NEUTROPHILS NFR BLD AUTO: 53.3 % (ref 42.7–76)
NRBC BLD AUTO-RTO: 0 /100 WBC (ref 0–0.2)
PLATELET # BLD AUTO: 304 10*3/MM3 (ref 140–450)
PMV BLD AUTO: 9.2 FL (ref 6–12)
RBC # BLD AUTO: 4.98 10*6/MM3 (ref 3.77–5.28)
WBC NRBC COR # BLD: 5.71 10*3/MM3 (ref 3.4–10.8)

## 2023-03-02 PROCEDURE — 36415 COLL VENOUS BLD VENIPUNCTURE: CPT

## 2023-03-02 PROCEDURE — 99215 OFFICE O/P EST HI 40 MIN: CPT | Performed by: INTERNAL MEDICINE

## 2023-03-02 PROCEDURE — 85025 COMPLETE CBC W/AUTO DIFF WBC: CPT | Performed by: INTERNAL MEDICINE

## 2023-03-06 ENCOUNTER — TELEPHONE (OUTPATIENT)
Dept: CARDIOLOGY | Facility: CLINIC | Age: 78
End: 2023-03-06
Payer: MEDICARE

## 2023-03-06 NOTE — TELEPHONE ENCOUNTER
Pt called and LVM stating she had a CTA with Dr. Pierce and he recommended she follow up with our office in regards to 2 findings. Results are in epic.     Please advise if pt needs a sooner appt. // HG

## 2023-03-06 NOTE — TELEPHONE ENCOUNTER
Called and spoke with patient to inform. She verbalized understanding. She has not been experiencing any CP or SOA. She is going to keep upcoming f/u with SYMONE. //SHERI

## 2023-03-06 NOTE — TELEPHONE ENCOUNTER
Okay to keep scheduled follow-up unless that she is experiencing chest pain or worsening dyspnea.  If she is then it should be moved up.  She just has some plaque noted on CT imaging, this is probably incidental.  Previous stress testing has all been normal.  I do not think she requires urgent follow-up for this.

## 2023-03-08 RX ORDER — SPIRONOLACTONE 25 MG/1
25 TABLET ORAL DAILY
Qty: 30 TABLET | Refills: 4 | Status: SHIPPED | OUTPATIENT
Start: 2023-03-08

## 2023-03-08 NOTE — TELEPHONE ENCOUNTER
Rx Refill Note  Requested Prescriptions     Pending Prescriptions Disp Refills   • spironolactone (ALDACTONE) 25 MG tablet [Pharmacy Med Name: SPIRONOLACTONE 25MG TABLETS] 30 tablet 4     Sig: TAKE 1 TABLET BY MOUTH DAILY      Last office visit with prescribing clinician: 8/31/2022   Last telemedicine visit with prescribing clinician: 5/9/2023   Next office visit with prescribing clinician: 5/9/2023                         Would you like a call back once the refill request has been completed: [] Yes [] No    If the office needs to give you a call back, can they leave a voicemail: [] Yes [] No    Gina Martell MA  03/08/23, 14:16 EST

## 2023-03-21 ENCOUNTER — ANESTHESIA EVENT (OUTPATIENT)
Dept: GASTROENTEROLOGY | Facility: HOSPITAL | Age: 78
End: 2023-03-21
Payer: MEDICARE

## 2023-03-21 ENCOUNTER — ANESTHESIA (OUTPATIENT)
Dept: GASTROENTEROLOGY | Facility: HOSPITAL | Age: 78
End: 2023-03-21
Payer: MEDICARE

## 2023-03-21 ENCOUNTER — HOSPITAL ENCOUNTER (OUTPATIENT)
Facility: HOSPITAL | Age: 78
Setting detail: HOSPITAL OUTPATIENT SURGERY
Discharge: HOME | End: 2023-03-21
Attending: INTERNAL MEDICINE | Admitting: INTERNAL MEDICINE
Payer: MEDICARE

## 2023-03-21 VITALS
OXYGEN SATURATION: 99 % | BODY MASS INDEX: 21.05 KG/M2 | DIASTOLIC BLOOD PRESSURE: 51 MMHG | SYSTOLIC BLOOD PRESSURE: 128 MMHG | WEIGHT: 131 LBS | HEIGHT: 66 IN | RESPIRATION RATE: 16 BRPM | HEART RATE: 70 BPM

## 2023-03-21 DIAGNOSIS — R91.8 ABNORMAL FINDINGS ON DIAGNOSTIC IMAGING OF LUNG: ICD-10-CM

## 2023-03-21 LAB — GLUCOSE BLDC GLUCOMTR-MCNC: 100 MG/DL (ref 70–130)

## 2023-03-21 PROCEDURE — 25010000002 PHENYLEPHRINE 10 MG/ML SOLUTION: Performed by: NURSE ANESTHETIST, CERTIFIED REGISTERED

## 2023-03-21 PROCEDURE — 25010000002 PROPOFOL 10 MG/ML EMULSION: Performed by: NURSE ANESTHETIST, CERTIFIED REGISTERED

## 2023-03-21 PROCEDURE — 82962 GLUCOSE BLOOD TEST: CPT

## 2023-03-21 PROCEDURE — 88305 TISSUE EXAM BY PATHOLOGIST: CPT | Performed by: INTERNAL MEDICINE

## 2023-03-21 RX ORDER — LIDOCAINE HYDROCHLORIDE 20 MG/ML
INJECTION, SOLUTION EPIDURAL; INFILTRATION; INTRACAUDAL; PERINEURAL AS NEEDED
Status: DISCONTINUED | OUTPATIENT
Start: 2023-03-21 | End: 2023-03-21 | Stop reason: HOSPADM

## 2023-03-21 RX ORDER — PHENYLEPHRINE HYDROCHLORIDE 10 MG/ML
INJECTION INTRAVENOUS AS NEEDED
Status: DISCONTINUED | OUTPATIENT
Start: 2023-03-21 | End: 2023-03-21 | Stop reason: SURG

## 2023-03-21 RX ORDER — LIDOCAINE HYDROCHLORIDE 20 MG/ML
INJECTION, SOLUTION INFILTRATION; PERINEURAL AS NEEDED
Status: DISCONTINUED | OUTPATIENT
Start: 2023-03-21 | End: 2023-03-21 | Stop reason: SURG

## 2023-03-21 RX ORDER — LIDOCAINE HYDROCHLORIDE 10 MG/ML
INJECTION, SOLUTION EPIDURAL; INFILTRATION; INTRACAUDAL; PERINEURAL AS NEEDED
Status: DISCONTINUED | OUTPATIENT
Start: 2023-03-21 | End: 2023-03-21 | Stop reason: HOSPADM

## 2023-03-21 RX ORDER — PROPOFOL 10 MG/ML
VIAL (ML) INTRAVENOUS AS NEEDED
Status: DISCONTINUED | OUTPATIENT
Start: 2023-03-21 | End: 2023-03-21 | Stop reason: SURG

## 2023-03-21 RX ORDER — SODIUM CHLORIDE, SODIUM LACTATE, POTASSIUM CHLORIDE, CALCIUM CHLORIDE 600; 310; 30; 20 MG/100ML; MG/100ML; MG/100ML; MG/100ML
1000 INJECTION, SOLUTION INTRAVENOUS CONTINUOUS
Status: DISCONTINUED | OUTPATIENT
Start: 2023-03-21 | End: 2023-03-21 | Stop reason: HOSPADM

## 2023-03-21 RX ORDER — GLYCOPYRROLATE 0.2 MG/ML
INJECTION INTRAMUSCULAR; INTRAVENOUS AS NEEDED
Status: DISCONTINUED | OUTPATIENT
Start: 2023-03-21 | End: 2023-03-21 | Stop reason: SURG

## 2023-03-21 RX ADMIN — PROPOFOL 100 MG: 10 INJECTION, EMULSION INTRAVENOUS at 09:21

## 2023-03-21 RX ADMIN — LIDOCAINE HYDROCHLORIDE 60 MG: 20 INJECTION, SOLUTION INFILTRATION; PERINEURAL at 09:21

## 2023-03-21 RX ADMIN — GLYCOPYRROLATE 0.2 MG: 0.2 INJECTION INTRAMUSCULAR; INTRAVENOUS at 09:21

## 2023-03-21 RX ADMIN — PROPOFOL 200 MCG/KG/MIN: 10 INJECTION, EMULSION INTRAVENOUS at 09:25

## 2023-03-21 RX ADMIN — SODIUM CHLORIDE, POTASSIUM CHLORIDE, SODIUM LACTATE AND CALCIUM CHLORIDE 1000 ML: 600; 310; 30; 20 INJECTION, SOLUTION INTRAVENOUS at 08:49

## 2023-03-21 RX ADMIN — PHENYLEPHRINE HYDROCHLORIDE 100 MCG: 10 INJECTION INTRAVENOUS at 09:31

## 2023-03-21 NOTE — OP NOTE
Bronchoscopy Procedure Note    Procedure:  1. Bronchoscopy, Diagnostic  2. Endobronchial biopsy    Pre-Operative Diagnosis: Right lower lobe and middle lobe possible endobronchial lesion    Post-Operative Diagnosis: Mucosal abnormality noted on the right lower lobe endobronchial area.  Biopsies done.  Control of bleeding.    Indication:    Anesthesia: Monitored Anesthesia Care (MAC)    Procedure Details: Patient was consented for the procedure with all risks and benefits of the procedure explained in detail.  Patient was given the opportunity to ask questions and all concerns were answered.  The bronchocope was inserted into the main airway via the LMA. An anatomical survey was done of the main airways and the subsegmental bronchus.  The findings are reported above.  Right lower lobe bronchus with mucosal abnormality status post endobronchial biopsy x2.  Minimal bleeding self controlled.    Findings:    Estimated Blood Loss:  Minimal           Specimens:  Sent endobronchial biopsy specimen                Complications:  None; patient tolerated the procedure well.           Disposition: PACU - hemodynamically stable.      Patient tolerated the procedure well.    While I was in the room and during my examination of the patient I wore gown, gloves, mask, eye protection and washed my hands before and after the encounter.  Proper enhanced droplet precautions and isolation precautions were taken.    Mae Barry MD  3/21/2023  09:45 EDT

## 2023-03-21 NOTE — ANESTHESIA PREPROCEDURE EVALUATION
Anesthesia Evaluation     Patient summary reviewed and Nursing notes reviewed   no history of anesthetic complications:  NPO Solid Status: > 8 hours  NPO Liquid Status: > 8 hours           Airway   Mallampati: II  TM distance: >3 FB  Neck ROM: full  No difficulty expected  Dental - normal exam     Pulmonary    (-) rhonchi, decreased breath sounds, wheezes, not a smoker  Cardiovascular   Exercise tolerance: good (4-7 METS)    Rhythm: regular  Rate: normal    (+) hypertension, valvular problems/murmurs murmur, CAD, dysrhythmias PVC, hyperlipidemia,   (-) angina, DONG, murmur    ROS comment: S/p Holter hist SVT and frequent PVCs   LVEF 67% via 2D echo     Neuro/Psych  (+) TIA, headaches, dizziness/light headedness, numbness, psychiatric history Depression,    (-) seizures, CVA    ROS Comment: Vertigo, abnormal brain MRI  GI/Hepatic/Renal/Endo    (+)  GERD,  diabetes mellitus type 2 well controlled,   (-) no renal disease    Musculoskeletal     Abdominal     Abdomen: soft.   Substance History      OB/GYN          Other   arthritis,    history of cancer                      Anesthesia Plan    ASA 3     MAC     intravenous induction     Anesthetic plan, risks, benefits, and alternatives have been provided, discussed and informed consent has been obtained with: patient.

## 2023-03-21 NOTE — ANESTHESIA POSTPROCEDURE EVALUATION
"Patient: Alexandria Yousif    Procedure Summary     Date: 03/21/23 Room / Location:  [facility]    Anesthesia Start: 0910 Anesthesia Stop: 0941    Procedure: BRONCHOSCOPY WITH BX (Bronchus) Diagnosis:     Surgeons: Mae Barry MD Provider: Romaine Meyers MD    Anesthesia Type: MAC ASA Status: 3          Anesthesia Type: MAC    Vitals  Vitals Value Taken Time   /51 03/21/23 0958   Temp     Pulse 70 03/21/23 0958   Resp 16 03/21/23 0958   SpO2 99 % 03/21/23 0958           Post Anesthesia Care and Evaluation    Patient location during evaluation: bedside  Patient participation: complete - patient participated  Level of consciousness: sleepy but conscious  Pain score: 0  Pain management: adequate    Airway patency: patent  Anesthetic complications: No anesthetic complications    Cardiovascular status: acceptable  Respiratory status: acceptable  Hydration status: acceptable    Comments: /51 (BP Location: Left arm, Patient Position: Lying)   Pulse 70   Resp 16   Ht 167.6 cm (66\")   Wt 59.4 kg (131 lb)   LMP  (LMP Unknown)   SpO2 99%   BMI 21.14 kg/m²         "

## 2023-03-21 NOTE — ANESTHESIA PROCEDURE NOTES
Airway  Urgency: elective    Date/Time: 3/21/2023 9:23 AM  Airway not difficult    General Information and Staff    Patient location during procedure: OR    Indications and Patient Condition  Indications for airway management: airway protection    Preoxygenated: yes  MILS maintained throughout  Mask difficulty assessment: 0 - not attempted    Final Airway Details  Final airway type: supraglottic airway      Successful airway: unique  Size 4     Number of attempts at approach: 1  Assessment: lips, teeth, and gum same as pre-op and atraumatic intubation

## 2023-03-22 LAB
LAB AP CASE REPORT: NORMAL
LAB AP DIAGNOSIS COMMENT: NORMAL
PATH REPORT.FINAL DX SPEC: NORMAL
PATH REPORT.GROSS SPEC: NORMAL

## 2023-03-28 ENCOUNTER — TRANSCRIBE ORDERS (OUTPATIENT)
Dept: ADMINISTRATIVE | Facility: HOSPITAL | Age: 78
End: 2023-03-28
Payer: MEDICARE

## 2023-03-28 DIAGNOSIS — R91.1 PULMONARY NODULE: Primary | ICD-10-CM

## 2023-04-17 ENCOUNTER — TELEPHONE (OUTPATIENT)
Dept: FAMILY MEDICINE CLINIC | Facility: CLINIC | Age: 78
End: 2023-04-17

## 2023-04-17 NOTE — TELEPHONE ENCOUNTER
Caller: YousifAlexandria    Relationship: Self    Best call back number: [phone]    What is the medical concern/diagnosis:   NECK PAIN  RIGHT HIP PAIN      What specialty or service is being requested: NEUROSURGERY    What is the provider, practice or medical service name:   Neurosurgery Sae Celis MD [facility]         What is the office location:     [address] [place] [address]    What is the office phone number: [phone]    Any additional details:     IS HAVING A FLAIR UP AND WOULD LIKE TO GET IN WITH DOCTOR MIGUELITO.

## 2023-04-19 NOTE — TELEPHONE ENCOUNTER
Called to schedule appt for pt. No answer LVM for pt to return call to be scheduled    Hub to read and schedule

## 2023-04-21 RX ORDER — DESVENLAFAXINE 100 MG/1
100 TABLET, EXTENDED RELEASE ORAL DAILY
Qty: 90 TABLET | Refills: 1 | Status: SHIPPED | OUTPATIENT
Start: 2023-04-21

## 2023-04-21 NOTE — TELEPHONE ENCOUNTER
Rx Refill Note  Requested Prescriptions     Pending Prescriptions Disp Refills   • desvenlafaxine (PRISTIQ) 100 MG 24 hr tablet [Pharmacy Med Name: DESVENLAFAXINE ER SUCCINATE 100MG T] 90 tablet 1     Sig: TAKE 1 TABLET BY MOUTH DAILY      Last office visit with prescribing clinician: 8/31/2022   Last telemedicine visit with prescribing clinician: 5/9/2023   Next office visit with prescribing clinician: 5/9/2023                         Would you like a call back once the refill request has been completed: [] Yes [] No    If the office needs to give you a call back, can they leave a voicemail: [] Yes [] No    Johnna Kumar MA  04/21/23, 08:35 EDT

## 2023-05-04 ENCOUNTER — OFFICE VISIT (OUTPATIENT)
Dept: CARDIOLOGY | Facility: CLINIC | Age: 78
End: 2023-05-04
Payer: MEDICARE

## 2023-05-04 VITALS
WEIGHT: 136 LBS | OXYGEN SATURATION: 100 % | HEART RATE: 64 BPM | DIASTOLIC BLOOD PRESSURE: 60 MMHG | RESPIRATION RATE: 16 BRPM | HEIGHT: 66 IN | BODY MASS INDEX: 21.86 KG/M2 | SYSTOLIC BLOOD PRESSURE: 132 MMHG

## 2023-05-04 DIAGNOSIS — I25.10 CORONARY ARTERY CALCIFICATION: ICD-10-CM

## 2023-05-04 DIAGNOSIS — I49.3 PVCS (PREMATURE VENTRICULAR CONTRACTIONS): ICD-10-CM

## 2023-05-04 DIAGNOSIS — I45.10 RBBB: ICD-10-CM

## 2023-05-04 DIAGNOSIS — E78.2 MIXED HYPERLIPIDEMIA: ICD-10-CM

## 2023-05-04 DIAGNOSIS — I25.84 CORONARY ARTERY CALCIFICATION: ICD-10-CM

## 2023-05-04 DIAGNOSIS — E11.9 TYPE 2 DIABETES MELLITUS WITHOUT COMPLICATION, WITHOUT LONG-TERM CURRENT USE OF INSULIN: ICD-10-CM

## 2023-05-04 DIAGNOSIS — I47.1 PAT (PAROXYSMAL ATRIAL TACHYCARDIA): Primary | ICD-10-CM

## 2023-05-04 RX ORDER — ALBUTEROL SULFATE 90 UG/1
2 AEROSOL, METERED RESPIRATORY (INHALATION) EVERY 4 HOURS PRN
COMMUNITY

## 2023-05-04 NOTE — PROGRESS NOTES
CARDIOLOGY    Judy Lynch MD    ENCOUNTER DATE:  05/04/2023    Alexandria Yousif / 78 y.o. / female        CHIEF COMPLAINT / REASON FOR OFFICE VISIT     Heart Problem (6 month follow up former [facility] patient )      HISTORY OF PRESENT ILLNESS       HPI    Alexandria Yousif is a 78 y.o. female     This is a patient who was previously followed by Dr. Rhodes. She was initially seen in October of 2017 with shortness of breath and a systolic murmur. She has a family history of heart disease including her mother who had 3 pacemakers and heart failure. She had a sister who had a fatal MI at 64. She has another sister with atrial fibrillation. Her father had 4 vessel CABG at 62. She had a stress echo in October of 2017 which was normal. In 2019 she had palpitations. Carotid Doppler in May of 2019 was normal. Echo in May of 2019 showed normal LV function with an ejection fraction of 67% with grade 2 diastolic dysfunction. Holter monitor in June of 2019 showed brief runs of atrial tachycardia, the longest being 7 beats. Zio patch in December of 2019 showed brief episodes of SVT, the longest being 11 beats. She was noted to have PVCs 5% of the time.     Of note, she did have a severe reaction to the Zio patch adhesive with rash and blisters which required steroid injections and oral steroids.     Echocardiogram in November 2022 showed normal LV function ejection fraction of 61%, normal diastolic function and no significant valve disease.    She has been feeling well.  She is doing better since GIOVANI Gonzalez increased her metoprolol.  She still has some episodes where she feels weak and her heart races but they are not as bad as they were before.  She just rescued a dog and she and her  have been walking more and she feels okay with exercise.    REVIEW OF SYSTEMS     Review of Systems   Constitutional: Negative for chills, fever, weight gain and weight loss.   Cardiovascular: Positive for leg swelling.  "  Respiratory: Positive for cough. Negative for snoring and wheezing.    Hematologic/Lymphatic: Negative for bleeding problem. Does not bruise/bleed easily.   Skin: Negative for color change.   Musculoskeletal: Positive for joint pain and myalgias. Negative for falls.   Gastrointestinal: Negative for melena.   Genitourinary: Negative for hematuria.   Neurological: Negative for excessive daytime sleepiness.   Psychiatric/Behavioral: Positive for depression. The patient is nervous/anxious.          VITAL SIGNS     Visit Vitals  /60 (BP Location: Right arm, Patient Position: Sitting, Cuff Size: Adult)   Pulse 64   Resp 16   Ht 167.6 cm (66\")   Wt 61.7 kg (136 lb)   LMP  (LMP Unknown)   SpO2 100%   BMI 21.95 kg/m²         Wt Readings from Last 3 Encounters:   05/04/23 61.7 kg (136 lb)   03/21/23 59.4 kg (131 lb)   03/02/23 62 kg (136 lb 11.2 oz)     Body mass index is 21.95 kg/m².      PHYSICAL EXAMINATION     Constitutional:       General: Not in acute distress.  Neck:      Vascular: No carotid bruit or JVD.   Pulmonary:      Effort: Pulmonary effort is normal.      Breath sounds: Normal breath sounds.   Cardiovascular:      Normal rate. Regular rhythm.      Murmurs: There is no murmur.   Psychiatric:         Mood and Affect: Mood and affect normal.           REVIEWED DATA       ECG 12 Lead    Date/Time: 5/4/2023 10:44 AM  Performed by: Judy Lynch MD  Authorized by: Judy Lynch MD   Comparison: compared with previous ECG from 11/4/2022  Similar to previous ECG  Rhythm: sinus rhythm  BPM: 64  Conduction: right bundle branch block    Clinical impression: abnormal EKG              Lipid Panel        8/22/2022    09:37   Lipid Panel   Total Cholesterol 163     Triglycerides 169     HDL Cholesterol 52     VLDL Cholesterol 29     LDL Cholesterol  82     LDL/HDL Ratio 1.6         Lab Results   Component Value Date    GLUCOSE 94 08/22/2022    BUN 15 08/22/2022    CREATININE 0.65 08/22/2022    EGFRRESULT 91 " 08/22/2022    BCR 23 08/22/2022    K 4.8 08/22/2022    CO2 25 08/22/2022    CALCIUM 10.1 08/22/2022    PROTENTOTREF 7.3 08/22/2022    ALBUMIN 5.1 (H) 08/22/2022    BILITOT 0.3 08/22/2022    AST 28 08/22/2022    ALT 26 08/22/2022       ASSESSMENT & PLAN      Diagnosis Plan   1. PAT (paroxysmal atrial tachycardia)        2. Mixed hyperlipidemia        3. PVCs (premature ventricular contractions)        4. Type 2 diabetes mellitus without complication, without long-term current use of insulin              1.  Palpitations   2.  Paroxysmal atrial tachycardia  3.  PVCs 5% of the time.  4.  History of TIA  5.  Hyperlipidemia.  On medical therapy.  Lipids reviewed as above.  Continue current therapy.  6.  Diabetes.  On metformin  7.  Right bundle branch block.  8.  Allergic reaction to Zio patch.  9.  Depression and anxiety.  She is on a low-dose of metoprolol and I doubt it is contributing.  She has an appointment with Dr. Muse next week.  10.  Coronary artery calcification.  I reviewed her CTA from this year and compared it to her CTA from last year and there is no change.  She has some calcium in her left anterior descending artery and in the aortic arch.  Continue medical therapy.  No ischemic symptoms    Continue current medications.  Follow-up with GIOVANI Gonzalez next year.    Orders Placed This Encounter   Procedures   • ECG 12 Lead     This order was created via procedure documentation     Order Specific Question:   Release to patient     Answer:   Routine Release           MEDICATIONS         Discharge Medications          Accurate as of May 4, 2023 10:45 AM. If you have any questions, ask your nurse or doctor.            Continue These Medications      Instructions Start Date   acetaminophen 500 MG tablet  Commonly known as: TYLENOL   1,000 mg, Oral, Every 6 Hours PRN      albuterol sulfate  (90 Base) MCG/ACT inhaler  Commonly known as: PROVENTIL HFA;VENTOLIN HFA;PROAIR HFA   2 puffs, Inhalation,  Every 4 Hours PRN      ALPRAZolam 0.5 MG tablet  Commonly known as: XANAX   0.5 mg, Oral, Daily PRN      Alum Hydroxide-Mag Trisilicate 80-14.2 MG chewable tablet  Commonly known as: Gaviscon   1 tablet, Oral, 4 Times Daily PRN      aspirin 81 MG tablet   81 mg, Oral, Daily      Biotin 10 MG tablet   No dose, route, or frequency recorded.      Cholecalciferol 50 MCG (2000 UT) capsule   No dose, route, or frequency recorded.      cyclobenzaprine 5 MG tablet  Commonly known as: FLEXERIL   5 mg, Oral, Nightly PRN      desvenlafaxine 100 MG 24 hr tablet  Commonly known as: PRISTIQ   100 mg, Oral, Daily      fenofibrate 145 MG tablet  Commonly known as: TRICOR   TAKE 1 TABLET BY MOUTH EVERY DAY      fish oil 1200 MG capsule capsule   No dose, route, or frequency recorded.      fluticasone 50 MCG/ACT nasal spray  Commonly known as: FLONASE   2 sprays, Nasal, Daily      levETIRAcetam 500 MG tablet  Commonly known as: KEPPRA   Take 1/2 tablet as needed for headache      metFORMIN  MG 24 hr tablet  Commonly known as: GLUCOPHAGE-XR   TAKE 1 TABLET BY MOUTH DAILY WITH LARGEST MEAL OF THE DAY AND A GLASS OF WATER      metoprolol tartrate 25 MG tablet  Commonly known as: LOPRESSOR   25 mg, Oral, 2 Times Daily      omeprazole 40 MG capsule  Commonly known as: priLOSEC   TAKE 1 CAPSULE BY MOUTH EVERY DAY      Premarin 0.3 MG tablet  Generic drug: estrogens (conjugated)   TAKE 1 TABLET BY MOUTH EVERY DAY      rosuvastatin 10 MG tablet  Commonly known as: CRESTOR   10 mg, Oral, Nightly      spironolactone 25 MG tablet  Commonly known as: ALDACTONE   25 mg, Oral, Daily      traZODone 50 MG tablet  Commonly known as: DESYREL   TAKE 1/2 TO 1 TABLET BY MOUTH AT BEDTIME AS NEEDED FOR SLEEP      tretinoin 0.025 % cream  Commonly known as: RETIN-A   APPLY EXTERNALLY TO THE AFFECTED AREA EVERY NIGHT AT BEDTIME TO THE FACE      valACYclovir 500 MG tablet  Commonly known as: VALTREX   1,000 mg, Oral, 2 Times Daily               Judy  ZEYAD Lynch MD  05/04/23  10:45 EDT    Part of this note may be an electronic transcription/translation of spoken language to printed text using the Dragon dictation system.

## 2023-05-09 ENCOUNTER — OFFICE VISIT (OUTPATIENT)
Dept: FAMILY MEDICINE CLINIC | Facility: CLINIC | Age: 78
End: 2023-05-09
Payer: MEDICARE

## 2023-05-09 VITALS
OXYGEN SATURATION: 97 % | SYSTOLIC BLOOD PRESSURE: 126 MMHG | TEMPERATURE: 96.8 F | WEIGHT: 137.1 LBS | HEART RATE: 62 BPM | BODY MASS INDEX: 22.03 KG/M2 | HEIGHT: 66 IN | DIASTOLIC BLOOD PRESSURE: 70 MMHG

## 2023-05-09 DIAGNOSIS — F41.9 ANXIETY AND DEPRESSION: ICD-10-CM

## 2023-05-09 DIAGNOSIS — M54.2 NECK PAIN, CHRONIC: ICD-10-CM

## 2023-05-09 DIAGNOSIS — G89.29 NECK PAIN, CHRONIC: ICD-10-CM

## 2023-05-09 DIAGNOSIS — M43.16 ANTEROLISTHESIS OF LUMBAR SPINE: Primary | ICD-10-CM

## 2023-05-09 DIAGNOSIS — M54.42 ACUTE LOW BACK PAIN WITH BILATERAL SCIATICA, UNSPECIFIED BACK PAIN LATERALITY: ICD-10-CM

## 2023-05-09 DIAGNOSIS — M53.3 COCCYX PAIN: ICD-10-CM

## 2023-05-09 DIAGNOSIS — M54.41 ACUTE LOW BACK PAIN WITH BILATERAL SCIATICA, UNSPECIFIED BACK PAIN LATERALITY: ICD-10-CM

## 2023-05-09 DIAGNOSIS — F32.A ANXIETY AND DEPRESSION: ICD-10-CM

## 2023-05-09 PROCEDURE — 99214 OFFICE O/P EST MOD 30 MIN: CPT | Performed by: INTERNAL MEDICINE

## 2023-05-09 RX ORDER — CYCLOBENZAPRINE HCL 5 MG
5 TABLET ORAL NIGHTLY PRN
Qty: 30 TABLET | Refills: 1 | Status: SHIPPED | OUTPATIENT
Start: 2023-05-09

## 2023-05-09 NOTE — PROGRESS NOTES
Answers for HPI/ROS submitted by the patient on 5/7/2023  What is the primary reason for your visit?: Back Pain  Chronicity: recurrent  Onset: 1 to 4 weeks ago  Frequency: daily  Progression since onset: waxing and waning  Pain location: gluteal, lumbar spine, sacro-iliac  Pain quality: aching, shooting, stabbing  Radiates to: left foot, left knee, right foot, right knee  Pain - numeric: 8/10  Pain is: worse during the day  Aggravated by: bending, position, sitting, standing, twisting  Stiffness is present: all day  abdominal pain: No  bladder incontinence: No  bowel incontinence: No  chest pain: No  dysuria: No  fever: No  headaches: Yes  leg pain: Yes  numbness: Yes  paresis: No  paresthesias: Yes  pelvic pain: No  perianal numbness: No  tingling: Yes  weakness: Yes  weight loss: No  Risk factors: history of cancer, lack of exercise, menopause    Chief Complaint  Hyperlipidemia (6 month follow up, pt is doing well but have other issues), Pain (Pt complains of pain all over), and Depression (Pt is extreme depress)    Subjective        Alexandria Yousif presents to [facility]  Back Pain  This is a recurrent problem. The current episode started 1 to 4 weeks ago. The problem occurs daily. The problem has been waxing and waning since onset. The pain is present in the gluteal, lumbar spine and sacro-iliac. The quality of the pain is described as aching, shooting and stabbing. The pain radiates to the left foot, left knee, right foot and right knee. The pain is at a severity of 8/10. The pain is worse during the day. The symptoms are aggravated by bending, position, sitting, standing and twisting. Stiffness is present all day. Associated symptoms include headaches, leg pain, numbness, paresthesias, tingling and weakness. Pertinent negatives include no abdominal pain, bladder incontinence, bowel incontinence, chest pain, dysuria, fever, paresis, pelvic pain, perianal numbness or weight loss.  "Risk factors include history of cancer, lack of exercise and menopause.     Over a month ago, she mopped a large section of floor in her home and since that time her lower back has been in pain.  Some radiation of pain and discomfort to both legs but left is worse.  After lifting something in her flower bed this weekend,her tailbone is hurting.  No falls nor injury.  No loss of bladder or bowel function.  She has known DJD and has had CT of abdomen recently that show anteriolisthesis in LSPine.  Has tried her flexeril and PT exercises to no avail.   Wanting to see NSG for further evaluation.  Hasn't tried epidurals.    Also c/o worsening depression.  No SI nor HI.  Loss of motivation present.  On 100 mg pristiq eun has restarted buproprion 1/2 of a 75 mg daily about 4 weeks ago and is tolerating it well.  In the past, the addition of bupropion was helpful during winter months for SAD.  She hasn't tried whole tab b/c a few years ago the whole tab /day gave her more energy.  She is also interested in seeing a therapist and is wondering about recommendations.  Mostly c/o loss of motivation and interest in doing things. Some isolation present.  Lives with .  Has good support at home.  Has a new dog which she loves.   Objective   Vital Signs:  /70   Pulse 62   Temp 96.8 °F (36 °C)   Ht 167.6 cm (66\")   Wt 62.2 kg (137 lb 1.6 oz)   SpO2 97%   BMI 22.13 kg/m²   Estimated body mass index is 22.13 kg/m² as calculated from the following:    Height as of this encounter: 167.6 cm (66\").    Weight as of this encounter: 62.2 kg (137 lb 1.6 oz).       BMI is within normal parameters. No other follow-up for BMI required.      Physical Exam  Vitals and nursing note reviewed.   Constitutional:       Appearance: Normal appearance. She is well-developed.   HENT:      Head: Normocephalic and atraumatic.      Right Ear: External ear normal.      Left Ear: External ear normal.   Eyes:      Extraocular Movements: " Extraocular movements intact.      Conjunctiva/sclera: Conjunctivae normal.   Neck:      Vascular: No carotid bruit.   Cardiovascular:      Rate and Rhythm: Normal rate and regular rhythm.      Heart sounds: Normal heart sounds.      Comments: No bruits  Pulmonary:      Effort: Pulmonary effort is normal. No respiratory distress.      Breath sounds: Normal breath sounds. No stridor. No wheezing, rhonchi or rales.   Chest:      Chest wall: No tenderness.   Abdominal:      General: Bowel sounds are normal. There is no distension.      Palpations: Abdomen is soft. There is no mass.      Tenderness: There is no abdominal tenderness. There is no guarding or rebound.      Hernia: No hernia is present.   Musculoskeletal:      Cervical back: Neck supple.   Lymphadenopathy:      Cervical: No cervical adenopathy.   Skin:     General: Skin is warm.   Neurological:      Mental Status: She is alert and oriented to person, place, and time. Mental status is at baseline.   Psychiatric:         Mood and Affect: Mood normal.         Behavior: Behavior normal.         Thought Content: Thought content normal.         Judgment: Judgment normal.        Result Review :                   Assessment and Plan   Diagnoses and all orders for this visit:    1. Anterolisthesis of lumbar spine (Primary)  -     Ambulatory Referral to Neurosurgery    2. Acute low back pain with bilateral sciatica, unspecified back pain laterality  -     Ambulatory Referral to Neurosurgery  -     MRI Lumbar Spine Without Contrast; Future    3. Coccyx pain  -     Ambulatory Referral to Neurosurgery  -     MRI pelvis wo contrast; Future    4. Neck pain, chronic  -     cyclobenzaprine (FLEXERIL) 5 MG tablet; Take 1 tablet by mouth At Night As Needed for Muscle Spasms.  Dispense: 30 tablet; Refill: 1    5. Anxiety and depression    Other orders  -     buPROPion (WELLBUTRIN) 75 MG tablet; Take 1 tablet by mouth Daily. For energy and improved moved  Dispense: 30 tablet;  Refill: 2    MRI ordered for Lspine and pelvis and referral placed for NSG  Refill flexeril which she uses prn .    Continue PT home exercises and walking for exercise  TENS unit helpful  Continue pristiq 100 mg qd and increase bupropion to 75 mg daily (she has tolerated the 1/2 tab/day without side effects) hoping the higher dosage of the bupropion will be helpful to improve mood. Pt has prescription at home but will refill it since she will be taking full dose  Names of therapist provided to patient and she will call and make appointment.  Depression could be worsened by her recent LBP flare up as well.    Total time spent CC 30 minutes       Follow Up   No follow-ups on file.  Patient was given instructions and counseling regarding her condition or for health maintenance advice. Please see specific information pulled into the AVS if appropriate.

## 2023-05-10 RX ORDER — BUPROPION HYDROCHLORIDE 75 MG/1
75 TABLET ORAL DAILY
Qty: 30 TABLET | Refills: 2 | Status: SHIPPED | OUTPATIENT
Start: 2023-05-10

## 2023-05-12 ENCOUNTER — HOSPITAL ENCOUNTER (OUTPATIENT)
Dept: CT IMAGING | Facility: HOSPITAL | Age: 78
Discharge: HOME | End: 2023-05-12
Payer: MEDICARE

## 2023-05-12 DIAGNOSIS — R91.1 PULMONARY NODULE: ICD-10-CM

## 2023-05-12 PROCEDURE — 71250 CT THORAX DX C-: CPT

## 2023-05-23 DIAGNOSIS — F41.9 ANXIETY: ICD-10-CM

## 2023-05-23 NOTE — TELEPHONE ENCOUNTER
Rx Refill Note  Requested Prescriptions     Pending Prescriptions Disp Refills   • ALPRAZolam (XANAX) 0.5 MG tablet [Pharmacy Med Name: ALPRAZOLAM 0.5MG TABLETS] 45 tablet      Sig: TAKE 1 TABLET BY MOUTH DAILY AS NEEDED FOR ANXIETY      Last office visit with prescribing clinician: 5/9/2023   Last telemedicine visit with prescribing clinician: Visit date not found   Next office visit with prescribing clinician: 9/5/2023                         Would you like a call back once the refill request has been completed: [] Yes [] No    If the office needs to give you a call back, can they leave a voicemail: [] Yes [] No    Marli John MA  05/23/23, 15:16 EDT

## 2023-05-24 RX ORDER — ALPRAZOLAM 0.5 MG/1
0.5 TABLET ORAL DAILY PRN
Qty: 45 TABLET | Refills: 0 | Status: SHIPPED | OUTPATIENT
Start: 2023-05-24

## 2023-05-26 ENCOUNTER — TRANSCRIBE ORDERS (OUTPATIENT)
Dept: ADMINISTRATIVE | Facility: HOSPITAL | Age: 78
End: 2023-05-26

## 2023-05-26 DIAGNOSIS — R91.1 PULMONARY NODULE: Primary | ICD-10-CM

## 2023-06-08 ENCOUNTER — APPOINTMENT (OUTPATIENT)
Dept: MRI IMAGING | Facility: HOSPITAL | Age: 78
End: 2023-06-08
Payer: MEDICARE

## 2023-06-08 ENCOUNTER — HOSPITAL ENCOUNTER (OUTPATIENT)
Dept: MRI IMAGING | Facility: HOSPITAL | Age: 78
Discharge: HOME | End: 2023-06-08
Payer: MEDICARE

## 2023-06-08 DIAGNOSIS — M53.3 COCCYX PAIN: ICD-10-CM

## 2023-06-08 PROCEDURE — 72195 MRI PELVIS W/O DYE: CPT

## 2023-07-17 ENCOUNTER — TELEPHONE (OUTPATIENT)
Dept: FAMILY MEDICINE CLINIC | Facility: CLINIC | Age: 78
End: 2023-07-17

## 2023-07-17 NOTE — TELEPHONE ENCOUNTER
Make sure she keeps her appointment with Ortho tomorrow   In the meantime if it gets worse go to the emergency room  Thanks

## 2023-07-17 NOTE — TELEPHONE ENCOUNTER
Caller: Alexandria Yousif    Relationship: Self    Best call back number: [phone]     What was the call regarding: PATIENT WAS REQUESTED TO CALL TO UPDATE JAMES EPLEY ON THE STATUS OF HER FOOT.  SHE REPORTS OVER THE WEEKEND THE PAIN WAS SEVERE BUT SHE FEELS A BIT OF IMPROVEMENT THIS MORNING.    SHE IS REQUESTING TO BE CALLED AND ADVISED.

## 2023-08-03 RX ORDER — OMEPRAZOLE 40 MG/1
CAPSULE, DELAYED RELEASE ORAL
Qty: 90 CAPSULE | Refills: 0 | Status: SHIPPED | OUTPATIENT
Start: 2023-08-03

## 2023-08-09 ENCOUNTER — TRANSCRIBE ORDERS (OUTPATIENT)
Dept: ADMINISTRATIVE | Facility: HOSPITAL | Age: 78
End: 2023-08-09
Payer: MEDICARE

## 2023-08-09 DIAGNOSIS — Z12.31 VISIT FOR SCREENING MAMMOGRAM: Primary | ICD-10-CM

## 2023-08-09 RX ORDER — BUPROPION HYDROCHLORIDE 75 MG/1
TABLET ORAL
Qty: 30 TABLET | Refills: 2 | Status: SHIPPED | OUTPATIENT
Start: 2023-08-09

## 2023-08-10 RX ORDER — SPIRONOLACTONE 25 MG/1
25 TABLET ORAL DAILY
Qty: 30 TABLET | Refills: 4 | Status: SHIPPED | OUTPATIENT
Start: 2023-08-10

## 2023-08-14 RX ORDER — ROSUVASTATIN CALCIUM 10 MG/1
10 TABLET, COATED ORAL NIGHTLY
Qty: 90 TABLET | Refills: 1 | Status: SHIPPED | OUTPATIENT
Start: 2023-08-14

## 2023-08-14 RX ORDER — FENOFIBRATE 145 MG/1
TABLET, COATED ORAL
Qty: 90 TABLET | Refills: 1 | Status: SHIPPED | OUTPATIENT
Start: 2023-08-14

## 2023-08-16 NOTE — TELEPHONE ENCOUNTER
Pt states has been taking Pristiq bid through out Holidays for increased depression, wants to know if she can get an early refill because she'll run out too soon.   CM   Statement Selected

## 2023-08-21 ENCOUNTER — TELEPHONE (OUTPATIENT)
Dept: FAMILY MEDICINE CLINIC | Facility: CLINIC | Age: 78
End: 2023-08-21
Payer: MEDICARE

## 2023-08-21 ENCOUNTER — HOSPITAL ENCOUNTER (OUTPATIENT)
Dept: MAMMOGRAPHY | Facility: HOSPITAL | Age: 78
Discharge: HOME | End: 2023-08-21
Admitting: INTERNAL MEDICINE
Payer: MEDICARE

## 2023-08-21 ENCOUNTER — TELEPHONE (OUTPATIENT)
Dept: CARDIOLOGY | Facility: CLINIC | Age: 78
End: 2023-08-21
Payer: MEDICARE

## 2023-08-21 DIAGNOSIS — Z12.31 VISIT FOR SCREENING MAMMOGRAM: ICD-10-CM

## 2023-08-21 PROCEDURE — 77063 BREAST TOMOSYNTHESIS BI: CPT

## 2023-08-21 PROCEDURE — 77067 SCR MAMMO BI INCL CAD: CPT

## 2023-08-21 NOTE — TELEPHONE ENCOUNTER
Pt called this morning. She said she's been experiencing a cough and some SOA. Her daughter-in-law is a PA and recommended she call us since pt has a history of CHF. I scheduled her to see Dr. Lynch tomorrow, but I asked that pt go to  to get checked today. Pt verbalized understanding.    Thank you,    Dipika Dial, RN  Triage [facility] [place]  08/21/23 10:24 EDT

## 2023-08-21 NOTE — TELEPHONE ENCOUNTER
PATIENT CALLING TO REPORT RATTLE IN CHEST WHEN BREATHING AND REQUESTING CHEST XRAY. I INFORMED PATIENT THAT PROVIDER USUALLY HAS TO SEE PATIENT FOR AN ORDER TO BE PUT IN AND THAT DR. BECK IS OUT OF THE OFFICE TODAY. PATIENT STATED SHE WOULD GO TO  [facility] TO GET THIS CHECKED OUT.

## 2023-08-22 ENCOUNTER — HOSPITAL ENCOUNTER (OUTPATIENT)
Dept: GENERAL RADIOLOGY | Facility: HOSPITAL | Age: 78
Discharge: HOME | End: 2023-08-22
Admitting: INTERNAL MEDICINE
Payer: MEDICARE

## 2023-08-22 ENCOUNTER — OFFICE VISIT (OUTPATIENT)
Dept: CARDIOLOGY | Facility: CLINIC | Age: 78
End: 2023-08-22
Payer: MEDICARE

## 2023-08-22 VITALS
HEART RATE: 66 BPM | SYSTOLIC BLOOD PRESSURE: 112 MMHG | DIASTOLIC BLOOD PRESSURE: 78 MMHG | BODY MASS INDEX: 22.27 KG/M2 | WEIGHT: 138 LBS

## 2023-08-22 DIAGNOSIS — R05.1 ACUTE COUGH: ICD-10-CM

## 2023-08-22 DIAGNOSIS — I47.1 PAT (PAROXYSMAL ATRIAL TACHYCARDIA): ICD-10-CM

## 2023-08-22 DIAGNOSIS — I25.84 CORONARY ARTERY CALCIFICATION: Primary | ICD-10-CM

## 2023-08-22 DIAGNOSIS — I49.3 PVCS (PREMATURE VENTRICULAR CONTRACTIONS): ICD-10-CM

## 2023-08-22 DIAGNOSIS — E78.2 MIXED HYPERLIPIDEMIA: ICD-10-CM

## 2023-08-22 DIAGNOSIS — I25.84 CORONARY ARTERY CALCIFICATION: ICD-10-CM

## 2023-08-22 DIAGNOSIS — I25.10 CORONARY ARTERY CALCIFICATION: ICD-10-CM

## 2023-08-22 DIAGNOSIS — I25.10 CORONARY ARTERY CALCIFICATION: Primary | ICD-10-CM

## 2023-08-22 DIAGNOSIS — I47.1 PSVT (PAROXYSMAL SUPRAVENTRICULAR TACHYCARDIA): ICD-10-CM

## 2023-08-22 PROCEDURE — 93000 ELECTROCARDIOGRAM COMPLETE: CPT | Performed by: INTERNAL MEDICINE

## 2023-08-22 PROCEDURE — 99214 OFFICE O/P EST MOD 30 MIN: CPT | Performed by: INTERNAL MEDICINE

## 2023-08-22 PROCEDURE — 71046 X-RAY EXAM CHEST 2 VIEWS: CPT

## 2023-08-22 NOTE — PROGRESS NOTES
CARDIOLOGY    Judy Lynch MD    ENCOUNTER DATE:  08/22/2023    Alexandria Yousif / 78 y.o. / female        CHIEF COMPLAINT / REASON FOR OFFICE VISIT     Heart Problem      HISTORY OF PRESENT ILLNESS       HPI    Alexandria Yousif is a 78 y.o. female     This is a patient who was previously followed by Dr. Rhodes. She was initially seen in October of 2017 with shortness of breath and a systolic murmur. She has a family history of heart disease including her mother who had 3 pacemakers and heart failure. She had a sister who had a fatal MI at 64. She has another sister with atrial fibrillation. Her father had 4 vessel CABG at 62. She had a stress echo in October of 2017 which was normal. In 2019 she had palpitations. Carotid Doppler in May of 2019 was normal. Echo in May of 2019 showed normal LV function with an ejection fraction of 67% with grade 2 diastolic dysfunction. Holter monitor in June of 2019 showed brief runs of atrial tachycardia, the longest being 7 beats. Zio patch in December of 2019 showed brief episodes of SVT, the longest being 11 beats. She was noted to have PVCs 5% of the time.      Of note, she did have a severe reaction to the Zio patch adhesive with rash and blisters which required steroid injections and oral steroids.      Echocardiogram in November 2022 showed normal LV function ejection fraction of 61%, normal diastolic function and no significant valve disease.    She went to urgent care yesterday with a cough.  She said they have started her on some antibiotics.       REVIEW OF SYSTEMS     Review of Systems   Constitutional: Negative for chills, fever, weight gain and weight loss.   Cardiovascular:  Negative for leg swelling.   Respiratory:  Positive for cough and wheezing. Negative for snoring.    Hematologic/Lymphatic: Negative for bleeding problem. Bruises/bleeds easily.   Skin:  Negative for color change.   Musculoskeletal:  Positive for joint pain. Negative for falls and  myalgias.   Gastrointestinal:  Negative for melena.   Genitourinary:  Negative for hematuria.   Neurological:  Negative for excessive daytime sleepiness.   Psychiatric/Behavioral:  Positive for depression. The patient is nervous/anxious.        VITAL SIGNS     Visit Vitals  /78 (BP Location: Right arm, Patient Position: Sitting, Cuff Size: Adult)   Pulse 66   Wt 62.6 kg (138 lb)   LMP  (LMP Unknown)   BMI 22.27 kg/mý         Wt Readings from Last 3 Encounters:   08/22/23 62.6 kg (138 lb)   08/21/23 60.8 kg (134 lb)   07/18/23 61.7 kg (136 lb)     Body mass index is 22.27 kg/mý.      PHYSICAL EXAMINATION     Constitutional:       General: Not in acute distress.  Neck:      Vascular: No carotid bruit or JVD.   Pulmonary:      Effort: Pulmonary effort is normal.      Breath sounds: Normal breath sounds.   Cardiovascular:      Normal rate. Regular rhythm.      Murmurs: There is no murmur.   Psychiatric:         Mood and Affect: Mood and affect normal.         REVIEWED DATA       ECG 12 Lead    Date/Time: 8/22/2023 11:10 AM  Performed by: Judy Lynch MD  Authorized by: Judy Lynch MD   Comparison: compared with previous ECG from 5/4/2023  Similar to previous ECG  Rhythm: sinus rhythm  BPM: 66  Conduction: right bundle branch block and left posterior fascicular block    Clinical impression: abnormal EKG              Lab Results   Component Value Date    GLUCOSE 94 08/22/2022    BUN 15 08/22/2022    CREATININE 0.65 08/22/2022    EGFRRESULT 91 08/22/2022    BCR 23 08/22/2022    K 4.8 08/22/2022    CO2 25 08/22/2022    CALCIUM 10.1 08/22/2022    PROTENTOTREF 7.3 08/22/2022    ALBUMIN 5.1 (H) 08/22/2022    BILITOT 0.3 08/22/2022    AST 28 08/22/2022    ALT 26 08/22/2022       ASSESSMENT & PLAN      Diagnosis Plan   1. Coronary artery calcification  XR Chest 2 View      2. Mixed hyperlipidemia  XR Chest 2 View      3. PAT (paroxysmal atrial tachycardia)  XR Chest 2 View      4. PSVT (paroxysmal  supraventricular tachycardia)  XR Chest 2 View      5. PVCs (premature ventricular contractions)  XR Chest 2 View      6. Acute cough  XR Chest 2 View          1.  Palpitations   2.  Paroxysmal atrial tachycardia  3.  PVCs 5% of the time.  4.  History of TIA  5.  Hyperlipidemia.  Continue current medications..  6.  Diabetes.  On metformin  7.  Right bundle branch block.  No change.  8.  Allergic reaction to Zio patch.  9.  Depression and anxiety.  Follow-up with Dr. Muse.  10.  Coronary artery calcification.  CTA shows coronary artery calcification in the LAD and the aortic arch.  11.  Cough.  I am going to send her for a chest x-ray.  If there is any abnormalities I will forward the results on to Dr. Muse.     Keep follow-up appointment in May      Orders Placed This Encounter   Procedures    XR Chest 2 View     Standing Status:   Future     Standing Expiration Date:   8/22/2024     Order Specific Question:   Reason for Exam:     Answer:   cough     Order Specific Question:   Release to patient     Answer:   Routine Release [[id number]]    ECG 12 Lead     This order was created via procedure documentation     Order Specific Question:   Release to patient     Answer:   Routine Release [[id number]]           MEDICATIONS         Discharge Medications            Accurate as of August 22, 2023 11:12 AM. If you have any questions, ask your nurse or doctor.                Continue These Medications        Instructions Start Date   acetaminophen 500 MG tablet  Commonly known as: TYLENOL   1,000 mg, Oral, Every 6 Hours PRN      albuterol sulfate  (90 Base) MCG/ACT inhaler  Commonly known as: PROVENTIL HFA;VENTOLIN HFA;PROAIR HFA   2 puffs, Inhalation, Every 4 Hours PRN      ALPRAZolam 0.5 MG tablet  Commonly known as: XANAX   0.5 mg, Oral, Daily PRN      Alum Hydroxide-Mag Trisilicate 80-14.2 MG chewable tablet  Commonly known as: Gaviscon   1 tablet, Oral, 4 Times Daily PRN      amoxicillin 875 MG  tablet  Commonly known as: AMOXIL   875 mg, Oral, 2 Times Daily      amoxicillin-clavulanate 875-125 MG per tablet  Commonly known as: AUGMENTIN   1 tablet, Oral, 2 Times Daily      aspirin 81 MG tablet   81 mg, Oral, Daily      Biotin 10 MG tablet   No dose, route, or frequency recorded.      buPROPion 75 MG tablet  Commonly known as: WELLBUTRIN   TAKE 1 TABLET BY MOUTH DAILY      Cholecalciferol 50 MCG (2000 UT) capsule   No dose, route, or frequency recorded.      cyclobenzaprine 5 MG tablet  Commonly known as: FLEXERIL   5 mg, Oral, Nightly PRN      desvenlafaxine 100 MG 24 hr tablet  Commonly known as: PRISTIQ   100 mg, Oral, Daily      fenofibrate 145 MG tablet  Commonly known as: TRICOR   TAKE 1 TABLET BY MOUTH EVERY DAY      fish oil 1200 MG capsule capsule   No dose, route, or frequency recorded.      fluticasone 50 MCG/ACT nasal spray  Commonly known as: FLONASE   2 sprays, Nasal, Daily      levETIRAcetam 500 MG tablet  Commonly known as: KEPPRA   TAKE 1/2 TABLET BY MOUTH DAILY AS NEEDED FOR HEADACHE      metFORMIN  MG 24 hr tablet  Commonly known as: GLUCOPHAGE-XR   TAKE 1 TABLET BY MOUTH DAILY WITH LARGEST MEAL OF THE DAY AND A GLASS OF WATER      metoprolol tartrate 25 MG tablet  Commonly known as: LOPRESSOR   25 mg, Oral, 2 Times Daily      montelukast 10 MG tablet  Commonly known as: SINGULAIR   10 mg, Oral, Nightly      omeprazole 40 MG capsule  Commonly known as: priLOSEC   TAKE 1 CAPSULE BY MOUTH EVERY DAY      Premarin 0.3 MG tablet  Generic drug: estrogens (conjugated)   TAKE 1 TABLET BY MOUTH EVERY DAY      rosuvastatin 10 MG tablet  Commonly known as: CRESTOR   10 mg, Oral, Nightly      spironolactone 25 MG tablet  Commonly known as: ALDACTONE   25 mg, Oral, Daily      traZODone 50 MG tablet  Commonly known as: DESYREL   TAKE 1/2 TO 1 TABLET BY MOUTH AT BEDTIME AS NEEDED FOR SLEEP      tretinoin 0.025 % cream  Commonly known as: RETIN-A   APPLY EXTERNALLY TO THE AFFECTED AREA EVERY NIGHT  AT BEDTIME TO THE FACE      valACYclovir 500 MG tablet  Commonly known as: VALTREX   1,000 mg, Oral, 2 Times Daily                 Judy Lynch MD  08/22/23  11:12 EDT    Part of this note may be an electronic transcription/translation of spoken language to printed text using the Dragon dictation system.

## 2023-08-23 ENCOUNTER — OFFICE VISIT (OUTPATIENT)
Dept: FAMILY MEDICINE CLINIC | Facility: CLINIC | Age: 78
End: 2023-08-23
Payer: MEDICARE

## 2023-08-23 ENCOUNTER — TELEPHONE (OUTPATIENT)
Dept: CARDIOLOGY | Facility: CLINIC | Age: 78
End: 2023-08-23
Payer: MEDICARE

## 2023-08-23 ENCOUNTER — HOSPITAL ENCOUNTER (OUTPATIENT)
Dept: GENERAL RADIOLOGY | Facility: HOSPITAL | Age: 78
Discharge: HOME | End: 2023-08-23
Payer: MEDICARE

## 2023-08-23 ENCOUNTER — TELEPHONE (OUTPATIENT)
Dept: FAMILY MEDICINE CLINIC | Facility: CLINIC | Age: 78
End: 2023-08-23

## 2023-08-23 VITALS
RESPIRATION RATE: 16 BRPM | BODY MASS INDEX: 22.4 KG/M2 | DIASTOLIC BLOOD PRESSURE: 60 MMHG | HEART RATE: 64 BPM | WEIGHT: 139.4 LBS | OXYGEN SATURATION: 96 % | SYSTOLIC BLOOD PRESSURE: 126 MMHG | TEMPERATURE: 97.1 F | HEIGHT: 66 IN

## 2023-08-23 DIAGNOSIS — R92.1 BREAST CALCIFICATION, RIGHT: Primary | ICD-10-CM

## 2023-08-23 DIAGNOSIS — R93.89 ABNORMAL CHEST X-RAY: ICD-10-CM

## 2023-08-23 DIAGNOSIS — R05.1 ACUTE COUGH: Primary | ICD-10-CM

## 2023-08-23 PROCEDURE — 99214 OFFICE O/P EST MOD 30 MIN: CPT | Performed by: NURSE PRACTITIONER

## 2023-08-23 RX ORDER — AZITHROMYCIN 250 MG/1
TABLET, FILM COATED ORAL
Qty: 6 TABLET | Refills: 0 | Status: SHIPPED | OUTPATIENT
Start: 2023-08-23

## 2023-08-23 NOTE — TELEPHONE ENCOUNTER
Called and left VM. Will continue to try to reach patient.     Ella Orr RN  Triage [facility]

## 2023-08-23 NOTE — TELEPHONE ENCOUNTER
Please inform patient chest x-ray shows small area of abnormality on the right side.  The radiologist is recommending a follow-up chest x-ray so I have placed an order which I believe she should have done in 1 week to follow-up.  She will also need to discuss with her PCP at upcoming appointment she may have some lung process such as pneumonia developing

## 2023-08-23 NOTE — PROGRESS NOTES
"Chief Complaint  Pneumonia    Subjective        Alexandria Yousif presents to [facility]  History of Present Illness  Pleasant patient here today follow-up recent upper respiratory infection started about 2 weeks ago mostly sinus drainage scratchy throat coughing, without  Fever chills weakness or worsening symptoms occasional little mild shortness of breath after coughing but none walking down the hallway she has a rather lengthy walk her throughout the day.  No bloody sputum's  Little bit of productive cough at times now more dry.    Recent chest x-ray showed right upper lobe infiltrate versus nodule  Her cardiologist Dr. Lynch ordered this, I think due to the complaints in the office, she was given Amoxil urgent care for upper respiratory infection she is concerned it may not cover pneumonia    No history of malignancy other than basal cell cancer and a premelanoma  No history of tobacco abuse no chronic cough  Night sweats or other unexplained weight loss  Keep up with Sveta talk about body mass index this is just a height to weight ratio which goes to counseling we can just follow-up to be seven-point we have with her meds and we have 15 like 6 months and placed on my call you know show up Objective   Vital Signs:  /60   Pulse 64   Temp 97.1 øF (36.2 øC) (Temporal)   Resp 16   Ht 167.6 cm (66\")   Wt 63.2 kg (139 lb 6.4 oz)   SpO2 96%   BMI 22.50 kg/mý   Estimated body mass index is 22.5 kg/mý as calculated from the following:    Height as of this encounter: 167.6 cm (66\").    Weight as of this encounter: 63.2 kg (139 lb 6.4 oz).    BMI is within normal parameters. No other follow-up for BMI required.      Physical Exam  Vitals reviewed.   Constitutional:       General: She is not in acute distress.     Appearance: Normal appearance. She is well-developed. She is not ill-appearing, toxic-appearing or diaphoretic.   HENT:      Head: Normocephalic.      Nose: Nose " normal.   Eyes:      General: No scleral icterus.     Conjunctiva/sclera: Conjunctivae normal.      Pupils: Pupils are equal, round, and reactive to light.   Neck:      Thyroid: No thyromegaly.      Vascular: No JVD.   Cardiovascular:      Rate and Rhythm: Normal rate and regular rhythm.      Heart sounds: Normal heart sounds. No murmur heard.    No friction rub. No gallop.   Pulmonary:      Effort: Pulmonary effort is normal. No respiratory distress.      Breath sounds: Normal breath sounds. No stridor. No wheezing or rales.      Comments: Clear unlabored respirations less than 20 patient able to speak full sentences without dyspnea    Abdominal:      General: Bowel sounds are normal. There is no distension.      Palpations: Abdomen is soft.      Tenderness: There is no abdominal tenderness.      Comments: No hepatosplenomegaly, no ascites,   Musculoskeletal:         General: No tenderness.      Cervical back: Neck supple.   Lymphadenopathy:      Cervical: No cervical adenopathy.   Skin:     General: Skin is warm and dry.      Findings: No erythema or rash.   Neurological:      General: No focal deficit present.      Mental Status: She is alert and oriented to person, place, and time. Mental status is at baseline.      Deep Tendon Reflexes: Reflexes are normal and symmetric.   Psychiatric:         Mood and Affect: Mood normal.         Behavior: Behavior normal.         Thought Content: Thought content normal.         Judgment: Judgment normal.      Result Review :                Assessment and Plan   Diagnoses and all orders for this visit:    1. Acute cough (Primary)    2. Abnormal chest x-ray  -     XR Chest PA & Lateral; Future    Other orders  -     azithromycin (Zithromax Z-Jay) 250 MG tablet; Take 2 tablets the first day, then 1 tablet daily for 4 days.  Dispense: 6 tablet; Refill: 0             Follow Up   No follow-ups on file.  Patient was given instructions and counseling regarding her condition or for  health maintenance advice. Please see specific information pulled into the AVS if appropriate.     Patient Instructions   Discharge instructions  Okay to discontinue Amoxil start Zithromax take this for 5 days, stays unibody nearly 10 days and covers most community-acquired pneumonias  Not sure if you have a small nodule versus a small infiltrate or pneumonia    Over-the-counter medicine as needed for cough congestion as you have been doing, plenty fluids plenty rest,  Update your condition in 1 week please your cough will resolve however over the next 1 to 3 weeks likely  But you have no evidence of any active infection presently  Very unlikely would be contagious unless you develop new symptoms which are likely a virus.    If you develop fever increasing cough weakness get a COVID check but also would need to be reevaluated to rule out any worsening.    Outpatient chest x-ray in 3 to 4 weeks and call for results very important critical we must ensure resolution  If not, I will obtain a chest CT with contrast or refer you back to Dr. Muse.    No need for Singulair at this time is for allergies, and actually fairly safe  Psychiatric issues in my experience have been rare  May be more common than we know however?  I relatively but I am just for fun for conversation

## 2023-08-23 NOTE — TELEPHONE ENCOUNTER
Caller: YousifAlexandria    Relationship: Self    Best call back number: [phone]    What is the best time to reach you: ANYTIME    Who are you requesting to speak with (clinical staff, provider,  specific staff member): CLINICAL    What was the call regarding: PATIENT STATES HER CARDIOLOGIST SENT HER TO DO SOME IMAGING AND THEY DIAGNOSED HER WITH EARLY DEVELOPING PNEUMONIA. SHE STATES SHE WAS SEEN AT URGENT CARE BUT THEY TOLD HER IT WAS AN UPPER RESPIRATORY PRIOR TO SEEING HER CARDIOLOGIST INFECTION AND PRESCRIBED AMOXICILLIN. PATIENT IS REQUESTING IF SHE CAN GET A STRONGER ANTIBIOTIC SINCE IT IS NOW ISAAC DIAGNOSED AS PNEUMONIA.    PREFERRED PHARMACY:[id number] [address] AT [facility] [address] - [phone] [facility] [phone]  [phone]

## 2023-08-23 NOTE — TELEPHONE ENCOUNTER
Alexandria Yousif returned call.    Reviewed results and recommendations with patient and she verbalized understanding.  Patient stated she will discuss with Dr. Muse as requested.  Patient also reports she has seen pulmonology before and asked if she should f/u with pulmnology as well.  Explained to patient that she can contact pulmonology and notify of result to see if they feel she needs to be seen in office.  Patient verbalized understanding.    Thank you,  Carissa BENAVIDES RN  Triage Nurse [facility]   09:53 EDT

## 2023-08-23 NOTE — PATIENT INSTRUCTIONS
Discharge instructions  Okay to discontinue Amoxil start Zithromax take this for 5 days, stays unibody nearly 10 days and covers most community-acquired pneumonias  Not sure if you have a small nodule versus a small infiltrate or pneumonia    Over-the-counter medicine as needed for cough congestion as you have been doing, plenty fluids plenty rest,  Update your condition in 1 week please your cough will resolve however over the next 1 to 3 weeks likely  But you have no evidence of any active infection presently  Very unlikely would be contagious unless you develop new symptoms which are likely a virus.    If you develop fever increasing cough weakness get a COVID check but also would need to be reevaluated to rule out any worsening.    Outpatient chest x-ray in 3 to 4 weeks and call for results very important critical we must ensure resolution  If not, I will obtain a chest CT with contrast or refer you back to Dr. Muse.    No need for Singulair at this time is for allergies, and actually fairly safe  Psychiatric issues in my experience have been rare  May be more common than we know however?  I relatively but I am just for fun for conversation

## 2023-08-29 ENCOUNTER — TELEPHONE (OUTPATIENT)
Dept: FAMILY MEDICINE CLINIC | Facility: CLINIC | Age: 78
End: 2023-08-29

## 2023-08-29 NOTE — TELEPHONE ENCOUNTER
Caller: Alexandria Yousif    Relationship: Self    Best call back number: [phone]    What is the medical concern/diagnosis: LUMBAR SPINE FLARE UP     What is the provider, practice or medical service name: DR. NANCY FARLEY     What is the office location: [address] [place] [address]    What is the office phone number: [phone]    Any additional details: PATIENT STATES SHE CONTACTED DR. FARLEY'S OFFICE TO GET SCHEDULED FOR HER LUMBAR SPINE FLARE UP BUT THEY TOLD HER SHE NEEDED TO GET A NEW REFERRAL BEFORE SHE IS ABLE TO GET SCHEDULED.     PATIENT IS REQUESTING A CALL BACK.

## 2023-08-30 ENCOUNTER — TELEPHONE (OUTPATIENT)
Dept: FAMILY MEDICINE CLINIC | Facility: CLINIC | Age: 78
End: 2023-08-30

## 2023-08-30 NOTE — TELEPHONE ENCOUNTER
Caller: Alexandria Yousif    Relationship: Self    Best call back number: [phone]     What medication are you requesting: SOMETHING FOR  BACK PAIN     What are your current symptoms: EXTREME SEVERE BACK PAIN    How long have you been experiencing symptoms: 8/29/2023    Have you had these symptoms before:    [x] Yes  [] No    Have you been treated for these symptoms before:   [x] Yes  [] No    If a prescription is needed, what is your preferred pharmacy and phone number:        [email or web address] #[id number] [facility] [address] AT [facility] [address] - [phone]  - [phone]          Additional notes:    PATIENT BENT OVER AND BACK WENT OUT AND IS IN SEVERE PAIN

## 2023-08-30 NOTE — TELEPHONE ENCOUNTER
Caller: Alexandria Yousif    Relationship: Self    Best call back number: [phone]     What orders are you requesting (i.e. lab or imaging): TO SEE DR NANCY QUIÑONEZ    In what timeframe would the patient need to come in: ASAP      Additional notes:     PATIENT PREFERS TO SEE THIS DOCTOR AND THE ONLY WAY SHE CAN MAKE AN APPOINTMENT WITH HIM IS FOR THE ORDERS TO BE SENT TO HIM.      THESE ORDERS ARE FOR LUMBAR BACK PAIN      ANY QUESTIONS OR CONCERNS PLEASE CALL PATIENT.

## 2023-08-30 NOTE — TELEPHONE ENCOUNTER
Looks like they were waiting for the imaging to be complete which was the end of June so I resent the referral- they will be calling her shortly.

## 2023-08-31 DIAGNOSIS — G89.29 NECK PAIN, CHRONIC: ICD-10-CM

## 2023-08-31 DIAGNOSIS — M54.2 NECK PAIN, CHRONIC: ICD-10-CM

## 2023-08-31 RX ORDER — CYCLOBENZAPRINE HCL 5 MG
5 TABLET ORAL 3 TIMES DAILY PRN
Qty: 30 TABLET | Refills: 0 | Status: SHIPPED | OUTPATIENT
Start: 2023-08-31

## 2023-08-31 RX ORDER — METHYLPREDNISOLONE 4 MG/1
TABLET ORAL
Qty: 21 TABLET | Refills: 0 | Status: SHIPPED | OUTPATIENT
Start: 2023-08-31

## 2023-08-31 NOTE — TELEPHONE ENCOUNTER
CALLED AND SPOKE TO PATIENT. INFORMED THAT SHE WOULD NEED TO SEE DR. BECK IN ORDER FOR A REFERRAL TO BE WRITTEN. INFORMED PATIENT THAT SHE CAN DISCUSS THIS AT UPCOMING VISIT WITH DR. BECK. PATIENT STATED SHE HAS BEEN IN CONTACT WITH DR. QUIÑONEZ'S OFFICE AND THEY HAVE SCHEDULED HER AN APPT FOR THE 11TH.

## 2023-09-01 NOTE — TELEPHONE ENCOUNTER
Was going to have another MA deal with this before the end of the day due to my clock out time nearing. Was informed by Marli that Micha left her a message for this patient also and she has already called and notified this patient.

## 2023-09-05 ENCOUNTER — HOSPITAL ENCOUNTER (OUTPATIENT)
Dept: GENERAL RADIOLOGY | Facility: HOSPITAL | Age: 78
Discharge: HOME | End: 2023-09-05
Admitting: NURSE PRACTITIONER
Payer: MEDICARE

## 2023-09-05 ENCOUNTER — OFFICE VISIT (OUTPATIENT)
Dept: FAMILY MEDICINE CLINIC | Facility: CLINIC | Age: 78
End: 2023-09-05
Payer: MEDICARE

## 2023-09-05 VITALS
HEART RATE: 66 BPM | WEIGHT: 137 LBS | OXYGEN SATURATION: 96 % | DIASTOLIC BLOOD PRESSURE: 74 MMHG | HEIGHT: 66 IN | BODY MASS INDEX: 22.02 KG/M2 | SYSTOLIC BLOOD PRESSURE: 126 MMHG

## 2023-09-05 DIAGNOSIS — M54.42 ACUTE BILATERAL LOW BACK PAIN WITH BILATERAL SCIATICA: ICD-10-CM

## 2023-09-05 DIAGNOSIS — E78.2 MIXED HYPERLIPIDEMIA: Primary | ICD-10-CM

## 2023-09-05 DIAGNOSIS — M54.41 ACUTE BILATERAL LOW BACK PAIN WITH BILATERAL SCIATICA: ICD-10-CM

## 2023-09-05 DIAGNOSIS — E11.9 TYPE 2 DIABETES MELLITUS WITHOUT COMPLICATION, WITHOUT LONG-TERM CURRENT USE OF INSULIN: ICD-10-CM

## 2023-09-05 DIAGNOSIS — F32.89 OTHER DEPRESSION: ICD-10-CM

## 2023-09-05 DIAGNOSIS — R93.89 ABNORMAL CHEST X-RAY: ICD-10-CM

## 2023-09-05 DIAGNOSIS — F06.4 ANXIETY DISORDER DUE TO GENERAL MEDICAL CONDITION: ICD-10-CM

## 2023-09-05 PROCEDURE — 71046 X-RAY EXAM CHEST 2 VIEWS: CPT

## 2023-09-05 RX ORDER — ALBUTEROL SULFATE 90 UG/1
2 AEROSOL, METERED RESPIRATORY (INHALATION) EVERY 4 HOURS PRN
Qty: 18 G | Refills: 1 | Status: SHIPPED | OUTPATIENT
Start: 2023-09-05

## 2023-09-05 NOTE — PROGRESS NOTES
The ABCs of the Annual Wellness Visit  Subsequent Medicare Wellness Visit    Subjective    Alexandria Yousif is a 78 y.o. female who presents for a Subsequent Medicare Wellness Visit.    The following portions of the patient's history were reviewed and   updated as appropriate: allergies, current medications, past family history, past medical history, past social history, past surgical history, and problem list.    Compared to one year ago, the patient feels her physical   health is better.    Compared to one year ago, the patient feels her mental   health is better.    Recent Hospitalizations:  She was not admitted to the hospital during the last year.       Current Medical Providers:  Patient Care Team:  Anita Muse MD as PCP - General  nAita Muse MD as PCP - Family Medicine  Aric, Rajinder Figueredo MD as Consulting Physician (Otolaryngology)  Daquan Rhodes MD as Consulting Physician (Cardiology)  Amador Pierce II, MD as Consulting Physician (Hematology and Oncology)  Anita Muse MD as Referring Physician (Internal Medicine)    Outpatient Medications Prior to Visit   Medication Sig Dispense Refill    acetaminophen (TYLENOL) 500 MG tablet Take 2 tablets by mouth Every 6 (Six) Hours As Needed for Mild Pain.      ALPRAZolam (XANAX) 0.5 MG tablet Take 1 tablet by mouth Daily As Needed for Anxiety. 45 tablet 0    Alum Hydroxide-Mag Trisilicate (GAVISCON) 80-14.2 MG chewable tablet Chew 1 tablet 4 (Four) Times a Day As Needed (Heartburn). 224 each 11    aspirin 81 MG tablet Take 1 tablet by mouth Daily.      Biotin 10 MG tablet       buPROPion (WELLBUTRIN) 75 MG tablet TAKE 1 TABLET BY MOUTH DAILY 30 tablet 2    Cholecalciferol 50 MCG (2000 UT) capsule       cyclobenzaprine (FLEXERIL) 5 MG tablet Take 1 tablet by mouth 3 (Three) Times a Day As Needed for Muscle Spasms. 30 tablet 0    desvenlafaxine (PRISTIQ) 100 MG 24 hr tablet TAKE 1 TABLET BY MOUTH DAILY 90 tablet 1    fenofibrate (TRICOR) 145  MG tablet TAKE 1 TABLET BY MOUTH EVERY DAY 90 tablet 1    metFORMIN ER (GLUCOPHAGE-XR) 500 MG 24 hr tablet TAKE 1 TABLET BY MOUTH DAILY WITH LARGEST MEAL OF THE DAY AND A GLASS OF WATER 90 tablet 01    methylPREDNISolone (MEDROL) 4 MG dose pack Take as directed on package instructions. 21 tablet 0    metoprolol tartrate (LOPRESSOR) 25 MG tablet Take 1 tablet by mouth 2 (Two) Times a Day. 180 tablet 3    montelukast (SINGULAIR) 10 MG tablet Take 1 tablet by mouth Every Night for 30 days. 30 tablet 0    Omega-3 Fatty Acids (FISH OIL) 1200 MG capsule capsule       omeprazole (priLOSEC) 40 MG capsule TAKE 1 CAPSULE BY MOUTH EVERY DAY 90 capsule 0    Premarin 0.3 MG tablet TAKE 1 TABLET BY MOUTH EVERY DAY 90 tablet 2    rosuvastatin (CRESTOR) 10 MG tablet TAKE 1 TABLET BY MOUTH EVERY NIGHT 90 tablet 1    spironolactone (ALDACTONE) 25 MG tablet TAKE 1 TABLET BY MOUTH DAILY 30 tablet 4    traZODone (DESYREL) 50 MG tablet TAKE 1/2 TO 1 TABLET BY MOUTH AT BEDTIME AS NEEDED FOR SLEEP 90 tablet 1    valACYclovir (VALTREX) 500 MG tablet Take 2 tablets by mouth 2 (Two) Times a Day. 4 tablet 3    albuterol sulfate  (90 Base) MCG/ACT inhaler Inhale 2 puffs Every 4 (Four) Hours As Needed for Wheezing.      fluticasone (FLONASE) 50 MCG/ACT nasal spray 2 sprays into the nostril(s) as directed by provider Daily for 30 days. 16 g 0    levETIRAcetam (KEPPRA) 500 MG tablet TAKE 1/2 TABLET BY MOUTH DAILY AS NEEDED FOR HEADACHE (Patient not taking: Reported on 9/5/2023) 45 tablet 1    tretinoin (RETIN-A) 0.025 % cream APPLY EXTERNALLY TO THE AFFECTED AREA EVERY NIGHT AT BEDTIME TO THE FACE (Patient not taking: Reported on 9/5/2023)      azithromycin (Zithromax Z-Jay) 250 MG tablet Take 2 tablets the first day, then 1 tablet daily for 4 days. 6 tablet 0     No facility-administered medications prior to visit.       No opioid medication identified on active medication list. I have reviewed chart for other potential  high risk  "medication/s and harmful drug interactions in the elderly.        Aspirin is on active medication list. Aspirin use is indicated based on review of current medical condition/s. Pros and cons of this therapy have been discussed today. Benefits of this medication outweigh potential harm.  Patient has been encouraged to continue taking this medication.  .      Patient Active Problem List   Diagnosis    Hypercalcemia    Mixed hyperlipidemia    Abnormal brain MRI    Vertigo    Anxiety disorder due to general medical condition    Depression    Gastroesophageal reflux disease    Impaired fasting glucose    Insomnia    Numbness of lower extremity    Osteopenia    PVCs (premature ventricular contractions)    PSVT (paroxysmal supraventricular tachycardia)    PAT (paroxysmal atrial tachycardia)    Polycythemia    Pulmonary nodule, right    Hyperalbuminemia    Type 2 diabetes mellitus without complication, without long-term current use of insulin    Coronary artery calcification    RBBB     Advance Care Planning   Advance Care Planning     Advance Directive is on file.  ACP discussion was held with the patient during this visit. Patient has an advance directive in EMR which is still valid.      Objective    Vitals:    09/05/23 1006   BP: 126/74   BP Location: Left arm   Patient Position: Sitting   Cuff Size: Adult   Pulse: 66   SpO2: 96%   Weight: 62.1 kg (137 lb)   Height: 167.6 cm (66\")     Estimated body mass index is 22.11 kg/m² as calculated from the following:    Height as of this encounter: 167.6 cm (66\").    Weight as of this encounter: 62.1 kg (137 lb).    BMI is within normal parameters. No other follow-up for BMI required.      Does the patient have evidence of cognitive impairment? No          HEALTH RISK ASSESSMENT    Smoking Status:  Social History     Tobacco Use   Smoking Status Never   Smokeless Tobacco Never     Alcohol Consumption:  Social History     Substance and Sexual Activity   Alcohol Use Yes    " Alcohol/week: 3.0 - 4.0 standard drinks    Types: 2 - 3 Glasses of wine, 1 Cans of beer per week    Comment: liquor-1 to 2 month     Fall Risk Screen:    ANJANA Fall Risk Assessment was completed, and patient is at LOW risk for falls.Assessment completed on:2023    Depression Screenin/5/2023    10:10 AM   PHQ-2/PHQ-9 Depression Screening   Little Interest or Pleasure in Doing Things 1-->several days   Feeling Down, Depressed or Hopeless 2-->more than half the days   Trouble Falling or Staying Asleep, or Sleeping Too Much 1-->several days   Feeling Tired or Having Little Energy 3-->nearly every day   Poor Appetite or Overeating 0-->not at all   Feeling Bad about Yourself - or that You are a Failure or Have Let Yourself or Your Family Down 2-->more than half the days   Trouble Concentrating on Things, Such as Reading the Newspaper or Watching Television 2-->more than half the days   Moving or Speaking So Slowly that Other People Could Have Noticed? Or the Opposite - Being So Fidgety 0-->not at all   Thoughts that You Would be Better Off Dead or of Hurting Yourself in Some Way 0-->not at all   PHQ-9: Brief Depression Severity Measure Score 11       Health Habits and Functional and Cognitive Screenin/5/2023    10:00 AM   Functional & Cognitive Status   Do you have difficulty preparing food and eating? Yes   Do you have difficulty bathing yourself, getting dressed or grooming yourself? No   Do you have difficulty using the toilet? No   Do you have difficulty moving around from place to place? No   Do you have trouble with steps or getting out of a bed or a chair? No   Current Diet Well Balanced Diet   Dental Exam Up to date   Eye Exam Up to date   Exercise (times per week) 0 times per week   Current Exercises Include No Regular Exercise   Do you need help using the phone?  No   Are you deaf or do you have serious difficulty hearing?  Yes   Do you need help to go to places out of walking distance? No    Do you need help shopping? No   Do you need help preparing meals?  No   Do you need help with housework?  No   Do you need help with laundry? No   Do you need help taking your medications? No   Do you need help managing money? No   Do you ever drive or ride in a car without wearing a seat belt? No   Have you felt unusual stress, anger or loneliness in the last month? Yes   Who do you live with? Spouse   If you need help, do you have trouble finding someone available to you? No   Have you been bothered in the last four weeks by sexual problems? No   Do you have difficulty concentrating, remembering or making decisions? Yes       Age-appropriate Screening Schedule:  Refer to the list below for future screening recommendations based on patient's age, sex and/or medical conditions. Orders for these recommended tests are listed in the plan section. The patient has been provided with a written plan.    Health Maintenance   Topic Date Due    DXA SCAN  06/26/2022    URINE MICROALBUMIN  01/26/2023    HEMOGLOBIN A1C  02/22/2023    COVID-19 Vaccine (6 - Pfizer series) 03/08/2023    LIPID PANEL  08/22/2023    Pneumococcal Vaccine 65+ (2 - PPSV23 or PCV20) 11/09/2023 (Originally 11/10/2017)    INFLUENZA VACCINE  10/01/2023    DIABETIC EYE EXAM  05/22/2024    ANNUAL WELLNESS VISIT  09/05/2024    COLORECTAL CANCER SCREENING  02/17/2025    TDAP/TD VACCINES (3 - Td or Tdap) 10/21/2030    ZOSTER VACCINE  Completed    HEPATITIS C SCREENING  Addressed                  CMS Preventative Services Quick Reference  Risk Factors Identified During Encounter  Hearing Problem:  already wears hearing aids  Immunizations Discussed/Encouraged: Prevnar 20 (Pneumococcal 20-valent conjugate)  Dental Screening Recommended  Vision Screening Recommended  The above risks/problems have been discussed with the patient.  Pertinent information has been shared with the patient in the After Visit Summary.  An After Visit Summary and PPPS were made available  "to the patient.    Follow Up:   Next Medicare Wellness visit to be scheduled in 1 year.       Additional E&M Note during same encounter follows:  Patient has multiple medical problems which are significant and separately identifiable that require additional work above and beyond the Medicare Wellness Visit.      Chief Complaint  Medicare Wellness-subsequent    Subjective        HPI  Alexandria Yousif is also being seen today for AWV and for f/u on pneumonia (treated with a few doses of amoxil and then full course of zpak) 8/22/23 and return of the rattle cough on Saturday.  Also with increase in back pain flare up that started a week ago which she thinks was caused by severe coughing and laying around with pneumonia.  I started flexeril and medrol dosepak on Friday.  Her back is much better and her gait and movement are much better.  Sees [facility] on Monday Dr. Celis.  On Saturday she began with rattle in her chest with deep breathing and minimal cough.  She restarted albuterol that she was given with pneumonia in August.  No fever.  Clear RN.  No St. Some ha's which are dull and tylenol relieves it.  Sats remain greater than 95% at home.  No soa.           Objective   Vital Signs:  /74 (BP Location: Left arm, Patient Position: Sitting, Cuff Size: Adult)   Pulse 66   Ht 167.6 cm (66\")   Wt 62.1 kg (137 lb)   SpO2 96%   BMI 22.11 kg/m²     Physical Exam  Vitals and nursing note reviewed.   Constitutional:       Appearance: Normal appearance. She is well-developed.   HENT:      Head: Normocephalic and atraumatic.      Right Ear: Tympanic membrane, ear canal and external ear normal.      Left Ear: Tympanic membrane, ear canal and external ear normal.      Mouth/Throat:      Mouth: Mucous membranes are moist.   Eyes:      Extraocular Movements: Extraocular movements intact.      Conjunctiva/sclera: Conjunctivae normal.   Neck:      Vascular: No carotid bruit.   Cardiovascular:      Rate and Rhythm: Normal rate and " regular rhythm.      Heart sounds: Normal heart sounds.      Comments: No bruits  Pulmonary:      Effort: Pulmonary effort is normal. No respiratory distress.      Breath sounds: No stridor. Wheezing (left sided inspiratory wheeze mild) present. No rhonchi or rales.   Chest:      Chest wall: No tenderness.   Abdominal:      General: Bowel sounds are normal. There is no distension.      Palpations: Abdomen is soft. There is no mass.      Tenderness: There is no abdominal tenderness. There is no guarding or rebound.      Hernia: No hernia is present.   Musculoskeletal:      Cervical back: Neck supple.   Lymphadenopathy:      Cervical: No cervical adenopathy.   Skin:     General: Skin is warm.   Neurological:      Mental Status: She is alert and oriented to person, place, and time. Mental status is at baseline.   Psychiatric:         Mood and Affect: Mood normal.         Behavior: Behavior normal.         Thought Content: Thought content normal.         Judgment: Judgment normal.                       Assessment and Plan   Diagnoses and all orders for this visit:    1. Mixed hyperlipidemia (Primary)  -     Hemoglobin A1c; Future  -     CBC & Differential; Future  -     Comprehensive Metabolic Panel; Future  -     Lipid Panel With LDL / HDL Ratio; Future  -     TSH; Future  -     T4, Free; Future    2. Type 2 diabetes mellitus without complication, without long-term current use of insulin  -     Hemoglobin A1c; Future    3. Anxiety disorder due to general medical condition    4. Other depression    5. Acute bilateral low back pain with bilateral sciatica    Other orders  -     albuterol sulfate  (90 Base) MCG/ACT inhaler; Inhale 2 puffs Every 4 (Four) Hours As Needed for Wheezing.  Dispense: 18 g; Refill: 1      She is due for fasting labs but with recent steroid pack for her LBP and sciatica--will delay her labs  [place] [facility] [place] and MRI showing deg changes and disc disease.  Back pain greatly improved  with steroid pack and flexeril.  She is doing her PT exercises and has already completed course of PT  Depression controlled with pristiq and now working with therapist and seeing improvement  Prediabetes continue metformin ER.  Diet and exercise  Insomnia trazodone working well for her  Anxiety prn xanax.  Not due for refill yet.  aJmarcus reviewed an contract in place  URI with recent pneumonia--check CXR.  Inspiratory wheeze on left side--infiltrate was on the right in August.  Will delay antibiotics for now.  Refill albuterol .  Mucinex and pulm toilet recommended         Follow Up   Return in about 6 months (around 3/5/2024) for Recheck.  Patient was given instructions and counseling regarding her condition or for health maintenance advice. Please see specific information pulled into the AVS if appropriate.

## 2023-09-08 NOTE — PROGRESS NOTES
Subjective   Patient ID: Alexandria Yousif is a 78 y.o. female is being seen for consultation today at the request of Anita Muse MD for anterolisthesis of lumbar spine. Patient had a MRI L-spine on 06/30/2023 @ [facility], and an MRI pelvis on 06/08/2023    Treatment: No recent treatment    Today patient states that she has low back pain that radiates to B/L legs. Patient states that she has neck pain as well     History of Present Illness    This patient has not been seen here since 2015.  At that time she was having pain in her neck and in her lower back.  Recently she has had a couple of severe flareups of her lower back.  These have taken her down and made it difficult to get up and walk.  Ultimately the first 1 was in June and it calm down.  Subsequent to that she was doing okay but had another flareup at the end of August.  She took some oral steroids and that is also better at this point.  It is beginning to get a little bit worse again though.  The pain is located across her lower back.  It radiates into her buttocks.  It goes into her anterior thighs down to her knees.  Most of the worst pain that was in her back.    The following portions of the patient's history were reviewed and updated as appropriate: allergies, current medications, past family history, past medical history, past social history, past surgical history, and problem list.    Review of Systems   Constitutional:  Negative for chills and fever.   HENT:  Negative for congestion.    Genitourinary:  Negative for difficulty urinating and dysuria.   Musculoskeletal:  Positive for back pain, gait problem and myalgias.   Neurological:  Positive for weakness and numbness.     I reviewed the review of systems listed by the patient and discussed by my MA    Objective     There were no vitals filed for this visit.  There is no height or weight on file to calculate BMI.    Tobacco Use: Low Risk     Smoking Tobacco Use: Never    Smokeless Tobacco  Use: Never    Passive Exposure: Not on file          Physical Exam  Constitutional:       Appearance: She is well-developed.   HENT:      Head: Normocephalic and atraumatic.   Eyes:      Extraocular Movements: EOM normal.      Conjunctiva/sclera: Conjunctivae normal.      Pupils: Pupils are equal, round, and reactive to light.   Neck:      Vascular: No carotid bruit.   Neurological:      Mental Status: She is oriented to person, place, and time.      Coordination: Finger-Nose-Finger Test and Heel to Shin Test normal.      Gait: Gait is intact.      Deep Tendon Reflexes:      Reflex Scores:       Tricep reflexes are 2+ on the right side and 2+ on the left side.       Bicep reflexes are 2+ on the right side and 2+ on the left side.       Brachioradialis reflexes are 2+ on the right side and 2+ on the left side.       Patellar reflexes are 2+ on the right side and 2+ on the left side.       Achilles reflexes are 2+ on the right side and 2+ on the left side.  Psychiatric:         Speech: Speech normal.     Neurologic Exam     Mental Status   Oriented to person, place, and time.   Registration of memory: Good recent and remote memory.   Attention: normal. Concentration: normal.   Speech: speech is normal   Level of consciousness: alert  Knowledge: consistent with education.     Cranial Nerves     CN II   Visual fields full to confrontation.   Visual acuity: normal    CN III, IV, VI   Pupils are equal, round, and reactive to light.  Extraocular motions are normal.     CN V   Facial sensation intact.   Right corneal reflex: normal  Left corneal reflex: normal    CN VII   Facial expression full, symmetric.   Right facial weakness: none  Left facial weakness: none    CN VIII   Hearing: intact    CN IX, X   Palate: symmetric    CN XI   Right sternocleidomastoid strength: normal  Left sternocleidomastoid strength: normal    CN XII   Tongue: not atrophic  Tongue deviation: none    Motor Exam   Muscle bulk: normal  Right arm  tone: normal  Left arm tone: normal  Right leg tone: normal  Left leg tone: normal    Strength   Strength 5/5 except as noted.     Sensory Exam   Light touch normal.     Gait, Coordination, and Reflexes     Gait  Gait: normal    Coordination   Finger to nose coordination: normal  Heel to shin coordination: normal    Reflexes   Right brachioradialis: 2+  Left brachioradialis: 2+  Right biceps: 2+  Left biceps: 2+  Right triceps: 2+  Left triceps: 2+  Right patellar: 2+  Left patellar: 2+  Right achilles: 2+  Left achilles: 2+  Right : 2+  Left : 2+        Assessment & Plan   Independent Review of Radiographic Studies:      I personally reviewed the images from the following studies.    I reviewed an MRI of the lumbar spine done on 30 June of this year.  On the sagittal images there is some disc bulging at several levels but no severe stenosis.  On the axial images T12-L1 looks okay.  L1-2 is widely open as well.  L2-3 looks okay but L3-4 does show a little narrowing although I do not see any nerve pressure.  L4-5 is pretty narrow and shows a spondylolisthesis which appears to have progressed according to the radiologist from an MRI in 2015.  L5-S1 mostly looks okay.    Medical Decision Making:      Told the patient about the imaging.  I told her that from my point of view I would not recommend jumping into any surgery.  I suggested we try her on some physical therapy and some epidural blocks as a next step.  She is in agreement with that plan.  She will call if anything flares up again in the future.    Diagnoses and all orders for this visit:    1. Spinal stenosis of lumbar region without neurogenic claudication (Primary)  -     Ambulatory Referral to Physical Therapy  -     Epidural Block      No follow-ups on file.

## 2023-09-09 DIAGNOSIS — F41.9 ANXIETY: ICD-10-CM

## 2023-09-11 ENCOUNTER — OFFICE VISIT (OUTPATIENT)
Dept: NEUROSURGERY | Facility: CLINIC | Age: 78
End: 2023-09-11
Payer: MEDICARE

## 2023-09-11 DIAGNOSIS — M48.061 SPINAL STENOSIS OF LUMBAR REGION WITHOUT NEUROGENIC CLAUDICATION: Primary | ICD-10-CM

## 2023-09-11 PROCEDURE — 99203 OFFICE O/P NEW LOW 30 MIN: CPT | Performed by: NEUROLOGICAL SURGERY

## 2023-09-11 RX ORDER — ALPRAZOLAM 0.5 MG/1
0.5 TABLET ORAL DAILY PRN
Qty: 45 TABLET | Refills: 0 | Status: SHIPPED | OUTPATIENT
Start: 2023-09-11

## 2023-09-11 NOTE — TELEPHONE ENCOUNTER
Rx Refill Note  Requested Prescriptions     Pending Prescriptions Disp Refills    ALPRAZolam (XANAX) 0.5 MG tablet [Pharmacy Med Name: ALPRAZOLAM 0.5MG TABLETS] 45 tablet      Sig: TAKE 1 TABLET BY MOUTH DAILY AS NEEDED FOR ANXIETY      Last office visit with prescribing clinician: 9/5/2023   Last telemedicine visit with prescribing clinician: Visit date not found   Next office visit with prescribing clinician: 3/5/2024                         Would you like a call back once the refill request has been completed: [] Yes [] No    If the office needs to give you a call back, can they leave a voicemail: [] Yes [] No    Juancarlos Laguna MA  09/11/23, 10:37 EDT

## 2023-09-13 ENCOUNTER — HOSPITAL ENCOUNTER (OUTPATIENT)
Dept: MAMMOGRAPHY | Facility: HOSPITAL | Age: 78
Discharge: HOME | End: 2023-09-13
Admitting: INTERNAL MEDICINE
Payer: MEDICARE

## 2023-09-13 DIAGNOSIS — R92.1 BREAST CALCIFICATION, RIGHT: ICD-10-CM

## 2023-09-13 PROCEDURE — 77065 DX MAMMO INCL CAD UNI: CPT

## 2023-09-13 PROCEDURE — G0279 TOMOSYNTHESIS, MAMMO: HCPCS
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
## 2023-09-15 ENCOUNTER — TREATMENT (OUTPATIENT)
Dept: PHYSICAL THERAPY | Facility: CLINIC | Age: 78
End: 2023-09-15
Payer: MEDICARE

## 2023-09-15 DIAGNOSIS — M43.16 SPONDYLOLISTHESIS OF LUMBAR REGION: ICD-10-CM

## 2023-09-15 DIAGNOSIS — R29.898 WEAKNESS OF BOTH LEGS: ICD-10-CM

## 2023-09-15 DIAGNOSIS — M54.16 RADICULOPATHY, LUMBAR REGION: ICD-10-CM

## 2023-09-15 DIAGNOSIS — M48.061 SPINAL STENOSIS OF LUMBAR REGION WITHOUT NEUROGENIC CLAUDICATION: Primary | ICD-10-CM

## 2023-09-15 DIAGNOSIS — R26.2 DIFFICULTY WALKING: ICD-10-CM

## 2023-09-15 PROCEDURE — 97012 MECHANICAL TRACTION THERAPY: CPT | Performed by: PHYSICAL THERAPIST

## 2023-09-15 PROCEDURE — 97162 PT EVAL MOD COMPLEX 30 MIN: CPT | Performed by: PHYSICAL THERAPIST

## 2023-09-15 PROCEDURE — 97110 THERAPEUTIC EXERCISES: CPT | Performed by: PHYSICAL THERAPIST

## 2023-09-15 PROCEDURE — 97035 APP MDLTY 1+ULTRASOUND EA 15: CPT | Performed by: PHYSICAL THERAPIST

## 2023-09-16 NOTE — PATIENT INSTRUCTIONS
Patient was educated on findings of evaluation, purpose of treatment, and goals for therapy.  Patient was informed about cauda equina symptoms. Treatment options discussed and questions answered.  Patient was educated on anatomy/body mechanics/exercises/self treatment/pain relief techniques.       Access Code: [email or web address]  URL: [email or web address]  Date: 09/16/2023  Prepared by: Aracelis Porter    Exercises  - Pelvic Tilt  - 1 x daily - 7 x weekly - 1 sets - 10 reps  - Supine Diaphragmatic Breathing  - 1 x daily - 7 x weekly - 1 sets - 5 reps  - Sidelying Diaphragmatic Breathing  - 1 x daily - 7 x weekly - 1 sets - 5 reps  - Seated Diaphragmatic Breathing  - 1 x daily - 7 x weekly - 1 sets - 5 reps  - Seated Hip Adduction Isometrics with Ball  - 1 x daily - 7 x weekly - 1 sets - 5 reps - 3 hold    Patient Education  - Lumbar Spondylolisthesis  - Log Roll

## 2023-09-18 ENCOUNTER — TREATMENT (OUTPATIENT)
Dept: PHYSICAL THERAPY | Facility: CLINIC | Age: 78
End: 2023-09-18
Payer: MEDICARE

## 2023-09-18 DIAGNOSIS — M48.061 SPINAL STENOSIS OF LUMBAR REGION WITHOUT NEUROGENIC CLAUDICATION: Primary | ICD-10-CM

## 2023-09-18 DIAGNOSIS — R29.898 WEAKNESS OF BOTH LEGS: ICD-10-CM

## 2023-09-18 DIAGNOSIS — M54.16 RADICULOPATHY, LUMBAR REGION: ICD-10-CM

## 2023-09-18 DIAGNOSIS — R26.2 DIFFICULTY WALKING: ICD-10-CM

## 2023-09-18 DIAGNOSIS — M43.16 SPONDYLOLISTHESIS OF LUMBAR REGION: ICD-10-CM

## 2023-09-18 NOTE — PROGRESS NOTES
[facility] Therapy [place]  [address] [place] [address]  Phone [phone]  Fax [phone]      Physical Therapy Daily Treatment Note      Patient: Alexandria Yousif   : 1945  Referring practitioner: Sae Celis MD  Date of Initial Visit: Type: THERAPY  Noted: 9/15/2023  Today's Date: 2023  Patient seen for 2 sessions       Visit Diagnoses:    ICD-10-CM ICD-9-CM   1. Spinal stenosis of lumbar region without neurogenic claudication  M48.061 724.02   2. Radiculopathy, lumbar region  M54.16 724.4   3. Spondylolisthesis of lumbar region  M43.16 738.4   4. Weakness of both legs  R29.898 729.89   5. Difficulty walking  R26.2 719.7       Alexandria Yousif reports: no increased pain, just muscle soreness after last visit.     Subjective     Objective   See Exercise, Manual, and Modality Logs for complete treatment.   Right upslip, left AINN      Assessment/Plan  Centralized symptoms today to lumbar with improved muscular guarding.  Transfers on/off table and gait improved ease.  Increased abdominal stabilization with supine exercises.  Will continue to progress as able.  Awaiting LSO approval for spine stabilization when upright.    Progress weight on lumbar traction as her tolerance was excellent with 35lbs today.    Timed:         Manual Therapy:    8     mins  55883;     Therapeutic Exercise:    15     mins  46779;     Neuromuscular Pj:    -    mins  48920;    Therapeutic Activity:     -     mins  70964;     Gait Training:      -     mins  31635;     Ultrasound:     -     mins  17578;    Ionto                               -    mins   07248  Self Care                       -     mins   82110  Orthotic training    -     mins 20401      Un-Timed:  Electrical Stimulation:    -     mins  06088 ( );  Dry Needling     -     mins self-pay  Traction     15     mins 12277      Timed Treatment:   23   mins   Total Treatment:     50   mins    Aracelis Porter PT,  JIHAN SOTELO License: [id number]

## 2023-09-19 ENCOUNTER — HOSPITAL ENCOUNTER (OUTPATIENT)
Dept: GENERAL RADIOLOGY | Facility: HOSPITAL | Age: 78
Discharge: HOME | End: 2023-09-19
Payer: MEDICARE

## 2023-09-19 ENCOUNTER — HOSPITAL ENCOUNTER (OUTPATIENT)
Dept: PAIN MEDICINE | Facility: HOSPITAL | Age: 78
Discharge: HOME | End: 2023-09-19
Payer: MEDICARE

## 2023-09-19 ENCOUNTER — ANESTHESIA (OUTPATIENT)
Dept: PAIN MEDICINE | Facility: HOSPITAL | Age: 78
End: 2023-09-19
Payer: MEDICARE

## 2023-09-19 ENCOUNTER — ANESTHESIA EVENT (OUTPATIENT)
Dept: PAIN MEDICINE | Facility: HOSPITAL | Age: 78
End: 2023-09-19
Payer: MEDICARE

## 2023-09-19 VITALS
OXYGEN SATURATION: 95 % | RESPIRATION RATE: 16 BRPM | HEART RATE: 71 BPM | DIASTOLIC BLOOD PRESSURE: 74 MMHG | SYSTOLIC BLOOD PRESSURE: 127 MMHG

## 2023-09-19 DIAGNOSIS — M54.16 LUMBAR RADICULOPATHY: Primary | ICD-10-CM

## 2023-09-19 DIAGNOSIS — R52 PAIN: ICD-10-CM

## 2023-09-19 LAB — GLUCOSE BLDC GLUCOMTR-MCNC: 96 MG/DL (ref 70–130)

## 2023-09-19 PROCEDURE — 77003 FLUOROGUIDE FOR SPINE INJECT: CPT

## 2023-09-19 PROCEDURE — 25010000002 DEXAMETHASONE PER 1 MG: Performed by: ANESTHESIOLOGY

## 2023-09-19 PROCEDURE — 82948 REAGENT STRIP/BLOOD GLUCOSE: CPT

## 2023-09-19 PROCEDURE — 25510000001 IOPAMIDOL 41 % SOLUTION: Performed by: ANESTHESIOLOGY

## 2023-09-19 PROCEDURE — 25010000002 DEXAMETHASONE SODIUM PHOSPHATE 10 MG/ML SOLUTION: Performed by: ANESTHESIOLOGY

## 2023-09-19 RX ORDER — DEXAMETHASONE SODIUM PHOSPHATE 10 MG/ML
10 INJECTION, SOLUTION INTRAMUSCULAR; INTRAVENOUS ONCE
Status: COMPLETED | OUTPATIENT
Start: 2023-09-19 | End: 2023-09-19

## 2023-09-19 RX ORDER — MIDAZOLAM HYDROCHLORIDE 1 MG/ML
1 INJECTION INTRAMUSCULAR; INTRAVENOUS ONCE AS NEEDED
Status: DISCONTINUED | OUTPATIENT
Start: 2023-09-19 | End: 2023-09-20 | Stop reason: HOSPADM

## 2023-09-19 RX ORDER — DEXAMETHASONE SODIUM PHOSPHATE 4 MG/ML
INJECTION, SOLUTION INTRA-ARTICULAR; INTRALESIONAL; INTRAMUSCULAR; INTRAVENOUS; SOFT TISSUE
Status: COMPLETED | OUTPATIENT
Start: 2023-09-19 | End: 2023-09-19

## 2023-09-19 RX ORDER — LIDOCAINE HYDROCHLORIDE 10 MG/ML
1 INJECTION, SOLUTION INFILTRATION; PERINEURAL ONCE
Status: DISCONTINUED | OUTPATIENT
Start: 2023-09-19 | End: 2023-09-20 | Stop reason: HOSPADM

## 2023-09-19 RX ORDER — FENTANYL CITRATE 50 UG/ML
50 INJECTION, SOLUTION INTRAMUSCULAR; INTRAVENOUS ONCE
Status: DISCONTINUED | OUTPATIENT
Start: 2023-09-19 | End: 2023-09-20 | Stop reason: HOSPADM

## 2023-09-19 RX ADMIN — IOPAMIDOL 10 ML: 408 INJECTION, SOLUTION INTRATHECAL at 14:20

## 2023-09-19 RX ADMIN — DEXAMETHASONE SODIUM PHOSPHATE 10 MG: 10 INJECTION, SOLUTION INTRAMUSCULAR; INTRAVENOUS at 14:20

## 2023-09-19 RX ADMIN — DEXAMETHASONE SODIUM PHOSPHATE 10 MG: 4 INJECTION, SOLUTION INTRA-ARTICULAR; INTRALESIONAL; INTRAMUSCULAR; INTRAVENOUS; SOFT TISSUE at 14:23

## 2023-09-19 NOTE — H&P
[facility]    History and Physical    Patient Name: Alexandria Yousif  :  1945  MRN:  [id number]  Date of Admission: 2023    Subjective     Patient is a 78 y.o. female presents with chief complaint of acute on chronic, intermittent, excruciating low back and left greater than right lower extremity  pain.  Onset of symptoms was gradual starting 3 weeks ago.  Symptoms are associated/aggravated by sitting or bending . Symptoms improve with ice, heating pad, TENS unit, and rest.  On a pain scale from 0-10, she rates her pain as a 6 while at rest and a 10 with activity.  She describes the pain as sharp, stabbing and throbbing in nature.  She has responded favorably to lumbar epidural steroid injections in the past.  She was referred to the pain clinic for a new series.    Her most recent lumbar MRI results are as follows:    FINDINGS:     There is a mild chronic compression deformity at L1 with approximately  10-20% loss of vertebral body height within the maximally compressed  portion of the vertebra. A similar degree of chronic vertebral body  height loss was seen on the prior exam.     The conus medullaris terminates at the mid to upper body of L2 and has  normal signal intensity.     At L1-2, there is degenerative retrolisthesis of L1 on L2 by  approximately 2 mm. There is a minimal disc bulge. However, there is no  significant canal or foraminal narrowing. The degenerative  retrolisthesis of L1 on L2 as well as a minimal disc bulging is new.     At L2-3, there is degenerative retrolisthesis of L2 on L3 by  approximately 2 mm. There is a new posterior annular fissure. There is  no significant canal or foraminal stenosis.      At L3-4, there is new degenerative anterior spondylolisthesis of L3 on  L4 by approximately 3 mm. There is mild facet hypertrophic change. There  is no significant canal or foraminal stenosis.     At L4-5, there is moderate facet hypertrophic change and mild ligamentum  flavum  thickening results in a mild degree of central canal stenosis.  This canal narrowing is more pronounced when compared to the prior  study. There is also new moderate right lateral recess narrowing at the  L4-5 level where the right-sided hypertrophic facet abuts the posterior  aspect of the descending right L5 nerve root within the thecal sac.  Unroofed disc material abuts the ventral aspect of this descending L5  nerve root. There is degenerative anterior spondylolisthesis of L4 and  L5 by approximately 6 to 7 mm which has progressed from approximately 3  mm of anterior spondylolisthesis on the prior exam. No significant  foraminal compromise is seen.     At L5-S1, there is mild-to-moderate facet hypertrophic change but no  significant canal or foraminal narrowing is noted. The facet  hypertrophic change has progressed since the prior study.       The following portions of the patients history were reviewed and updated as appropriate: current medications, allergies, past medical history, past surgical history, past family history, past social history, and problem list                Objective     Past Medical History:   Past Medical History:   Diagnosis Date    Allergic 1980    Ankle sprain 2017?    Fractured ankle    Anxiety and depression     Arthritis of back ?    Arthritis of neck ?    Basal cell carcinoma 03/2008    Scalp and face    Cervical herniated disc     CHF (congestive heart failure) 2019    Colon polyp 02/2020    Coronary artery disease 2019    CVA (cerebral vascular accident) 2018    Mild to moderat ischemia    Fibrocystic breast     Fracture of ankle 2016    R ankle    Fracture, finger 2022    Torn tendon    GERD (gastroesophageal reflux disease)     Grade II diastolic dysfunction     Headache     Heart murmur     Hyperalbuminemia 05/2015    Hypercalcemia 03/2016    mild at best when corrected for ongoing abnormal albumin levels with normal albumin being up to 4.7 g/dL though that is an issue with  reference range here    Hyperlipidemia     Hypertension     Infectious mononucleosis     Injury of back 1971    Injury of neck 1971    Hernieated disc when broke my back    Knee swelling ?    Low back pain 1971    Broken back    Lumbar herniated disc     Lumbosacral disc disease 1971    Multiple fractures/herniated disc/accident    LVH (left ventricular hypertrophy) 2019    Neck strain 1971    Neuromuscular disorder     OA (osteoarthritis)     Osteopenia     PAT (paroxysmal atrial tachycardia)     12 very brief runs of atrial tachycardia with the longest 7 beats in duration per 24-hour Holter monitor in June 2019    Pneumonia 08/2023    PSVT (paroxysmal supraventricular tachycardia)     5 episodes of PSVT the longest 11 beats in duration per Zio patch monitor in December 2019    PVCs (premature ventricular contractions)     5% burden per Zio patch monitor in December 2019    Rheumatic fever     Age 17    Rotator cuff syndrome 1990s    Shortness of breath     Stress fracture 2017    Ankle    Tear of meniscus of knee 2010    Torn MCL & Meniscus/repaired    Thoracic disc disorder     TIA (transient ischemic attack)     Tinnitus     Type II diabetes mellitus 2019    Began Metformin    Vertigo      Past Surgical History:   Past Surgical History:   Procedure Laterality Date    BRONCHOSCOPY N/A 3/21/2023    Procedure: BRONCHOSCOPY WITH BX;  Surgeon: Mae Barry MD;  Location: [facility] ENDOSCOPY;  Service: Pulmonary;  Laterality: N/A;  PRE/POST-ABNORMAL IMAGING     CEREBRAL ANGIOGRAM N/A 10/8/2018    Procedure: CEREBRAL ANGIOGRAM AND VENOGRAM WITH INTRASINUS PRESSURE RECORDING;  Surgeon: Alfredo Caruso MD;  Location: [facility] OR 18/19;  Service: Neurosurgery    COLONOSCOPY      2010    COLONOSCOPY N/A 2/17/2020    Procedure: COLONOSCOPY TO CECUM WITH COLD BIOPSY POLYPECTOMY;  Surgeon: Stan Flood MD;  Location: [facility] ENDOSCOPY;  Service: Gastroenterology;  Laterality: N/A;  PRE- FAMILY HX COLON  "POLYPS  POST- POLYP, HEMORRHOIDS    ENDOSCOPY  02/27/2017    Esophagitis, gastritis, small hh    HYSTERECTOMY      KNEE SURGERY      MOHS SURGERY      OOPHORECTOMY      SUBTOTAL HYSTERECTOMY  1994    Full    TONSILLECTOMY AND ADENOIDECTOMY  1965    TRIGGER POINT INJECTION  1990s    Spinal    UPPER GASTROINTESTINAL ENDOSCOPY  2016    \"Extremely severe GERD\"     Family History:   Family History   Problem Relation Age of Onset    Uterine cancer Mother     Heart disease Mother         in her 70s    Diabetes Mother         Late in life    Arthritis Mother     Cancer Mother         spine    Hearing loss Mother         In her 70s    Hyperlipidemia Mother         in her 60s    Kidney disease Mother         ?    Vision loss Mother         Macular Degeneration    Osteoporosis Mother     Skin cancer Father     Heart disease Father         in his early 60s    Colon polyps Father         negative    Cancer Father         skin    Heart disease Sister         Heart attack-64    Atrial fibrillation Sister     Hyperlipidemia Sister         in her 50s    Vision loss Sister         Macular Degeneration    Diabetes Maternal Grandmother         Late in life    Heart disease Maternal Grandmother         Stroke-80    Stroke Maternal Grandmother     Hyperlipidemia Maternal Grandmother         ?    Kidney disease Maternal Grandmother         ?    Heart disease Paternal Grandmother         CHF    Stroke Paternal Grandmother     Depression Paternal Grandmother     Hyperlipidemia Paternal Grandmother         ?    Anxiety disorder Paternal Grandmother         Agoraphobia/anxiety    Heart disease Paternal Grandfather         Emphysema-heart disease    Stroke Paternal Grandfather     Rheumatologic disease Maternal Aunt     Malig Hyperthermia Neg Hx      Social History:   Social History     Socioeconomic History    Marital status:      Spouse name: Saravanan    Number of children: 1    Years of education: College   Tobacco Use    Smoking status: " Never    Smokeless tobacco: Never   Vaping Use    Vaping Use: Never used   Substance and Sexual Activity    Alcohol use: Yes     Alcohol/week: 3.0 - 4.0 standard drinks     Types: 2 - 3 Glasses of wine, 1 Cans of beer per week     Comment: liquor-1 to 2 month    Drug use: Never    Sexual activity: Yes     Partners: Male     Birth control/protection: Post-menopausal, Hysterectomy     Comment: Complete hysterectomy       Vital Signs Range for the last 24 hours  Temperature:     Temp Source:     BP:     Pulse:     Respirations:     SPO2:     O2 Amount (l/min):     O2 Devices     Weight:           --------------------------------------------------------------------------------    Current Outpatient Medications   Medication Sig Dispense Refill    acetaminophen (TYLENOL) 500 MG tablet Take 2 tablets by mouth Every 6 (Six) Hours As Needed for Mild Pain.      aspirin 81 MG tablet Take 1 tablet by mouth Daily.      albuterol sulfate  (90 Base) MCG/ACT inhaler Inhale 2 puffs Every 4 (Four) Hours As Needed for Wheezing. 18 g 1    ALPRAZolam (XANAX) 0.5 MG tablet TAKE 1 TABLET BY MOUTH DAILY AS NEEDED FOR ANXIETY 45 tablet 0    Alum Hydroxide-Mag Trisilicate (GAVISCON) 80-14.2 MG chewable tablet Chew 1 tablet 4 (Four) Times a Day As Needed (Heartburn). 224 each 11    Biotin 10 MG tablet       buPROPion (WELLBUTRIN) 75 MG tablet TAKE 1 TABLET BY MOUTH DAILY 30 tablet 2    Cholecalciferol 50 MCG (2000 UT) capsule       cyclobenzaprine (FLEXERIL) 5 MG tablet Take 1 tablet by mouth 3 (Three) Times a Day As Needed for Muscle Spasms. 30 tablet 0    desvenlafaxine (PRISTIQ) 100 MG 24 hr tablet TAKE 1 TABLET BY MOUTH DAILY 90 tablet 1    fenofibrate (TRICOR) 145 MG tablet TAKE 1 TABLET BY MOUTH EVERY DAY 90 tablet 1    fluticasone (FLONASE) 50 MCG/ACT nasal spray 2 sprays into the nostril(s) as directed by provider Daily for 30 days. 16 g 0    levETIRAcetam (KEPPRA) 500 MG tablet TAKE 1/2 TABLET BY MOUTH DAILY AS NEEDED FOR  HEADACHE 45 tablet 1    metFORMIN ER (GLUCOPHAGE-XR) 500 MG 24 hr tablet TAKE 1 TABLET BY MOUTH DAILY WITH LARGEST MEAL OF THE DAY AND A GLASS OF WATER 90 tablet 01    methylPREDNISolone (MEDROL) 4 MG dose pack Take as directed on package instructions. 21 tablet 0    metoprolol tartrate (LOPRESSOR) 25 MG tablet Take 1 tablet by mouth 2 (Two) Times a Day. 180 tablet 3    montelukast (SINGULAIR) 10 MG tablet Take 1 tablet by mouth Every Night for 30 days. 30 tablet 0    Omega-3 Fatty Acids (FISH OIL) 1200 MG capsule capsule       omeprazole (priLOSEC) 40 MG capsule TAKE 1 CAPSULE BY MOUTH EVERY DAY 90 capsule 0    Premarin 0.3 MG tablet TAKE 1 TABLET BY MOUTH EVERY DAY 90 tablet 2    rosuvastatin (CRESTOR) 10 MG tablet TAKE 1 TABLET BY MOUTH EVERY NIGHT 90 tablet 1    spironolactone (ALDACTONE) 25 MG tablet TAKE 1 TABLET BY MOUTH DAILY 30 tablet 4    traZODone (DESYREL) 50 MG tablet TAKE 1/2 TO 1 TABLET BY MOUTH AT BEDTIME AS NEEDED FOR SLEEP 90 tablet 1    tretinoin (RETIN-A) 0.025 % cream APPLY EXTERNALLY TO THE AFFECTED AREA EVERY NIGHT AT BEDTIME TO THE FACE (Patient not taking: Reported on 9/5/2023)      valACYclovir (VALTREX) 500 MG tablet Take 2 tablets by mouth 2 (Two) Times a Day. 4 tablet 3     No current facility-administered medications for this encounter.       --------------------------------------------------------------------------------  Assessment & Plan      Anesthesia Evaluation     Patient summary reviewed and Nursing notes reviewed   NPO Solid Status: > 8 hours  NPO Liquid Status: > 2 hours    Pain impairs ability to perform ADLs: Dressing, Toileting and Exercise/Activity  Modalities previously tried to control pain with limited effectiveness within the last 4-6 weeks: Ice, Heat, Rest and Other     Airway   Mallampati: II  TM distance: >3 FB  Neck ROM: full  Dental - normal exam     Pulmonary - negative pulmonary ROS and normal exam    breath sounds clear to auscultation  (+) pneumonia resolved ,   Cardiovascular - negative cardio ROS and normal exam    Rhythm: regular  Rate: normal    (+) hypertension, valvular problems/murmurs murmur, CAD, dysrhythmias (RBBB), CHF , DONG, hyperlipidemia  (-) angina, orthopnea, PND      Neuro/Psych- negative ROS  (+) TIA, CVA, headaches, dizziness/light headedness, numbness, psychiatric history Anxiety and Depression  GI/Hepatic/Renal/Endo - negative ROS   (+) GERD, diabetes mellitus type 2    Musculoskeletal (-) negative ROS    Abdominal    Substance History - negative use     OB/GYN negative ob/gyn ROS         Other - negative ROS  arthritis,   history of cancer (Skin)               Diagnosis and Plan    Treatment Plan  ASA 3      Procedures: Lumbar Epidural Steroid Injection(LESI), With fluoroscopy,      Anesthetic plan and risks discussed with patient.        Diagnosis     * Intervertebral disc stenosis of neural canal of lumbar region [M99.53]

## 2023-09-19 NOTE — DISCHARGE INSTRUCTIONS

## 2023-09-19 NOTE — ANESTHESIA PROCEDURE NOTES
PAIN Epidural block      Patient reassessed immediately prior to procedure    Patient location during procedure: pain clinic  Start Time: 9/19/2023 2:09 PM  Stop Time: 9/19/2023 2:25 PM  Indication:procedure for pain  Performed By  Anesthesiologist: Cheko Alejandre MD  Preanesthetic Checklist  Completed: patient identified, site marked, risks and benefits discussed, surgical consent, monitors and equipment checked, pre-op evaluation and timeout performed  Additional Notes  Post-Op Diagnosis Codes:     * Intervertebral disc stenosis of neural canal of lumbar region [M99.53]    The patient was observed in recovery with no evidence of neurological deficits or other problems. All questions were answered. The patient was discharged with appropriate instructions.  Prep:  Pt Position:prone  Sterile Tech:cap, gloves, mask and sterile barrier  Prep:chlorhexidine gluconate and isopropyl alcohol  Monitoring:blood pressure monitoring, continuous pulse oximetry and EKG  Procedure:Sedation: no     Approach:left paramedian  Guidance: fluoroscopy  Location:lumbar  Level:4-5 (Interlaminar)  Needle Type:Tuohy  Needle Gauge:20 G  Aspiration:negative  Medications:  Preservative Free Saline:3mL  Isovue:2mL  Comments:Isovue dye spread was consistent with epidural placement.    Preservative-free dexamethasone 10 mg  Post Assessment:  Dressing:occlusive dressing applied  Pt Tolerance:patient tolerated the procedure well with no apparent complications  Complications:no

## 2023-09-20 ENCOUNTER — TELEPHONE (OUTPATIENT)
Dept: CARDIOLOGY | Facility: CLINIC | Age: 78
End: 2023-09-20
Payer: MEDICARE

## 2023-09-20 RX ORDER — TRAZODONE HYDROCHLORIDE 50 MG/1
25-50 TABLET ORAL NIGHTLY PRN
Qty: 90 TABLET | Refills: 1 | Status: SHIPPED | OUTPATIENT
Start: 2023-09-20

## 2023-09-20 NOTE — TELEPHONE ENCOUNTER
Rx Refill Note  Requested Prescriptions     Pending Prescriptions Disp Refills    traZODone (DESYREL) 50 MG tablet 90 tablet 1     Sig: Take 0.5-1 tablets by mouth At Night As Needed. for sleep      Last office visit with prescribing clinician: 9/5/2023   Last telemedicine visit with prescribing clinician: Visit date not found   Next office visit with prescribing clinician: 3/5/2024                         Would you like a call back once the refill request has been completed: [] Yes [] No    If the office needs to give you a call back, can they leave a voicemail: [] Yes [] No    Juancarlos Laguna MA  09/20/23, 11:32 EDT

## 2023-09-20 NOTE — TELEPHONE ENCOUNTER
Caller: Alexandria Yousif    Relationship: Self    Best call back number: [phone]    Requested Prescriptions:   Requested Prescriptions     Pending Prescriptions Disp Refills    traZODone (DESYREL) 50 MG tablet 90 tablet 1     Sig: Take 0.5-1 tablets by mouth At Night As Needed. for sleep        Pharmacy where request should be sent: [id number] [id number]  AT [facility] [address] [facility] [address]  [place]  [phone] [facility] [phone] FX     Last office visit with prescribing clinician: 9/5/2023   Last telemedicine visit with prescribing clinician: Visit date not found   Next office visit with prescribing clinician: 3/5/2024     Additional details provided by patient: PATIENT STATED PHARMACY WILL NOT FILL YET.    Does the patient have less than a 3 day supply:  [] Yes  [] No    Would you like a call back once the refill request has been completed: [x] Yes [] No    If the office needs to give you a call back, can they leave a voicemail: [x] Yes [] No    Saranya Devlin Rep   09/20/23 10:32 EDT

## 2023-09-20 NOTE — TELEPHONE ENCOUNTER
Pt is wanting to know if she can use the electric stem therapy machines when she does physical therapy from a heart stand point?     PT#: [phone]

## 2023-09-22 ENCOUNTER — TELEPHONE (OUTPATIENT)
Dept: MAMMOGRAPHY | Facility: HOSPITAL | Age: 78
End: 2023-09-22
Payer: MEDICARE

## 2023-09-22 ENCOUNTER — TREATMENT (OUTPATIENT)
Dept: PHYSICAL THERAPY | Facility: CLINIC | Age: 78
End: 2023-09-22
Payer: MEDICARE

## 2023-09-22 DIAGNOSIS — M43.16 SPONDYLOLISTHESIS OF LUMBAR REGION: ICD-10-CM

## 2023-09-22 DIAGNOSIS — M54.16 RADICULOPATHY, LUMBAR REGION: ICD-10-CM

## 2023-09-22 DIAGNOSIS — R26.2 DIFFICULTY WALKING: ICD-10-CM

## 2023-09-22 DIAGNOSIS — M48.061 SPINAL STENOSIS OF LUMBAR REGION WITHOUT NEUROGENIC CLAUDICATION: Primary | ICD-10-CM

## 2023-09-22 NOTE — PROGRESS NOTES
[facility] [place]  [address] [place] [address]  Phone [phone]  Fax [phone]      Physical Therapy Daily Treatment Note      Patient: Alexandria Yousif   : 1945  Referring practitioner: Sae Celsi MD  Date of Initial Visit: Type: THERAPY  Noted: 9/15/2023  Today's Date: 2023  Patient seen for 3 sessions       Visit Diagnoses:    ICD-10-CM ICD-9-CM   1. Spinal stenosis of lumbar region without neurogenic claudication  M48.061 724.02   2. Radiculopathy, lumbar region  M54.16 724.4   3. Spondylolisthesis of lumbar region  M43.16 738.4   4. Difficulty walking  R26.2 719.7       Alexandria Yousif reports: had epidural Tuesday.  Sore about 36 hours and now more hip pain than back.     *OK per Dr. Lynch to start electrical stimulation.  No cardiac concerns.    Subjective     Objective   See Exercise, Manual, and Modality Logs for complete treatment.   Left PINN, outflare  (-) SLR left, cross over R at SI  Left L4,5 5/5, S1 4+/5  (+) left Nanette's    Assessment/Plan  Improved myotomal strength, SLR, and neural symptoms.  Left SI joint most noted today.  Alignment improved after manual.  Tolerated traction well.  Added estim to plan of care as I got approval from patient's cardiologist.  Patient out of town, she should have lso brace soon, will take TENS, and frequent rest breaks.   Resume when she returns.    Timed:         Manual Therapy:    8     mins  01822;     Therapeutic Exercise:    15     mins  00165;     Neuromuscular Pj:    -    mins  09685;    Therapeutic Activity:     -     mins  91890;     Gait Training:      -     mins  92951;     Ultrasound:     8     mins  28574;    Ionto                               -    mins   94969  Self Care                       -     mins   93219  Orthotic training    --     mins 84849      Un-Timed:  Electrical Stimulation:    15     mins  11202 ( );  Dry Needling     -     mins self-pay  Traction      10     mins 35315      Timed Treatment:   31   mins   Total Treatment:     60   mins    Aracelis Porter PT, CIDN  [place] License: [id number]

## 2023-09-22 NOTE — TELEPHONE ENCOUNTER
"This RN sent Epic In Basket message to Dr. Lynch requesting if okay for patient to hold Aspirin 5-7 days per Radiologist protocol. Dr. Lynch responded, \"Yes.  She may hold aspirin for 5 to 7 days.  It should be resumed as soon as possible afterwards.\" Patient to be updated during pre call.   "

## 2023-10-04 NOTE — TELEPHONE ENCOUNTER
She probably is feeling symptomatic because her blood pressure is elevated.  Have her increase her spironolactone to 2 tablets/day.   Yes

## 2023-10-04 NOTE — TELEPHONE ENCOUNTER
"This lady has moderate persistent asthma.  She has had an exacerbation with sneezing and cough and also multiple mosquito bites which cause of pruritus      The following portions of the patient's history were reviewed and updated as appropriate: current medications, past family history, past medical history, past social history, past surgical history and problem list.    ROS    Constitutional-no night sweats weight loss headaches  GI no abdominal pain nausea or diarrhea  Neuro no seizure or neurologic deficits  Musculoskeletal no deformity or joint pain   no dysuria or hematuria  Skin no rash or other lesions  All other systems reviewed and were negative except for the above.      Physical Exam  /80   Pulse 95   Ht 157.5 cm (62\")   Wt 86.6 kg (191 lb)   SpO2 96%   BMI 34.93 kg/m²   Vital signs as above  Pupils equally round and reactive to light and accommodation, neck no JVD or adenopathy.  Cardiovascular regular rhythm and rate no murmur or gallop.  Abdomen soft no organomegaly tenderness.  Extremities no clubbing cyanosis or edema.  No cervical adenopathy.  No skin rash.  Neurologic good strength bilaterally without deficits  Mildly dyspneic white female lungs reveal few rhonchi, skin reveals multiple erythematous raised areas resembling mosquito bites    Impression asthma exacerbation    Plan Depo-Medrol, DuoNeb, prednisone, continue routine meds, return as needed        This document has been produced with the assistance of Dragon dictation  This document has been electronically signed by Juliocesar Sun MD on June 9, 2020 15:45      " Last visit 5/29/18    tricor    Patient would benefit from weight loss and has set goals to achieve success by walking 1-1.5 miles daily.   S/P Gastric Sleeve in 2019        .

## 2023-10-09 RX ORDER — ALBUTEROL SULFATE 90 UG/1
AEROSOL, METERED RESPIRATORY (INHALATION)
Qty: 8.5 G | Refills: 0 | Status: SHIPPED | OUTPATIENT
Start: 2023-10-09

## 2023-10-09 RX ORDER — CONJUGATED ESTROGENS 0.3 MG/1
TABLET, FILM COATED ORAL
Qty: 90 TABLET | Refills: 2 | Status: SHIPPED | OUTPATIENT
Start: 2023-10-09

## 2023-10-09 NOTE — TELEPHONE ENCOUNTER
Rx Refill Note  Requested Prescriptions     Pending Prescriptions Disp Refills    Premarin 0.3 MG tablet [Pharmacy Med Name: PREMARIN 0.3MG TABLETS] 90 tablet 2     Sig: TAKE 1 TABLET BY MOUTH EVERY DAY    albuterol sulfate  (90 Base) MCG/ACT inhaler [Pharmacy Med Name: ALBUTEROL HFA INH (200 PUFFS) 8.5GM] 8.5 g      Sig: INHALE 2 PUFFS BY MOUTH INTO THE LUNGS EVERY 4 HOURS AS NEEDED FOR WHEEZING      Last office visit with prescribing clinician: 9/5/2023   Last telemedicine visit with prescribing clinician: Visit date not found   Next office visit with prescribing clinician: 3/5/2024                         Would you like a call back once the refill request has been completed: [] Yes [] No    If the office needs to give you a call back, can they leave a voicemail: [] Yes [] No    Juancarlos Laguna MA  10/09/23, 08:56 EDT

## 2023-10-10 NOTE — NURSING NOTE
Precall placed/no answer/VMM left with my name/#/reason for call/arrival time 1130 at [facility] at [facility]/adv. no Aspirin 5 days prior/no Omega-3/Fish Oil/Vit E/NSAIDs 3 days prior/wear bra/okay to eat/drink/take other routine meds prior/ call if questns.

## 2023-10-16 ENCOUNTER — HOSPITAL ENCOUNTER (OUTPATIENT)
Dept: MAMMOGRAPHY | Facility: HOSPITAL | Age: 78
Discharge: HOME | End: 2023-10-16
Admitting: RADIOLOGY
Payer: MEDICARE

## 2023-10-16 VITALS
OXYGEN SATURATION: 97 % | SYSTOLIC BLOOD PRESSURE: 120 MMHG | TEMPERATURE: 98.2 F | RESPIRATION RATE: 17 BRPM | BODY MASS INDEX: 22.02 KG/M2 | HEART RATE: 64 BPM | HEIGHT: 66 IN | WEIGHT: 137 LBS | DIASTOLIC BLOOD PRESSURE: 79 MMHG

## 2023-10-16 DIAGNOSIS — R92.8 ABNORMAL MAMMOGRAM OF RIGHT BREAST: ICD-10-CM

## 2023-10-16 PROCEDURE — 88341 IMHCHEM/IMCYTCHM EA ADD ANTB: CPT | Performed by: INTERNAL MEDICINE

## 2023-10-16 PROCEDURE — 88360 TUMOR IMMUNOHISTOCHEM/MANUAL: CPT | Performed by: INTERNAL MEDICINE

## 2023-10-16 PROCEDURE — 88305 TISSUE EXAM BY PATHOLOGIST: CPT | Performed by: INTERNAL MEDICINE

## 2023-10-16 PROCEDURE — A4648 IMPLANTABLE TISSUE MARKER: HCPCS

## 2023-10-16 PROCEDURE — 88342 IMHCHEM/IMCYTCHM 1ST ANTB: CPT | Performed by: INTERNAL MEDICINE

## 2023-10-16 PROCEDURE — 25010000002 LIDOCAINE 1 % SOLUTION: Performed by: INTERNAL MEDICINE

## 2023-10-16 RX ORDER — LIDOCAINE HYDROCHLORIDE 10 MG/ML
1 INJECTION, SOLUTION INFILTRATION; PERINEURAL ONCE
Status: COMPLETED | OUTPATIENT
Start: 2023-10-16 | End: 2023-10-16

## 2023-10-16 RX ADMIN — LIDOCAINE HYDROCHLORIDE 7 ML: 10; .005 INJECTION, SOLUTION EPIDURAL; INFILTRATION; INTRACAUDAL; PERINEURAL at 12:53

## 2023-10-16 RX ADMIN — Medication 1 ML: at 12:53

## 2023-10-16 NOTE — H&P
"        Name: Alexandria Yousif ADMIT: 10/16/2023   : 1945  PCP: Anita Muse MD    MRN: [id number] LOS: 0 days   AGE/SEX: 78 y.o. female  ROOM: Room/bed info not found       Chief complaint R breast calcs    Present Illness or Internal History:  Patient is a 78 y.o. female presents with R breast calcs for stereo bx.     Past Surgical History:  Past Surgical History:   Procedure Laterality Date    BRONCHOSCOPY N/A 3/21/2023    Procedure: BRONCHOSCOPY WITH BX;  Surgeon: Mae Barry MD;  Location: [facility] ENDOSCOPY;  Service: Pulmonary;  Laterality: N/A;  PRE/POST-ABNORMAL IMAGING     CEREBRAL ANGIOGRAM N/A 10/8/2018    Procedure: CEREBRAL ANGIOGRAM AND VENOGRAM WITH INTRASINUS PRESSURE RECORDING;  Surgeon: Alfredo Caruso MD;  Location: [facility] HYBRID OR ;  Service: Neurosurgery    COLONOSCOPY          COLONOSCOPY N/A 2020    Procedure: COLONOSCOPY TO CECUM WITH COLD BIOPSY POLYPECTOMY;  Surgeon: Stan Flood MD;  Location: [facility] ENDOSCOPY;  Service: Gastroenterology;  Laterality: N/A;  PRE- FAMILY HX COLON POLYPS  POST- POLYP, HEMORRHOIDS    ENDOSCOPY  2017    Esophagitis, gastritis, small hh    HYSTERECTOMY      KNEE SURGERY      MOHS SURGERY      OOPHORECTOMY      SUBTOTAL HYSTERECTOMY      Full    TONSILLECTOMY AND ADENOIDECTOMY  1965    TRIGGER POINT INJECTION      Spinal    UPPER GASTROINTESTINAL ENDOSCOPY      \"Extremely severe GERD\"       Past Medical History:  Past Medical History:   Diagnosis Date    Allergic 1980    Ankle sprain ?    Fractured ankle    Anxiety and depression     Arthritis of back ?    Arthritis of neck ?    Basal cell carcinoma 2008    Scalp and face    Cervical herniated disc     CHF (congestive heart failure) 2019    Colon polyp 2020    Coronary artery disease 2019    CVA (cerebral vascular accident) 2018    Mild to moderat ischemia    Fibrocystic breast     Fracture of ankle     R ankle    Fracture, finger     " Torn tendon    GERD (gastroesophageal reflux disease)     Grade II diastolic dysfunction     Headache     Heart murmur     Hyperalbuminemia 05/2015    Hypercalcemia 03/2016    mild at best when corrected for ongoing abnormal albumin levels with normal albumin being up to 4.7 g/dL though that is an issue with reference range here    Hyperlipidemia     Hypertension     Infectious mononucleosis     Injury of back 1971    Injury of neck 1971    Hernieated disc when broke my back    Knee swelling ?    Low back pain 1971    Broken back    Lumbar herniated disc     Lumbosacral disc disease 1971    Multiple fractures/herniated disc/accident    LVH (left ventricular hypertrophy) 2019    Neck strain 1971    Neuromuscular disorder     OA (osteoarthritis)     Osteopenia     PAT (paroxysmal atrial tachycardia)     12 very brief runs of atrial tachycardia with the longest 7 beats in duration per 24-hour Holter monitor in June 2019    Pneumonia 08/2023    PSVT (paroxysmal supraventricular tachycardia)     5 episodes of PSVT the longest 11 beats in duration per Zio patch monitor in December 2019    PVCs (premature ventricular contractions)     5% burden per Zio patch monitor in December 2019    Rheumatic fever     Age 17    Rotator cuff syndrome 1990s    Shortness of breath     Stress fracture 2017    Ankle    Tear of meniscus of knee 2010    Torn MCL & Meniscus/repaired    Thoracic disc disorder     TIA (transient ischemic attack)     Tinnitus     Type II diabetes mellitus 2019    Began Metformin    Vertigo        Home Medications:  (Not in a hospital admission)      Allergies:  Codeine    Family History:  Family History   Problem Relation Age of Onset    Uterine cancer Mother     Heart disease Mother         in her 70s    Diabetes Mother         Late in life    Arthritis Mother     Cancer Mother         spine    Hearing loss Mother         In her 70s    Hyperlipidemia Mother         in her 60s    Kidney disease Mother         ?     Vision loss Mother         Macular Degeneration    Osteoporosis Mother     Skin cancer Father     Heart disease Father         in his early 60s    Colon polyps Father         negative    Cancer Father         skin    Heart disease Sister         Heart attack-64    Atrial fibrillation Sister     Hyperlipidemia Sister         in her 50s    Vision loss Sister         Macular Degeneration    Diabetes Maternal Grandmother         Late in life    Heart disease Maternal Grandmother         Stroke-80    Stroke Maternal Grandmother     Hyperlipidemia Maternal Grandmother         ?    Kidney disease Maternal Grandmother         ?    Heart disease Paternal Grandmother         CHF    Stroke Paternal Grandmother     Depression Paternal Grandmother     Hyperlipidemia Paternal Grandmother         ?    Anxiety disorder Paternal Grandmother         Agoraphobia/anxiety    Heart disease Paternal Grandfather         Emphysema-heart disease    Stroke Paternal Grandfather     Rheumatologic disease Maternal Aunt     Malig Hyperthermia Neg Hx        Social History:  Social History     Tobacco Use    Smoking status: Never    Smokeless tobacco: Never   Vaping Use    Vaping Use: Never used   Substance Use Topics    Alcohol use: Yes     Alcohol/week: 3.0 - 4.0 standard drinks of alcohol     Types: 2 - 3 Glasses of wine, 1 Cans of beer per week     Comment: liquor-1 to 2 month    Drug use: Never        Objective     Physical Exam:    No exam performed today,    Vital Signs  Temp:  [98.2 °F (36.8 °C)] 98.2 °F (36.8 °C)  Heart Rate:  [70] 70  Resp:  [17] 17  BP: (135)/(72) 135/72    Anticipated Surgical Procedure:  Right breast stereotactic core needle biopsy    The risks, benefits and alternatives of this procedure have been discussed with the patient or responsible party: Yes        Cassidy Walker MD  10/16/23  12:42 EDT

## 2023-10-16 NOTE — NURSING NOTE
Biopsy site to right outer breast clear with Dermabond dry and intact. 2.5 x 2.5 cm hematoma noted around biopsy site. Ace bandage wrapped around patient and instructions provided. Denies pain. Ice pack with protective covering applied to biopsy site. Discharge instructions discussed with understanding voiced by patient. Copies provided to patient. No distress noted. To home via personal vehicle with spouse.

## 2023-10-17 LAB
LAB AP CASE REPORT: NORMAL
LAB AP SPECIAL STAINS: NORMAL
LAB AP SYNOPTIC CHECKLIST: NORMAL
PATH REPORT.FINAL DX SPEC: NORMAL
PATH REPORT.GROSS SPEC: NORMAL

## 2023-10-18 DIAGNOSIS — D05.12 DUCTAL CARCINOMA IN SITU (DCIS) OF LEFT BREAST: Primary | ICD-10-CM

## 2023-10-18 DIAGNOSIS — D05.11 DUCTAL CARCINOMA IN SITU (DCIS) OF RIGHT BREAST: ICD-10-CM

## 2023-10-19 ENCOUNTER — PATIENT OUTREACH (OUTPATIENT)
Dept: OTHER | Facility: HOSPITAL | Age: 78
End: 2023-10-19
Payer: MEDICARE

## 2023-10-19 DIAGNOSIS — C50.919 MALIGNANT NEOPLASM OF FEMALE BREAST, UNSPECIFIED ESTROGEN RECEPTOR STATUS, UNSPECIFIED LATERALITY, UNSPECIFIED SITE OF BREAST: Primary | ICD-10-CM

## 2023-10-19 DIAGNOSIS — C50.911 MALIGNANT NEOPLASM OF RIGHT FEMALE BREAST, UNSPECIFIED ESTROGEN RECEPTOR STATUS, UNSPECIFIED SITE OF BREAST: ICD-10-CM

## 2023-10-20 ENCOUNTER — TREATMENT (OUTPATIENT)
Dept: PHYSICAL THERAPY | Facility: CLINIC | Age: 78
End: 2023-10-20
Payer: MEDICARE

## 2023-10-20 DIAGNOSIS — M54.16 RADICULOPATHY, LUMBAR REGION: ICD-10-CM

## 2023-10-20 DIAGNOSIS — M48.061 SPINAL STENOSIS OF LUMBAR REGION WITHOUT NEUROGENIC CLAUDICATION: Primary | ICD-10-CM

## 2023-10-20 DIAGNOSIS — M43.16 SPONDYLOLISTHESIS OF LUMBAR REGION: ICD-10-CM

## 2023-10-20 PROCEDURE — G0283 ELEC STIM OTHER THAN WOUND: HCPCS | Performed by: PHYSICAL THERAPIST

## 2023-10-20 PROCEDURE — 97110 THERAPEUTIC EXERCISES: CPT | Performed by: PHYSICAL THERAPIST

## 2023-10-20 PROCEDURE — 97530 THERAPEUTIC ACTIVITIES: CPT | Performed by: PHYSICAL THERAPIST

## 2023-10-23 ENCOUNTER — HOSPITAL ENCOUNTER (OUTPATIENT)
Dept: MRI IMAGING | Facility: HOSPITAL | Age: 78
Discharge: HOME | End: 2023-10-23
Admitting: STUDENT IN AN ORGANIZED HEALTH CARE EDUCATION/TRAINING PROGRAM
Payer: MEDICARE

## 2023-10-23 ENCOUNTER — OFFICE VISIT (OUTPATIENT)
Dept: SURGERY | Facility: CLINIC | Age: 78
End: 2023-10-23
Payer: MEDICARE

## 2023-10-23 ENCOUNTER — PREP FOR SURGERY (OUTPATIENT)
Dept: OTHER | Facility: HOSPITAL | Age: 78
End: 2023-10-23
Payer: MEDICARE

## 2023-10-23 VITALS
HEIGHT: 66 IN | DIASTOLIC BLOOD PRESSURE: 82 MMHG | WEIGHT: 131.2 LBS | SYSTOLIC BLOOD PRESSURE: 134 MMHG | BODY MASS INDEX: 21.08 KG/M2

## 2023-10-23 DIAGNOSIS — C50.919 MALIGNANT NEOPLASM OF FEMALE BREAST, UNSPECIFIED ESTROGEN RECEPTOR STATUS, UNSPECIFIED LATERALITY, UNSPECIFIED SITE OF BREAST: ICD-10-CM

## 2023-10-23 DIAGNOSIS — C50.919 MALIGNANT NEOPLASM OF FEMALE BREAST, UNSPECIFIED ESTROGEN RECEPTOR STATUS, UNSPECIFIED LATERALITY, UNSPECIFIED SITE OF BREAST: Primary | ICD-10-CM

## 2023-10-23 DIAGNOSIS — C50.911 MALIGNANT NEOPLASM OF RIGHT FEMALE BREAST, UNSPECIFIED ESTROGEN RECEPTOR STATUS, UNSPECIFIED SITE OF BREAST: ICD-10-CM

## 2023-10-23 LAB — CREAT BLDA-MCNC: 1.2 MG/DL (ref 0.6–1.3)

## 2023-10-23 PROCEDURE — 99204 OFFICE O/P NEW MOD 45 MIN: CPT | Performed by: STUDENT IN AN ORGANIZED HEALTH CARE EDUCATION/TRAINING PROGRAM

## 2023-10-23 PROCEDURE — C8937 CAD BREAST MRI: HCPCS

## 2023-10-23 PROCEDURE — 1159F MED LIST DOCD IN RCRD: CPT | Performed by: STUDENT IN AN ORGANIZED HEALTH CARE EDUCATION/TRAINING PROGRAM

## 2023-10-23 PROCEDURE — A9577 INJ MULTIHANCE: HCPCS | Performed by: STUDENT IN AN ORGANIZED HEALTH CARE EDUCATION/TRAINING PROGRAM

## 2023-10-23 PROCEDURE — C8908 MRI W/O FOL W/CONT, BREAST,: HCPCS

## 2023-10-23 PROCEDURE — 1160F RVW MEDS BY RX/DR IN RCRD: CPT | Performed by: STUDENT IN AN ORGANIZED HEALTH CARE EDUCATION/TRAINING PROGRAM

## 2023-10-23 PROCEDURE — 82565 ASSAY OF CREATININE: CPT

## 2023-10-23 PROCEDURE — 0 GADOBENATE DIMEGLUMINE 529 MG/ML SOLUTION: Performed by: STUDENT IN AN ORGANIZED HEALTH CARE EDUCATION/TRAINING PROGRAM

## 2023-10-23 RX ORDER — DIAZEPAM 5 MG/1
5 TABLET ORAL DAILY
Qty: 1 TABLET | Refills: 0 | Status: SHIPPED | OUTPATIENT
Start: 2023-10-23 | End: 2023-10-24

## 2023-10-23 RX ORDER — DIAZEPAM 5 MG/1
5 TABLET ORAL ONCE
OUTPATIENT
Start: 2023-10-23 | End: 2023-10-23

## 2023-10-23 RX ORDER — CEFAZOLIN SODIUM 2 G/100ML
2000 INJECTION, SOLUTION INTRAVENOUS ONCE
OUTPATIENT
Start: 2023-10-23 | End: 2023-10-23

## 2023-10-23 RX ADMIN — GADOBENATE DIMEGLUMINE 12 ML: 529 INJECTION, SOLUTION INTRAVENOUS at 16:33

## 2023-10-23 NOTE — PROGRESS NOTES
General Surgery Breast Cancer History and Physical Exam     Summary:    Alexandria Yousif is a 78 y.o. 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      A multidisciplinary plan has been formulated for the patient:    (1) Breast Surgical Oncology:  -Follow up [employer] 9 panel genetic testing. I will call her with results.   -MRI to evaluate anatomy as well as for surgical planning.   -Nurse navigator consult.   -Surgical plan: She believes she would like to proceed with right breast wire localized lumpectomy pending MRI results.  -Plastic surgery referral declined at this time.    (2) Medical Oncology:  -Will refer postoperatively for evaluation for endocrine therapy.    (3) Radiation Oncology:  -Will refer postoperatively for evaluation for radiation therapy.    Referring Provider: Anita Muse MD    Chief Complaint: abnormal breast imaging    History of Present Illness: Ms. Alexandria Yousif is a 78 y.o. year old lady, seen at the request of Anita Muse MD for a new diagnosis of right breast cancer.      This was initially detected as an imaging abnormality. She has had annual mammograms each year. She denies any prior history of abnormal mammograms or breast biopsies. Her work-up is detailed in the oncologic history below.     She denies any breast lumps, pain, skin changes, or nipple discharge. She denies any family history of breast or ovarian cancer. Her mother had a hysterectomy for pre-cancerous cells.     Workup of Current Diagnosis:    8/21/2023 Bilateral Screening Mammogram:  IMPRESSION:  1. Microcalcifications in the middle third upper outer quadrant of the right breast are noted. Further evaluation with spot magnification CC and 90 degree lateral images is recommended.  2. There are no findings suspicious for malignancy in the left breast.  BI-RADS CATEGORY 0    9/13/2023 Right Breast Diagnostic Mammogram:   The  patient returned for diagnostic imaging with coned magnified views in both projections and R 2 and digital tomosynthesis. These more clearly delineate clustered microcalcifications which have a slightly pleomorphic configuration and are considered indeterminant. They appear to be new since an earlier mammogram dated 08/17/2022 and therefore further evaluation with stereotactic core biopsy is recommended.  CONCLUSION: Suspicious clustered microcalcifications in middle third of upper outer right breast and further evaluation with stereotactic core biopsy is recommended. These findings have been discussed with the  patient.   BI-RADS 4. Suspicious abnormality.    10/16/2023 Right Breast Stereotactic Biopsy:   PRE-PROCEDURAL CONSULTATION: Details of the procedure and possible limitations and complications were discussed with the patient. After addressing questions and concerns, written informed consent was obtained.    PROCEDURE: The calcifications in the outer middle right breast was/were localized and targeted under stereotactic/tomosynthesis guidance via a(n) lateral approach. The skin of the breast was cleansed and prepped.  1 ml of 1% lidocaine and 8 ml of 1% lidocaine with epinephrine were used for local anesthesia. An 8 gauge needle used with a vacuum assisted biopsy device was advanced into the breast and 6 core specimens were  obtained. Specimen radiography demonstrated the targeted calcifications. A bowtie clip was then deployed at the biopsy site. The patient tolerated the procedure well with no immediate complications.  Post-procedural digital mammographic views of the right breast demonstrate the bowtie clip at the expected location at the site of biopsy in the outer central middle right breast, where there is at least 1 adjacent residual calcification.   IMPRESSION:   1. Stereotactic/Tomosynthesis guided core needle biopsy of a group of calcifications at 9:00 in the middle right breast, marked with a  bowtie biopsy clip. Pathology is malignant and concordant with the imaging  assessment. Recommend surgical consultation.    10/16/2023 Pathology:   Final Diagnosis   1. Right Breast, 9:00, Stereotactic-Guided Core Needle Biopsy for Calcifications:  A. 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               B. Greatest contiguous extent of DCIS measures 10 mm.               C. See biomarker template.     2023 Bilateral Breast MRI     Gynecologic History:   . P:1 AB:0  Age at first childbirth: 27  Lactation/How long: none  Age at menarche: 16  Age at menopause: 49  Total years of oral contraceptive use: 6 years previously  Total years of hormone replacement therapy: on premarin, just stopped     Past Medical History:   HTN  DM  CAD, Dr. Lynch   HLD    Past Surgical History:    Colonoscopy  Hysterectomy  Knee surgery  Tonsillectomy and adenoidectomy  Trigger point injection    Family History:    As above    Social History:  Denies tobacco use  Occasional alcohol use    Allergies:   Allergies   Allergen Reactions    Codeine Nausea Only     BLURRED VISION, RASH       Medications:     Current Outpatient Medications:     acetaminophen (TYLENOL) 500 MG tablet, Take 2 tablets by mouth Every 6 (Six) Hours As Needed for Mild Pain., Disp: , Rfl:     albuterol sulfate  (90 Base) MCG/ACT inhaler, INHALE 2 PUFFS BY MOUTH INTO THE LUNGS EVERY 4 HOURS AS NEEDED FOR WHEEZING, Disp: 8.5 g, Rfl: 0    ALPRAZolam (XANAX) 0.5 MG tablet, TAKE 1 TABLET BY MOUTH DAILY AS NEEDED FOR ANXIETY, Disp: 45 tablet, Rfl: 0    Alum Hydroxide-Mag Trisilicate (GAVISCON) 80-14.2 MG chewable tablet, Chew 1 tablet 4 (Four) Times a Day As Needed (Heartburn)., Disp: 224 each, Rfl: 11    aspirin 81 MG tablet, Take 1 tablet by mouth Daily., Disp: , Rfl:     Biotin 10 MG tablet, , Disp: , Rfl:     buPROPion (WELLBUTRIN) 75 MG tablet, TAKE 1  TABLET BY MOUTH DAILY (Patient not taking: Reported on 10/16/2023), Disp: 30 tablet, Rfl: 2    Cholecalciferol 50 MCG (2000 UT) capsule, , Disp: , Rfl:     cyclobenzaprine (FLEXERIL) 5 MG tablet, Take 1 tablet by mouth 3 (Three) Times a Day As Needed for Muscle Spasms., Disp: 30 tablet, Rfl: 0    desvenlafaxine (PRISTIQ) 100 MG 24 hr tablet, TAKE 1 TABLET BY MOUTH DAILY, Disp: 90 tablet, Rfl: 1    fenofibrate (TRICOR) 145 MG tablet, TAKE 1 TABLET BY MOUTH EVERY DAY, Disp: 90 tablet, Rfl: 1    fluticasone (FLONASE) 50 MCG/ACT nasal spray, 2 sprays into the nostril(s) as directed by provider Daily for 30 days., Disp: 16 g, Rfl: 0    levETIRAcetam (KEPPRA) 500 MG tablet, TAKE 1/2 TABLET BY MOUTH DAILY AS NEEDED FOR HEADACHE, Disp: 45 tablet, Rfl: 1    metFORMIN ER (GLUCOPHAGE-XR) 500 MG 24 hr tablet, TAKE 1 TABLET BY MOUTH DAILY WITH LARGEST MEAL OF THE DAY AND A GLASS OF WATER, Disp: 90 tablet, Rfl: 01    metoprolol tartrate (LOPRESSOR) 25 MG tablet, Take 1 tablet by mouth 2 (Two) Times a Day., Disp: 180 tablet, Rfl: 3    Omega-3 Fatty Acids (FISH OIL) 1200 MG capsule capsule, , Disp: , Rfl:     omeprazole (priLOSEC) 40 MG capsule, TAKE 1 CAPSULE BY MOUTH EVERY DAY, Disp: 90 capsule, Rfl: 0    rosuvastatin (CRESTOR) 10 MG tablet, TAKE 1 TABLET BY MOUTH EVERY NIGHT, Disp: 90 tablet, Rfl: 1    spironolactone (ALDACTONE) 25 MG tablet, TAKE 1 TABLET BY MOUTH DAILY, Disp: 30 tablet, Rfl: 4    traZODone (DESYREL) 50 MG tablet, Take 0.5-1 tablets by mouth At Night As Needed for Sleep. for sleep, Disp: 90 tablet, Rfl: 1    valACYclovir (VALTREX) 500 MG tablet, Take 2 tablets by mouth 2 (Two) Times a Day., Disp: 4 tablet, Rfl: 3    Laboratory Values:    Labs from 3/2023 reviewed    Review of Systems:   Influenza-like illness: no fever, no  cough, no  sore throat, no  body aches, no loss of sense of taste or smell, no known exposure to person with Covid-19.  Constitutional: Negative for fevers or chills  HENT: Negative for  hearing loss or runny nose  Eyes: Negative for vision changes or scleral icterus  Respiratory: Negative for cough or shortness of breath  Cardiovascular: Negative for chest pain or heart palpitations  Gastrointestinal: Negative for abdominal pain, nausea, vomiting, constipation, melena, or hematochezia  Genitourinary: Negative for hematuria or dysuria  Musculoskeletal: Negative for joint swelling or gait instability  Neurologic: Negative for tremors or seizures  Psychiatric: Negative for suicidal ideations or depression  All other systems reviewed and negative    Physical Exam:   ECO - Asymptomatic  Constitutional: Well-developed well-nourished, no acute distress  Eyes: Conjunctiva normal, sclera nonicteric  ENMT: Hearing grossly normal, oral mucosa moist  Neck: Supple, no palpable mass, trachea midline  Respiratory: Clear to auscultation, normal inspiratory effort  Cardiovascular: Regular rate, no peripheral edema, no jugular venous distention  Breast: symmetric  Right: No visible abnormalities on inspection while seated, with arms raised or hands on hips. No masses, skin changes, or nipple abnormalities.  Left: No visible abnormalities on inspection while seated, with arms raised or hands on hips. No masses, skin changes, or nipple abnormalities.  Biopsy site appreciated in right breast, otherwise no skin changes.   No clinical chest wall involvement.  Gastrointestinal: Soft, nontender  Lymphatics (palpable nodes): No cervical, supraclavicular or axillary lymphadenopathy  Skin:  Warm, dry, no rash on visualized skin surfaces  Musculoskeletal: Symmetric strength, normal gait  Psychiatric: Alert and oriented ×3, normal affect     Discussion:  I had an extensive discussion with the patient and her family about the nature of her breast cancer diagnosis. We reviewed the components of breast tissue including ducts and lobules. We reviewed her pathology report in detail. We reviewed breast cancer histology, including  stage, grade, ER/NC receptors, HER2 receptors and how this applies to her diagnosis. We reviewed the basics of systemic and local/regional management of breast cancer.     We reviewed potential surgical treatments to include partial mastectomy, mastectomy, sentinel lymph node biopsy and axillary node dissection and discussed the rationale associated with each approach. Regarding radiation therapy, we discussed that radiation is indicated in all cases of breast conservation and in only limited circumstances following mastectomy. We discussed that the primary goal of adjuvant radiation is to decrease the likelihood of local recurrence.     In her case, she is a good candidate for lumpectomy and she would like to proceed with breast conservation. We discussed that lumpectomy would require preoperative wire-localization. We also discussed the risk of positive margins and that she must have negative margins for lumpectomy to be an appropriate oncologic procedure. I will make every effort to obtain negative margins at her initial operation, but there is a 10-15% chance that she will require a second operation for re-excision, or possibly a total mastectomy. We will not know the margin status until after her final pathology has returned.     I described additional risks and potential complications associated with surgery, including, but not limited to, bleeding, infection, deformity/poor cosmetic result, chronic pain, numbness, seroma, hematoma, deep venous thrombosis, skin flap necrosis, disease recurrence and the possibility of requiring additional surgery. We also discussed other treatment options including the option of not undergoing any surgical treatment and the risks associated with this including disease progression. She expressed an understanding of these factors and wished to proceed.    We discussed that in her case, systemic treatment would likely involve endocrine therapy/targeted therapy.     KEAGAN MURILLO,  M.D.  General and Endoscopic Surgery  [facility]    [address], [address]  [place], [place], [address]  P: [phone]  F: [phone]

## 2023-10-23 NOTE — H&P (VIEW-ONLY)
General Surgery Breast Cancer History and Physical Exam     Summary:    Alexandria Yousif is a 78 y.o. 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      A multidisciplinary plan has been formulated for the patient:    (1) Breast Surgical Oncology:  -Follow up [employer] 9 panel genetic testing. I will call her with results.   -MRI to evaluate anatomy as well as for surgical planning.   -Nurse navigator consult.   -Surgical plan: She believes she would like to proceed with right breast wire localized lumpectomy pending MRI results.  -Plastic surgery referral declined at this time.    (2) Medical Oncology:  -Will refer postoperatively for evaluation for endocrine therapy.    (3) Radiation Oncology:  -Will refer postoperatively for evaluation for radiation therapy.    Referring Provider: Anita Muse MD    Chief Complaint: abnormal breast imaging    History of Present Illness: Ms. Alexandria Yousif is a 78 y.o. year old lady, seen at the request of Anita Muse MD for a new diagnosis of right breast cancer.      This was initially detected as an imaging abnormality. She has had annual mammograms each year. She denies any prior history of abnormal mammograms or breast biopsies. Her work-up is detailed in the oncologic history below.     She denies any breast lumps, pain, skin changes, or nipple discharge. She denies any family history of breast or ovarian cancer. Her mother had a hysterectomy for pre-cancerous cells.     Workup of Current Diagnosis:    8/21/2023 Bilateral Screening Mammogram:  IMPRESSION:  1. Microcalcifications in the middle third upper outer quadrant of the right breast are noted. Further evaluation with spot magnification CC and 90 degree lateral images is recommended.  2. There are no findings suspicious for malignancy in the left breast.  BI-RADS CATEGORY 0    9/13/2023 Right Breast Diagnostic Mammogram:   The  patient returned for diagnostic imaging with coned magnified views in both projections and R 2 and digital tomosynthesis. These more clearly delineate clustered microcalcifications which have a slightly pleomorphic configuration and are considered indeterminant. They appear to be new since an earlier mammogram dated 08/17/2022 and therefore further evaluation with stereotactic core biopsy is recommended.  CONCLUSION: Suspicious clustered microcalcifications in middle third of upper outer right breast and further evaluation with stereotactic core biopsy is recommended. These findings have been discussed with the  patient.   BI-RADS 4. Suspicious abnormality.    10/16/2023 Right Breast Stereotactic Biopsy:   PRE-PROCEDURAL CONSULTATION: Details of the procedure and possible limitations and complications were discussed with the patient. After addressing questions and concerns, written informed consent was obtained.    PROCEDURE: The calcifications in the outer middle right breast was/were localized and targeted under stereotactic/tomosynthesis guidance via a(n) lateral approach. The skin of the breast was cleansed and prepped.  1 ml of 1% lidocaine and 8 ml of 1% lidocaine with epinephrine were used for local anesthesia. An 8 gauge needle used with a vacuum assisted biopsy device was advanced into the breast and 6 core specimens were  obtained. Specimen radiography demonstrated the targeted calcifications. A bowtie clip was then deployed at the biopsy site. The patient tolerated the procedure well with no immediate complications.  Post-procedural digital mammographic views of the right breast demonstrate the bowtie clip at the expected location at the site of biopsy in the outer central middle right breast, where there is at least 1 adjacent residual calcification.   IMPRESSION:   1. Stereotactic/Tomosynthesis guided core needle biopsy of a group of calcifications at 9:00 in the middle right breast, marked with a  bowtie biopsy clip. Pathology is malignant and concordant with the imaging  assessment. Recommend surgical consultation.    10/16/2023 Pathology:   Final Diagnosis   1. Right Breast, 9:00, Stereotactic-Guided Core Needle Biopsy for Calcifications:  A. 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               B. Greatest contiguous extent of DCIS measures 10 mm.               C. See biomarker template.     2023 Bilateral Breast MRI     Gynecologic History:   . P:1 AB:0  Age at first childbirth: 27  Lactation/How long: none  Age at menarche: 16  Age at menopause: 49  Total years of oral contraceptive use: 6 years previously  Total years of hormone replacement therapy: on premarin, just stopped     Past Medical History:   HTN  DM  CAD, Dr. Lynch   HLD    Past Surgical History:    Colonoscopy  Hysterectomy  Knee surgery  Tonsillectomy and adenoidectomy  Trigger point injection    Family History:    As above    Social History:  Denies tobacco use  Occasional alcohol use    Allergies:   Allergies   Allergen Reactions    Codeine Nausea Only     BLURRED VISION, RASH       Medications:     Current Outpatient Medications:     acetaminophen (TYLENOL) 500 MG tablet, Take 2 tablets by mouth Every 6 (Six) Hours As Needed for Mild Pain., Disp: , Rfl:     albuterol sulfate  (90 Base) MCG/ACT inhaler, INHALE 2 PUFFS BY MOUTH INTO THE LUNGS EVERY 4 HOURS AS NEEDED FOR WHEEZING, Disp: 8.5 g, Rfl: 0    ALPRAZolam (XANAX) 0.5 MG tablet, TAKE 1 TABLET BY MOUTH DAILY AS NEEDED FOR ANXIETY, Disp: 45 tablet, Rfl: 0    Alum Hydroxide-Mag Trisilicate (GAVISCON) 80-14.2 MG chewable tablet, Chew 1 tablet 4 (Four) Times a Day As Needed (Heartburn)., Disp: 224 each, Rfl: 11    aspirin 81 MG tablet, Take 1 tablet by mouth Daily., Disp: , Rfl:     Biotin 10 MG tablet, , Disp: , Rfl:     buPROPion (WELLBUTRIN) 75 MG tablet, TAKE 1  TABLET BY MOUTH DAILY (Patient not taking: Reported on 10/16/2023), Disp: 30 tablet, Rfl: 2    Cholecalciferol 50 MCG (2000 UT) capsule, , Disp: , Rfl:     cyclobenzaprine (FLEXERIL) 5 MG tablet, Take 1 tablet by mouth 3 (Three) Times a Day As Needed for Muscle Spasms., Disp: 30 tablet, Rfl: 0    desvenlafaxine (PRISTIQ) 100 MG 24 hr tablet, TAKE 1 TABLET BY MOUTH DAILY, Disp: 90 tablet, Rfl: 1    fenofibrate (TRICOR) 145 MG tablet, TAKE 1 TABLET BY MOUTH EVERY DAY, Disp: 90 tablet, Rfl: 1    fluticasone (FLONASE) 50 MCG/ACT nasal spray, 2 sprays into the nostril(s) as directed by provider Daily for 30 days., Disp: 16 g, Rfl: 0    levETIRAcetam (KEPPRA) 500 MG tablet, TAKE 1/2 TABLET BY MOUTH DAILY AS NEEDED FOR HEADACHE, Disp: 45 tablet, Rfl: 1    metFORMIN ER (GLUCOPHAGE-XR) 500 MG 24 hr tablet, TAKE 1 TABLET BY MOUTH DAILY WITH LARGEST MEAL OF THE DAY AND A GLASS OF WATER, Disp: 90 tablet, Rfl: 01    metoprolol tartrate (LOPRESSOR) 25 MG tablet, Take 1 tablet by mouth 2 (Two) Times a Day., Disp: 180 tablet, Rfl: 3    Omega-3 Fatty Acids (FISH OIL) 1200 MG capsule capsule, , Disp: , Rfl:     omeprazole (priLOSEC) 40 MG capsule, TAKE 1 CAPSULE BY MOUTH EVERY DAY, Disp: 90 capsule, Rfl: 0    rosuvastatin (CRESTOR) 10 MG tablet, TAKE 1 TABLET BY MOUTH EVERY NIGHT, Disp: 90 tablet, Rfl: 1    spironolactone (ALDACTONE) 25 MG tablet, TAKE 1 TABLET BY MOUTH DAILY, Disp: 30 tablet, Rfl: 4    traZODone (DESYREL) 50 MG tablet, Take 0.5-1 tablets by mouth At Night As Needed for Sleep. for sleep, Disp: 90 tablet, Rfl: 1    valACYclovir (VALTREX) 500 MG tablet, Take 2 tablets by mouth 2 (Two) Times a Day., Disp: 4 tablet, Rfl: 3    Laboratory Values:    Labs from 3/2023 reviewed    Review of Systems:   Influenza-like illness: no fever, no  cough, no  sore throat, no  body aches, no loss of sense of taste or smell, no known exposure to person with Covid-19.  Constitutional: Negative for fevers or chills  HENT: Negative for  hearing loss or runny nose  Eyes: Negative for vision changes or scleral icterus  Respiratory: Negative for cough or shortness of breath  Cardiovascular: Negative for chest pain or heart palpitations  Gastrointestinal: Negative for abdominal pain, nausea, vomiting, constipation, melena, or hematochezia  Genitourinary: Negative for hematuria or dysuria  Musculoskeletal: Negative for joint swelling or gait instability  Neurologic: Negative for tremors or seizures  Psychiatric: Negative for suicidal ideations or depression  All other systems reviewed and negative    Physical Exam:   ECO - Asymptomatic  Constitutional: Well-developed well-nourished, no acute distress  Eyes: Conjunctiva normal, sclera nonicteric  ENMT: Hearing grossly normal, oral mucosa moist  Neck: Supple, no palpable mass, trachea midline  Respiratory: Clear to auscultation, normal inspiratory effort  Cardiovascular: Regular rate, no peripheral edema, no jugular venous distention  Breast: symmetric  Right: No visible abnormalities on inspection while seated, with arms raised or hands on hips. No masses, skin changes, or nipple abnormalities.  Left: No visible abnormalities on inspection while seated, with arms raised or hands on hips. No masses, skin changes, or nipple abnormalities.  Biopsy site appreciated in right breast, otherwise no skin changes.   No clinical chest wall involvement.  Gastrointestinal: Soft, nontender  Lymphatics (palpable nodes): No cervical, supraclavicular or axillary lymphadenopathy  Skin:  Warm, dry, no rash on visualized skin surfaces  Musculoskeletal: Symmetric strength, normal gait  Psychiatric: Alert and oriented ×3, normal affect     Discussion:  I had an extensive discussion with the patient and her family about the nature of her breast cancer diagnosis. We reviewed the components of breast tissue including ducts and lobules. We reviewed her pathology report in detail. We reviewed breast cancer histology, including  stage, grade, ER/VT receptors, HER2 receptors and how this applies to her diagnosis. We reviewed the basics of systemic and local/regional management of breast cancer.     We reviewed potential surgical treatments to include partial mastectomy, mastectomy, sentinel lymph node biopsy and axillary node dissection and discussed the rationale associated with each approach. Regarding radiation therapy, we discussed that radiation is indicated in all cases of breast conservation and in only limited circumstances following mastectomy. We discussed that the primary goal of adjuvant radiation is to decrease the likelihood of local recurrence.     In her case, she is a good candidate for lumpectomy and she would like to proceed with breast conservation. We discussed that lumpectomy would require preoperative wire-localization. We also discussed the risk of positive margins and that she must have negative margins for lumpectomy to be an appropriate oncologic procedure. I will make every effort to obtain negative margins at her initial operation, but there is a 10-15% chance that she will require a second operation for re-excision, or possibly a total mastectomy. We will not know the margin status until after her final pathology has returned.     I described additional risks and potential complications associated with surgery, including, but not limited to, bleeding, infection, deformity/poor cosmetic result, chronic pain, numbness, seroma, hematoma, deep venous thrombosis, skin flap necrosis, disease recurrence and the possibility of requiring additional surgery. We also discussed other treatment options including the option of not undergoing any surgical treatment and the risks associated with this including disease progression. She expressed an understanding of these factors and wished to proceed.    We discussed that in her case, systemic treatment would likely involve endocrine therapy/targeted therapy.     KEAGAN MURILLO,  M.D.  General and Endoscopic Surgery  [facility]    [address], [address]  [place], [place], [address]  P: [phone]  F: [phone]

## 2023-10-24 ENCOUNTER — TELEPHONE (OUTPATIENT)
Dept: SURGERY | Facility: CLINIC | Age: 78
End: 2023-10-24
Payer: MEDICARE

## 2023-10-24 ENCOUNTER — PREP FOR SURGERY (OUTPATIENT)
Dept: OTHER | Facility: HOSPITAL | Age: 78
End: 2023-10-24
Payer: MEDICARE

## 2023-10-24 ENCOUNTER — TREATMENT (OUTPATIENT)
Dept: PHYSICAL THERAPY | Facility: CLINIC | Age: 78
End: 2023-10-24
Payer: MEDICARE

## 2023-10-24 DIAGNOSIS — M54.16 RADICULOPATHY, LUMBAR REGION: ICD-10-CM

## 2023-10-24 DIAGNOSIS — M48.061 SPINAL STENOSIS OF LUMBAR REGION WITHOUT NEUROGENIC CLAUDICATION: Primary | ICD-10-CM

## 2023-10-24 DIAGNOSIS — M43.16 SPONDYLOLISTHESIS OF LUMBAR REGION: ICD-10-CM

## 2023-10-24 DIAGNOSIS — C50.919 MALIGNANT NEOPLASM OF FEMALE BREAST, UNSPECIFIED ESTROGEN RECEPTOR STATUS, UNSPECIFIED LATERALITY, UNSPECIFIED SITE OF BREAST: Primary | ICD-10-CM

## 2023-10-24 PROCEDURE — 97110 THERAPEUTIC EXERCISES: CPT | Performed by: PHYSICAL THERAPIST

## 2023-10-24 PROCEDURE — G0283 ELEC STIM OTHER THAN WOUND: HCPCS | Performed by: PHYSICAL THERAPIST

## 2023-10-24 PROCEDURE — 97140 MANUAL THERAPY 1/> REGIONS: CPT | Performed by: PHYSICAL THERAPIST

## 2023-10-24 RX ORDER — DESVENLAFAXINE 100 MG/1
100 TABLET, EXTENDED RELEASE ORAL DAILY
Qty: 90 TABLET | Refills: 1 | Status: SHIPPED | OUTPATIENT
Start: 2023-10-24

## 2023-10-24 NOTE — TELEPHONE ENCOUNTER
Rx Refill Note  Requested Prescriptions     Pending Prescriptions Disp Refills    desvenlafaxine (PRISTIQ) 100 MG 24 hr tablet [Pharmacy Med Name: DESVENLAFAXINE ER SUCCINATE 100MG T] 90 tablet 1     Sig: TAKE 1 TABLET BY MOUTH DAILY      Last office visit with prescribing clinician: 9/5/2023   Last telemedicine visit with prescribing clinician: Visit date not found   Next office visit with prescribing clinician: 3/5/2024                         Would you like a call back once the refill request has been completed: [] Yes [] No    If the office needs to give you a call back, can they leave a voicemail: [] Yes [] No    Juancarlos Laguna MA  10/24/23, 08:14 EDT

## 2023-10-24 NOTE — PROGRESS NOTES
[facility] [place]  [address] [place] [address]  [place] [address]  Phone [phone]  Fax [phone]      Physical Therapy Daily Treatment Note      Patient: Alexandria Yousif   : 1945  Referring practitioner: Sae Celis MD  Date of Initial Visit: Type: THERAPY  Noted: 9/15/2023  Today's Date: 10/24/2023  Patient seen for 5 sessions       Visit Diagnoses:    ICD-10-CM ICD-9-CM   1. Spinal stenosis of lumbar region without neurogenic claudication  M48.061 724.02   2. Radiculopathy, lumbar region  M54.16 724.4   3. Spondylolisthesis of lumbar region  M43.16 738.4       Alexandria Yousif reports: MRI yesterday facedown with arms.  Flared muscles from neck to back and hip.  SI and lumbar not too painful.    Subjective     Objective   See Exercise, Manual, and Modality Logs for complete treatment.   Increased lumbar paraspinals, thoracic, cervical.  None at PSIS.  Right upslip, left PINN, outflare    Assessment/Plan  SI corrected with manual.  Noted increased muscle tone especially on left paraspinals likely from prolonged prone position for MRI yesterday.  Good tolerance of stabilization exercises with improved pain at the SI joint and no return of radicular symptoms.  Continue to progress strength as able.    Timed:         Manual Therapy:    15     mins  81541;     Therapeutic Exercise:    20     mins  08540;     Neuromuscular Pj:    -    mins  90469;    Therapeutic Activity:     -     mins  83620;     Gait Training:      -     mins  32880;     Ultrasound:     -     mins  83426;    Ionto                               -    mins   13282  Self Care                       -     mins   83932  Orthotic training    -     mins 25173      Un-Timed:  Electrical Stimulation:    15     mins  50741 ( );  Dry Needling     -     mins self-pay  Traction     -     mins 72827      Timed Treatment:   35   mins   Total Treatment:     50   mins    Aracelis Porter PT, JIHAN  [place] License:  [id number]

## 2023-10-25 ENCOUNTER — TELEPHONE (OUTPATIENT)
Dept: FAMILY MEDICINE CLINIC | Facility: CLINIC | Age: 78
End: 2023-10-25
Payer: MEDICARE

## 2023-10-25 DIAGNOSIS — F41.9 ANXIETY: ICD-10-CM

## 2023-10-25 RX ORDER — ALPRAZOLAM 0.5 MG/1
0.5 TABLET ORAL DAILY PRN
Qty: 45 TABLET | Refills: 0 | Status: SHIPPED | OUTPATIENT
Start: 2023-10-25

## 2023-10-25 NOTE — TELEPHONE ENCOUNTER
Rx Refill Note  Requested Prescriptions     Pending Prescriptions Disp Refills    ALPRAZolam (XANAX) 0.5 MG tablet [Pharmacy Med Name: ALPRAZOLAM 0.5MG TABLETS] 45 tablet      Sig: TAKE 1 TABLET BY MOUTH DAILY AS NEEDED FOR ANXIETY      Last office visit with prescribing clinician: 9/5/2023   Last telemedicine visit with prescribing clinician: Visit date not found   Next office visit with prescribing clinician: 3/5/2024       {TIP  Please add Last Relevant Lab Date if appropriate: 03/02/2023                 Would you like a call back once the refill request has been completed: [] Yes [] No    If the office needs to give you a call back, can they leave a voicemail: [] Yes [] No    Bindu Mulligan MA  10/25/23, 14:52 EDT

## 2023-10-25 NOTE — TELEPHONE ENCOUNTER
Caller: Alexandria Yousif    Relationship: Self    Best call back number: [phone]    What was the call regarding: PATIENT WOULD LIKE TO KNOW IF DR BECK RECOMMENDS HER GETTING HER COVID AND FLU VACCINES TOGETHER, OR IF SHE SHOULD HOLD OFF UNTIL AFTER HER BREAST CANCER SURGERY ON 11/16/23.    PLEASE CALL TO ADVISE.

## 2023-10-26 ENCOUNTER — PATIENT OUTREACH (OUTPATIENT)
Dept: OTHER | Facility: HOSPITAL | Age: 78
End: 2023-10-26
Payer: MEDICARE

## 2023-10-26 NOTE — PROGRESS NOTES
Referral received from Dr. Saunders's office. Called Ms. Yousif and left a message introducing myself and navigational services. Asked her to call me back at her convenience and left my contact information.   Home

## 2023-10-27 ENCOUNTER — TELEPHONE (OUTPATIENT)
Dept: PHYSICAL THERAPY | Facility: CLINIC | Age: 78
End: 2023-10-27

## 2023-10-30 ENCOUNTER — TREATMENT (OUTPATIENT)
Dept: PHYSICAL THERAPY | Facility: CLINIC | Age: 78
End: 2023-10-30
Payer: MEDICARE

## 2023-10-30 DIAGNOSIS — R26.2 DIFFICULTY WALKING: ICD-10-CM

## 2023-10-30 DIAGNOSIS — M54.16 RADICULOPATHY, LUMBAR REGION: ICD-10-CM

## 2023-10-30 DIAGNOSIS — M48.061 SPINAL STENOSIS OF LUMBAR REGION WITHOUT NEUROGENIC CLAUDICATION: Primary | ICD-10-CM

## 2023-10-30 DIAGNOSIS — R29.898 WEAKNESS OF BOTH LEGS: ICD-10-CM

## 2023-10-30 DIAGNOSIS — M43.16 SPONDYLOLISTHESIS OF LUMBAR REGION: ICD-10-CM

## 2023-10-30 PROCEDURE — 97140 MANUAL THERAPY 1/> REGIONS: CPT | Performed by: PHYSICAL THERAPIST

## 2023-10-30 PROCEDURE — G0283 ELEC STIM OTHER THAN WOUND: HCPCS | Performed by: PHYSICAL THERAPIST

## 2023-10-30 PROCEDURE — 97110 THERAPEUTIC EXERCISES: CPT | Performed by: PHYSICAL THERAPIST

## 2023-10-30 NOTE — PROGRESS NOTES
[facility] [place]  [address]  [place] [address]  Phone [phone]  Fax [phone]      Physical Therapy Daily Treatment Note      Patient: Alexandria Yousif   : 1945  Referring practitioner: Sae Celis MD  Date of Initial Visit: Type: THERAPY  Noted: 9/15/2023  Today's Date: 10/30/2023  Patient seen for 6 sessions       Visit Diagnoses:    ICD-10-CM ICD-9-CM   1. Spinal stenosis of lumbar region without neurogenic claudication  M48.061 724.02   2. Radiculopathy, lumbar region  M54.16 724.4   3. Spondylolisthesis of lumbar region  M43.16 738.4   4. Difficulty walking  R26.2 719.7   5. Weakness of both legs  R29.898 729.89       Alexandria Yousif reports: had my flu and covid shot Thursday.  Didn't do as much activity and having a little back pain, joint, today.  No leg pain.    Subjective     Objective   See Exercise, Manual, and Modality Logs for complete treatment.   Right upslip, left PINN, and left outflare.  TTP left PSIS.    Assessment/Plan  Patient presents with SI malalignment but no s/s of lumbar radiculopathy.  Able to progress strengthening repetitions with good tolerance.    Timed:         Manual Therapy:    15     mins  56303;     Therapeutic Exercise:    20     mins  02798;     Neuromuscular Pj:    -    mins  26405;    Therapeutic Activity:     5    mins  89405;     Gait Training:      -     mins  70239;     Ultrasound:     -     mins  34892;    Ionto                               -    mins   12560  Self Care                       -     mins   93836  Orthotic training    -     mins 77030      Un-Timed:  Electrical Stimulation:    15     mins  54162 ( );  Dry Needling     -     mins self-pay  Traction     -     mins 14250      Timed Treatment:   45   mins   Total Treatment:     60   mins    Aracelis Porter PT, CIDN  [place] License: [id number]

## 2023-10-31 ENCOUNTER — PATIENT OUTREACH (OUTPATIENT)
Dept: OTHER | Facility: HOSPITAL | Age: 78
End: 2023-10-31
Payer: MEDICARE

## 2023-10-31 NOTE — PROGRESS NOTES
Referral received from Dr. Saunders's office. I called Ms. Yousif and introduced myself and navigational services. She stated the consult with Dr. Saunders went well and she is leaning toward a lumpectomy.  She has a good understanding of her pathology and treatment options. She was able to verbalize teach back on her plan of care.      She stated she has a wonderful support system with her , friends and family. She stated she feels comfortable talking to them about needs or issues.      She stated she has no financial or transportation concerns at this time. She has no resource needs or ongoing concerns at this time.      She stated she has been anxious about her diagnosis, but also feels blessed. We discussed that can be normal. Also discussed we have support options if the need arises. She was thankful for the information.      We discussed integrative therapies and other [facility]. She will received a navigation folder with the following information in the mail:     [facility], Cancer and Restorative Exercise (CARE), [employer] Exercise program, Guide for the Newly Diagnosed, [employer], [facility], Massage Therapy, Reiki Therapy, [employer] [place], Cancer Nutrition, and Survivorship Clinic.     She verbalized appreciation for navigational services and she has my contact information and will call with any questions that arise

## 2023-11-02 RX ORDER — OMEPRAZOLE 40 MG/1
CAPSULE, DELAYED RELEASE ORAL
Qty: 90 CAPSULE | Refills: 0 | Status: SHIPPED | OUTPATIENT
Start: 2023-11-02

## 2023-11-02 NOTE — TELEPHONE ENCOUNTER
Rx Refill Note  Requested Prescriptions     Pending Prescriptions Disp Refills    omeprazole (priLOSEC) 40 MG capsule [Pharmacy Med Name: OMEPRAZOLE 40MG CAPSULES] 90 capsule 0     Sig: TAKE 1 CAPSULE BY MOUTH EVERY DAY      Last office visit with prescribing clinician: Visit date not found   Last telemedicine visit with prescribing clinician: Visit date not found   Next office visit with prescribing clinician: Visit date not found                         Would you like a call back once the refill request has been completed: [] Yes [] No    If the office needs to give you a call back, can they leave a voicemail: [] Yes [] No    Juancarlos Laguna MA  11/02/23, 14:16 EDT

## 2023-11-09 ENCOUNTER — PREP FOR SURGERY (OUTPATIENT)
Dept: OTHER | Facility: HOSPITAL | Age: 78
End: 2023-11-09
Payer: MEDICARE

## 2023-11-09 ENCOUNTER — PRE-ADMISSION TESTING (OUTPATIENT)
Dept: PREADMISSION TESTING | Facility: HOSPITAL | Age: 78
End: 2023-11-09
Payer: MEDICARE

## 2023-11-09 VITALS
BODY MASS INDEX: 22.48 KG/M2 | WEIGHT: 134.9 LBS | TEMPERATURE: 97.6 F | OXYGEN SATURATION: 97 % | SYSTOLIC BLOOD PRESSURE: 143 MMHG | HEART RATE: 68 BPM | HEIGHT: 65 IN | DIASTOLIC BLOOD PRESSURE: 83 MMHG | RESPIRATION RATE: 18 BRPM

## 2023-11-09 LAB
ANION GAP SERPL CALCULATED.3IONS-SCNC: 10 MMOL/L (ref 5–15)
BUN SERPL-MCNC: 16 MG/DL (ref 8–23)
BUN/CREAT SERPL: 24.2 (ref 7–25)
CALCIUM SPEC-SCNC: 10.2 MG/DL (ref 8.6–10.5)
CHLORIDE SERPL-SCNC: 101 MMOL/L (ref 98–107)
CO2 SERPL-SCNC: 28 MMOL/L (ref 22–29)
CREAT SERPL-MCNC: 0.66 MG/DL (ref 0.57–1)
DEPRECATED RDW RBC AUTO: 44.1 FL (ref 37–54)
EGFRCR SERPLBLD CKD-EPI 2021: 90.5 ML/MIN/1.73
ERYTHROCYTE [DISTWIDTH] IN BLOOD BY AUTOMATED COUNT: 12.9 % (ref 12.3–15.4)
GLUCOSE SERPL-MCNC: 125 MG/DL (ref 65–99)
HCT VFR BLD AUTO: 44.6 % (ref 34–46.6)
HGB BLD-MCNC: 14.2 G/DL (ref 12–15.9)
MCH RBC QN AUTO: 29.3 PG (ref 26.6–33)
MCHC RBC AUTO-ENTMCNC: 31.8 G/DL (ref 31.5–35.7)
MCV RBC AUTO: 92 FL (ref 79–97)
PLATELET # BLD AUTO: 321 10*3/MM3 (ref 140–450)
PMV BLD AUTO: 9.2 FL (ref 6–12)
POTASSIUM SERPL-SCNC: 4.2 MMOL/L (ref 3.5–5.2)
RBC # BLD AUTO: 4.85 10*6/MM3 (ref 3.77–5.28)
SODIUM SERPL-SCNC: 139 MMOL/L (ref 136–145)
WBC NRBC COR # BLD: 6.33 10*3/MM3 (ref 3.4–10.8)

## 2023-11-09 PROCEDURE — 80048 BASIC METABOLIC PNL TOTAL CA: CPT

## 2023-11-09 PROCEDURE — 36415 COLL VENOUS BLD VENIPUNCTURE: CPT

## 2023-11-09 PROCEDURE — 85027 COMPLETE CBC AUTOMATED: CPT

## 2023-11-09 RX ORDER — CYCLOBENZAPRINE HCL 5 MG
5 TABLET ORAL NIGHTLY PRN
COMMUNITY

## 2023-11-09 NOTE — DISCHARGE INSTRUCTIONS
Take the following medications the morning of surgery:  DESVENLAFAXINE,METOPROLOL AND OMEPRAZOLE    BRING INHALER    ARRIVAL TIME 11:30 AM    If you are on prescription narcotic pain medication to control your pain you may also take that medication the morning of surgery.    General Instructions:  Do not eat solid food after midnight the night before surgery.  You may drink clear liquids day of surgery but must stop at least one hour before your hospital arrival time.  It is beneficial for you to have a clear drink that contains carbohydrates the day of surgery.  We suggest a 12 to 20 ounce bottle of Gatorade or Powerade for non-diabetic patients or a 12 to 20 ounce bottle of G2 or Powerade Zero for diabetic patients. (Pediatric patients, are not advised to drink a 12 to 20 ounce carbohydrate drink)    Clear liquids are liquids you can see through.  Nothing red in color.     Plain water                               Sports drinks  Sodas                                   Gelatin (Jell-O)  Fruit juices without pulp such as white grape juice and apple juice  Popsicles that contain no fruit or yogurt  Tea or coffee (no cream or milk added)  Gatorade / Powerade  G2 / Powerade Zero    Infants may have breast milk up to four hours before surgery.  Infants drinking formula may drink formula up to six hours before surgery.   Patients who avoid smoking, chewing tobacco and alcohol for 4 weeks prior to surgery have a reduced risk of post-operative complications.  Quit smoking as many days before surgery as you can.  Do not smoke, use chewing tobacco or drink alcohol the day of surgery.   If applicable bring your C-PAP/ BI-PAP machine in with you to preop day of surgery.  Bring any papers given to you in the doctor’s office.  Wear clean comfortable clothes.  Do not wear contact lenses, false eyelashes or make-up.  Bring a case for your glasses.   Bring crutches or walker if applicable.  Remove all piercings.  Leave jewelry and  any other valuables at home.  Hair extensions with metal clips must be removed prior to surgery.  The Pre-Admission Testing nurse will instruct you to bring medications if unable to obtain an accurate list in Pre-Admission Testing.        If you were given a blood bank ID arm band remember to bring it with you the day of surgery.    Preventing a Surgical Site Infection:  For 2 to 3 days before surgery, avoid shaving with a razor because the razor can irritate skin and make it easier to develop an infection.    Any areas of open skin can increase the risk of a post-operative wound infection by allowing bacteria to enter and travel throughout the body.  Notify your surgeon if you have any skin wounds / rashes even if it is not near the expected surgical site.  The area will need assessed to determine if surgery should be delayed until it is healed.  The night prior to surgery shower using a fresh bar of anti-bacterial soap (such as Dial) and clean washcloth.  Sleep in a clean bed with clean clothing.  Do not allow pets to sleep with you.  Shower on the morning of surgery using a fresh bar of anti-bacterial soap (such as Dial) and clean washcloth.  Dry with a clean towel and dress in clean clothing.  Ask your surgeon if you will be receiving antibiotics prior to surgery.  Make sure you, your family, and all healthcare providers clean their hands with soap and water or an alcohol based hand  before caring for you or your wound.    Day of surgery: 11/16/2023   Your arrival time is approximately two hours before your scheduled surgery time.  Upon arrival, a Pre-op nurse and Anesthesiologist will review your health history, obtain vital signs, and answer questions you may have.  The only belongings needed at this time will be a list of your home medications and if applicable your C-PAP/BI-PAP machine.  A Pre-op nurse will start an IV and you may receive medication in preparation for surgery, including something to  help you relax.     Please be aware that surgery does come with discomfort.  We want to make every effort to control your discomfort so please discuss any uncontrolled symptoms with your nurse.   Your doctor will most likely have prescribed pain medications.      If you are going home after surgery you will receive individualized written care instructions before being discharged.  A responsible adult must drive you to and from the hospital on the day of your surgery and stay with you for 24 hours.  Discharge prescriptions can be filled by the hospital pharmacy during regular pharmacy hours.  If you are having surgery late in the day/evening your prescription may be e-prescribed to your pharmacy.  Please verify your pharmacy hours or chose a 24 hour pharmacy to avoid not having access to your prescription because your pharmacy has closed for the day.    If you are staying overnight following surgery, you will be transported to your hospital room following the recovery period.  [facility] has all private rooms.    If you have any questions please call Pre-Admission Testing at [phone].  Deductibles and co-payments are collected on the day of service. Please be prepared to pay the required co-pay, deductible or deposit on the day of service as defined by your plan.    Call your surgeon immediately if you experience any of the following symptoms:  Sore Throat  Shortness of Breath or difficulty breathing  Cough  Chills  Body soreness or muscle pain  Headache  Fever  New loss of taste or smell  Do not arrive for your surgery ill.  Your procedure will need to be rescheduled to another time.  You will need to call your physician before the day of surgery to avoid any unnecessary exposure to hospital staff as well as other patients.    CHLORHEXIDINE CLOTH INSTRUCTIONS  The morning of surgery follow these instructions using the Chlorhexidine cloths you've been given.  These steps reduce bacteria on the  body.  Do not use the cloths near your eyes, ears mouth, genitalia or on open wounds.  Throw the cloths away after use but do not try to flush them down a toilet.      Open and remove one cloth at a time from the package.    Leave the cloth unfolded and begin the bathing.  Massage the skin with the cloths using gentle pressure to remove bacteria.  Do not scrub harshly.   Follow the steps below with one 2% CHG cloth per area (6 total cloths).  One cloth for neck, shoulders and chest.  One cloth for both arms, hands, fingers and underarms (do underarms last).  One cloth for the abdomen followed by groin.  One cloth for right leg and foot including between the toes.  One cloth for left leg and foot including between the toes.  The last cloth is to be used for the back of the neck, back and buttocks.    Allow the CHG to air dry 3 minutes on the skin which will give it time to work and decrease the chance of irritation.  The skin may feel sticky until it is dry.  Do not rinse with water or any other liquid or you will lose the beneficial effects of the CHG.  If mild skin irritation occurs, do rinse the skin to remove the CHG.  Report this to the nurse at time of admission.  Do not apply lotions, creams, ointments, deodorants or perfumes after using the clothes. Dress in clean clothes before coming to the hospital.

## 2023-11-10 ENCOUNTER — TREATMENT (OUTPATIENT)
Dept: PHYSICAL THERAPY | Facility: CLINIC | Age: 78
End: 2023-11-10
Payer: MEDICARE

## 2023-11-10 DIAGNOSIS — R29.898 WEAKNESS OF BOTH LEGS: ICD-10-CM

## 2023-11-10 DIAGNOSIS — M43.16 SPONDYLOLISTHESIS OF LUMBAR REGION: ICD-10-CM

## 2023-11-10 DIAGNOSIS — M48.061 SPINAL STENOSIS OF LUMBAR REGION WITHOUT NEUROGENIC CLAUDICATION: Primary | ICD-10-CM

## 2023-11-10 DIAGNOSIS — M54.16 RADICULOPATHY, LUMBAR REGION: ICD-10-CM

## 2023-11-10 DIAGNOSIS — R26.2 DIFFICULTY WALKING: ICD-10-CM

## 2023-11-10 PROCEDURE — 97140 MANUAL THERAPY 1/> REGIONS: CPT | Performed by: PHYSICAL THERAPIST

## 2023-11-10 PROCEDURE — G0283 ELEC STIM OTHER THAN WOUND: HCPCS | Performed by: PHYSICAL THERAPIST

## 2023-11-10 PROCEDURE — 97110 THERAPEUTIC EXERCISES: CPT | Performed by: PHYSICAL THERAPIST

## 2023-11-10 NOTE — PROGRESS NOTES
[facility]  [address] [place] [address]  Phone [phone]  Fax [phone]      Physical Therapy Daily Treatment Note      Patient: Alexandria Yousif   : 1945  Referring practitioner: Sae Celis MD  Date of Initial Visit: Type: THERAPY  Noted: 9/15/2023  Today's Date: 11/10/2023  Patient seen for 7 sessions       Visit Diagnoses:    ICD-10-CM ICD-9-CM   1. Spinal stenosis of lumbar region without neurogenic claudication  M48.061 724.02   2. Spondylolisthesis of lumbar region  M43.16 738.4       Alexandria Yousif reports: continued SI pain this week.  Feels like my left hip has been up all week.    Subjective     Objective   See Exercise, Manual, and Modality Logs for complete treatment.   Left upslip  Left outflare and PINN    Assessment/Plan  Patient with continued SI pain due to consistent upslip and innominate rotation. Advised to continue exercises until surgery.  She will be on rest so it is important to have as much strength and stability before the surgery.  Continue one more visit then breast CA surgery.    Timed:         Manual Therapy: 20    mins  21908;     Therapeutic Exercise:    10     mins  39430;     Neuromuscular Pj:    -    mins  14803;    Therapeutic Activity:     -     mins  86752;     Gait Training:      -     mins  86996;     Ultrasound:     -     mins  10954;    Ionto                               -    mins   32056  Self Care                       -     mins   49876  Orthotic training    -     mins 59968      Un-Timed:  Electrical Stimulation:    15     mins  27686 ( );  Dry Needling     -     mins self-pay  Traction     -     mins 31513      Timed Treatment:   30 divided   mins   Total Treatment:     55   mins    Aracelis Porter PT, CIDN  [place] License: [id number]

## 2023-11-14 ENCOUNTER — TELEPHONE (OUTPATIENT)
Dept: SURGERY | Facility: CLINIC | Age: 78
End: 2023-11-14
Payer: MEDICARE

## 2023-11-16 ENCOUNTER — APPOINTMENT (OUTPATIENT)
Dept: GENERAL RADIOLOGY | Facility: HOSPITAL | Age: 78
End: 2023-11-16
Payer: MEDICARE

## 2023-11-16 ENCOUNTER — HOSPITAL ENCOUNTER (OUTPATIENT)
Facility: HOSPITAL | Age: 78
Setting detail: HOSPITAL OUTPATIENT SURGERY
Discharge: HOME | End: 2023-11-16
Attending: STUDENT IN AN ORGANIZED HEALTH CARE EDUCATION/TRAINING PROGRAM | Admitting: STUDENT IN AN ORGANIZED HEALTH CARE EDUCATION/TRAINING PROGRAM
Payer: MEDICARE

## 2023-11-16 ENCOUNTER — ANESTHESIA (OUTPATIENT)
Dept: PERIOP | Facility: HOSPITAL | Age: 78
End: 2023-11-16
Payer: MEDICARE

## 2023-11-16 ENCOUNTER — ANESTHESIA EVENT (OUTPATIENT)
Dept: PERIOP | Facility: HOSPITAL | Age: 78
End: 2023-11-16
Payer: MEDICARE

## 2023-11-16 ENCOUNTER — HOSPITAL ENCOUNTER (OUTPATIENT)
Dept: MAMMOGRAPHY | Facility: HOSPITAL | Age: 78
Discharge: HOME | End: 2023-11-16
Payer: MEDICARE

## 2023-11-16 VITALS
DIASTOLIC BLOOD PRESSURE: 79 MMHG | TEMPERATURE: 97 F | SYSTOLIC BLOOD PRESSURE: 137 MMHG | OXYGEN SATURATION: 100 % | RESPIRATION RATE: 16 BRPM | HEART RATE: 68 BPM

## 2023-11-16 DIAGNOSIS — C50.919 MALIGNANT NEOPLASM OF FEMALE BREAST, UNSPECIFIED ESTROGEN RECEPTOR STATUS, UNSPECIFIED LATERALITY, UNSPECIFIED SITE OF BREAST: Primary | ICD-10-CM

## 2023-11-16 DIAGNOSIS — C50.919 MALIGNANT NEOPLASM OF FEMALE BREAST, UNSPECIFIED ESTROGEN RECEPTOR STATUS, UNSPECIFIED LATERALITY, UNSPECIFIED SITE OF BREAST: ICD-10-CM

## 2023-11-16 LAB — GLUCOSE BLDC GLUCOMTR-MCNC: 74 MG/DL (ref 70–130)

## 2023-11-16 PROCEDURE — 25010000002 ONDANSETRON PER 1 MG: Performed by: NURSE ANESTHETIST, CERTIFIED REGISTERED

## 2023-11-16 PROCEDURE — C1819 TISSUE LOCALIZATION-EXCISION: HCPCS

## 2023-11-16 PROCEDURE — 19301 PARTIAL MASTECTOMY: CPT | Performed by: STUDENT IN AN ORGANIZED HEALTH CARE EDUCATION/TRAINING PROGRAM

## 2023-11-16 PROCEDURE — 88307 TISSUE EXAM BY PATHOLOGIST: CPT | Performed by: STUDENT IN AN ORGANIZED HEALTH CARE EDUCATION/TRAINING PROGRAM

## 2023-11-16 PROCEDURE — 76098 X-RAY EXAM SURGICAL SPECIMEN: CPT

## 2023-11-16 PROCEDURE — 25810000003 LACTATED RINGERS PER 1000 ML: Performed by: ANESTHESIOLOGY

## 2023-11-16 PROCEDURE — 88342 IMHCHEM/IMCYTCHM 1ST ANTB: CPT | Performed by: STUDENT IN AN ORGANIZED HEALTH CARE EDUCATION/TRAINING PROGRAM

## 2023-11-16 PROCEDURE — 25010000002 PROPOFOL 10 MG/ML EMULSION: Performed by: NURSE ANESTHETIST, CERTIFIED REGISTERED

## 2023-11-16 PROCEDURE — 25010000002 CEFAZOLIN IN DEXTROSE 2-4 GM/100ML-% SOLUTION: Performed by: STUDENT IN AN ORGANIZED HEALTH CARE EDUCATION/TRAINING PROGRAM

## 2023-11-16 PROCEDURE — 82948 REAGENT STRIP/BLOOD GLUCOSE: CPT

## 2023-11-16 PROCEDURE — 88341 IMHCHEM/IMCYTCHM EA ADD ANTB: CPT | Performed by: STUDENT IN AN ORGANIZED HEALTH CARE EDUCATION/TRAINING PROGRAM

## 2023-11-16 PROCEDURE — 25010000002 FENTANYL CITRATE (PF) 50 MCG/ML SOLUTION: Performed by: NURSE ANESTHETIST, CERTIFIED REGISTERED

## 2023-11-16 DEVICE — CLIP LIGAT VASC HORIZON TI MD BLU 6CT: Type: IMPLANTABLE DEVICE | Site: BREAST | Status: FUNCTIONAL

## 2023-11-16 RX ORDER — SODIUM CHLORIDE 0.9 % (FLUSH) 0.9 %
3 SYRINGE (ML) INJECTION EVERY 12 HOURS SCHEDULED
Status: DISCONTINUED | OUTPATIENT
Start: 2023-11-16 | End: 2023-11-16 | Stop reason: HOSPADM

## 2023-11-16 RX ORDER — LIDOCAINE HYDROCHLORIDE 20 MG/ML
INJECTION, SOLUTION EPIDURAL; INFILTRATION; INTRACAUDAL; PERINEURAL AS NEEDED
Status: DISCONTINUED | OUTPATIENT
Start: 2023-11-16 | End: 2023-11-16 | Stop reason: SURG

## 2023-11-16 RX ORDER — FENTANYL CITRATE 50 UG/ML
INJECTION, SOLUTION INTRAMUSCULAR; INTRAVENOUS AS NEEDED
Status: DISCONTINUED | OUTPATIENT
Start: 2023-11-16 | End: 2023-11-16 | Stop reason: SURG

## 2023-11-16 RX ORDER — HYDROMORPHONE HYDROCHLORIDE 1 MG/ML
0.25 INJECTION, SOLUTION INTRAMUSCULAR; INTRAVENOUS; SUBCUTANEOUS
Status: DISCONTINUED | OUTPATIENT
Start: 2023-11-16 | End: 2023-11-16 | Stop reason: HOSPADM

## 2023-11-16 RX ORDER — FENTANYL CITRATE 50 UG/ML
25 INJECTION, SOLUTION INTRAMUSCULAR; INTRAVENOUS
Status: DISCONTINUED | OUTPATIENT
Start: 2023-11-16 | End: 2023-11-16 | Stop reason: HOSPADM

## 2023-11-16 RX ORDER — HYDRALAZINE HYDROCHLORIDE 20 MG/ML
5 INJECTION INTRAMUSCULAR; INTRAVENOUS
Status: DISCONTINUED | OUTPATIENT
Start: 2023-11-16 | End: 2023-11-16 | Stop reason: HOSPADM

## 2023-11-16 RX ORDER — TRAMADOL HYDROCHLORIDE 50 MG/1
50 TABLET ORAL ONCE
Status: COMPLETED | OUTPATIENT
Start: 2023-11-16 | End: 2023-11-16

## 2023-11-16 RX ORDER — LABETALOL HYDROCHLORIDE 5 MG/ML
5 INJECTION, SOLUTION INTRAVENOUS
Status: DISCONTINUED | OUTPATIENT
Start: 2023-11-16 | End: 2023-11-16 | Stop reason: HOSPADM

## 2023-11-16 RX ORDER — TRAMADOL HYDROCHLORIDE 50 MG/1
50 TABLET ORAL EVERY 6 HOURS PRN
Qty: 8 TABLET | Refills: 0 | Status: SHIPPED | OUTPATIENT
Start: 2023-11-16 | End: 2024-11-15

## 2023-11-16 RX ORDER — HYDROCODONE BITARTRATE AND ACETAMINOPHEN 5; 325 MG/1; MG/1
1 TABLET ORAL ONCE AS NEEDED
Status: DISCONTINUED | OUTPATIENT
Start: 2023-11-16 | End: 2023-11-16 | Stop reason: HOSPADM

## 2023-11-16 RX ORDER — ONDANSETRON 2 MG/ML
4 INJECTION INTRAMUSCULAR; INTRAVENOUS ONCE AS NEEDED
Status: DISCONTINUED | OUTPATIENT
Start: 2023-11-16 | End: 2023-11-16 | Stop reason: HOSPADM

## 2023-11-16 RX ORDER — MAGNESIUM HYDROXIDE 1200 MG/15ML
LIQUID ORAL AS NEEDED
Status: DISCONTINUED | OUTPATIENT
Start: 2023-11-16 | End: 2023-11-16 | Stop reason: HOSPADM

## 2023-11-16 RX ORDER — EPHEDRINE SULFATE 50 MG/ML
5 INJECTION, SOLUTION INTRAVENOUS ONCE AS NEEDED
Status: DISCONTINUED | OUTPATIENT
Start: 2023-11-16 | End: 2023-11-16 | Stop reason: HOSPADM

## 2023-11-16 RX ORDER — PROMETHAZINE HYDROCHLORIDE 25 MG/1
25 SUPPOSITORY RECTAL ONCE AS NEEDED
Status: DISCONTINUED | OUTPATIENT
Start: 2023-11-16 | End: 2023-11-16 | Stop reason: HOSPADM

## 2023-11-16 RX ORDER — PROPOFOL 10 MG/ML
VIAL (ML) INTRAVENOUS CONTINUOUS PRN
Status: DISCONTINUED | OUTPATIENT
Start: 2023-11-16 | End: 2023-11-16 | Stop reason: SURG

## 2023-11-16 RX ORDER — DIAZEPAM 5 MG/1
5 TABLET ORAL ONCE
Status: COMPLETED | OUTPATIENT
Start: 2023-11-16 | End: 2023-11-16

## 2023-11-16 RX ORDER — FAMOTIDINE 10 MG/ML
20 INJECTION, SOLUTION INTRAVENOUS ONCE
Status: COMPLETED | OUTPATIENT
Start: 2023-11-16 | End: 2023-11-16

## 2023-11-16 RX ORDER — HYDROCODONE BITARTRATE AND ACETAMINOPHEN 7.5; 325 MG/1; MG/1
1 TABLET ORAL EVERY 4 HOURS PRN
Status: DISCONTINUED | OUTPATIENT
Start: 2023-11-16 | End: 2023-11-16 | Stop reason: HOSPADM

## 2023-11-16 RX ORDER — NALOXONE HCL 0.4 MG/ML
0.2 VIAL (ML) INJECTION AS NEEDED
Status: DISCONTINUED | OUTPATIENT
Start: 2023-11-16 | End: 2023-11-16 | Stop reason: HOSPADM

## 2023-11-16 RX ORDER — SODIUM CHLORIDE, SODIUM LACTATE, POTASSIUM CHLORIDE, CALCIUM CHLORIDE 600; 310; 30; 20 MG/100ML; MG/100ML; MG/100ML; MG/100ML
9 INJECTION, SOLUTION INTRAVENOUS CONTINUOUS
Status: DISCONTINUED | OUTPATIENT
Start: 2023-11-16 | End: 2023-11-16 | Stop reason: HOSPADM

## 2023-11-16 RX ORDER — PROMETHAZINE HYDROCHLORIDE 25 MG/1
25 TABLET ORAL ONCE AS NEEDED
Status: DISCONTINUED | OUTPATIENT
Start: 2023-11-16 | End: 2023-11-16 | Stop reason: HOSPADM

## 2023-11-16 RX ORDER — DROPERIDOL 2.5 MG/ML
0.62 INJECTION, SOLUTION INTRAMUSCULAR; INTRAVENOUS
Status: DISCONTINUED | OUTPATIENT
Start: 2023-11-16 | End: 2023-11-16 | Stop reason: HOSPADM

## 2023-11-16 RX ORDER — DIPHENHYDRAMINE HYDROCHLORIDE 50 MG/ML
12.5 INJECTION INTRAMUSCULAR; INTRAVENOUS
Status: DISCONTINUED | OUTPATIENT
Start: 2023-11-16 | End: 2023-11-16 | Stop reason: HOSPADM

## 2023-11-16 RX ORDER — LIDOCAINE HYDROCHLORIDE 10 MG/ML
0.5 INJECTION, SOLUTION INFILTRATION; PERINEURAL ONCE AS NEEDED
Status: DISCONTINUED | OUTPATIENT
Start: 2023-11-16 | End: 2023-11-16 | Stop reason: HOSPADM

## 2023-11-16 RX ORDER — SODIUM CHLORIDE 0.9 % (FLUSH) 0.9 %
3-10 SYRINGE (ML) INJECTION AS NEEDED
Status: DISCONTINUED | OUTPATIENT
Start: 2023-11-16 | End: 2023-11-16 | Stop reason: HOSPADM

## 2023-11-16 RX ORDER — BUPIVACAINE HYDROCHLORIDE AND EPINEPHRINE 5; 5 MG/ML; UG/ML
INJECTION, SOLUTION EPIDURAL; INTRACAUDAL; PERINEURAL AS NEEDED
Status: DISCONTINUED | OUTPATIENT
Start: 2023-11-16 | End: 2023-11-16 | Stop reason: HOSPADM

## 2023-11-16 RX ORDER — FLUMAZENIL 0.1 MG/ML
0.2 INJECTION INTRAVENOUS AS NEEDED
Status: DISCONTINUED | OUTPATIENT
Start: 2023-11-16 | End: 2023-11-16 | Stop reason: HOSPADM

## 2023-11-16 RX ORDER — CEFAZOLIN SODIUM 2 G/100ML
2000 INJECTION, SOLUTION INTRAVENOUS ONCE
Status: COMPLETED | OUTPATIENT
Start: 2023-11-16 | End: 2023-11-16

## 2023-11-16 RX ORDER — ONDANSETRON 2 MG/ML
INJECTION INTRAMUSCULAR; INTRAVENOUS AS NEEDED
Status: DISCONTINUED | OUTPATIENT
Start: 2023-11-16 | End: 2023-11-16 | Stop reason: SURG

## 2023-11-16 RX ORDER — IPRATROPIUM BROMIDE AND ALBUTEROL SULFATE 2.5; .5 MG/3ML; MG/3ML
3 SOLUTION RESPIRATORY (INHALATION) ONCE AS NEEDED
Status: DISCONTINUED | OUTPATIENT
Start: 2023-11-16 | End: 2023-11-16 | Stop reason: HOSPADM

## 2023-11-16 RX ADMIN — SODIUM CHLORIDE, POTASSIUM CHLORIDE, SODIUM LACTATE AND CALCIUM CHLORIDE 9 ML/HR: 600; 310; 30; 20 INJECTION, SOLUTION INTRAVENOUS at 14:37

## 2023-11-16 RX ADMIN — FAMOTIDINE 20 MG: 10 INJECTION INTRAVENOUS at 14:37

## 2023-11-16 RX ADMIN — LIDOCAINE HYDROCHLORIDE 60 MG: 20 INJECTION, SOLUTION EPIDURAL; INFILTRATION; INTRACAUDAL; PERINEURAL at 14:46

## 2023-11-16 RX ADMIN — PROPOFOL INJECTABLE EMULSION 100 MCG/KG/MIN: 10 INJECTION, EMULSION INTRAVENOUS at 14:47

## 2023-11-16 RX ADMIN — PROPOFOL INJECTABLE EMULSION 50 MG: 10 INJECTION, EMULSION INTRAVENOUS at 14:46

## 2023-11-16 RX ADMIN — ONDANSETRON 4 MG: 2 INJECTION INTRAMUSCULAR; INTRAVENOUS at 14:46

## 2023-11-16 RX ADMIN — FENTANYL CITRATE 25 MCG: 50 INJECTION, SOLUTION INTRAMUSCULAR; INTRAVENOUS at 14:46

## 2023-11-16 RX ADMIN — TRAMADOL HYDROCHLORIDE 50 MG: 50 TABLET ORAL at 16:29

## 2023-11-16 RX ADMIN — FENTANYL CITRATE 25 MCG: 50 INJECTION, SOLUTION INTRAMUSCULAR; INTRAVENOUS at 14:55

## 2023-11-16 RX ADMIN — DIAZEPAM 5 MG: 5 TABLET ORAL at 12:32

## 2023-11-16 RX ADMIN — CEFAZOLIN SODIUM 2000 MG: 2 INJECTION, SOLUTION INTRAVENOUS at 14:37

## 2023-11-16 NOTE — ANESTHESIA PREPROCEDURE EVALUATION
Anesthesia Evaluation                  Airway   Mallampati: III  TM distance: >3 FB  No difficulty expected  Dental - normal exam     Pulmonary - normal exam   Cardiovascular   Exercise tolerance: good (4-7 METS)    ECG reviewed  Patient on routine beta blocker and Beta blocker given within 24 hours of surgery  Rate: abnormal    (+) hypertension, CAD, dysrhythmias (SVT) PVC, CHF , hyperlipidemia    ROS comment: Echo:   ·  Left ventricular systolic function is normal. Calculated left ventricular EF = 60.7%  ·  Left ventricular diastolic function was normal.  ·  Estimated right ventricular systolic pressure from tricuspid regurgitation is normal (<35 mmHg).    PE comment: Bradycardia    Neuro/Psych  (+) CVA, dizziness/light headedness (vertigo), psychiatric history Anxiety and Depression  GI/Hepatic/Renal/Endo    (+) GERD, diabetes mellitus type 2    Musculoskeletal     (+) back pain (spinal stenosis)  Abdominal    Substance History      OB/GYN          Other   arthritis,   history of cancer                      Anesthesia Plan    ASA 3     MAC     (I have reviewed the patient's history and chart with the patient, including all pertinent laboratory results and imaging. I have explained the risks of monitored anesthesia care including but not limited to respiratory depression, possible need for airway intervention, or possible intra-op recall. I explained that airway intervention involves risk of possible dental injury.    VITALS:  /74 (BP Location: Left arm, Patient Position: Sitting)   Pulse 58   Temp 36.2 °C (97.2 °F) (Oral)   Resp 18   LMP  (LMP Unknown)   SpO2 100% )  intravenous induction     Anesthetic plan, risks, benefits, and alternatives have been provided, discussed and informed consent has been obtained with: patient.  Pre-procedure education provided      CODE STATUS:

## 2023-11-16 NOTE — ANESTHESIA POSTPROCEDURE EVALUATION
Patient: Alexandria Yousif    Procedure Summary       Date: 11/16/23 Room / Location:  [facility]  /  [facility]    Anesthesia Start: 1441 Anesthesia Stop: 1528    Procedure: right breast wire localized lumpectomy (Right: Breast) Diagnosis:       Malignant neoplasm of female breast, unspecified estrogen receptor status, unspecified laterality, unspecified site of breast      (Malignant neoplasm of female breast, unspecified estrogen receptor status, unspecified laterality, unspecified site of breast [C50.919])    Surgeons: Dee Saunders MD Provider: Annabel Justice MD    Anesthesia Type: MAC ASA Status: 3            Anesthesia Type: MAC    Vitals  Vitals Value Taken Time   /79 11/16/23 1545   Temp 36.1 °C (97 °F) 11/16/23 1530   Pulse 68 11/16/23 1552   Resp 16 11/16/23 1535   SpO2 97 % 11/16/23 1552   Vitals shown include unfiled device data.        Post Anesthesia Care and Evaluation    Patient location during evaluation: PHASE II  Patient participation: complete - patient participated  Level of consciousness: awake  Pain management: adequate    Airway patency: patent  Anesthetic complications: No anesthetic complications  PONV Status: none  Cardiovascular status: stable  Respiratory status: acceptable  Hydration status: acceptable

## 2023-11-17 ENCOUNTER — ANCILLARY PROCEDURE (OUTPATIENT)
Dept: LAB | Facility: HOSPITAL | Age: 78
End: 2023-11-17
Payer: MEDICARE

## 2023-11-17 ENCOUNTER — TRANSCRIBE ORDERS (OUTPATIENT)
Dept: LAB | Facility: HOSPITAL | Age: 78
End: 2023-11-17
Payer: MEDICARE

## 2023-11-17 DIAGNOSIS — D05.11 DUCTAL CARCINOMA IN SITU (DCIS) OF RIGHT BREAST: ICD-10-CM

## 2023-11-17 DIAGNOSIS — D05.11 DUCTAL CARCINOMA IN SITU (DCIS) OF RIGHT BREAST: Primary | ICD-10-CM

## 2023-11-17 PROCEDURE — 76098 X-RAY EXAM SURGICAL SPECIMEN: CPT

## 2023-11-17 RX ORDER — LIDOCAINE HYDROCHLORIDE 10 MG/ML
3 INJECTION, SOLUTION INFILTRATION; PERINEURAL ONCE
Status: SHIPPED | OUTPATIENT
Start: 2023-11-17

## 2023-11-20 ENCOUNTER — TELEPHONE (OUTPATIENT)
Dept: SURGERY | Facility: CLINIC | Age: 78
End: 2023-11-20
Payer: MEDICARE

## 2023-11-20 NOTE — OP NOTE
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 lady who was recently diagnosed with right breast DCIS. She ultimately elected to proceed with a right breast wire localized lumpectomy with sentinel lymph node biopsy. All risks (including bleeding, infection, damage to surrounding structures, need for further surgery, pathologic upgrade), benefits and alternatives were explained to the patient who agreed and wished to proceed. Informed consent was signed.      OPERATIVE COURSE: The patient was taken to the operating room, transferred onto the operating room table, and underwent anesthesia without incident. The patient was prepped and draped in sterile fashion. A time out was performed and preoperative antibiotics were given.  Half percent Marcaine with epinephrine was injected into the skin and subcutaneous tissues.  A curvilinear incision was made along the breast.  Bovie electrocautery was used to create a flap along the length of the wire 1 cm in thickness. The tissue was transected around the tip of the wire. Lastly the wire was brought through the incision with 2 hemostats. The  tissue was amputated at its base.  It was marked as listed above.  Specimen radiograph confirmed clip, wire, and abnormal breast tissue.  It was sent for fresh permanent specimen. Additional margins were taken along all borders. These were done with Allis clamps and approximately 1 cm in thickness. The area was irrigated and appeared hemostatic.  There were no signs of bleeding.      The rest of the local anesthetic was administered. The incision was closed with interrupted 3-0 Vicryl sutures and a running 4-0 Monocryl suture.  Skin glue was placed over the incision.  The patient tolerated the procedure well. All needle and lap counts were correct at the end of the case. The patient was then awoken from anesthesia and taken to recovery for further monitoring.       Dee Saunders MD   General and Endoscopic Surgery  [facility]     [address] [place] [address], [address]  [place], [place], [address]  P: [phone]  F: [phone]

## 2023-11-20 NOTE — TELEPHONE ENCOUNTER
"Pt has made her PO appt for 12/11/23.  She wanted to inform / ask a couple items:    1) stopped Tramadol Sat 11/18 and just taking Ibuprofen & Tylenol    2) the topical ointment she puts on chest (not breast area) and under arm has started a rash, burning, and redness.    Use something else?  D/C?    3) her final path states \"Active - In Process\" - she would like you to call her once available as she is anxious.  "

## 2023-11-21 ENCOUNTER — PATIENT OUTREACH (OUTPATIENT)
Dept: OTHER | Facility: HOSPITAL | Age: 78
End: 2023-11-21
Payer: MEDICARE

## 2023-11-21 NOTE — PROGRESS NOTES
Called MsRadha Benja to see how she was doing. She stated she is recovering from surgery well but is struggling with a rash on the area of the skin around the breast. Message sent to Dr. Saunders to update her. Otherwise, she has no needs at this time. She was thankful for the call and will reach out if any questions or needs arise.

## 2023-11-22 LAB
LAB AP CASE REPORT: NORMAL
LAB AP CLINICAL INFORMATION: NORMAL
LAB AP SPECIAL STAINS: NORMAL
LAB AP SYNOPTIC CHECKLIST: NORMAL
PATH REPORT.FINAL DX SPEC: NORMAL
PATH REPORT.GROSS SPEC: NORMAL

## 2023-11-27 ENCOUNTER — TELEPHONE (OUTPATIENT)
Dept: SURGERY | Facility: CLINIC | Age: 78
End: 2023-11-27
Payer: MEDICARE

## 2023-11-27 DIAGNOSIS — D05.10 DUCTAL CARCINOMA IN SITU (DCIS) OF BREAST, UNSPECIFIED LATERALITY: Primary | ICD-10-CM

## 2023-12-01 ENCOUNTER — PATIENT OUTREACH (OUTPATIENT)
Dept: OTHER | Facility: HOSPITAL | Age: 78
End: 2023-12-01
Payer: MEDICARE

## 2023-12-01 NOTE — PROGRESS NOTES
Ms. Yousif called with  questions about upcoming appointments. We discussed those and she was thankful for the help

## 2023-12-07 DIAGNOSIS — F41.9 ANXIETY: ICD-10-CM

## 2023-12-07 NOTE — TELEPHONE ENCOUNTER
Rx Refill Note  Requested Prescriptions     Pending Prescriptions Disp Refills    ALPRAZolam (XANAX) 0.5 MG tablet [Pharmacy Med Name: ALPRAZOLAM 0.5MG TABLETS] 45 tablet      Sig: TAKE 1 TABLET BY MOUTH DAILY AS NEEDED FOR ANXIETY      Last office visit with prescribing clinician: 9/5/2023   Last telemedicine visit with prescribing clinician: Visit date not found   Next office visit with prescribing clinician: 3/5/2024                         Would you like a call back once the refill request has been completed: [] Yes [] No    If the office needs to give you a call back, can they leave a voicemail: [] Yes [] No    Juancarlos Laguna MA  12/07/23, 12:44 EST

## 2023-12-08 ENCOUNTER — TELEPHONE (OUTPATIENT)
Dept: FAMILY MEDICINE CLINIC | Facility: CLINIC | Age: 78
End: 2023-12-08

## 2023-12-08 RX ORDER — ALPRAZOLAM 0.5 MG/1
0.5 TABLET ORAL DAILY PRN
Qty: 45 TABLET | Refills: 0 | Status: SHIPPED | OUTPATIENT
Start: 2023-12-08

## 2023-12-08 NOTE — TELEPHONE ENCOUNTER
Caller: Alexandria Yousif     Relationship: SELF      Best call back number:     [phone]       What is your medical concern?     PATIENT HAS RECENTLY BEEN DIAGNOSED WITH BREAST CANCER AND REALLY NEEDS HER MEDIATION MORE NOW THAN EVER BEFORE.    SHE DOESN'T WANT TO GO THROUGH THE WEEKEND WITHOUT  ANY MEDICATION AND IF YOU CAN EXPEDITE THIS SHE APPRECIATES IT.    ALPRAZolam (XANAX) 0.5 MG tablet THIS WAS REQUESTED IN A REFILL YESTERDAY.        PLEASE CALL AND ADVISE.

## 2023-12-11 ENCOUNTER — OFFICE VISIT (OUTPATIENT)
Dept: SURGERY | Facility: CLINIC | Age: 78
End: 2023-12-11
Payer: MEDICARE

## 2023-12-11 VITALS
HEIGHT: 65 IN | BODY MASS INDEX: 23.32 KG/M2 | DIASTOLIC BLOOD PRESSURE: 74 MMHG | WEIGHT: 140 LBS | SYSTOLIC BLOOD PRESSURE: 116 MMHG

## 2023-12-11 DIAGNOSIS — C50.919 MALIGNANT NEOPLASM OF FEMALE BREAST, UNSPECIFIED ESTROGEN RECEPTOR STATUS, UNSPECIFIED LATERALITY, UNSPECIFIED SITE OF BREAST: Primary | ICD-10-CM

## 2023-12-11 PROCEDURE — 99024 POSTOP FOLLOW-UP VISIT: CPT | Performed by: STUDENT IN AN ORGANIZED HEALTH CARE EDUCATION/TRAINING PROGRAM

## 2023-12-11 PROCEDURE — 1160F RVW MEDS BY RX/DR IN RCRD: CPT | Performed by: STUDENT IN AN ORGANIZED HEALTH CARE EDUCATION/TRAINING PROGRAM

## 2023-12-11 PROCEDURE — 1159F MED LIST DOCD IN RCRD: CPT | Performed by: STUDENT IN AN ORGANIZED HEALTH CARE EDUCATION/TRAINING PROGRAM

## 2023-12-11 NOTE — PROGRESS NOTES
General Surgery Breast Cancer History and Physical Exam     Summary:    Alexandria Yousif is a 78 y.o. 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      A multidisciplinary plan has been formulated for the patient:    (1) Breast Surgical Oncology:  -[employer] 9 panel genetic testing: negative.   -s/p right breast wire localized lumpectomy 11/2023.  -Annual mammogram due 8/2024.  -Follow up 9/2024.    (2) Medical Oncology:  -Appointment with Dr. Chapman 12/21/2023.     (3) Radiation Oncology:  -Appointment with Dr. Hood from 12/13/2023.     Referring Provider: No ref. provider found    Chief Complaint: abnormal breast imaging    History of Present Illness: Ms. Alexandria Yousif is a 78 y.o. year old lady, seen at the request of No ref. provider found for a new diagnosis of right breast cancer.      This was initially detected as an imaging abnormality. She has had annual mammograms each year. She denies any prior history of abnormal mammograms or breast biopsies. Her work-up is detailed in the oncologic history below.     She denies any breast lumps, pain, skin changes, or nipple discharge. She denies any family history of breast or ovarian cancer. Her mother had a hysterectomy for pre-cancerous cells.     12/11/2023 She presents today for follow up.  She has done well since surgery.  She did have a significant skin reaction after surgery but this is improving.  Her pain is controlled.    Workup of Current Diagnosis:    8/21/2023 Bilateral Screening Mammogram:  IMPRESSION:  1. Microcalcifications in the middle third upper outer quadrant of the right breast are noted. Further evaluation with spot magnification CC and 90 degree lateral images is recommended.  2. There are no findings suspicious for malignancy in the left breast.  BI-RADS CATEGORY 0    9/13/2023 Right Breast Diagnostic Mammogram:   The patient returned for diagnostic imaging  Reason for Call:  Other - Flu shot question    Detailed comments: Mom called and stated patient has a cough. She is wondering if she should still get the flu shot tomorrow. Please call back to discuss.    Phone Number Patient can be reached at: Home number on file [phone] (home)    Best Time: Anytime    Can we leave a detailed message on this number? YES    Call taken on 9/19/2017 at 11:53 AM by Bertha Montanez       with coned magnified views in both projections and R 2 and digital tomosynthesis. These more clearly delineate clustered microcalcifications which have a slightly pleomorphic configuration and are considered indeterminant. They appear to be new since an earlier mammogram dated 08/17/2022 and therefore further evaluation with stereotactic core biopsy is recommended.  CONCLUSION: Suspicious clustered microcalcifications in middle third of upper outer right breast and further evaluation with stereotactic core biopsy is recommended. These findings have been discussed with the  patient.   BI-RADS 4. Suspicious abnormality.    10/16/2023 Right Breast Stereotactic Biopsy:   PRE-PROCEDURAL CONSULTATION: Details of the procedure and possible limitations and complications were discussed with the patient. After addressing questions and concerns, written informed consent was obtained.    PROCEDURE: The calcifications in the outer middle right breast was/were localized and targeted under stereotactic/tomosynthesis guidance via a(n) lateral approach. The skin of the breast was cleansed and prepped.  1 ml of 1% lidocaine and 8 ml of 1% lidocaine with epinephrine were used for local anesthesia. An 8 gauge needle used with a vacuum assisted biopsy device was advanced into the breast and 6 core specimens were  obtained. Specimen radiography demonstrated the targeted calcifications. A bowtie clip was then deployed at the biopsy site. The patient tolerated the procedure well with no immediate complications.  Post-procedural digital mammographic views of the right breast demonstrate the bowtie clip at the expected location at the site of biopsy in the outer central middle right breast, where there is at least 1 adjacent residual calcification.   IMPRESSION:   1. Stereotactic/Tomosynthesis guided core needle biopsy of a group of calcifications at 9:00 in the middle right breast, marked with a bowtie biopsy clip. Pathology is malignant  and concordant with the imaging  assessment. Recommend surgical consultation.    10/16/2023 Pathology:   Final Diagnosis   1. Right Breast, 9:00, Stereotactic-Guided Core Needle Biopsy for Calcifications:  A. 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               B. Greatest contiguous extent of DCIS measures 10 mm.               C. See biomarker template.     10/23/2023 Bilateral Breast MRI   IMPRESSION:  1. Biopsy-proven site of DCIS in the right breast in the middle-third at the 9-o'clock position represented by the bowtie shaped metallic clip within a postbiopsy cavity with thin linear enhancement measuring up to 0.7 cm at the anterior, superior and posterior margins of the biopsy cavity. The entire region including the biopsy cavity and linear enhancement measures up to 2.4 cm in greatest dimension. No other suspicious findings are seen within the right breast and there is no  evidence for right axillary adenopathy.  2. There are no findings suspicious for malignancy in the left breast.  BI-RADS Category 6: Known biopsy-proven malignancy.    11/20/2023 right breast wire localized lumpectomy   Final Diagnosis   1. Right Breast, Needle-Localized Lumpectomy (25 G):               A. HIGH-GRADE DUCTAL CARCINOMA IN SITU (DCIS) WITH PARTIAL INVOLVEMENT OF A       RADIAL SCAR:                            1. Solid, comedo, and pagetoid types with associated necrosis and calcifications.                            2. Extent of DCIS: 29 mm (measured across slides 1F to 1M).                            3. Margins are negative for in situ carcinoma; closest distance: DCIS is present 0.4 mm       from the inferior margin (superseded by specimen #5).               B. Clip and biopsy site changes are present adjacent to in situ carcinoma.               C. See synoptic report (to include parts 2-7).     2. Right Breast,  Additional Superior Margin, Reexcision:               A. Breast parenchyma with no significant histopathologic changes       (additional 6 mm of tissue free of carcinoma).     3. Right Breast, Additional Anterior Margin, Reexcision:               A. Breast parenchyma with no significant histopathologic changes       (additional 10 mm of tissue free of carcinoma).     4. Right Breast, Additional Posterior Margin, Reexcision:               A. Fibroadipose tissue with no significant histopathologic changes       (additional 10 mm of tissue free of carcinoma).     5. Right Breast, Additional Inferior Margin, Reexcision:               A. Breast parenchyma with small radial scar, otherwise no significant histopathologic changes       (additional 14 mm of tissue free of carcinoma).     6. Right Breast, Additional Lateral Margin, Reexcision:               A. Breast parenchyma with no significant histopathologic changes       (additional 6 mm of tissue free of carcinoma).     7. Right Breast, Additional Medial Margin, Reexcision:               A. Breast parenchyma with no significant histopathologic changes        (additional 8 mm of tissue free of carcinoma).     Gynecologic History:   . P:1 AB:0  Age at first childbirth: 27  Lactation/How long: none  Age at menarche: 16  Age at menopause: 49  Total years of oral contraceptive use: 6 years previously  Total years of hormone replacement therapy: on premarin, just stopped     Past Medical History:   HTN  DM  CAD, Dr. Lynch   HLD    Past Surgical History:    Colonoscopy  Hysterectomy  Knee surgery  Tonsillectomy and adenoidectomy  Trigger point injection    Family History:    As above    Social History:  Denies tobacco use  Occasional alcohol use    Allergies:   Allergies   Allergen Reactions    Codeine Nausea Only     BLURRED VISION, RASH     Medications:     Current Outpatient Medications:     acetaminophen (TYLENOL) 500 MG tablet, Take 2 tablets by mouth Every 6 (Six)  Hours As Needed for Mild Pain., Disp: , Rfl:     albuterol sulfate  (90 Base) MCG/ACT inhaler, INHALE 2 PUFFS BY MOUTH INTO THE LUNGS EVERY 4 HOURS AS NEEDED FOR WHEEZING, Disp: 8.5 g, Rfl: 0    ALPRAZolam (XANAX) 0.5 MG tablet, TAKE 1 TABLET BY MOUTH DAILY AS NEEDED FOR ANXIETY, Disp: 45 tablet, Rfl: 0    Alum Hydroxide-Mag Trisilicate (GAVISCON) 80-14.2 MG chewable tablet, Chew 1 tablet 4 (Four) Times a Day As Needed (Heartburn)., Disp: 224 each, Rfl: 11    aspirin 81 MG tablet, Take 1 tablet by mouth Daily., Disp: , Rfl:     Biotin 10 MG tablet, HOLD PRIOR TO SURG, Disp: , Rfl:     buPROPion (WELLBUTRIN) 75 MG tablet, TAKE 1 TABLET BY MOUTH DAILY (Patient taking differently: Take 1 tablet by mouth Daily. ONLY TAKES IN WINTER MONTHS), Disp: 30 tablet, Rfl: 2    Cholecalciferol 50 MCG (2000 UT) capsule, HOLD PRIOR TO SURG, Disp: , Rfl:     cyclobenzaprine (FLEXERIL) 5 MG tablet, Take 1 tablet by mouth At Night As Needed for Muscle Spasms., Disp: , Rfl:     desvenlafaxine (PRISTIQ) 100 MG 24 hr tablet, TAKE 1 TABLET BY MOUTH DAILY, Disp: 90 tablet, Rfl: 1    fenofibrate (TRICOR) 145 MG tablet, TAKE 1 TABLET BY MOUTH EVERY DAY, Disp: 90 tablet, Rfl: 1    levETIRAcetam (KEPPRA) 500 MG tablet, TAKE 1/2 TABLET BY MOUTH DAILY AS NEEDED FOR HEADACHE, Disp: 45 tablet, Rfl: 1    metFORMIN ER (GLUCOPHAGE-XR) 500 MG 24 hr tablet, TAKE 1 TABLET BY MOUTH DAILY WITH LARGEST MEAL OF THE DAY AND A GLASS OF WATER (Patient taking differently: 1 tablet Every Evening. TAKE 1 TABLET BY MOUTH DAILY WITH LARGEST MEAL OF THE DAY AND A GLASS OF WATER), Disp: 90 tablet, Rfl: 01    metoprolol tartrate (LOPRESSOR) 25 MG tablet, Take 1 tablet by mouth 2 (Two) Times a Day., Disp: 180 tablet, Rfl: 3    Omega-3 Fatty Acids (FISH OIL) 1200 MG capsule capsule, HOLD PRIOR TO SURG, Disp: , Rfl:     omeprazole (priLOSEC) 40 MG capsule, TAKE 1 CAPSULE BY MOUTH EVERY DAY, Disp: 90 capsule, Rfl: 0    rosuvastatin (CRESTOR) 10 MG tablet, TAKE 1  TABLET BY MOUTH EVERY NIGHT, Disp: 90 tablet, Rfl: 1    spironolactone (ALDACTONE) 25 MG tablet, TAKE 1 TABLET BY MOUTH DAILY, Disp: 30 tablet, Rfl: 4    traZODone (DESYREL) 50 MG tablet, Take 0.5-1 tablets by mouth At Night As Needed for Sleep. for sleep, Disp: 90 tablet, Rfl: 1    valACYclovir (VALTREX) 500 MG tablet, Take 2 tablets by mouth 2 (Two) Times a Day., Disp: 4 tablet, Rfl: 3    fluticasone (FLONASE) 50 MCG/ACT nasal spray, 2 sprays into the nostril(s) as directed by provider Daily for 30 days., Disp: 16 g, Rfl: 0    traMADol (Ultram) 50 MG tablet, Take 1 tablet by mouth Every 6 (Six) Hours As Needed for Moderate Pain., Disp: 8 tablet, Rfl: 0    tretinoin (RETIN-A) 0.025 % cream, Apply 1 application  topically to the appropriate area as directed Every Night. (Patient not taking: Reported on 12/11/2023), Disp: , Rfl:   No current facility-administered medications for this visit.    Facility-Administered Medications Ordered in Other Visits:     lidocaine (XYLOCAINE) 1 % injection 3 mL, 3 mL, Intradermal, Once, Dee Saunders MD    Laboratory Values:    Labs from 11/9/2023 reviewed    Review of Systems:   Influenza-like illness: no fever, no  cough, no  sore throat, no  body aches, no loss of sense of taste or smell, no known exposure to person with Covid-19.  Constitutional: Negative for fevers or chills  HENT: Negative for hearing loss or runny nose  Eyes: Negative for vision changes or scleral icterus  Respiratory: Negative for cough or shortness of breath  Cardiovascular: Negative for chest pain or heart palpitations  Gastrointestinal: Negative for abdominal pain, nausea, vomiting, constipation, melena, or hematochezia  Genitourinary: Negative for hematuria or dysuria  Musculoskeletal: Negative for joint swelling or gait instability  Neurologic: Negative for tremors or seizures  Psychiatric: Negative for suicidal ideations or depression  All other systems reviewed and negative    Physical Exam:    ECO - Asymptomatic  Constitutional: Well-developed well-nourished, no acute distress  Eyes: Conjunctiva normal, sclera nonicteric  ENMT: Hearing grossly normal, oral mucosa moist  Neck: Supple, no palpable mass, trachea midline  Respiratory: Clear to auscultation, normal inspiratory effort  Cardiovascular: Regular rate, no peripheral edema, no jugular venous distention  Breast: symmetric  Right: No visible abnormalities on inspection while seated, with arms raised or hands on hips. No masses, skin changes, or nipple abnormalities. Right breast incision clean and dry with no erythema or drainage, no hematoma or seroma  Left: No visible abnormalities on inspection while seated, with arms raised or hands on hips. No masses, skin changes, or nipple abnormalities.  No clinical chest wall involvement.  Gastrointestinal: Soft, nontender  Lymphatics (palpable nodes): No cervical, supraclavicular or axillary lymphadenopathy  Skin:  Warm, dry, no rash on visualized skin surfaces  Musculoskeletal: Symmetric strength, normal gait  Psychiatric: Alert and oriented ×3, normal affect     KEAGAN MURILLO M.D.  General and Endoscopic Surgery  [facility]    [address] [place] [address], [address]  [place], [place], [address]  P: [phone]  F: [phone]

## 2023-12-12 ENCOUNTER — TELEPHONE (OUTPATIENT)
Dept: RADIATION ONCOLOGY | Facility: HOSPITAL | Age: 78
End: 2023-12-12
Payer: MEDICARE

## 2023-12-12 NOTE — PROGRESS NOTES
[facility] Radiation Oncology   Consult    Chief Complaint  DCIS of the Right Breast      Diagnosis: DCIS of the Right Breast    Overall Stage 0    pTis: per Lumpectomy Pathology  cN0: per MRI Breast and Physical Exam  cM0: per Physical Exam      Pacemaker: no  Prior History of Radiation: no  Contraindications to Radiation: no  Patient Requires Pregnancy Test: No, patient is female and >55 years and/or has undergone hysterectomy      HPI:    Alexandria Yousif is a 78 y.o. female with a history of anxiety, vertigo, depression, GERD, polycythemia, DM 2, CAD with a screening detected right upper outer quadrant DCIS.  Biopsy pathology showed intermediate to high-grade DCIS, solid and cribriform subtypes, associated necrosis, ER/IN positive.  The patient underwent lumpectomy which demonstrated high-grade DCIS measuring 2.9 cm with negative margins, closest superiorly 7.5 mm.  The patient has been referred for consideration of adjuvant radiation therapy.  She has also been referred to meet with Dr. Chapman with medical oncology to discuss endocrine therapy on 12/21/2023.      Imaging:      Screening Mammogram 8/21/2023    IMPRESSION:  1. Microcalcifications in the middle third upper outer quadrant of the  right breast are noted. Further evaluation with spot magnification CC  and 90 degree lateral images is recommended.     2. There are no findings suspicious for malignancy in the left breast.     BI-RADS CATEGORY 0: Incomplete. Needs additional imaging evaluation.      Diagnostic Mammogram 9/13/2023    CONCLUSION: Suspicious clustered microcalcifications in middle third of  upper outer right breast and further evaluation with stereotactic core  biopsy is recommended. These findings have been discussed with the  patient. BI-RADS 4. Suspicious abnormality.      MRI Breast 10/23/2023    IMPRESSION:  1. Biopsy-proven site of DCIS in the right breast in the middle-third at  the 9-o'clock position represented by the bowtie shaped  metallic clip  within a postbiopsy cavity with thin linear enhancement measuring up to  0.7 cm at the anterior, superior and posterior margins of the biopsy  cavity. The entire region including the biopsy cavity and linear  enhancement measures up to 2.4 cm in greatest dimension. No other  suspicious findings are seen within the right breast and there is no  evidence for right axillary adenopathy.  2. There are no findings suspicious for malignancy in the left breast.      Pathology:      Right Breast 9:00 Biopsy Pathology 10/16/2023    Final Diagnosis   1. Right Breast, 9:00, Stereotactic-Guided Core Needle Biopsy for Calcifications:  A. 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               B. Greatest contiguous extent of DCIS measures 10 mm.               C. See biomarker template.   ER/SC+      Lumpectomy Pathology 11/16/2023    Final Diagnosis   1. Right Breast, Needle-Localized Lumpectomy (25 G):               A. HIGH-GRADE DUCTAL CARCINOMA IN SITU (DCIS) WITH PARTIAL INVOLVEMENT OF A       RADIAL SCAR:                            1. Solid, comedo, and pagetoid types with associated necrosis and calcifications.                            2. Extent of DCIS: 29 mm (measured across slides 1F to 1M).                            3. Margins are negative for in situ carcinoma; closest distance: DCIS is present 0.4 mm       from the inferior margin (superseded by specimen #5).               B. Clip and biopsy site changes are present adjacent to in situ carcinoma.               C. See synoptic report (to include parts 2-7).     2. Right Breast, Additional Superior Margin, Reexcision:               A. Breast parenchyma with no significant histopathologic changes       (additional 6 mm of tissue free of carcinoma).     3. Right Breast, Additional Anterior Margin, Reexcision:               A. Breast parenchyma with  "no significant histopathologic changes       (additional 10 mm of tissue free of carcinoma).     4. Right Breast, Additional Posterior Margin, Reexcision:               A. Fibroadipose tissue with no significant histopathologic changes       (additional 10 mm of tissue free of carcinoma).     5. Right Breast, Additional Inferior Margin, Reexcision:               A. Breast parenchyma with small radial scar, otherwise no significant histopathologic changes       (additional 14 mm of tissue free of carcinoma).     6. Right Breast, Additional Lateral Margin, Reexcision:               A. Breast parenchyma with no significant histopathologic changes       (additional 6 mm of tissue free of carcinoma).     7. Right Breast, Additional Medial Margin, Reexcision:               A. Breast parenchyma with no significant histopathologic changes        (additional 8 mm of tissue free of carcinoma).     SPECIMEN   Procedure  Excision (less than total mastectomy)   Specimen Laterality  Right   TUMOR   Tumor Site  Clock position     9 o'clock   Histologic Type  Ductal carcinoma in situ   Size (Extent) of DCIS  Estimated size (extent) of DCIS is at least (Millimeters): 29 mm   Architectural Patterns  [place]     Solid     Pagetoid   Nuclear Grade  Grade III (high)   Necrosis  Present, central (expansive \"comedo\" necrosis)   Microcalcifications  Present in DCIS   MARGINS   Margin Status  All margins negative for DCIS   Distance from DCIS to Closest Margin  7.5 mm   Closest Margin(s) to DCIS  Superior   Distance from DCIS to Anterior Margin  12 mm   Distance from DCIS to Posterior Margin  13 mm   Distance from DCIS to Superior Margin  7.5 mm   Distance from DCIS to Inferior Margin  14.4 mm   Distance from DCIS to Medial Margin  18 mm   Distance from DCIS to Lateral Margin  21 mm   REGIONAL LYMPH NODES   Regional Lymph Node Status  Not applicable (no regional lymph nodes submitted or found)   PATHOLOGIC STAGE CLASSIFICATION (pTNM, AJCC " 8th Edition)   Reporting of pT, pN, and (when applicable) pM categories is based on information available to the pathologist at the time the report is issued. As per the AJCC (Chapter 1, 8th Ed.) it is the managing physician’s responsibility to establish the final pathologic stage based upon all pertinent information, including but potentially not limited to this pathology report.   pT Category  pTis (DCIS)   pN Category  pN not assigned (no nodes submitted or found)   Breast Biomarker Testing Performed on Previous Biopsy     Estrogen Receptor (ER) Status  Positive   Percentage of Cells with Nuclear Positivity  %   Breast Biomarker Testing Performed on Previous Biopsy     Progesterone Receptor (PgR) Status  Positive   Percentage of Cells with Nuclear Positivity  %       Labs:    Lab Results   Component Value Date    CREATININE 0.66 11/09/2023             Problem List:  Patient Active Problem List   Diagnosis    Hypercalcemia    Mixed hyperlipidemia    Abnormal brain MRI    Vertigo    Anxiety disorder due to general medical condition    Depression    Gastroesophageal reflux disease    Impaired fasting glucose    Insomnia    Numbness of lower extremity    Osteopenia    PVCs (premature ventricular contractions)    PSVT (paroxysmal supraventricular tachycardia)    PAT (paroxysmal atrial tachycardia)    Polycythemia    Pulmonary nodule, right    Hyperalbuminemia    Type 2 diabetes mellitus without complication, without long-term current use of insulin    Coronary artery calcification    RBBB    Spinal stenosis of lumbar region without neurogenic claudication    Malignant neoplasm of female breast          Medications:  Current Outpatient Medications on File Prior to Visit   Medication Sig Dispense Refill    acetaminophen (TYLENOL) 500 MG tablet Take 2 tablets by mouth Every 6 (Six) Hours As Needed for Mild Pain.      albuterol sulfate  (90 Base) MCG/ACT inhaler INHALE 2 PUFFS BY MOUTH INTO THE LUNGS  EVERY 4 HOURS AS NEEDED FOR WHEEZING 8.5 g 0    ALPRAZolam (XANAX) 0.5 MG tablet TAKE 1 TABLET BY MOUTH DAILY AS NEEDED FOR ANXIETY 45 tablet 0    Alum Hydroxide-Mag Trisilicate (GAVISCON) 80-14.2 MG chewable tablet Chew 1 tablet 4 (Four) Times a Day As Needed (Heartburn). 224 each 11    aspirin 81 MG tablet Take 1 tablet by mouth Daily.      Biotin 10 MG tablet HOLD PRIOR TO SURG      buPROPion (WELLBUTRIN) 75 MG tablet TAKE 1 TABLET BY MOUTH DAILY (Patient taking differently: Take 1 tablet by mouth Daily. ONLY TAKES IN WINTER MONTHS) 30 tablet 2    Cholecalciferol 50 MCG (2000 UT) capsule HOLD PRIOR TO SURG      cyclobenzaprine (FLEXERIL) 5 MG tablet Take 1 tablet by mouth At Night As Needed for Muscle Spasms.      desvenlafaxine (PRISTIQ) 100 MG 24 hr tablet TAKE 1 TABLET BY MOUTH DAILY 90 tablet 1    fenofibrate (TRICOR) 145 MG tablet TAKE 1 TABLET BY MOUTH EVERY DAY 90 tablet 1    fluticasone (FLONASE) 50 MCG/ACT nasal spray 2 sprays into the nostril(s) as directed by provider Daily for 30 days. 16 g 0    levETIRAcetam (KEPPRA) 500 MG tablet TAKE 1/2 TABLET BY MOUTH DAILY AS NEEDED FOR HEADACHE 45 tablet 1    metFORMIN ER (GLUCOPHAGE-XR) 500 MG 24 hr tablet TAKE 1 TABLET BY MOUTH DAILY WITH LARGEST MEAL OF THE DAY AND A GLASS OF WATER (Patient taking differently: 1 tablet Every Evening. TAKE 1 TABLET BY MOUTH DAILY WITH LARGEST MEAL OF THE DAY AND A GLASS OF WATER) 90 tablet 01    metoprolol tartrate (LOPRESSOR) 25 MG tablet Take 1 tablet by mouth 2 (Two) Times a Day. 180 tablet 3    Omega-3 Fatty Acids (FISH OIL) 1200 MG capsule capsule HOLD PRIOR TO SURG      omeprazole (priLOSEC) 40 MG capsule TAKE 1 CAPSULE BY MOUTH EVERY DAY 90 capsule 0    rosuvastatin (CRESTOR) 10 MG tablet TAKE 1 TABLET BY MOUTH EVERY NIGHT 90 tablet 1    spironolactone (ALDACTONE) 25 MG tablet TAKE 1 TABLET BY MOUTH DAILY 30 tablet 4    traMADol (Ultram) 50 MG tablet Take 1 tablet by mouth Every 6 (Six) Hours As Needed for Moderate  "Pain. 8 tablet 0    traZODone (DESYREL) 50 MG tablet Take 0.5-1 tablets by mouth At Night As Needed for Sleep. for sleep 90 tablet 1    tretinoin (RETIN-A) 0.025 % cream Apply 1 application  topically to the appropriate area as directed Every Night. (Patient not taking: Reported on 12/11/2023)      valACYclovir (VALTREX) 500 MG tablet Take 2 tablets by mouth 2 (Two) Times a Day. 4 tablet 3     Current Facility-Administered Medications on File Prior to Visit   Medication Dose Route Frequency Provider Last Rate Last Admin    lidocaine (XYLOCAINE) 1 % injection 3 mL  3 mL Intradermal Once Dee Saunders MD              Allergies:  Allergies   Allergen Reactions    Codeine Nausea Only     BLURRED VISION, RASH         Family History:  The patient has no family history of conditions which would be contraindications to radiation therapy      Social History:  Never Smoker    Distance From Clinic: <30 minutes    Patient has someone who can assist with transportation: yes      Review of Systems:    Review of Systems   Constitutional:  Positive for diaphoresis and fatigue.   HENT:  Positive for hearing loss and tinnitus.    Respiratory:  Positive for choking and shortness of breath.    Cardiovascular:  Positive for palpitations.   Musculoskeletal:  Positive for arthralgias, back pain, myalgias, neck pain and neck stiffness.   Neurological:  Positive for dizziness, light-headedness and headaches.   Hematological:  Bruises/bleeds easily.   Psychiatric/Behavioral:  Positive for decreased concentration and sleep disturbance. The patient is nervous/anxious.        Vital Signs:  /71   Pulse 66   Wt 63.3 kg (139 lb 9.6 oz)   SpO2 96%   BMI 23.23 kg/m²   Estimated body mass index is 23.23 kg/m² as calculated from the following:    Height as of 12/11/23: 165.1 cm (65\").    Weight as of this encounter: 63.3 kg (139 lb 9.6 oz).  Pain Score    12/13/23 1347   PainSc:   4   PainLoc: Breast  Comment: Right         ECOG: " Restricted in physically strenuous activity but ambulatory and able to carry out work of a light or sedentary nature, e.g., light house work, office work = 1    Physical Exam       Result Review :  The following data was reviewed by: Rashi Hood MD on 12/13/2023:  Labs: Last Creatinine   Data reviewed : Radiologic studies Mammogram, MRI Breast             Diagnoses and all orders for this visit:    1. Ductal carcinoma in situ (DCIS) of right breast (Primary)  -     Ambulatory Referral to Behavioral Health        Assessment:    Alexandria Yousif is a 78 y.o. female with a history of anxiety, vertigo, depression, GERD, polycythemia, DM 2, CAD with a screening detected right upper outer quadrant DCIS.  Biopsy pathology showed intermediate to high-grade DCIS, solid and cribriform subtypes, associated necrosis, ER/SD positive.  The patient underwent lumpectomy which demonstrated high-grade DCIS measuring 2.9 cm with negative margins, closest superiorly 7.5 mm.  The patient has been referred for consideration of adjuvant radiation therapy.  She has also been referred to meet with Dr. Chapman with medical oncology to discuss endocrine therapy on 12/21/2023.    I met with the patient and discussed her workup and treatment to date in detail.  I discussed the risk, benefits, side effects, logistics of adjuvant radiation therapy for high-grade DCIS.  I did not recommend partial breast irradiation due to the high-grade nature of the disease and that it is 2.9 cm in size.  I discussed whole breast radiation therapy with a boost.  I answered all the patient's questions to her satisfaction.  At the end of our conversation the patient wished to proceed with radiation therapy.  Consents were signed.  Plan for CT simulation in approximately 2 weeks when she has had more time to heal from her surgery.    The patient expressed significant anxiety and depression issues some of which has been longstanding but has been exacerbated by a  recent diagnosis.  We discussed her feelings in detail and ultimately recommended behavioral health referral.  The patient was amenable to this.    Plan:    -Plan for adjuvant radiation therapy for high-grade DCIS of the right breast.  CT simulation 12/28/2023.  -Referral to behavioral health due to patient's significant anxiety and depression regarding her diagnosis       I spent 60 minutes caring for Alexandria on this date of service. This time includes time spent by me in the following activities:preparing for the visit, reviewing tests, obtaining and/or reviewing a separately obtained history, documenting information in the medical record, independently interpreting results and communicating that information with the patient/family/caregiver, and care coordination  Follow Up   No follow-ups on file.  Patient was given instructions and counseling regarding her condition or for health maintenance advice. Please see specific information pulled into the AVS if appropriate.     Rashi Hood MD

## 2023-12-13 ENCOUNTER — CONSULT (OUTPATIENT)
Dept: RADIATION ONCOLOGY | Facility: HOSPITAL | Age: 78
End: 2023-12-13
Payer: MEDICARE

## 2023-12-13 VITALS
HEART RATE: 66 BPM | BODY MASS INDEX: 23.23 KG/M2 | OXYGEN SATURATION: 96 % | DIASTOLIC BLOOD PRESSURE: 71 MMHG | SYSTOLIC BLOOD PRESSURE: 118 MMHG | WEIGHT: 139.6 LBS

## 2023-12-13 DIAGNOSIS — D05.11 DUCTAL CARCINOMA IN SITU (DCIS) OF RIGHT BREAST: Primary | ICD-10-CM

## 2023-12-13 PROCEDURE — G0463 HOSPITAL OUTPT CLINIC VISIT: HCPCS

## 2023-12-19 DIAGNOSIS — C50.919 MALIGNANT NEOPLASM OF FEMALE BREAST, UNSPECIFIED ESTROGEN RECEPTOR STATUS, UNSPECIFIED LATERALITY, UNSPECIFIED SITE OF BREAST: Primary | ICD-10-CM

## 2023-12-21 ENCOUNTER — OFFICE VISIT (OUTPATIENT)
Dept: PSYCHIATRY | Facility: HOSPITAL | Age: 78
End: 2023-12-21
Payer: MEDICARE

## 2023-12-21 ENCOUNTER — LAB (OUTPATIENT)
Dept: LAB | Facility: HOSPITAL | Age: 78
End: 2023-12-21
Payer: MEDICARE

## 2023-12-21 ENCOUNTER — CONSULT (OUTPATIENT)
Dept: ONCOLOGY | Facility: CLINIC | Age: 78
End: 2023-12-21
Payer: MEDICARE

## 2023-12-21 VITALS
WEIGHT: 142.6 LBS | SYSTOLIC BLOOD PRESSURE: 138 MMHG | OXYGEN SATURATION: 97 % | HEIGHT: 65 IN | HEART RATE: 60 BPM | BODY MASS INDEX: 23.76 KG/M2 | DIASTOLIC BLOOD PRESSURE: 85 MMHG | TEMPERATURE: 98 F

## 2023-12-21 DIAGNOSIS — C50.919 MALIGNANT NEOPLASM OF FEMALE BREAST, UNSPECIFIED ESTROGEN RECEPTOR STATUS, UNSPECIFIED LATERALITY, UNSPECIFIED SITE OF BREAST: ICD-10-CM

## 2023-12-21 DIAGNOSIS — F41.1 GENERALIZED ANXIETY DISORDER: ICD-10-CM

## 2023-12-21 DIAGNOSIS — C50.919 MALIGNANT NEOPLASM OF FEMALE BREAST, UNSPECIFIED ESTROGEN RECEPTOR STATUS, UNSPECIFIED LATERALITY, UNSPECIFIED SITE OF BREAST: Primary | ICD-10-CM

## 2023-12-21 DIAGNOSIS — F33.1 MODERATE EPISODE OF RECURRENT MAJOR DEPRESSIVE DISORDER: Primary | ICD-10-CM

## 2023-12-21 LAB
BASOPHILS # BLD AUTO: 0.09 10*3/MM3 (ref 0–0.2)
BASOPHILS NFR BLD AUTO: 1.5 % (ref 0–1.5)
DEPRECATED RDW RBC AUTO: 42.7 FL (ref 37–54)
EOSINOPHIL # BLD AUTO: 0.21 10*3/MM3 (ref 0–0.4)
EOSINOPHIL NFR BLD AUTO: 3.4 % (ref 0.3–6.2)
ERYTHROCYTE [DISTWIDTH] IN BLOOD BY AUTOMATED COUNT: 13.2 % (ref 12.3–15.4)
HCT VFR BLD AUTO: 42.3 % (ref 34–46.6)
HGB BLD-MCNC: 14.5 G/DL (ref 12–15.9)
IMM GRANULOCYTES # BLD AUTO: 0.24 10*3/MM3 (ref 0–0.05)
IMM GRANULOCYTES NFR BLD AUTO: 3.9 % (ref 0–0.5)
LYMPHOCYTES # BLD AUTO: 1.67 10*3/MM3 (ref 0.7–3.1)
LYMPHOCYTES NFR BLD AUTO: 27.4 % (ref 19.6–45.3)
MCH RBC QN AUTO: 30.5 PG (ref 26.6–33)
MCHC RBC AUTO-ENTMCNC: 34.3 G/DL (ref 31.5–35.7)
MCV RBC AUTO: 88.9 FL (ref 79–97)
MONOCYTES # BLD AUTO: 0.67 10*3/MM3 (ref 0.1–0.9)
MONOCYTES NFR BLD AUTO: 11 % (ref 5–12)
NEUTROPHILS NFR BLD AUTO: 3.21 10*3/MM3 (ref 1.7–7)
NEUTROPHILS NFR BLD AUTO: 52.8 % (ref 42.7–76)
NRBC BLD AUTO-RTO: 0.3 /100 WBC (ref 0–0.2)
PLATELET # BLD AUTO: 275 10*3/MM3 (ref 140–450)
PMV BLD AUTO: 9.5 FL (ref 6–12)
RBC # BLD AUTO: 4.76 10*6/MM3 (ref 3.77–5.28)
WBC NRBC COR # BLD AUTO: 6.09 10*3/MM3 (ref 3.4–10.8)

## 2023-12-21 PROCEDURE — 36415 COLL VENOUS BLD VENIPUNCTURE: CPT

## 2023-12-21 PROCEDURE — 85025 COMPLETE CBC W/AUTO DIFF WBC: CPT

## 2023-12-21 RX ORDER — RALOXIFENE HYDROCHLORIDE 60 MG/1
60 TABLET, FILM COATED ORAL DAILY
Qty: 30 TABLET | Refills: 5 | Status: SHIPPED | OUTPATIENT
Start: 2023-12-21

## 2023-12-21 RX ORDER — GABAPENTIN 100 MG/1
CAPSULE ORAL
Qty: 90 CAPSULE | Refills: 0 | Status: SHIPPED | OUTPATIENT
Start: 2023-12-21

## 2023-12-21 NOTE — PROGRESS NOTES
Subjective     REASON FOR CONSULTATION: Pathologic Tis NX right breast DCIS 9 o'clock position, 29 mm DCIS grade 3 with comedonecrosis margins negative for DCIS this post right lumpectomy for 16 2023:  Provide an opinion on any further workup or treatment                             REQUESTING PHYSICIAN: Dr. Whitney Saunders    RECORDS OBTAINED:  Records of the patients history including those obtained from the referring provider were reviewed and summarized in detail.    HISTORY OF PRESENT ILLNESS:  The patient is a 78 y.o. year old female who is here for an opinion about the above issue.    History of Present Illness     Patient is a 78-year-old female who was referred here by Dr. Saunders for new diagnosis of right breast cancer.  She had a abnormal screening mammogram.  She did not really feel a breast mass or lump.    There is no family history of breast or ovarian cancer.  Her mother had apparently precancerous cells at hysterectomy.    Details are as follows    August 21, 2023: Bilateral screening mammogram  8/21/2023 Bilateral Screening Mammogram:  IMPRESSION:  1. Microcalcifications in the middle third upper outer quadrant of the right breast are noted. Further evaluation with spot magnification CC and 90 degree lateral images is recommended.  2. There are no findings suspicious for malignancy in the left breast.  BI-RADS CATEGORY 0     9/13/2023 Right Breast Diagnostic Mammogram:   The patient returned for diagnostic imaging with coned magnified views in both projections and R 2 and digital tomosynthesis. These more clearly delineate clustered microcalcifications which have a slightly pleomorphic configuration and are considered indeterminant. They appear to be new since an earlier mammogram dated 08/17/2022 and therefore further evaluation with stereotactic core biopsy is recommended.  CONCLUSION: Suspicious clustered microcalcifications in middle third of upper outer right breast and further evaluation  with stereotactic core biopsy is recommended. These findings have been discussed with the  patient.   BI-RADS 4. Suspicious abnormality.     10/16/2023 Right Breast Stereotactic Biopsy:      10/16/2023 Pathology:   Final Diagnosis   1. Right Breast, 9:00, Stereotactic-Guided Core Needle Biopsy for Calcifications:  A. 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               B. Greatest contiguous extent of DCIS measures 10 mm.               C. See biomarker template.      11/1/2023 Bilateral Breast MRI    FINDINGS: Scattered fibroglandular tissue is seen throughout both  breasts. Mild background parenchymal enhancement of both breasts is  noted.     In the middle-third of the right breast at the 9-o'clock position there  is a postbiopsy cavity that measures on the order of 2.1 cm in greatest  dimension. An internal focal signal void is seen within the cavity. This  corresponds to the bowtie shaped metallic clip at the biopsy-proven site  of malignancy. There is linear enhancement seen at the anterior,  superior and posterior margin of the cavity. The anterior linear  enhancement measures up to 0.5 cm in length. The superior linear  enhancement measures up to 0.7 cm in length and the posterior linear  enhancement measures up to 0.8 cm in length. Including the cavity the  entire region measures on the order of 2.4 cm in anterior posterior  dimension, 1.8 cm in the superior inferior dimension and 1.8 cm in the  medial lateral dimension.     No other suspicious areas of enhancement or morphology are seen in the  right breast. I see no evidence for abnormal right breast skin, nipple  or chest wall enhancement and there is no evidence for right axillary or  internal mammary chain adenopathy.     There are no areas of suspicious enhancement or morphology in the left  breast. I see no evidence for abnormal left breast  "skin, nipple or chest  wall enhancement and there is no evidence for left axillary or internal  mammary chain adenopathy.     IMPRESSION:  1. Biopsy-proven site of DCIS in the right breast in the middle-third at  the 9-o'clock position represented by the bowtie shaped metallic clip  within a postbiopsy cavity with thin linear enhancement measuring up to  0.7 cm at the anterior, superior and posterior margins of the biopsy  cavity. The entire region including the biopsy cavity and linear  enhancement measures up to 2.4 cm in greatest dimension. No other  suspicious findings are seen within the right breast and there is no  evidence for right axillary adenopathy.  2. There are no findings suspicious for malignancy in the left breast.     BI-RADS Category 6: Known biopsy-proven malignancy.       November 16, 2023: Right breast wire localized lumpectomy    Synoptic Checklist   DCIS OF THE BREAST: Resection   8th Edition - Protocol posted: 3/23/2022DCIS OF THE BREAST: RESECTION - All Specimens  SPECIMEN   Procedure  Excision (less than total mastectomy)   Specimen Laterality  Right   TUMOR   Tumor Site  Clock position     9 o'clock   Histologic Type  Ductal carcinoma in situ   Size (Extent) of DCIS  Estimated size (extent) of DCIS is at least (Millimeters): 29 mm   Architectural Patterns  [place]     Solid     Pagetoid   Nuclear Grade  Grade III (high)   Necrosis  Present, central (expansive \"comedo\" necrosis)   Microcalcifications  Present in DCIS   MARGINS   Margin Status  All margins negative for DCIS   Distance from DCIS to Closest Margin  7.5 mm   Closest Margin(s) to DCIS  Superior   Distance from DCIS to Anterior Margin  12 mm   Distance from DCIS to Posterior Margin  13 mm   Distance from DCIS to Superior Margin  7.5 mm   Distance from DCIS to Inferior Margin  14.4 mm   Distance from DCIS to Medial Margin  18 mm   Distance from DCIS to Lateral Margin  21 mm   REGIONAL LYMPH NODES   Regional Lymph Node Status  Not " applicable (no regional lymph nodes submitted or found)   PATHOLOGIC STAGE CLASSIFICATION (pTNM, AJCC 8th Edition)   Reporting of pT, pN, and (when applicable) pM categories is based on information available to the pathologist at the time the report is issued. As per the AJCC (Chapter 1, 8th Ed.) it is the managing physician’s responsibility to establish the final pathologic stage based upon all pertinent information, including but potentially not limited to this pathology report.   pT Category  pTis (DCIS)   pN Category  pN not assigned (no nodes submitted or found)   Breast Biomarker Testing Performed on Previous Biopsy     Estrogen Receptor (ER) Status  Positive   Percentage of Cells with Nuclear Positivity  %   Breast Biomarker Testing Performed on Previous Biopsy     Progesterone Receptor (PgR) Status  Positive   Percentage of Cells with Nuclear Positivity  %          Patient has been referred here for evaluation for endocrine therapy      Past Medical History:   Diagnosis Date    Abnormal ECG 2019    DR BLAND    Allergic 1980    Anxiety and depression     Arthritis of back ?    Arthritis of neck ?    Basal cell carcinoma 03/2008    Scalp and face    Cervical herniated disc     CHF (congestive heart failure) 2019    Colon polyp 02/2020    Coronary artery disease 2019    CVA (cerebral vascular accident) 2018    Mild to moderat ischemia    GERD (gastroesophageal reflux disease)     Grade II diastolic dysfunction     Headache     Heart murmur     Hypercalcemia 03/2016    mild at best when corrected for ongoing abnormal albumin levels with normal albumin being up to 4.7 g/dL though that is an issue with reference range here    Hyperlipidemia     Hypertension     Injury of neck 1971    Hernieated disc when broke my back    Low back pain 1971    Broken back    Lumbosacral disc disease 1971    Multiple fractures/herniated disc/accident    LVH (left ventricular hypertrophy) 2019    Malignant neoplasm of  female breast 10/23/2023    Neck strain 1971    OA (osteoarthritis)     Osteopenia     PAT (paroxysmal atrial tachycardia)     12 very brief runs of atrial tachycardia with the longest 7 beats in duration per 24-hour Holter monitor in June 2019    Pneumonia 08/2023 8/2023    Postoperative wound infection     7/2023 FROM FOOT INJURY    PSVT (paroxysmal supraventricular tachycardia)     5 episodes of PSVT the longest 11 beats in duration per Zio patch monitor in December 2019    PVCs (premature ventricular contractions)     5% burden per Zio patch monitor in December 2019    Rheumatic fever     Age 17    Rotator cuff syndrome 1990s    Shortness of breath     WITH EXERTION    TIA (transient ischemic attack)     Tinnitus     Type II diabetes mellitus 2019    Began Metformin    Vertigo         Past Surgical History:   Procedure Laterality Date    BREAST BIOPSY  10/16/2023    At     BREAST LUMPECTOMY Right 11/16/2023    Procedure: right breast wire localized lumpectomy;  Surgeon: Dee Saunders MD;  Location: [facility] OR [facility];  Service: General;  Laterality: Right;    BRONCHOSCOPY N/A 03/21/2023    Procedure: BRONCHOSCOPY WITH BX;  Surgeon: Mae Barry MD;  Location: [facility] ENDOSCOPY;  Service: Pulmonary;  Laterality: N/A;  PRE/POST-ABNORMAL IMAGING     CEREBRAL ANGIOGRAM N/A 10/08/2018    Procedure: CEREBRAL ANGIOGRAM AND VENOGRAM WITH INTRASINUS PRESSURE RECORDING;  Surgeon: Alfredo Caruso MD;  Location: [facility] HYBRID OR 18/19;  Service: Neurosurgery    COLONOSCOPY      2010    COLONOSCOPY N/A 02/17/2020    Procedure: COLONOSCOPY TO CECUM WITH COLD BIOPSY POLYPECTOMY;  Surgeon: Stan Flood MD;  Location: [facility] ENDOSCOPY;  Service: Gastroenterology;  Laterality: N/A;  PRE- FAMILY HX COLON POLYPS  POST- POLYP, HEMORRHOIDS    ENDOSCOPY  02/27/2017    Esophagitis, gastritis, small hh    HYSTERECTOMY      KNEE SURGERY Right     MOHS SURGERY      OOPHORECTOMY      TONSILLECTOMY AND ADENOIDECTOMY  1965  "   TRIGGER POINT INJECTION  1990s    Spinal    UPPER GASTROINTESTINAL ENDOSCOPY  2016    \"Extremely severe GERD\"        Current Outpatient Medications on File Prior to Visit   Medication Sig Dispense Refill    acetaminophen (TYLENOL) 500 MG tablet Take 2 tablets by mouth Every 6 (Six) Hours As Needed for Mild Pain.      albuterol sulfate  (90 Base) MCG/ACT inhaler INHALE 2 PUFFS BY MOUTH INTO THE LUNGS EVERY 4 HOURS AS NEEDED FOR WHEEZING 8.5 g 0    ALPRAZolam (XANAX) 0.5 MG tablet TAKE 1 TABLET BY MOUTH DAILY AS NEEDED FOR ANXIETY 45 tablet 0    Alum Hydroxide-Mag Trisilicate (GAVISCON) 80-14.2 MG chewable tablet Chew 1 tablet 4 (Four) Times a Day As Needed (Heartburn). 224 each 11    aspirin 81 MG tablet Take 1 tablet by mouth Daily.      Biotin 10 MG tablet HOLD PRIOR TO SURG      buPROPion (WELLBUTRIN) 75 MG tablet TAKE 1 TABLET BY MOUTH DAILY (Patient taking differently: Take 1 tablet by mouth Daily. ONLY TAKES IN WINTER MONTHS) 30 tablet 2    Cholecalciferol 50 MCG (2000 UT) capsule HOLD PRIOR TO SURG      cyclobenzaprine (FLEXERIL) 5 MG tablet Take 1 tablet by mouth At Night As Needed for Muscle Spasms.      desvenlafaxine (PRISTIQ) 100 MG 24 hr tablet TAKE 1 TABLET BY MOUTH DAILY 90 tablet 1    fenofibrate (TRICOR) 145 MG tablet TAKE 1 TABLET BY MOUTH EVERY DAY 90 tablet 1    levETIRAcetam (KEPPRA) 500 MG tablet TAKE 1/2 TABLET BY MOUTH DAILY AS NEEDED FOR HEADACHE 45 tablet 1    metFORMIN ER (GLUCOPHAGE-XR) 500 MG 24 hr tablet TAKE 1 TABLET BY MOUTH DAILY WITH LARGEST MEAL OF THE DAY AND A GLASS OF WATER (Patient taking differently: 1 tablet Every Evening. TAKE 1 TABLET BY MOUTH DAILY WITH LARGEST MEAL OF THE DAY AND A GLASS OF WATER) 90 tablet 01    metoprolol tartrate (LOPRESSOR) 25 MG tablet Take 1 tablet by mouth 2 (Two) Times a Day. 180 tablet 3    Omega-3 Fatty Acids (FISH OIL) 1200 MG capsule capsule HOLD PRIOR TO SURG      omeprazole (priLOSEC) 40 MG capsule TAKE 1 CAPSULE BY MOUTH EVERY DAY " 90 capsule 0    rosuvastatin (CRESTOR) 10 MG tablet TAKE 1 TABLET BY MOUTH EVERY NIGHT 90 tablet 1    spironolactone (ALDACTONE) 25 MG tablet TAKE 1 TABLET BY MOUTH DAILY 30 tablet 4    traZODone (DESYREL) 50 MG tablet Take 0.5-1 tablets by mouth At Night As Needed for Sleep. for sleep 90 tablet 1    tretinoin (RETIN-A) 0.025 % cream Apply 1 application  topically to the appropriate area as directed Every Night.      valACYclovir (VALTREX) 500 MG tablet Take 2 tablets by mouth 2 (Two) Times a Day. 4 tablet 3    fluticasone (FLONASE) 50 MCG/ACT nasal spray 2 sprays into the nostril(s) as directed by provider Daily for 30 days. 16 g 0    [DISCONTINUED] traMADol (Ultram) 50 MG tablet Take 1 tablet by mouth Every 6 (Six) Hours As Needed for Moderate Pain. 8 tablet 0     Current Facility-Administered Medications on File Prior to Visit   Medication Dose Route Frequency Provider Last Rate Last Admin    lidocaine (XYLOCAINE) 1 % injection 3 mL  3 mL Intradermal Once Dee Saunders MD            ALLERGIES:    Allergies   Allergen Reactions    Betadine [Povidone Iodine] Rash     Burning/ stinging feeling on her rash    Codeine Nausea Only     BLURRED VISION, RASH        Social History     Socioeconomic History    Marital status:      Spouse name: Saravanan    Number of children: 1    Years of education: College   Tobacco Use    Smoking status: Never    Smokeless tobacco: Never   Vaping Use    Vaping Use: Never used   Substance and Sexual Activity    Alcohol use: Yes     Alcohol/week: 3.0 - 4.0 standard drinks of alcohol     Types: 2 - 3 Glasses of wine, 1 Cans of beer per week     Comment: liquor-1 to 2 month    Drug use: No    Sexual activity: Yes     Partners: Male     Birth control/protection: Post-menopausal     Comment: Complete hysterectomy        Family History   Problem Relation Age of Onset    Uterine cancer Mother         hysterectomy for precancerous cells    Heart disease Mother         in her 70s    Diabetes  Mother         Late in life    Arthritis Mother     Cancer Mother         spine    Hearing loss Mother         In her 70s    Hyperlipidemia Mother         in her 60s    Kidney disease Mother         ?    Vision loss Mother         Macular Degeneration    Osteoporosis Mother     Skin cancer Father     Heart disease Father         in his early 60s    Colon polyps Father         negative    Cancer Father         skin    Heart disease Sister         Heart attack-64    Atrial fibrillation Sister     Hyperlipidemia Sister         in her 50s    Vision loss Sister         Macular Degeneration    Rheumatologic disease Maternal Aunt     Diabetes Maternal Grandmother         Late in life    Heart disease Maternal Grandmother         Stroke-80    Stroke Maternal Grandmother     Hyperlipidemia Maternal Grandmother         ?    Kidney disease Maternal Grandmother         ?    Heart disease Paternal Grandmother         CHF    Stroke Paternal Grandmother     Depression Paternal Grandmother     Hyperlipidemia Paternal Grandmother         ?    Anxiety disorder Paternal Grandmother         Agoraphobia/anxiety    Heart disease Paternal Grandfather         Emphysema-heart disease    Stroke Paternal Grandfather     Malig Hyperthermia Neg Hx       Gynecologic History:   . P:1 AB:0  Age at first childbirth: 27  Lactation/How long: none  Age at menarche: 16  Age at menopause: 49  Total years of oral contraceptive use: 6 years previously  Total years of hormone replacement therapy: on premarin, just stopped      Past Medical History:   HTN  DM  CAD, Dr. Syed CAMPOS     Past Surgical History:    Colonoscopy  Hysterectomy  Knee surgery  Tonsillectomy and adenoidectomy  Trigger point injection     Family History:    As above     Social History:  Denies tobacco use  Occasional alcohol use    Review of Systems   Constitutional:  Negative for appetite change, chills, diaphoresis, fatigue, fever and unexpected weight change.   HENT:  Negative for  "hearing loss, sore throat and trouble swallowing.    Respiratory:  Negative for cough, chest tightness, shortness of breath and wheezing.    Cardiovascular:  Negative for chest pain, palpitations and leg swelling.   Gastrointestinal:  Negative for abdominal distention, abdominal pain, constipation, diarrhea, nausea and vomiting.   Genitourinary:  Negative for dysuria, frequency, hematuria and urgency.   Musculoskeletal:  Negative for joint swelling.        No muscle weakness.   Skin:  Negative for rash and wound.   Neurological:  Negative for seizures, syncope, speech difficulty, weakness, numbness and headaches.   Hematological:  Negative for adenopathy. Does not bruise/bleed easily.   Psychiatric/Behavioral:  Negative for behavioral problems, confusion and suicidal ideas.         Objective     Vitals:    12/21/23 1118   BP: 138/85   Pulse: 60   Temp: 98 °F (36.7 °C)   TempSrc: Temporal   SpO2: 97%   Weight: 64.7 kg (142 lb 9.6 oz)   Height: 165.1 cm (65\")   PainSc:   2   PainLoc: Generalized         12/21/2023    11:15 AM   Current Status   ECOG score 0       Physical Exam    Patient screened positive for depression based on a PHQ-9 score of 9 on 12/21/2023. Follow-up recommendations include: PCP managing depression.       This patient's ACP documentation is up to date, and there's nothing further left to document.      CONSTITUTIONAL:  Vital signs reviewed.  No distress, looks comfortable.  RESPIRATORY:  Normal respiratory effort.  Lungs clear to auscultation bilaterally.  CARDIOVASCULAR:  Normal S1, S2.  No murmurs rubs or gallops.  No significant lower extremity edema.  BREAST: Right breast: No skin changes, no evidence of breast mass, no nipple discharge, no evidence of any right axillary adenopathy or right supraclavicular adenopathy  Left breast: No evidence of any skin changes, no evidence of any left breast mass and no evidence of left nipple discharge as well as no left axillary adenopathy or left " supraclavicular adenopathy.   GASTROINTESTINAL: Abdomen appears unremarkable.  Nontender.  No hepatomegaly.  No splenomegaly.  LYMPHATIC:  No cervical, supraclavicular, axillary lymphadenopathy.  SKIN:  Warm.  No rashes.  PSYCHIATRIC:  Normal judgment and insight.  Normal mood and affect.    RECENT LABS:  Hematology WBC   Date Value Ref Range Status   12/21/2023 6.09 3.40 - 10.80 10*3/mm3 Final   08/22/2022 5.1 3.4 - 10.8 x10E3/uL Final     RBC   Date Value Ref Range Status   12/21/2023 4.76 3.77 - 5.28 10*6/mm3 Final   08/22/2022 5.02 3.77 - 5.28 x10E6/uL Final     Hemoglobin   Date Value Ref Range Status   12/21/2023 14.5 12.0 - 15.9 g/dL Final     Hematocrit   Date Value Ref Range Status   12/21/2023 42.3 34.0 - 46.6 % Final     Platelets   Date Value Ref Range Status   12/21/2023 275 140 - 450 10*3/mm3 Final          Assessment & Plan     #.Pathologic Tis NX right breast DCIS 9 o'clock position, 29 mm DCIS grade 3 with comedonecrosis margins negative for DCIS this post right lumpectomy for 16 2023: % FL % s/p right lumpectomy November 16, 2023  Patient is referred here for endocrine therapy  Discussed side effects of endocrine therapy  Tamoxifen causes hot flashes, rare chance for uterine cancer, blood clots, DVT, PE, hair thinning, rare chance for thrombocytopenia, cataracts.  Aromatase inhibitors can cause arthralgias, hot flashes, decrease in bone density, rare chance for diarrhea, also discussed about hot flashes with it.  Evista is approved for osteopenia and can also be used for prophylaxis with fewer side effects except hot flashes and rare chance for cataracts but it can improve bone density.  Imfinzi seen radiation oncology Dr. Sana Markham and he plans to do radiation  After discussion about all the side effects patient prefers to take raloxifene.  She is opposed to taking tamoxifen or aromatase inhibitor    #.  Bone density shows osteopenia with a T-score of -2.3 but bone density was done  in 2020.  She will need to get it done     Plan  Reviewed all records and imaging studies in detail and discussed with patient  Reviewed pathology  Given she was ER/W[place] highly positive with the DCIS she could potentially be a candidate for endocrine therapy  However given her osteopenia she would require another bone density as she is overdue  She could be offered Arimidex if she is osteopenic would require to go on Prolia or consider tamoxifen.  Patient preferred to go on raloxifene and refused both tamoxifen and aromatase inhibitor  In length side effects of raloxifene and will start 60 mg p.o. daily  Discussed side effects of all of the endocrine therapy  Up with me end of March 2024    MD Dr. Whitney Dudley

## 2023-12-21 NOTE — PROGRESS NOTES
"New psychiatric evaluation    The primary office location is [facility]. The provider is seeing the patient in person for this visit.    Chief Complaint: \"I feel stuck.\"  She reports feeling frustrated, poor energy, poor motivation, increased anxiety, and feels like her antidepressant medication is not working as well as it used to and she is having increased hot flashes due to having to stop her hormone therapy.     Subjective  Patient ID: Alexandria Yousif is a 78 y.o. female who presents for initial consultation through the [facility] at the request of Rashi Hood MD, who is following her for right breast DCIS, diagnosed in August 2023.  Status post right breast lumpectomy with negative margins.  She is getting ready to undergo radiation treatment followed by oral hormone therapy.     HPI: The patient reports a recent diagnosis of right breast cancer.  She endorses feeling \"stuck\" and feels \"physically and emotionally immobilized.\"  She thought that she would feel some euphoria after hearing that her cancer had been removed with clean margins and that she would need a minimal amount of radiation treatment followed by oral hormone therapy.  However, she has developed increased ruminative worry, describes feeling anxious going out in public, social anxiety, and panic with trying to complete, previously perceived, simple tasks. She gives an example that she has always loved to cook and has previously cooked a dish to share with friends at an upcoming holiday gathering, but recently she became panicked and overwhelmed with the idea of cooking. Thankfully, her  has been very supportive and made an executive decision to buy something to take instead, which she found helpful. Additionally, she had to stop her hormone therapy because of the kind of cancer that she was diagnosed with, which has caused her to have increased hot flashes and increased " pain.    She endorses having a long history of anxiety and depression having outpatient therapy off and on for the last 40 years and being stable on her antidepressant, pristiq for the last 6 to 7 years.  She endorses that she has been on Pristiq, recently increased to 100 mg a month or two ago, and Wellbutrin 75 mg tablet cut in half and only taken during the winter months. She has been followed by her internal medicine provider, Dr. Anita Muse. She endorses that she can only tolerate taking half a tablet because any higher dosage of Wellbutrin causes her to have increased anxiety. She felt like her medications were working for some time, but now feels they have lost their effectiveness.  She denies any previous inpatient mental health hospitalizations, past suicidal ideation, active suicidal ideation with or without plan, or thoughts that she might be better off dead.  She denies any current HI or AVH also. She does, however, endorse recent understanding of how people could come to the point of feeling so hopeless that they want to kill themselves, which scares her because she has never felt that way and has always felt that suicide was a very selfish act, hurting the ones you leave behind.    She endorses intermittent episodes of tearfulness, lack of motivation to do the things that she previously enjoyed, such as decorating for the holidays and building [employer] villages with her 21-year-old grandson, which has also increased her level of distress because she now dreads engaging in these experiences. She endorses having some childhood trauma, which she feels she's worked through and is now stable. She also endorses past trauma related to her first  being an alcoholic, resulting in her becoming a single mother. In turn, this has now resulted in discord and estrangement with her son, which, in turn, has been difficult for her. However, she continues to try and be supportive of her son's wishes and the  anger he is currently processing. She has had ongoing stress after the passing of her mother 7 years ago. Additionally, she has felt increased stress related to her 's recent diagnosis of macular degeneration and feeling guilt that he has been having to support her through her cancer diagnosis and treatment while he is also dealing with losing his vision.    She endorses previously taking trazodone 50 mg, taking 1/2 tablet nightly and xanax 0.25 mg nightly (also followed by her PCP), with good effect for sleep up until recently. She now reports having decreased quality and quantity of sleep with new onset fragmentation. She was previously averaging 7-7.5 hours without fragmentation and was feeling rested. She is now averaging about  5 - 6.5 hours, awakening 2-3 times per night and feeling tired upon awakening. She has also been requiring about an hour nap in the afternoons daily. She endorses decreased interest and a sense of hopelessness. She previously enjoyed walking daily with her  and dog and she now has to push herself to keep walking. She feels her energy and concentration wax and wane depending on how she slept the night before. She reports an increased appetite, feeling hungry all of the time. She endorses that she rarely cooks anymore, but did recently make some dressing, which she liked. She states she has gained about 2 lbs.     She feels she continues to have good support with her , a friend of 54 years, her daughter-in-law, and up until recently, her son. She and her  have made some end of life decisions, which has also been helpful to her sense of her burden. She endorses that she does feel like she might benefit with some medication changes and possible talk therapy.    Records were reviewed.    PHQ-9 Total Score: 18  The patient endorses over eating several days of the week within the last 2 weeks.  She reports sleeping too much and trouble concentrating more than half  "the days of the week within the last 2 weeks.  She endorses little interest or pleasure in doing things, feeling down, depressed, or hopeless, feeling tired or having little energy, and feeling bad about herself, feeling like she is a failure, feeling like she has let herself down, or like she has let her family down nearly every day of the week within the last 2 weeks.  She denies issues with any of the other questions.    GAD7 Total Score: 19  The patient reports being so restless that it is hard to sit still several days of the week within the last 2 weeks.  She reports feeling nervous, anxious, or on edge, not being able to stop or control worry, worrying too much about different things, trouble relaxing, becoming easily annoyed or irritable, and feeling afraid as if something awful might happen nearly every day of the week within the last 2 weeks.    Social History  Marital Status: Previously .  She reports her first  was an alcoholic.  She is now in her second marriage to \"Bud,\" formerly in the  and feels good support with him.  Children: 1 son  Support Community: She verbalizes great support with a friend of 54 years, her , and some limited support from her son, who she was previously very close with, but is currently having some family dysfunction with.  She is also close with her daughter-in-law.  Tobacco Use: The patient denies current or previous tobacco use.  Alcohol Use: Occasional alcohol consumption, up to 3 to 4 standard alcoholic beverages per week.  Marijuana/ Other drug Use: denied.    Medical History  Psychiatric History: Longstanding history of anxiety and depression. Past history of childhood trauma, that she feels she has worked through and trauma of first  being an alcoholic. She reports a 40+ years of outpatient therapy off and on. Denies any inpatient treatment or past history of SI or attempts. She reports a previous trial on paxil, which caused sexual " dysfunction. She later had a trial on prozac after her dad , but developed issues with her libido. She reports she has been on pristiq for the last 6-7 years and feels this has been helpful for her mood and anxiety. She has also been on xanax 0.25-0.5 daily for hotflashes.     Medical History: The patient denies any past history of seizure disorder. She does, however, take Keppra 250 mg by mouth prn for headaches.   Past Medical History:   Diagnosis Date    Abnormal ECG     DR BLAND    Allergic 1980    Anxiety and depression     Arthritis of back ?    Arthritis of neck ?    Basal cell carcinoma 2008    Scalp and face    Cervical herniated disc     CHF (congestive heart failure) 2019    Colon polyp 2020    Coronary artery disease 2019    CVA (cerebral vascular accident) 2018    Mild to moderat ischemia    GERD (gastroesophageal reflux disease)     Grade II diastolic dysfunction     Headache     Heart murmur     Hypercalcemia 2016    mild at best when corrected for ongoing abnormal albumin levels with normal albumin being up to 4.7 g/dL though that is an issue with reference range here    Hyperlipidemia     Hypertension     Injury of neck     Hernieated disc when broke my back    Low back pain 1971    Broken back    Lumbosacral disc disease 1971    Multiple fractures/herniated disc/accident    LVH (left ventricular hypertrophy) 2019    Malignant neoplasm of female breast 10/23/2023    Neck strain     OA (osteoarthritis)     Osteopenia     PAT (paroxysmal atrial tachycardia)     12 very brief runs of atrial tachycardia with the longest 7 beats in duration per 24-hour Holter monitor in 2019    Pneumonia 2023    Postoperative wound infection     2023 FROM FOOT INJURY    PSVT (paroxysmal supraventricular tachycardia)     5 episodes of PSVT the longest 11 beats in duration per Zio patch monitor in 2019    PVCs (premature ventricular contractions)     5% burden per Zio  patch monitor in December 2019    Rheumatic fever     Age 17    Rotator cuff syndrome 1990s    Shortness of breath     WITH EXERTION    TIA (transient ischemic attack)     Tinnitus     Type II diabetes mellitus 2019    Began Metformin    Vertigo       Family History  Family Psychiatric History: Paternal grandmother - anxiety, depression    Family Cancer History: Mother - uterine cancer, father - colon polyps, skin cancer    The following portions of the patient's history were reviewed and updated as appropriate: She  has a past medical history of Abnormal ECG (2019), Allergic (1980), Anxiety and depression, Arthritis of back (?), Arthritis of neck (?), Basal cell carcinoma (03/2008), Cervical herniated disc, CHF (congestive heart failure) (2019), Colon polyp (02/2020), Coronary artery disease (2019), CVA (cerebral vascular accident) (2018), GERD (gastroesophageal reflux disease), Grade II diastolic dysfunction, Headache, Heart murmur, Hypercalcemia (03/2016), Hyperlipidemia, Hypertension, Injury of neck (1971), Low back pain (1971), Lumbosacral disc disease (1971), LVH (left ventricular hypertrophy) (2019), Malignant neoplasm of female breast (10/23/2023), Neck strain (1971), OA (osteoarthritis), Osteopenia, PAT (paroxysmal atrial tachycardia), Pneumonia (08/2023), Postoperative wound infection, PSVT (paroxysmal supraventricular tachycardia), PVCs (premature ventricular contractions), Rheumatic fever, Rotator cuff syndrome (1990s), Shortness of breath, TIA (transient ischemic attack), Tinnitus, Type II diabetes mellitus (2019), and Vertigo.  She does not have any pertinent problems on file.  She  has a past surgical history that includes Hysterectomy; Oophorectomy; Knee surgery (Right); Mohs surgery; Cerebral angiogram (N/A, 10/08/2018); Esophagogastroduodenoscopy (02/27/2017); Upper gastrointestinal endoscopy (2016); Colonoscopy; Colonoscopy (N/A, 02/17/2020); Trigger point injection (1990s); Tonsillectomy and  adenoidectomy (1965); Bronchoscopy (N/A, 03/21/2023); Breast biopsy (10/16/2023); and Breast lumpectomy (Right, 11/16/2023).  Her family history includes Anxiety disorder in her paternal grandmother; Arthritis in her mother; Atrial fibrillation in her sister; Cancer in her father and mother; Colon polyps in her father; Depression in her paternal grandmother; Diabetes in her maternal grandmother and mother; Hearing loss in her mother; Heart disease in her father, maternal grandmother, mother, paternal grandfather, paternal grandmother, and sister; Hyperlipidemia in her maternal grandmother, mother, paternal grandmother, and sister; Kidney disease in her maternal grandmother and mother; Osteoporosis in her mother; Rheumatologic disease in her maternal aunt; Skin cancer in her father; Stroke in her maternal grandmother, paternal grandfather, and paternal grandmother; Uterine cancer in her mother; Vision loss in her mother and sister.  She  reports that she has never smoked. She has never used smokeless tobacco. She reports current alcohol use of about 3.0 - 4.0 standard drinks of alcohol per week. She reports that she does not use drugs.  Current Outpatient Medications on File Prior to Visit   Medication Sig    acetaminophen (TYLENOL) 500 MG tablet Take 2 tablets by mouth Every 6 (Six) Hours As Needed for Mild Pain.    albuterol sulfate  (90 Base) MCG/ACT inhaler INHALE 2 PUFFS BY MOUTH INTO THE LUNGS EVERY 4 HOURS AS NEEDED FOR WHEEZING    ALPRAZolam (XANAX) 0.5 MG tablet TAKE 1 TABLET BY MOUTH DAILY AS NEEDED FOR ANXIETY    Alum Hydroxide-Mag Trisilicate (GAVISCON) 80-14.2 MG chewable tablet Chew 1 tablet 4 (Four) Times a Day As Needed (Heartburn).    aspirin 81 MG tablet Take 1 tablet by mouth Daily.    Biotin 10 MG tablet HOLD PRIOR TO SURG    buPROPion (WELLBUTRIN) 75 MG tablet TAKE 1 TABLET BY MOUTH DAILY (Patient taking differently: Take 1 tablet by mouth Daily. ONLY TAKES IN WINTER MONTHS)     Cholecalciferol 50 MCG (2000 UT) capsule HOLD PRIOR TO SURG    desvenlafaxine (PRISTIQ) 100 MG 24 hr tablet TAKE 1 TABLET BY MOUTH DAILY    fenofibrate (TRICOR) 145 MG tablet TAKE 1 TABLET BY MOUTH EVERY DAY    fluticasone (FLONASE) 50 MCG/ACT nasal spray 2 sprays into the nostril(s) as directed by provider Daily for 30 days.    levETIRAcetam (KEPPRA) 500 MG tablet TAKE 1/2 TABLET BY MOUTH DAILY AS NEEDED FOR HEADACHE    metFORMIN ER (GLUCOPHAGE-XR) 500 MG 24 hr tablet TAKE 1 TABLET BY MOUTH DAILY WITH LARGEST MEAL OF THE DAY AND A GLASS OF WATER (Patient taking differently: 1 tablet Every Evening. TAKE 1 TABLET BY MOUTH DAILY WITH LARGEST MEAL OF THE DAY AND A GLASS OF WATER)    Omega-3 Fatty Acids (FISH OIL) 1200 MG capsule capsule HOLD PRIOR TO SURG    omeprazole (priLOSEC) 40 MG capsule TAKE 1 CAPSULE BY MOUTH EVERY DAY    raloxifene (EVISTA) 60 MG tablet Take 1 tablet by mouth Daily.    rosuvastatin (CRESTOR) 10 MG tablet TAKE 1 TABLET BY MOUTH EVERY NIGHT    spironolactone (ALDACTONE) 25 MG tablet TAKE 1 TABLET BY MOUTH DAILY    traZODone (DESYREL) 50 MG tablet Take 0.5-1 tablets by mouth At Night As Needed for Sleep. for sleep    tretinoin (RETIN-A) 0.025 % cream Apply 1 application  topically to the appropriate area as directed Every Night.    valACYclovir (VALTREX) 500 MG tablet Take 2 tablets by mouth 2 (Two) Times a Day.     Current Facility-Administered Medications on File Prior to Visit   Medication    lidocaine (XYLOCAINE) 1 % injection 3 mL     Current Documented Allergies & Reactions  Allergies   Allergen Reactions    Betadine [Povidone Iodine] Rash     Burning/ stinging feeling on her rash    Codeine Nausea Only     BLURRED VISION, RASH       Objective   Mental Status Exam  Appearance:  clean and casually dressed, appropriate  Attitude toward clinician:  cooperative and agreeable , good eye contact  Speech:    Rate:  regular rate and rhythm   Volume:  normal  Motor:  no abnormal movements  present  Mood: Dysphoric  Affect:  dysphoric and mood congruent  Thought Processes:  linear, logical, and goal directed  Thought Content:  normal  Suicidal Thoughts:  absent  Homicidal Thoughts:  absent  Perceptual Disturbance: no perceptual disturbance  Attention and Concentration:  fair  Insight and Judgement:  good  Memory:  memory appears to be intact    Gait: Within normal limits.  Assistive devices: None.    Lab Review: The patient's oncological team is currently following and treating her labs.  Of note, her WBC, hemoglobin and hematocrit, MCV, BUN, creatinine, and electrolyte levels are within normal limits.  It appears she had a free T4 and TSH drawn at the beginning of October this year.  However, no available results are seen.  Lab on 12/21/2023   Component Date Value    WBC 12/21/2023 6.09     RBC 12/21/2023 4.76     Hemoglobin 12/21/2023 14.5     Hematocrit 12/21/2023 42.3     MCV 12/21/2023 88.9     MCH 12/21/2023 30.5     MCHC 12/21/2023 34.3     RDW 12/21/2023 13.2     RDW-SD 12/21/2023 42.7     MPV 12/21/2023 9.5     Platelets 12/21/2023 275     Neutrophil % 12/21/2023 52.8     Lymphocyte % 12/21/2023 27.4     Monocyte % 12/21/2023 11.0     Eosinophil % 12/21/2023 3.4     Basophil % 12/21/2023 1.5     Immature Grans % 12/21/2023 3.9 (H)     Neutrophils, Absolute 12/21/2023 3.21     Lymphocytes, Absolute 12/21/2023 1.67     Monocytes, Absolute 12/21/2023 0.67     Eosinophils, Absolute 12/21/2023 0.21     Basophils, Absolute 12/21/2023 0.09     Immature Grans, Absolute 12/21/2023 0.24 (H)     nRBC 12/21/2023 0.3 (H)    Admission on 11/16/2023, Discharged on 11/16/2023   Component Date Value    Glucose 11/16/2023 74    Component Date Value    Glucose 11/09/2023 125 (H)     BUN 11/09/2023 16     Creatinine 11/09/2023 0.66     Sodium 11/09/2023 139     Potassium 11/09/2023 4.2     Chloride 11/09/2023 101     CO2 11/09/2023 28.0     Calcium 11/09/2023 10.2     BUN/Creatinine Ratio 11/09/2023 24.2      Anion Gap 11/09/2023 10.0     eGFR 11/09/2023 90.5     WBC 11/09/2023 6.33     RBC 11/09/2023 4.85     Hemoglobin 11/09/2023 14.2     Hematocrit 11/09/2023 44.6     MCV 11/09/2023 92.0     MCH 11/09/2023 29.3     MCHC 11/09/2023 31.8     RDW 11/09/2023 12.9     RDW-SD 11/09/2023 44.1     MPV 11/09/2023 9.2     Platelets 11/09/2023 321    Hospital Outpatient Visit on 10/23/2023   Component Date Value    Creatinine 10/23/2023 1.20        Medications Reviewed:  Current Psychotropic Medications    ALPRAZolam (XANAX) 0.5 MG tablet, TAKE 1 TABLET BY MOUTH DAILY AS NEEDED FOR ANXIETY, Disp: 45 tablet, Rfl: 0    buPROPion (WELLBUTRIN) 75 MG tablet, TAKE 1 TABLET BY MOUTH DAILY (Patient taking differently: Take 1 tablet by mouth Daily. ONLY TAKES IN WINTER MONTHS), Disp: 30 tablet, Rfl: 2    desvenlafaxine (PRISTIQ) 100 MG 24 hr tablet, TAKE 1 TABLET BY MOUTH DAILY, Disp: 90 tablet, Rfl: 1    levETIRAcetam (KEPPRA) 500 MG tablet, TAKE 1/2 TABLET BY MOUTH DAILY AS NEEDED FOR HEADACHE, Disp: 45 tablet, Rfl: 1    traZODone (DESYREL) 50 MG tablet, Take 0.5-1 tablets by mouth At Night As Needed for Sleep. for sleep, Disp: 90 tablet, Rfl: 1    Current Outpatient Medications:     acetaminophen (TYLENOL) 500 MG tablet, Take 2 tablets by mouth Every 6 (Six) Hours As Needed for Mild Pain., Disp: , Rfl:     albuterol sulfate  (90 Base) MCG/ACT inhaler, INHALE 2 PUFFS BY MOUTH INTO THE LUNGS EVERY 4 HOURS AS NEEDED FOR WHEEZING, Disp: 8.5 g, Rfl: 0    Alum Hydroxide-Mag Trisilicate (GAVISCON) 80-14.2 MG chewable tablet, Chew 1 tablet 4 (Four) Times a Day As Needed (Heartburn)., Disp: 224 each, Rfl: 11    aspirin 81 MG tablet, Take 1 tablet by mouth Daily., Disp: , Rfl:     Biotin 10 MG tablet, HOLD PRIOR TO SURG, Disp: , Rfl:     Cholecalciferol 50 MCG (2000 UT) capsule, HOLD PRIOR TO SURG, Disp: , Rfl:     cyclobenzaprine (FLEXERIL) 5 MG tablet, Take 1 tablet by mouth At Night As Needed for Muscle Spasms., Disp: , Rfl:      fenofibrate (TRICOR) 145 MG tablet, TAKE 1 TABLET BY MOUTH EVERY DAY, Disp: 90 tablet, Rfl: 1    fluticasone (FLONASE) 50 MCG/ACT nasal spray, 2 sprays into the nostril(s) as directed by provider Daily for 30 days., Disp: 16 g, Rfl: 0    metFORMIN ER (GLUCOPHAGE-XR) 500 MG 24 hr tablet, TAKE 1 TABLET BY MOUTH DAILY WITH LARGEST MEAL OF THE DAY AND A GLASS OF WATER (Patient taking differently: 1 tablet Every Evening. TAKE 1 TABLET BY MOUTH DAILY WITH LARGEST MEAL OF THE DAY AND A GLASS OF WATER), Disp: 90 tablet, Rfl: 01    metoprolol tartrate (LOPRESSOR) 25 MG tablet, Take 1 tablet by mouth 2 (Two) Times a Day., Disp: 180 tablet, Rfl: 3    Omega-3 Fatty Acids (FISH OIL) 1200 MG capsule capsule, HOLD PRIOR TO SURG, Disp: , Rfl:     omeprazole (priLOSEC) 40 MG capsule, TAKE 1 CAPSULE BY MOUTH EVERY DAY, Disp: 90 capsule, Rfl: 0    raloxifene (EVISTA) 60 MG tablet, Take 1 tablet by mouth Daily., Disp: 30 tablet, Rfl: 5    rosuvastatin (CRESTOR) 10 MG tablet, TAKE 1 TABLET BY MOUTH EVERY NIGHT, Disp: 90 tablet, Rfl: 1    spironolactone (ALDACTONE) 25 MG tablet, TAKE 1 TABLET BY MOUTH DAILY, Disp: 30 tablet, Rfl: 4    traMADol (Ultram) 50 MG tablet, Take 1 tablet by mouth Every 6 (Six) Hours As Needed for Moderate Pain., Disp: 8 tablet, Rfl: 0    tretinoin (RETIN-A) 0.025 % cream, Apply 1 application  topically to the appropriate area as directed Every Night., Disp: , Rfl:     valACYclovir (VALTREX) 500 MG tablet, Take 2 tablets by mouth 2 (Two) Times a Day., Disp: 4 tablet, Rfl: 3  No current facility-administered medications for this visit.    Facility-Administered Medications Ordered in Other Visits:     lidocaine (XYLOCAINE) 1 % injection 3 mL, 3 mL, Intradermal, Once, Dee Saunders MD     Plan of Care  1) Discussed medications and treatment options. Patient would like to focus on her level of anxiety, mood improvement, decreasing hot flashes, and pain control.  She wants to avoid gaining weight,currently at 142  pounds.  2) She has been on Pristiq for several years and over the last month or two, it was increased to 100 mg.  We discussed medication holidays, the use of various antidepressants, and their compatibility with her future hormone therapy, while still addressing her current symptoms.  We discussed the use of her current medications on Wellbutrin and Pristiq with the possibility of stopping them and starting Lexapro instead.  She feels that she cannot tolerate a full tablet of Wellbutrin 75 mg due to increasing her level of anxiety.  We discussed gabapentin having the possibility of helping address some of the pain issues she has been having, help with improving sleep quality / quantity, help decrease ruminative worry and anxiety, and help with her hot flashes.  She was agreeable to starting with this medication and then making adjustments to her other medications at our next visit as needed.  She reports being very sensitive to medications.  3) Hold trazodone.  4) Start gabapentin 100 mg, take 1-2 capsules by mouth at dinnertime and/or at bedtime as needed to help with sleep, ruminative worry, mood, pain control, and hot flashes.  Additionally, she may take 1 capsule by mouth every a.m. and at lunchtime as needed to help with anxiety.  Advised that if she was still having issues with sleep to increase her dinner or night time dosage by 1 capsule per night until she is up to 600 mg in the evening total.  5) Will discuss baseline labs to include a thyroid panel, B12, and D3 at our next visit.  6) Provided support through active listening, empathy, talk therapy, positive reframing, reflection, and normalization of feelings. Encouraged talk therapy with this provider or a trusted source of support. Given resources for [employer] and discussed [employer] for additional support. Will provide reference material in the after visit summary patient instructions:  [employer]    [email or web address]    [employer]  [email or web address]    7) Notify provider if having any side effects, issues, or concerns    See patient after visit summary, patient instructions, for additional resource materials.    - Follow up with GIOVANI Xavier, 2 weeks and as needed.    Diagnoses and all orders for this visit:    1. Moderate episode of recurrent major depressive disorder (Primary)  -     gabapentin (NEURONTIN) 100 MG capsule; Take 1-2 capsules by mouth at dinnertime and/or bedtime as needed for sleep. May add one capsule per day until 600 mg total is reached in the evenings, as needed. May also take 1 capsule by mouth in the AM and one capsule at lunchtime as needed for anxiety.  Dispense: 90 capsule; Refill: 0    2. Generalized anxiety disorder  -     gabapentin (NEURONTIN) 100 MG capsule; Take 1-2 capsules by mouth at dinnertime and/or bedtime as needed for sleep. May add one capsule per day until 600 mg total is reached in the evenings, as needed. May also take 1 capsule by mouth in the AM and one capsule at lunchtime as needed for anxiety.  Dispense: 90 capsule; Refill: 0

## 2023-12-21 NOTE — LETTER
December 21, 2023     Dee Saunders MD  [address]  [facility] [address]  [place] [id number]    Patient: Alexandria Yousif   YOB: 1945   Date of Visit: 12/21/2023     Dear Dee Saunders MD:       Thank you for referring Alexandria Yousif to me for evaluation. Below are the relevant portions of my assessment and plan of care.    If you have questions, please do not hesitate to call me. I look forward to following Alexandria along with you.         Sincerely,        Stephanie Chapman MD        CC: No Recipients    Stephanie Chapman MD  12/21/23 2150  Signed    Subjective    REASON FOR CONSULTATION: Pathologic Tis NX right breast DCIS 9 o'clock position, 29 mm DCIS grade 3 with comedonecrosis margins negative for DCIS this post right lumpectomy for 16 2023:  Provide an opinion on any further workup or treatment                             REQUESTING PHYSICIAN: Dr. Whitney Saunders    RECORDS OBTAINED:  Records of the patients history including those obtained from the referring provider were reviewed and summarized in detail.    HISTORY OF PRESENT ILLNESS:  The patient is a 78 y.o. year old female who is here for an opinion about the above issue.    History of Present Illness     Patient is a 78-year-old female who was referred here by Dr. Saunders for new diagnosis of right breast cancer.  She had a abnormal screening mammogram.  She did not really feel a breast mass or lump.    There is no family history of breast or ovarian cancer.  Her mother had apparently precancerous cells at hysterectomy.    Details are as follows    August 21, 2023: Bilateral screening mammogram  8/21/2023 Bilateral Screening Mammogram:  IMPRESSION:  1. Microcalcifications in the middle third upper outer quadrant of the right breast are noted. Further evaluation with spot magnification CC and 90 degree lateral images is recommended.  2. There are no findings suspicious for malignancy in the left breast.  BI-RADS CATEGORY 0     9/13/2023 Right  Breast Diagnostic Mammogram:   The patient returned for diagnostic imaging with coned magnified views in both projections and R 2 and digital tomosynthesis. These more clearly delineate clustered microcalcifications which have a slightly pleomorphic configuration and are considered indeterminant. They appear to be new since an earlier mammogram dated 08/17/2022 and therefore further evaluation with stereotactic core biopsy is recommended.  CONCLUSION: Suspicious clustered microcalcifications in middle third of upper outer right breast and further evaluation with stereotactic core biopsy is recommended. These findings have been discussed with the  patient.   BI-RADS 4. Suspicious abnormality.     10/16/2023 Right Breast Stereotactic Biopsy:      10/16/2023 Pathology:   Final Diagnosis   1. Right Breast, 9:00, Stereotactic-Guided Core Needle Biopsy for Calcifications:  A. 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               B. Greatest contiguous extent of DCIS measures 10 mm.               C. See biomarker template.      11/1/2023 Bilateral Breast MRI    FINDINGS: Scattered fibroglandular tissue is seen throughout both  breasts. Mild background parenchymal enhancement of both breasts is  noted.     In the middle-third of the right breast at the 9-o'clock position there  is a postbiopsy cavity that measures on the order of 2.1 cm in greatest  dimension. An internal focal signal void is seen within the cavity. This  corresponds to the bowtie shaped metallic clip at the biopsy-proven site  of malignancy. There is linear enhancement seen at the anterior,  superior and posterior margin of the cavity. The anterior linear  enhancement measures up to 0.5 cm in length. The superior linear  enhancement measures up to 0.7 cm in length and the posterior linear  enhancement measures up to 0.8 cm in length. Including the  cavity the  entire region measures on the order of 2.4 cm in anterior posterior  dimension, 1.8 cm in the superior inferior dimension and 1.8 cm in the  medial lateral dimension.     No other suspicious areas of enhancement or morphology are seen in the  right breast. I see no evidence for abnormal right breast skin, nipple  or chest wall enhancement and there is no evidence for right axillary or  internal mammary chain adenopathy.     There are no areas of suspicious enhancement or morphology in the left  breast. I see no evidence for abnormal left breast skin, nipple or chest  wall enhancement and there is no evidence for left axillary or internal  mammary chain adenopathy.     IMPRESSION:  1. Biopsy-proven site of DCIS in the right breast in the middle-third at  the 9-o'clock position represented by the bowtie shaped metallic clip  within a postbiopsy cavity with thin linear enhancement measuring up to  0.7 cm at the anterior, superior and posterior margins of the biopsy  cavity. The entire region including the biopsy cavity and linear  enhancement measures up to 2.4 cm in greatest dimension. No other  suspicious findings are seen within the right breast and there is no  evidence for right axillary adenopathy.  2. There are no findings suspicious for malignancy in the left breast.     BI-RADS Category 6: Known biopsy-proven malignancy.       November 16, 2023: Right breast wire localized lumpectomy    Synoptic Checklist   DCIS OF THE BREAST: Resection   8th Edition - Protocol posted: 3/23/2022DCIS OF THE BREAST: RESECTION - All Specimens  SPECIMEN   Procedure  Excision (less than total mastectomy)   Specimen Laterality  Right   TUMOR   Tumor Site  Clock position     9 o'clock   Histologic Type  Ductal carcinoma in situ   Size (Extent) of DCIS  Estimated size (extent) of DCIS is at least (Millimeters): 29 mm   Architectural Patterns  Comedo     Solid     Pagetoid   Nuclear Grade  Grade III (high)   Necrosis   "Present, central (expansive \"comedo\" necrosis)   Microcalcifications  Present in DCIS   MARGINS   Margin Status  All margins negative for DCIS   Distance from DCIS to Closest Margin  7.5 mm   Closest Margin(s) to DCIS  Superior   Distance from DCIS to Anterior Margin  12 mm   Distance from DCIS to Posterior Margin  13 mm   Distance from DCIS to Superior Margin  7.5 mm   Distance from DCIS to Inferior Margin  14.4 mm   Distance from DCIS to Medial Margin  18 mm   Distance from DCIS to Lateral Margin  21 mm   REGIONAL LYMPH NODES   Regional Lymph Node Status  Not applicable (no regional lymph nodes submitted or found)   PATHOLOGIC STAGE CLASSIFICATION (pTNM, AJCC 8th Edition)   Reporting of pT, pN, and (when applicable) pM categories is based on information available to the pathologist at the time the report is issued. As per the AJCC (Chapter 1, 8th Ed.) it is the managing physician’s responsibility to establish the final pathologic stage based upon all pertinent information, including but potentially not limited to this pathology report.   pT Category  pTis (DCIS)   pN Category  pN not assigned (no nodes submitted or found)   Breast Biomarker Testing Performed on Previous Biopsy     Estrogen Receptor (ER) Status  Positive   Percentage of Cells with Nuclear Positivity  %   Breast Biomarker Testing Performed on Previous Biopsy     Progesterone Receptor (PgR) Status  Positive   Percentage of Cells with Nuclear Positivity  %          Patient has been referred here for evaluation for endocrine therapy      Past Medical History:   Diagnosis Date   • Abnormal ECG 2019    DR BLAND   • Allergic 1980   • Anxiety and depression    • Arthritis of back ?   • Arthritis of neck ?   • Basal cell carcinoma 03/2008    Scalp and face   • Cervical herniated disc    • CHF (congestive heart failure) 2019   • Colon polyp 02/2020   • Coronary artery disease 2019   • CVA (cerebral vascular accident) 2018    Mild to moderat " ischemia   • GERD (gastroesophageal reflux disease)    • Grade II diastolic dysfunction    • Headache    • Heart murmur    • Hypercalcemia 03/2016    mild at best when corrected for ongoing abnormal albumin levels with normal albumin being up to 4.7 g/dL though that is an issue with reference range here   • Hyperlipidemia    • Hypertension    • Injury of neck 1971    Hernieated disc when broke my back   • Low back pain 1971    Broken back   • Lumbosacral disc disease 1971    Multiple fractures/herniated disc/accident   • LVH (left ventricular hypertrophy) 2019   • Malignant neoplasm of female breast 10/23/2023   • Neck strain 1971   • OA (osteoarthritis)    • Osteopenia    • PAT (paroxysmal atrial tachycardia)     12 very brief runs of atrial tachycardia with the longest 7 beats in duration per 24-hour Holter monitor in June 2019   • Pneumonia 08/2023 8/2023   • Postoperative wound infection     7/2023 FROM FOOT INJURY   • PSVT (paroxysmal supraventricular tachycardia)     5 episodes of PSVT the longest 11 beats in duration per Zio patch monitor in December 2019   • PVCs (premature ventricular contractions)     5% burden per Zio patch monitor in December 2019   • Rheumatic fever     Age 17   • Rotator cuff syndrome 1990s   • Shortness of breath     WITH EXERTION   • TIA (transient ischemic attack)    • Tinnitus    • Type II diabetes mellitus 2019    Began Metformin   • Vertigo         Past Surgical History:   Procedure Laterality Date   • BREAST BIOPSY  10/16/2023    At    • BREAST LUMPECTOMY Right 11/16/2023    Procedure: right breast wire localized lumpectomy;  Surgeon: Dee Saunders MD;  Location: [facility] OR [facility];  Service: General;  Laterality: Right;   • BRONCHOSCOPY N/A 03/21/2023    Procedure: BRONCHOSCOPY WITH BX;  Surgeon: Mae Barry MD;  Location: [facility] ENDOSCOPY;  Service: Pulmonary;  Laterality: N/A;  PRE/POST-ABNORMAL IMAGING    • CEREBRAL ANGIOGRAM N/A 10/08/2018    Procedure: CEREBRAL  "ANGIOGRAM AND VENOGRAM WITH INTRASINUS PRESSURE RECORDING;  Surgeon: Alfredo Caruso MD;  Location: [facility] HYBRID OR 18/19;  Service: Neurosurgery   • COLONOSCOPY      2010   • COLONOSCOPY N/A 02/17/2020    Procedure: COLONOSCOPY TO CECUM WITH COLD BIOPSY POLYPECTOMY;  Surgeon: Stan Flood MD;  Location: [facility] ENDOSCOPY;  Service: Gastroenterology;  Laterality: N/A;  PRE- FAMILY HX COLON POLYPS  POST- POLYP, HEMORRHOIDS   • ENDOSCOPY  02/27/2017    Esophagitis, gastritis, small hh   • HYSTERECTOMY     • KNEE SURGERY Right    • MOHS SURGERY     • OOPHORECTOMY     • TONSILLECTOMY AND ADENOIDECTOMY  1965   • TRIGGER POINT INJECTION  1990s    Spinal   • UPPER GASTROINTESTINAL ENDOSCOPY  2016    \"Extremely severe GERD\"        Current Outpatient Medications on File Prior to Visit   Medication Sig Dispense Refill   • acetaminophen (TYLENOL) 500 MG tablet Take 2 tablets by mouth Every 6 (Six) Hours As Needed for Mild Pain.     • albuterol sulfate  (90 Base) MCG/ACT inhaler INHALE 2 PUFFS BY MOUTH INTO THE LUNGS EVERY 4 HOURS AS NEEDED FOR WHEEZING 8.5 g 0   • ALPRAZolam (XANAX) 0.5 MG tablet TAKE 1 TABLET BY MOUTH DAILY AS NEEDED FOR ANXIETY 45 tablet 0   • Alum Hydroxide-Mag Trisilicate (GAVISCON) 80-14.2 MG chewable tablet Chew 1 tablet 4 (Four) Times a Day As Needed (Heartburn). 224 each 11   • aspirin 81 MG tablet Take 1 tablet by mouth Daily.     • Biotin 10 MG tablet HOLD PRIOR TO SURG     • buPROPion (WELLBUTRIN) 75 MG tablet TAKE 1 TABLET BY MOUTH DAILY (Patient taking differently: Take 1 tablet by mouth Daily. ONLY TAKES IN WINTER MONTHS) 30 tablet 2   • Cholecalciferol 50 MCG (2000 UT) capsule HOLD PRIOR TO SURG     • cyclobenzaprine (FLEXERIL) 5 MG tablet Take 1 tablet by mouth At Night As Needed for Muscle Spasms.     • desvenlafaxine (PRISTIQ) 100 MG 24 hr tablet TAKE 1 TABLET BY MOUTH DAILY 90 tablet 1   • fenofibrate (TRICOR) 145 MG tablet TAKE 1 TABLET BY MOUTH EVERY DAY 90 tablet 1   • " levETIRAcetam (KEPPRA) 500 MG tablet TAKE 1/2 TABLET BY MOUTH DAILY AS NEEDED FOR HEADACHE 45 tablet 1   • metFORMIN ER (GLUCOPHAGE-XR) 500 MG 24 hr tablet TAKE 1 TABLET BY MOUTH DAILY WITH LARGEST MEAL OF THE DAY AND A GLASS OF WATER (Patient taking differently: 1 tablet Every Evening. TAKE 1 TABLET BY MOUTH DAILY WITH LARGEST MEAL OF THE DAY AND A GLASS OF WATER) 90 tablet 01   • metoprolol tartrate (LOPRESSOR) 25 MG tablet Take 1 tablet by mouth 2 (Two) Times a Day. 180 tablet 3   • Omega-3 Fatty Acids (FISH OIL) 1200 MG capsule capsule HOLD PRIOR TO SURG     • omeprazole (priLOSEC) 40 MG capsule TAKE 1 CAPSULE BY MOUTH EVERY DAY 90 capsule 0   • rosuvastatin (CRESTOR) 10 MG tablet TAKE 1 TABLET BY MOUTH EVERY NIGHT 90 tablet 1   • spironolactone (ALDACTONE) 25 MG tablet TAKE 1 TABLET BY MOUTH DAILY 30 tablet 4   • traZODone (DESYREL) 50 MG tablet Take 0.5-1 tablets by mouth At Night As Needed for Sleep. for sleep 90 tablet 1   • tretinoin (RETIN-A) 0.025 % cream Apply 1 application  topically to the appropriate area as directed Every Night.     • valACYclovir (VALTREX) 500 MG tablet Take 2 tablets by mouth 2 (Two) Times a Day. 4 tablet 3   • fluticasone (FLONASE) 50 MCG/ACT nasal spray 2 sprays into the nostril(s) as directed by provider Daily for 30 days. 16 g 0   • [DISCONTINUED] traMADol (Ultram) 50 MG tablet Take 1 tablet by mouth Every 6 (Six) Hours As Needed for Moderate Pain. 8 tablet 0     Current Facility-Administered Medications on File Prior to Visit   Medication Dose Route Frequency Provider Last Rate Last Admin   • lidocaine (XYLOCAINE) 1 % injection 3 mL  3 mL Intradermal Once Dee Saunders MD            ALLERGIES:    Allergies   Allergen Reactions   • Betadine [Povidone Iodine] Rash     Burning/ stinging feeling on her rash   • Codeine Nausea Only     BLURRED VISION, RASH        Social History     Socioeconomic History   • Marital status:      Spouse name: Prim   • Number of children: 1    • Years of education: College   Tobacco Use   • Smoking status: Never   • Smokeless tobacco: Never   Vaping Use   • Vaping Use: Never used   Substance and Sexual Activity   • Alcohol use: Yes     Alcohol/week: 3.0 - 4.0 standard drinks of alcohol     Types: 2 - 3 Glasses of wine, 1 Cans of beer per week     Comment: liquor-1 to 2 month   • Drug use: No   • Sexual activity: Yes     Partners: Male     Birth control/protection: Post-menopausal     Comment: Complete hysterectomy        Family History   Problem Relation Age of Onset   • Uterine cancer Mother         hysterectomy for precancerous cells   • Heart disease Mother         in her 70s   • Diabetes Mother         Late in life   • Arthritis Mother    • Cancer Mother         spine   • Hearing loss Mother         In her 70s   • Hyperlipidemia Mother         in her 60s   • Kidney disease Mother         ?   • Vision loss Mother         Macular Degeneration   • Osteoporosis Mother    • Skin cancer Father    • Heart disease Father         in his early 60s   • Colon polyps Father         negative   • Cancer Father         skin   • Heart disease Sister         Heart attack-64   • Atrial fibrillation Sister    • Hyperlipidemia Sister         in her 50s   • Vision loss Sister         Macular Degeneration   • Rheumatologic disease Maternal Aunt    • Diabetes Maternal Grandmother         Late in life   • Heart disease Maternal Grandmother         Stroke-80   • Stroke Maternal Grandmother    • Hyperlipidemia Maternal Grandmother         ?   • Kidney disease Maternal Grandmother         ?   • Heart disease Paternal Grandmother         CHF   • Stroke Paternal Grandmother    • Depression Paternal Grandmother    • Hyperlipidemia Paternal Grandmother         ?   • Anxiety disorder Paternal Grandmother         Agoraphobia/anxiety   • Heart disease Paternal Grandfather         Emphysema-heart disease   • Stroke Paternal Grandfather    • Malig Hyperthermia Neg Hx       Gynecologic  "History:   . P:1 AB:0  Age at first childbirth: 27  Lactation/How long: none  Age at menarche: 16  Age at menopause: 49  Total years of oral contraceptive use: 6 years previously  Total years of hormone replacement therapy: on premarin, just stopped      Past Medical History:   HTN  DM  CAD, Dr. Lynch   HLD     Past Surgical History:    Colonoscopy  Hysterectomy  Knee surgery  Tonsillectomy and adenoidectomy  Trigger point injection     Family History:    As above     Social History:  Denies tobacco use  Occasional alcohol use    Review of Systems   Constitutional:  Negative for appetite change, chills, diaphoresis, fatigue, fever and unexpected weight change.   HENT:  Negative for hearing loss, sore throat and trouble swallowing.    Respiratory:  Negative for cough, chest tightness, shortness of breath and wheezing.    Cardiovascular:  Negative for chest pain, palpitations and leg swelling.   Gastrointestinal:  Negative for abdominal distention, abdominal pain, constipation, diarrhea, nausea and vomiting.   Genitourinary:  Negative for dysuria, frequency, hematuria and urgency.   Musculoskeletal:  Negative for joint swelling.        No muscle weakness.   Skin:  Negative for rash and wound.   Neurological:  Negative for seizures, syncope, speech difficulty, weakness, numbness and headaches.   Hematological:  Negative for adenopathy. Does not bruise/bleed easily.   Psychiatric/Behavioral:  Negative for behavioral problems, confusion and suicidal ideas.         Objective    Vitals:    23 1118   BP: 138/85   Pulse: 60   Temp: 98 °F (36.7 °C)   TempSrc: Temporal   SpO2: 97%   Weight: 64.7 kg (142 lb 9.6 oz)   Height: 165.1 cm (65\")   PainSc:   2   PainLoc: Generalized         2023    11:15 AM   Current Status   ECOG score 0       Physical Exam    Patient screened positive for depression based on a PHQ-9 score of 9 on 2023. Follow-up recommendations include: PCP managing depression.       This " patient's ACP documentation is up to date, and there's nothing further left to document.      CONSTITUTIONAL:  Vital signs reviewed.  No distress, looks comfortable.  RESPIRATORY:  Normal respiratory effort.  Lungs clear to auscultation bilaterally.  CARDIOVASCULAR:  Normal S1, S2.  No murmurs rubs or gallops.  No significant lower extremity edema.  BREAST: Right breast: No skin changes, no evidence of breast mass, no nipple discharge, no evidence of any right axillary adenopathy or right supraclavicular adenopathy  Left breast: No evidence of any skin changes, no evidence of any left breast mass and no evidence of left nipple discharge as well as no left axillary adenopathy or left supraclavicular adenopathy.   GASTROINTESTINAL: Abdomen appears unremarkable.  Nontender.  No hepatomegaly.  No splenomegaly.  LYMPHATIC:  No cervical, supraclavicular, axillary lymphadenopathy.  SKIN:  Warm.  No rashes.  PSYCHIATRIC:  Normal judgment and insight.  Normal mood and affect.    RECENT LABS:  Hematology WBC   Date Value Ref Range Status   12/21/2023 6.09 3.40 - 10.80 10*3/mm3 Final   08/22/2022 5.1 3.4 - 10.8 x10E3/uL Final     RBC   Date Value Ref Range Status   12/21/2023 4.76 3.77 - 5.28 10*6/mm3 Final   08/22/2022 5.02 3.77 - 5.28 x10E6/uL Final     Hemoglobin   Date Value Ref Range Status   12/21/2023 14.5 12.0 - 15.9 g/dL Final     Hematocrit   Date Value Ref Range Status   12/21/2023 42.3 34.0 - 46.6 % Final     Platelets   Date Value Ref Range Status   12/21/2023 275 140 - 450 10*3/mm3 Final          Assessment & Plan    #.Pathologic Tis NX right breast DCIS 9 o'clock position, 29 mm DCIS grade 3 with comedonecrosis margins negative for DCIS this post right lumpectomy for 16 2023: % NE % s/p right lumpectomy November 16, 2023  Patient is referred here for endocrine therapy  Discussed side effects of endocrine therapy  Tamoxifen causes hot flashes, rare chance for uterine cancer, blood clots, DVT, PE, hair  thinning, rare chance for thrombocytopenia, cataracts.  Aromatase inhibitors can cause arthralgias, hot flashes, decrease in bone density, rare chance for diarrhea, also discussed about hot flashes with it.  Evista is approved for osteopenia and can also be used for prophylaxis with fewer side effects except hot flashes and rare chance for cataracts but it can improve bone density.  Imfinzi seen radiation oncology Dr. Sana Markham and he plans to do radiation  After discussion about all the side effects patient prefers to take raloxifene.  She is opposed to taking tamoxifen or aromatase inhibitor    #.  Bone density shows osteopenia with a T-score of -2.3 but bone density was done in 2020.  She will need to get it done     Plan  Reviewed all records and imaging studies in detail and discussed with patient  Reviewed pathology  Given she was ER/MI highly positive with the DCIS she could potentially be a candidate for endocrine therapy  However given her osteopenia she would require another bone density as she is overdue  She could be offered Arimidex if she is osteopenic would require to go on Prolia or consider tamoxifen.  Patient preferred to go on raloxifene and refused both tamoxifen and aromatase inhibitor  In length side effects of raloxifene and will start 60 mg p.o. daily  Discussed side effects of all of the endocrine therapy  Up with me end of March 2024    MD Dr. Whitney Dudley

## 2023-12-21 NOTE — LETTER
December 21, 2023     Dee Saunders MD  [address]  [facility] [address]  [place] [id number]    Patient: Alexandria Yousif   YOB: 1945   Date of Visit: 12/21/2023     Dear Dee Saunders MD:       Thank you for referring Alexandria Yousif to me for evaluation. Below are the relevant portions of my assessment and plan of care.    If you have questions, please do not hesitate to call me. I look forward to following Alexandria along with you.         Sincerely,        Stephanie Chapman MD        CC: No Recipients    Stephanie Chapman MD  12/21/23 2150  Signed    Subjective    REASON FOR CONSULTATION: Pathologic Tis NX right breast DCIS 9 o'clock position, 29 mm DCIS grade 3 with comedonecrosis margins negative for DCIS this post right lumpectomy for 16 2023:  Provide an opinion on any further workup or treatment                             REQUESTING PHYSICIAN: Dr. Whitney Saunders    RECORDS OBTAINED:  Records of the patients history including those obtained from the referring provider were reviewed and summarized in detail.    HISTORY OF PRESENT ILLNESS:  The patient is a 78 y.o. year old female who is here for an opinion about the above issue.    History of Present Illness     Patient is a 78-year-old female who was referred here by Dr. Saunders for new diagnosis of right breast cancer.  She had a abnormal screening mammogram.  She did not really feel a breast mass or lump.    There is no family history of breast or ovarian cancer.  Her mother had apparently precancerous cells at hysterectomy.    Details are as follows    August 21, 2023: Bilateral screening mammogram  8/21/2023 Bilateral Screening Mammogram:  IMPRESSION:  1. Microcalcifications in the middle third upper outer quadrant of the right breast are noted. Further evaluation with spot magnification CC and 90 degree lateral images is recommended.  2. There are no findings suspicious for malignancy in the left breast.  BI-RADS CATEGORY 0     9/13/2023 Right  Breast Diagnostic Mammogram:   The patient returned for diagnostic imaging with coned magnified views in both projections and R 2 and digital tomosynthesis. These more clearly delineate clustered microcalcifications which have a slightly pleomorphic configuration and are considered indeterminant. They appear to be new since an earlier mammogram dated 08/17/2022 and therefore further evaluation with stereotactic core biopsy is recommended.  CONCLUSION: Suspicious clustered microcalcifications in middle third of upper outer right breast and further evaluation with stereotactic core biopsy is recommended. These findings have been discussed with the  patient.   BI-RADS 4. Suspicious abnormality.     10/16/2023 Right Breast Stereotactic Biopsy:      10/16/2023 Pathology:   Final Diagnosis   1. Right Breast, 9:00, Stereotactic-Guided Core Needle Biopsy for Calcifications:  A. 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               B. Greatest contiguous extent of DCIS measures 10 mm.               C. See biomarker template.      11/1/2023 Bilateral Breast MRI    FINDINGS: Scattered fibroglandular tissue is seen throughout both  breasts. Mild background parenchymal enhancement of both breasts is  noted.     In the middle-third of the right breast at the 9-o'clock position there  is a postbiopsy cavity that measures on the order of 2.1 cm in greatest  dimension. An internal focal signal void is seen within the cavity. This  corresponds to the bowtie shaped metallic clip at the biopsy-proven site  of malignancy. There is linear enhancement seen at the anterior,  superior and posterior margin of the cavity. The anterior linear  enhancement measures up to 0.5 cm in length. The superior linear  enhancement measures up to 0.7 cm in length and the posterior linear  enhancement measures up to 0.8 cm in length. Including the  cavity the  entire region measures on the order of 2.4 cm in anterior posterior  dimension, 1.8 cm in the superior inferior dimension and 1.8 cm in the  medial lateral dimension.     No other suspicious areas of enhancement or morphology are seen in the  right breast. I see no evidence for abnormal right breast skin, nipple  or chest wall enhancement and there is no evidence for right axillary or  internal mammary chain adenopathy.     There are no areas of suspicious enhancement or morphology in the left  breast. I see no evidence for abnormal left breast skin, nipple or chest  wall enhancement and there is no evidence for left axillary or internal  mammary chain adenopathy.     IMPRESSION:  1. Biopsy-proven site of DCIS in the right breast in the middle-third at  the 9-o'clock position represented by the bowtie shaped metallic clip  within a postbiopsy cavity with thin linear enhancement measuring up to  0.7 cm at the anterior, superior and posterior margins of the biopsy  cavity. The entire region including the biopsy cavity and linear  enhancement measures up to 2.4 cm in greatest dimension. No other  suspicious findings are seen within the right breast and there is no  evidence for right axillary adenopathy.  2. There are no findings suspicious for malignancy in the left breast.     BI-RADS Category 6: Known biopsy-proven malignancy.       November 16, 2023: Right breast wire localized lumpectomy    Synoptic Checklist   DCIS OF THE BREAST: Resection   8th Edition - Protocol posted: 3/23/2022DCIS OF THE BREAST: RESECTION - All Specimens  SPECIMEN   Procedure  Excision (less than total mastectomy)   Specimen Laterality  Right   TUMOR   Tumor Site  Clock position     9 o'clock   Histologic Type  Ductal carcinoma in situ   Size (Extent) of DCIS  Estimated size (extent) of DCIS is at least (Millimeters): 29 mm   Architectural Patterns  Comedo     Solid     Pagetoid   Nuclear Grade  Grade III (high)   Necrosis   "Present, central (expansive \"comedo\" necrosis)   Microcalcifications  Present in DCIS   MARGINS   Margin Status  All margins negative for DCIS   Distance from DCIS to Closest Margin  7.5 mm   Closest Margin(s) to DCIS  Superior   Distance from DCIS to Anterior Margin  12 mm   Distance from DCIS to Posterior Margin  13 mm   Distance from DCIS to Superior Margin  7.5 mm   Distance from DCIS to Inferior Margin  14.4 mm   Distance from DCIS to Medial Margin  18 mm   Distance from DCIS to Lateral Margin  21 mm   REGIONAL LYMPH NODES   Regional Lymph Node Status  Not applicable (no regional lymph nodes submitted or found)   PATHOLOGIC STAGE CLASSIFICATION (pTNM, AJCC 8th Edition)   Reporting of pT, pN, and (when applicable) pM categories is based on information available to the pathologist at the time the report is issued. As per the AJCC (Chapter 1, 8th Ed.) it is the managing physician’s responsibility to establish the final pathologic stage based upon all pertinent information, including but potentially not limited to this pathology report.   pT Category  pTis (DCIS)   pN Category  pN not assigned (no nodes submitted or found)   Breast Biomarker Testing Performed on Previous Biopsy     Estrogen Receptor (ER) Status  Positive   Percentage of Cells with Nuclear Positivity  %   Breast Biomarker Testing Performed on Previous Biopsy     Progesterone Receptor (PgR) Status  Positive   Percentage of Cells with Nuclear Positivity  %          Patient has been referred here for evaluation for endocrine therapy      Past Medical History:   Diagnosis Date   • Abnormal ECG 2019    DR BLAND   • Allergic 1980   • Anxiety and depression    • Arthritis of back ?   • Arthritis of neck ?   • Basal cell carcinoma 03/2008    Scalp and face   • Cervical herniated disc    • CHF (congestive heart failure) 2019   • Colon polyp 02/2020   • Coronary artery disease 2019   • CVA (cerebral vascular accident) 2018    Mild to moderat " ischemia   • GERD (gastroesophageal reflux disease)    • Grade II diastolic dysfunction    • Headache    • Heart murmur    • Hypercalcemia 03/2016    mild at best when corrected for ongoing abnormal albumin levels with normal albumin being up to 4.7 g/dL though that is an issue with reference range here   • Hyperlipidemia    • Hypertension    • Injury of neck 1971    Hernieated disc when broke my back   • Low back pain 1971    Broken back   • Lumbosacral disc disease 1971    Multiple fractures/herniated disc/accident   • LVH (left ventricular hypertrophy) 2019   • Malignant neoplasm of female breast 10/23/2023   • Neck strain 1971   • OA (osteoarthritis)    • Osteopenia    • PAT (paroxysmal atrial tachycardia)     12 very brief runs of atrial tachycardia with the longest 7 beats in duration per 24-hour Holter monitor in June 2019   • Pneumonia 08/2023 8/2023   • Postoperative wound infection     7/2023 FROM FOOT INJURY   • PSVT (paroxysmal supraventricular tachycardia)     5 episodes of PSVT the longest 11 beats in duration per Zio patch monitor in December 2019   • PVCs (premature ventricular contractions)     5% burden per Zio patch monitor in December 2019   • Rheumatic fever     Age 17   • Rotator cuff syndrome 1990s   • Shortness of breath     WITH EXERTION   • TIA (transient ischemic attack)    • Tinnitus    • Type II diabetes mellitus 2019    Began Metformin   • Vertigo         Past Surgical History:   Procedure Laterality Date   • BREAST BIOPSY  10/16/2023    At    • BREAST LUMPECTOMY Right 11/16/2023    Procedure: right breast wire localized lumpectomy;  Surgeon: Dee Saunders MD;  Location: [facility];  Service: General;  Laterality: Right;   • BRONCHOSCOPY N/A 03/21/2023    Procedure: BRONCHOSCOPY WITH BX;  Surgeon: Mae Barry MD;  Location: [facility] ENDOSCOPY;  Service: Pulmonary;  Laterality: N/A;  PRE/POST-ABNORMAL IMAGING    • CEREBRAL ANGIOGRAM N/A 10/08/2018    Procedure: CEREBRAL  "ANGIOGRAM AND VENOGRAM WITH INTRASINUS PRESSURE RECORDING;  Surgeon: Alfredo Caruso MD;  Location: [facility] HYBRID OR 18/19;  Service: Neurosurgery   • COLONOSCOPY      2010   • COLONOSCOPY N/A 02/17/2020    Procedure: COLONOSCOPY TO CECUM WITH COLD BIOPSY POLYPECTOMY;  Surgeon: Stan Flood MD;  Location: [facility] ENDOSCOPY;  Service: Gastroenterology;  Laterality: N/A;  PRE- FAMILY HX COLON POLYPS  POST- POLYP, HEMORRHOIDS   • ENDOSCOPY  02/27/2017    Esophagitis, gastritis, small hh   • HYSTERECTOMY     • KNEE SURGERY Right    • MOHS SURGERY     • OOPHORECTOMY     • TONSILLECTOMY AND ADENOIDECTOMY  1965   • TRIGGER POINT INJECTION  1990s    Spinal   • UPPER GASTROINTESTINAL ENDOSCOPY  2016    \"Extremely severe GERD\"        Current Outpatient Medications on File Prior to Visit   Medication Sig Dispense Refill   • acetaminophen (TYLENOL) 500 MG tablet Take 2 tablets by mouth Every 6 (Six) Hours As Needed for Mild Pain.     • albuterol sulfate  (90 Base) MCG/ACT inhaler INHALE 2 PUFFS BY MOUTH INTO THE LUNGS EVERY 4 HOURS AS NEEDED FOR WHEEZING 8.5 g 0   • ALPRAZolam (XANAX) 0.5 MG tablet TAKE 1 TABLET BY MOUTH DAILY AS NEEDED FOR ANXIETY 45 tablet 0   • Alum Hydroxide-Mag Trisilicate (GAVISCON) 80-14.2 MG chewable tablet Chew 1 tablet 4 (Four) Times a Day As Needed (Heartburn). 224 each 11   • aspirin 81 MG tablet Take 1 tablet by mouth Daily.     • Biotin 10 MG tablet HOLD PRIOR TO SURG     • buPROPion (WELLBUTRIN) 75 MG tablet TAKE 1 TABLET BY MOUTH DAILY (Patient taking differently: Take 1 tablet by mouth Daily. ONLY TAKES IN WINTER MONTHS) 30 tablet 2   • Cholecalciferol 50 MCG (2000 UT) capsule HOLD PRIOR TO SURG     • cyclobenzaprine (FLEXERIL) 5 MG tablet Take 1 tablet by mouth At Night As Needed for Muscle Spasms.     • desvenlafaxine (PRISTIQ) 100 MG 24 hr tablet TAKE 1 TABLET BY MOUTH DAILY 90 tablet 1   • fenofibrate (TRICOR) 145 MG tablet TAKE 1 TABLET BY MOUTH EVERY DAY 90 tablet 1   • " levETIRAcetam (KEPPRA) 500 MG tablet TAKE 1/2 TABLET BY MOUTH DAILY AS NEEDED FOR HEADACHE 45 tablet 1   • metFORMIN ER (GLUCOPHAGE-XR) 500 MG 24 hr tablet TAKE 1 TABLET BY MOUTH DAILY WITH LARGEST MEAL OF THE DAY AND A GLASS OF WATER (Patient taking differently: 1 tablet Every Evening. TAKE 1 TABLET BY MOUTH DAILY WITH LARGEST MEAL OF THE DAY AND A GLASS OF WATER) 90 tablet 01   • metoprolol tartrate (LOPRESSOR) 25 MG tablet Take 1 tablet by mouth 2 (Two) Times a Day. 180 tablet 3   • Omega-3 Fatty Acids (FISH OIL) 1200 MG capsule capsule HOLD PRIOR TO SURG     • omeprazole (priLOSEC) 40 MG capsule TAKE 1 CAPSULE BY MOUTH EVERY DAY 90 capsule 0   • rosuvastatin (CRESTOR) 10 MG tablet TAKE 1 TABLET BY MOUTH EVERY NIGHT 90 tablet 1   • spironolactone (ALDACTONE) 25 MG tablet TAKE 1 TABLET BY MOUTH DAILY 30 tablet 4   • traZODone (DESYREL) 50 MG tablet Take 0.5-1 tablets by mouth At Night As Needed for Sleep. for sleep 90 tablet 1   • tretinoin (RETIN-A) 0.025 % cream Apply 1 application  topically to the appropriate area as directed Every Night.     • valACYclovir (VALTREX) 500 MG tablet Take 2 tablets by mouth 2 (Two) Times a Day. 4 tablet 3   • fluticasone (FLONASE) 50 MCG/ACT nasal spray 2 sprays into the nostril(s) as directed by provider Daily for 30 days. 16 g 0   • [DISCONTINUED] traMADol (Ultram) 50 MG tablet Take 1 tablet by mouth Every 6 (Six) Hours As Needed for Moderate Pain. 8 tablet 0     Current Facility-Administered Medications on File Prior to Visit   Medication Dose Route Frequency Provider Last Rate Last Admin   • lidocaine (XYLOCAINE) 1 % injection 3 mL  3 mL Intradermal Once Dee Saunders MD            ALLERGIES:    Allergies   Allergen Reactions   • Betadine [Povidone Iodine] Rash     Burning/ stinging feeling on her rash   • Codeine Nausea Only     BLURRED VISION, RASH        Social History     Socioeconomic History   • Marital status:      Spouse name: [place]   • Number of children: 1    • Years of education: College   Tobacco Use   • Smoking status: Never   • Smokeless tobacco: Never   Vaping Use   • Vaping Use: Never used   Substance and Sexual Activity   • Alcohol use: Yes     Alcohol/week: 3.0 - 4.0 standard drinks of alcohol     Types: 2 - 3 Glasses of wine, 1 Cans of beer per week     Comment: liquor-1 to 2 month   • Drug use: No   • Sexual activity: Yes     Partners: Male     Birth control/protection: Post-menopausal     Comment: Complete hysterectomy        Family History   Problem Relation Age of Onset   • Uterine cancer Mother         hysterectomy for precancerous cells   • Heart disease Mother         in her 70s   • Diabetes Mother         Late in life   • Arthritis Mother    • Cancer Mother         spine   • Hearing loss Mother         In her 70s   • Hyperlipidemia Mother         in her 60s   • Kidney disease Mother         ?   • Vision loss Mother         Macular Degeneration   • Osteoporosis Mother    • Skin cancer Father    • Heart disease Father         in his early 60s   • Colon polyps Father         negative   • Cancer Father         skin   • Heart disease Sister         Heart attack-64   • Atrial fibrillation Sister    • Hyperlipidemia Sister         in her 50s   • Vision loss Sister         Macular Degeneration   • Rheumatologic disease Maternal Aunt    • Diabetes Maternal Grandmother         Late in life   • Heart disease Maternal Grandmother         Stroke-80   • Stroke Maternal Grandmother    • Hyperlipidemia Maternal Grandmother         ?   • Kidney disease Maternal Grandmother         ?   • Heart disease Paternal Grandmother         CHF   • Stroke Paternal Grandmother    • Depression Paternal Grandmother    • Hyperlipidemia Paternal Grandmother         ?   • Anxiety disorder Paternal Grandmother         Agoraphobia/anxiety   • Heart disease Paternal Grandfather         Emphysema-heart disease   • Stroke Paternal Grandfather    • Malig Hyperthermia Neg Hx       Gynecologic  "History:   . P:1 AB:0  Age at first childbirth: 27  Lactation/How long: none  Age at menarche: 16  Age at menopause: 49  Total years of oral contraceptive use: 6 years previously  Total years of hormone replacement therapy: on premarin, just stopped      Past Medical History:   HTN  DM  CAD, Dr. Lynch   HLD     Past Surgical History:    Colonoscopy  Hysterectomy  Knee surgery  Tonsillectomy and adenoidectomy  Trigger point injection     Family History:    As above     Social History:  Denies tobacco use  Occasional alcohol use    Review of Systems   Constitutional:  Negative for appetite change, chills, diaphoresis, fatigue, fever and unexpected weight change.   HENT:  Negative for hearing loss, sore throat and trouble swallowing.    Respiratory:  Negative for cough, chest tightness, shortness of breath and wheezing.    Cardiovascular:  Negative for chest pain, palpitations and leg swelling.   Gastrointestinal:  Negative for abdominal distention, abdominal pain, constipation, diarrhea, nausea and vomiting.   Genitourinary:  Negative for dysuria, frequency, hematuria and urgency.   Musculoskeletal:  Negative for joint swelling.        No muscle weakness.   Skin:  Negative for rash and wound.   Neurological:  Negative for seizures, syncope, speech difficulty, weakness, numbness and headaches.   Hematological:  Negative for adenopathy. Does not bruise/bleed easily.   Psychiatric/Behavioral:  Negative for behavioral problems, confusion and suicidal ideas.         Objective    Vitals:    23 1118   BP: 138/85   Pulse: 60   Temp: 98 °F (36.7 °C)   TempSrc: Temporal   SpO2: 97%   Weight: 64.7 kg (142 lb 9.6 oz)   Height: 165.1 cm (65\")   PainSc:   2   PainLoc: Generalized         2023    11:15 AM   Current Status   ECOG score 0       Physical Exam    Patient screened positive for depression based on a PHQ-9 score of 9 on 2023. Follow-up recommendations include: PCP managing depression.       This " patient's ACP documentation is up to date, and there's nothing further left to document.      CONSTITUTIONAL:  Vital signs reviewed.  No distress, looks comfortable.  RESPIRATORY:  Normal respiratory effort.  Lungs clear to auscultation bilaterally.  CARDIOVASCULAR:  Normal S1, S2.  No murmurs rubs or gallops.  No significant lower extremity edema.  BREAST: Right breast: No skin changes, no evidence of breast mass, no nipple discharge, no evidence of any right axillary adenopathy or right supraclavicular adenopathy  Left breast: No evidence of any skin changes, no evidence of any left breast mass and no evidence of left nipple discharge as well as no left axillary adenopathy or left supraclavicular adenopathy.   GASTROINTESTINAL: Abdomen appears unremarkable.  Nontender.  No hepatomegaly.  No splenomegaly.  LYMPHATIC:  No cervical, supraclavicular, axillary lymphadenopathy.  SKIN:  Warm.  No rashes.  PSYCHIATRIC:  Normal judgment and insight.  Normal mood and affect.    RECENT LABS:  Hematology WBC   Date Value Ref Range Status   12/21/2023 6.09 3.40 - 10.80 10*3/mm3 Final   08/22/2022 5.1 3.4 - 10.8 x10E3/uL Final     RBC   Date Value Ref Range Status   12/21/2023 4.76 3.77 - 5.28 10*6/mm3 Final   08/22/2022 5.02 3.77 - 5.28 x10E6/uL Final     Hemoglobin   Date Value Ref Range Status   12/21/2023 14.5 12.0 - 15.9 g/dL Final     Hematocrit   Date Value Ref Range Status   12/21/2023 42.3 34.0 - 46.6 % Final     Platelets   Date Value Ref Range Status   12/21/2023 275 140 - 450 10*3/mm3 Final          Assessment & Plan    #.Pathologic Tis NX right breast DCIS 9 o'clock position, 29 mm DCIS grade 3 with comedonecrosis margins negative for DCIS this post right lumpectomy for 16 2023: % [place] % s/p right lumpectomy November 16, 2023  Patient is referred here for endocrine therapy  Discussed side effects of endocrine therapy  Tamoxifen causes hot flashes, rare chance for uterine cancer, blood clots, DVT, PE, hair  thinning, rare chance for thrombocytopenia, cataracts.  Aromatase inhibitors can cause arthralgias, hot flashes, decrease in bone density, rare chance for diarrhea, also discussed about hot flashes with it.  Evista is approved for osteopenia and can also be used for prophylaxis with fewer side effects except hot flashes and rare chance for cataracts but it can improve bone density.  Imfinzi seen radiation oncology Dr. Sana Markham and he plans to do radiation  After discussion about all the side effects patient prefers to take raloxifene.  She is opposed to taking tamoxifen or aromatase inhibitor    #.  Bone density shows osteopenia with a T-score of -2.3 but bone density was done in 2020.  She will need to get it done     Plan  Reviewed all records and imaging studies in detail and discussed with patient  Reviewed pathology  Given she was ER/LA highly positive with the DCIS she could potentially be a candidate for endocrine therapy  However given her osteopenia she would require another bone density as she is overdue  She could be offered Arimidex if she is osteopenic would require to go on Prolia or consider tamoxifen.  Patient preferred to go on raloxifene and refused both tamoxifen and aromatase inhibitor  In length side effects of raloxifene and will start 60 mg p.o. daily  Discussed side effects of all of the endocrine therapy  Up with me end of March 2024    MD Dr. Whitney Dudley

## 2023-12-22 ENCOUNTER — TELEPHONE (OUTPATIENT)
Dept: OTHER | Facility: HOSPITAL | Age: 78
End: 2023-12-22
Payer: MEDICARE

## 2023-12-22 NOTE — PATIENT INSTRUCTIONS
GIOVANI Xavier, PMHNP  [facility]  [address] [place], [place]   [phone]      Please see below and attached for additional resources:  Group, individual (for the patient and family members), and family therapy    [employer]    [email or web address]    Phone number: [phone]    Support for patients: matched with someone with a similar diagnosis and in survivorship    [employer]  [email or web address]    Self-referrals for wanting a primary care doctor or a specialist  PCP [phone]  Specialist [phone]    Bereavement Support Groups in the [place] Area  Grief Share: [email or web address]/[email or web address]/ky/[email or web address]  *If you go to the website, you can click on the specific group date and it will give additional info about the groups*    Therapy resources  Chronic Illness counseling center  [email or web address]  [address] , [address]  [place] [address]    [facility]:    Their phone # is: [phone].    They have two locations:      [address]  [place] [address]      [address], [address]  [place] [address]  FREE Massages  Patients are allowed some massages for FREE through the supportive oncology department. Massages at the cancer center and are done by Radha. She does her own scheduling.  Office:  [phone]    Mobile:  [phone]    FREE exercise program  Free exercise program [employer] program through the [employer]:  https://www.[facility].org/what-we-do/healthy-living/fitness/livestrong    Chiropractic services ([facility])  [place]  [phone]    Accupuncture  Dr. Dee Garcia does Accupuncture  [facility]  [address] [place], [place] [id number] [phone] phone  [phone] fax    [employer]  testing  [email or web address]_[email or web address]_[email or web address]&utm_[email or web address]&utm_[email or web address]&utm_term=genesight%20testing&utm_source=re&utm_medium=cpc&utm_campaign=branded&utm_content=[email or web address]&[email or web address]=genesight%20testing&[id number]

## 2023-12-22 NOTE — TELEPHONE ENCOUNTER
Rx Refill Note  Requested Prescriptions     Pending Prescriptions Disp Refills    metoprolol tartrate (LOPRESSOR) 25 MG tablet 180 tablet 3     Sig: Take 1 tablet by mouth 2 (Two) Times a Day.      Last office visit with prescribing clinician: 8/22/2023   Last telemedicine visit with prescribing clinician: Visit date not found   Next office visit with prescribing clinician: Visit date not found                         Would you like a call back once the refill request has been completed: [] Yes [] No    If the office needs to give you a call back, can they leave a voicemail: [] Yes [] No    Cassidy Romero CMA  12/22/23, 14:45 EST

## 2023-12-22 NOTE — TELEPHONE ENCOUNTER
"Oncology Social Work     Distress Level: 9 (12/21/2023  1:00 PM)     OSW reached out to patient related to her distress score on 12/21 of 9. Patient has breast cancer. She is being followed by [facility] for nurse navigation. She saw Elyssa MATUTE behavioral health oncology yesterday. OSW and patient spoke over the phone. Patient reported that her distress is so high related to her anxiety and depression. Patient believes once those are managed she can better manage other areas of her life. Patient reported how her anxiety has been elevated due to \"the unknowns\" of her treatment plan and diagnosis. Patient voiced that she does have a good support system and her  is her main support.   Patient will be followed by Elyssa MATUTE therefore patient declined any OSW needs at this time.     OSW will mail patient OSW contact information if any needs very arise.     Dipika Rubio CSW    "

## 2023-12-27 NOTE — TELEPHONE ENCOUNTER
Caller: Alexandria Yousif    Relationship: Self    Best call back number:     [phone] (Mobile)       Requested Prescriptions:   Requested Prescriptions     Pending Prescriptions Disp Refills    cyclobenzaprine (FLEXERIL) 5 MG tablet       Sig: Take 1 tablet by mouth At Night As Needed for Muscle Spasms.        [id number] [place] [id number] [address]  AT [facility] [address]  [phone] [facility] [phone]      Last office visit with prescribing clinician: 9/5/2023   Last telemedicine visit with prescribing clinician: Visit date not found   Next office visit with prescribing clinician: 3/5/2024     Additional details provided by patient: LIUDMILALEY OUT     Does the patient have less than a 3 day supply:  [x] Yes  [] No    Would you like a call back once the refill request has been completed: [x] Yes [] No    If the office needs to give you a call back, can they leave a voicemail: [x] Yes [] No    Saranya Sherman Rep   12/27/23 12:34 EST

## 2023-12-28 ENCOUNTER — TELEPHONE (OUTPATIENT)
Dept: PSYCHIATRY | Facility: HOSPITAL | Age: 78
End: 2023-12-28
Payer: MEDICARE

## 2023-12-28 ENCOUNTER — HOSPITAL ENCOUNTER (OUTPATIENT)
Dept: RADIATION ONCOLOGY | Facility: HOSPITAL | Age: 78
Setting detail: RADIATION/ONCOLOGY SERIES
End: 2023-12-28
Payer: MEDICARE

## 2023-12-28 PROCEDURE — 77332 RADIATION TREATMENT AID(S): CPT | Performed by: STUDENT IN AN ORGANIZED HEALTH CARE EDUCATION/TRAINING PROGRAM

## 2023-12-28 PROCEDURE — 77290 THER RAD SIMULAJ FIELD CPLX: CPT | Performed by: STUDENT IN AN ORGANIZED HEALTH CARE EDUCATION/TRAINING PROGRAM

## 2023-12-28 RX ORDER — CYCLOBENZAPRINE HCL 5 MG
5 TABLET ORAL NIGHTLY PRN
Qty: 30 TABLET | Refills: 0 | Status: SHIPPED | OUTPATIENT
Start: 2023-12-28

## 2023-12-28 NOTE — TELEPHONE ENCOUNTER
12/28/23 @ 09:23AM EST  Received an in basket message that the patient had called and would like a call back.     Spoke with the patient. She reports that she had been having some withdrawals with coming off of her Trazodone x 2 days over the weekend including increased agitation, poor sleep, and shakiness. She reports that she called her pharmacist and followed their recommendation of cutting the trazodone tablet in half and then taking it in addition to her Gabapentin. She reports she has been doing better since. She increased her Gabapentin to 300 mg last night and did not take the trazodone and reports that she slept well last night.    Additionally, she plans to take some xanax prior to her CT scan this morning, since she knows that she has a good response to it. She denies any other questions or concerns at this time and is scheduled for a follow up next week on 1/3/24.  BD

## 2023-12-28 NOTE — TELEPHONE ENCOUNTER
Rx Refill Note  Requested Prescriptions     Pending Prescriptions Disp Refills    cyclobenzaprine (FLEXERIL) 5 MG tablet       Sig: Take 1 tablet by mouth At Night As Needed for Muscle Spasms.      Last office visit with prescribing clinician: 9/5/2023   Last telemedicine visit with prescribing clinician: Visit date not found   Next office visit with prescribing clinician: 3/5/2024                         Would you like a call back once the refill request has been completed: [] Yes [] No    If the office needs to give you a call back, can they leave a voicemail: [] Yes [] No    Juancarlos Laguna MA  12/28/23, 09:54 EST

## 2024-01-02 ENCOUNTER — TELEPHONE (OUTPATIENT)
Dept: PSYCHIATRY | Facility: HOSPITAL | Age: 79
End: 2024-01-02
Payer: MEDICARE

## 2024-01-02 ENCOUNTER — HOSPITAL ENCOUNTER (OUTPATIENT)
Dept: RADIATION ONCOLOGY | Facility: HOSPITAL | Age: 79
Setting detail: RADIATION/ONCOLOGY SERIES
End: 2024-01-02
Payer: MEDICARE

## 2024-01-02 PROCEDURE — 77295 3-D RADIOTHERAPY PLAN: CPT | Performed by: STUDENT IN AN ORGANIZED HEALTH CARE EDUCATION/TRAINING PROGRAM

## 2024-01-02 PROCEDURE — 77334 RADIATION TREATMENT AID(S): CPT | Performed by: STUDENT IN AN ORGANIZED HEALTH CARE EDUCATION/TRAINING PROGRAM

## 2024-01-02 PROCEDURE — 77300 RADIATION THERAPY DOSE PLAN: CPT | Performed by: STUDENT IN AN ORGANIZED HEALTH CARE EDUCATION/TRAINING PROGRAM

## 2024-01-02 NOTE — TELEPHONE ENCOUNTER
LVM for patient to return call to be rescheduled for her appointment due to her transportation issues.

## 2024-01-02 NOTE — TELEPHONE ENCOUNTER
Patient called back stated that she had worked it out where she is able to come to her appointment with Elyssa MATUTE

## 2024-01-03 ENCOUNTER — OFFICE VISIT (OUTPATIENT)
Dept: PSYCHIATRY | Facility: HOSPITAL | Age: 79
End: 2024-01-03
Payer: MEDICARE

## 2024-01-03 DIAGNOSIS — F32.A DEPRESSION, UNSPECIFIED DEPRESSION TYPE: ICD-10-CM

## 2024-01-03 DIAGNOSIS — G47.00 INSOMNIA, UNSPECIFIED TYPE: ICD-10-CM

## 2024-01-03 DIAGNOSIS — F06.4 ANXIETY DISORDER DUE TO GENERAL MEDICAL CONDITION: Primary | ICD-10-CM

## 2024-01-03 DIAGNOSIS — R23.2 HOT FLASHES: ICD-10-CM

## 2024-01-03 RX ORDER — GABAPENTIN 300 MG/1
CAPSULE ORAL
Qty: 60 CAPSULE | Refills: 0 | Status: SHIPPED | OUTPATIENT
Start: 2024-01-03

## 2024-01-03 NOTE — PROGRESS NOTES
"aSupportive Oncology Services  In person follow up session - The primary office location is [facility].     Subjective  Patient ID: Alexandria Yousif is a 78 y.o. female is seen face to face in the Supportive Oncology Services ([facility]) Clinic. 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  Status post right breast lumpectomy with negative margins.  She will start radiation treatment next week and will plan to follow it with oral hormone therapy. She is followed by Dr. Rashi Hood.    CC: Mildly improving depression, anxiety, and sleep. \"I think my medication is starting to work a little bit.\" No changes with hot flashes.    Last seen in person: 12/21/2023    HPI/ Interval History: This is a follow up visit for re-evaluation and medication management of depression, anxiety, hot flashes, and poor sleep. She reports she has been sleeping better since taking a combination of Gabapentin 100 mg at bedtime, plus 1/2 tablet (25mg) of trazodone at bedtime every other night to help decrease withdrawals. She endorses that she tried to stop her trazodone altogether and developed some withdrawal symptoms over the holiday weekend, so she called her pharmacist and was advised to slowly taper it down before discontinuation, which has been helpful and she denies anymore withdrawal effects. She reports feeling improvement with her sleep quantity, with gradual reduction of her trazodone and starting on Gabapentin. Although her sleep quantity is increasing, and fragmentation has been decreasing, she feels she is not sleeping restfully still. She is hopeful that this will continue to improve with gradual titration of her gabapentin. She also reports that she continues to have issues with her hot flashes and does not feel the gabapentin has really helped with this yet.    She feels her mood is starting to improve, feeling less irritable and anxious. She " "denies any SI, HI, or AVH. She reports feeling like the effects of her Wellbutrin are starting to work. She is insightful, expressing some sadness, mourning the loss of her 's and her own previous capabilities to do more things, individually and as a couple. She endorses \"missing my partner.\" She feels that she has always kept a tidy home and neither she nor her  are able to do as much to better their situation on their own right now, which is frustrating and overwhelming. Asking for outside help has been a difficult transition for her, but she is having someone come in to help clean her home, which she has mixed feelings about. She expresses feeling embarrassed to have them see her current living state, but also looks forward to having a more cleanly living environment.    She continues to have energy, concentration, and interest that waxes and wanes, and is hopeful that this will continue to improve with improved sleep quality and quantity. She continues to express feelings of hopelessness and feeling overwhelmed with her home situation. She continues to try to push herself to walk, but finds this has continued to be difficult for her, which she previously enjoyed. She reports an an adequate appetite. She endorses having had a nice Yaakov with her family and feeling that she continues to have good support with her  and loved ones.      Records reviewed.    PHQ-9 Total Score: 14 (her previous score from her last visit was 18 on 12/21/23)  The patient reports trouble falling or staying asleep or sleeping too much and moving or speaking so slowly that other people could have noticed or feeling so fidgety or restless that she is having to move more than usual several days of the week within the last 2 weeks.  She reports little interest or pleasure in doing things, feeling down, depressed, or hopeless, and feeling tired or having little energy more than half the days of the week within the last " 2 weeks.  She reports feeling bad about herself more for feeling like a failure, like she has let herself or her family down and trouble concentrating on things nearly every day of the week within the last 2 weeks.  She denies issues with any of the other questions.    GAD7 Total Score: 14 (her previous score from her last visit was 19 on 12/21/23)  She endorses not being able to stop or control worry, worrying too much about different things, trouble relaxing, and feeling afraid as if something awful might happen more than half the days of the week within the last 2 weeks.  She endorses feeling nervous, anxious, or on edge, and becoming easily annoyed or irritable nearly every day of the week within the last 2 weeks.  She denies issues with any of the other questions.    Objective   Mental Status Exam  Appearance:  clean and casually dressed, appropriate and neat   Attitude toward clinician:  cooperative and agreeable , good eye contact  Speech:    Rate:  regular rate and rhythm   Volume:  normal  Motor:  no abnormal movements present  Mood:  Dysthymic  Affect:  dysthymic and mood congruent  Thought Processes:  linear, logical, and goal directed  Thought Content:  normal  Suicidal Thoughts:  absent  Homicidal Thoughts:  absent  Perceptual Disturbance: no perceptual disturbance  Attention and Concentration:  good  Insight and Judgement:  good  Memory:  memory appears to be intact    Gait: Within normal limits.  Assistive devices: None.    Exam: As above  Current Documented Allergies & Reactions  Allergies   Allergen Reactions    Betadine [Povidone Iodine] Rash     Burning/ stinging feeling on her rash    Codeine Nausea Only     BLURRED VISION, RASH       Current Psychotropic Medications:    ALPRAZolam (XANAX) 0.5 MG tablet, TAKE 1 TABLET BY MOUTH DAILY AS NEEDED FOR ANXIETY, Disp: 45 tablet, Rfl: 0    buPROPion (WELLBUTRIN) 75 MG tablet, TAKE 1 TABLET BY MOUTH DAILY (Patient taking differently: Take 1 tablet by  mouth Daily. ONLY TAKES IN WINTER MONTHS), Disp: 30 tablet, Rfl: 2    desvenlafaxine (PRISTIQ) 100 MG 24 hr tablet, TAKE 1 TABLET BY MOUTH DAILY, Disp: 90 tablet, Rfl: 1    gabapentin (NEURONTIN) 100 MG capsule, Take 1-2 capsules by mouth at dinnertime and/or bedtime as needed for sleep. May add one capsule per day until 600 mg total is reached in the evenings, as needed. May also take 1 capsule by mouth in the AM and one capsule at lunchtime as needed for anxiety., Disp: 90 capsule, Rfl: 0    traZODone (DESYREL) 50 MG tablet, Take 0.5-1 tablets by mouth At Night As Needed for Sleep. for sleep, Disp: 90 tablet, Rfl: 1    Current Outpatient Medications:     acetaminophen (TYLENOL) 500 MG tablet, Take 2 tablets by mouth Every 6 (Six) Hours As Needed for Mild Pain., Disp: , Rfl:     albuterol sulfate  (90 Base) MCG/ACT inhaler, INHALE 2 PUFFS BY MOUTH INTO THE LUNGS EVERY 4 HOURS AS NEEDED FOR WHEEZING, Disp: 8.5 g, Rfl: 0    Alum Hydroxide-Mag Trisilicate (GAVISCON) 80-14.2 MG chewable tablet, Chew 1 tablet 4 (Four) Times a Day As Needed (Heartburn)., Disp: 224 each, Rfl: 11    aspirin 81 MG tablet, Take 1 tablet by mouth Daily., Disp: , Rfl:     Biotin 10 MG tablet, HOLD PRIOR TO SURG, Disp: , Rfl:     Cholecalciferol 50 MCG (2000 UT) capsule, HOLD PRIOR TO SURG, Disp: , Rfl:     cyclobenzaprine (FLEXERIL) 5 MG tablet, Take 1 tablet by mouth At Night As Needed for Muscle Spasms., Disp: 30 tablet, Rfl: 0    fenofibrate (TRICOR) 145 MG tablet, TAKE 1 TABLET BY MOUTH EVERY DAY, Disp: 90 tablet, Rfl: 1    fluticasone (FLONASE) 50 MCG/ACT nasal spray, 2 sprays into the nostril(s) as directed by provider Daily for 30 days., Disp: 16 g, Rfl: 0    levETIRAcetam (KEPPRA) 500 MG tablet, TAKE 1/2 TABLET BY MOUTH DAILY AS NEEDED FOR HEADACHE, Disp: 45 tablet, Rfl: 1    metFORMIN ER (GLUCOPHAGE-XR) 500 MG 24 hr tablet, TAKE 1 TABLET BY MOUTH DAILY WITH LARGEST MEAL OF THE DAY AND A GLASS OF WATER (Patient taking  differently: 1 tablet Every Evening. TAKE 1 TABLET BY MOUTH DAILY WITH LARGEST MEAL OF THE DAY AND A GLASS OF WATER), Disp: 90 tablet, Rfl: 01    metoprolol tartrate (LOPRESSOR) 25 MG tablet, Take 1 tablet by mouth 2 (Two) Times a Day., Disp: 180 tablet, Rfl: 3    Omega-3 Fatty Acids (FISH OIL) 1200 MG capsule capsule, HOLD PRIOR TO SURG, Disp: , Rfl:     omeprazole (priLOSEC) 40 MG capsule, TAKE 1 CAPSULE BY MOUTH EVERY DAY, Disp: 90 capsule, Rfl: 0    raloxifene (EVISTA) 60 MG tablet, Take 1 tablet by mouth Daily., Disp: 30 tablet, Rfl: 5    rosuvastatin (CRESTOR) 10 MG tablet, TAKE 1 TABLET BY MOUTH EVERY NIGHT, Disp: 90 tablet, Rfl: 1    spironolactone (ALDACTONE) 25 MG tablet, TAKE 1 TABLET BY MOUTH DAILY, Disp: 30 tablet, Rfl: 4    tretinoin (RETIN-A) 0.025 % cream, Apply 1 application  topically to the appropriate area as directed Every Night., Disp: , Rfl:     valACYclovir (VALTREX) 500 MG tablet, Take 2 tablets by mouth 2 (Two) Times a Day., Disp: 4 tablet, Rfl: 3    Plan of Care/ Medical Decision Making  1) Continue slow titration of Gabapentin 300 mg in combination with 100 to 200 mg Gabapentin at night as needed for sleep.  2) Continue Trazodone 50 mg - take 1/2 tablet by mouth every other night as needed for sleep and to avoid withdrawals.  3) Wait 1 week, then increase Wellbutrin to 75 mg - increase from 1/2 tablet and instead take 1 tablet by mouth daily.  4) Continue Pristiq 100 mg by mouth daily.  5) Will consider re-evaluation of thyroid panel, B12, and Vitamin D for next visit.  6) Provided support through active listening, empathy, talk therapy, reflection, normalization of feelings, and positive reframing.  7) Will continue to provide additional resources in the after visit summary, patient instructions.  8) Notify provider if having any questions, concerns, or issues.    Follow up with GIOVANI Xavier, 2- 4 weeks    Diagnoses and all orders for this visit:    1. 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  Dispense: 60 capsule; Refill: 0    2. Depression, unspecified depression type  -     gabapentin (NEURONTIN) 300 MG capsule; Take 1 to 2 capsules by mouth at dinnertime as needed for sleep.  Dispense: 60 capsule; Refill: 0    3. Insomnia, unspecified type  -     gabapentin (NEURONTIN) 300 MG capsule; Take 1 to 2 capsules by mouth at dinnertime as needed for sleep.  Dispense: 60 capsule; Refill: 0    4. Hot flashes    I spent 76 minutes caring for Alexandria on this date of service. This time includes time spent by me in the following activities: preparing for the visit, obtaining and/or reviewing a separately obtained history, performing a medically appropriate examination and/or evaluation, counseling and educating the patient/family/caregiver, ordering medications, tests, or procedures, documenting information in the medical record, and care coordination.

## 2024-01-04 ENCOUNTER — PATIENT OUTREACH (OUTPATIENT)
Dept: OTHER | Facility: HOSPITAL | Age: 79
End: 2024-01-04
Payer: MEDICARE

## 2024-01-04 NOTE — PROGRESS NOTES
Called MsRadha Salomón to see how she was doing. Left a message with my contact information and asked her to call back at her convenience.

## 2024-01-04 NOTE — PATIENT INSTRUCTIONS
GIOVANI Xavier, PMHNP  [facility]  [address] [place], [place]   [phone]      Please see below and attached for additional resources:  Group, individual (for the patient and family members), and family therapy    [employer]    [email or web address]/    Phone number: [phone]    Support for patients: matched with someone with a similar diagnosis and in survivorship    [employer]  [email or web address].dftkgo5raal.[email or web address]/    Self-referrals for wanting a primary care doctor or a specialist  PCP [phone]  Specialist [phone]    Bereavement Support Groups in the [place] Area  Grief Share: https://www.griefshare.org/countries/[place]/[facility]/[place]/[facility]/[place]  *If you go to the website, you can click on the specific group date and it will give additional info about the groups*    Therapy resources  Chronic Illness counseling center  [facility]  [address] , [address]  [place] [address]    [facility]:    Their phone # is: [phone].    They have two locations:      [address]  [place] [address]      [address], [address]  [place] [address]  FREE Massages  Patients are allowed some massages for FREE through the supportive oncology department. Massages at the cancer center and are done by Radha. She does her own scheduling.  Office:  [phone]    Mobile:  [phone]    FREE exercise program  Free exercise program [employer] program through the [employer]:  https://www.[employer].org/what-we-do/healthy-living/fitness/livestrong    Chiropractic services ([facility])  [employer]  [phone]    Accupuncture  Dr. Dee Garcia does Accupuncture  [facility]  [address], [place] [address] [phone] phone  [phone] fax    [employer]  testing  https://[employer]/gene-test-mental-health-medications/?utm_source=re&utm_medium=cpc&utm_campaign=661063030&utm_content=2406304305671477&utm_term=genesight%20testing&utm_source=re&utm_medium=cpc&utm_campaign=branded&utm_content=3747074072019180&utm_term=genesight%20testing&[id number]

## 2024-01-09 ENCOUNTER — HOSPITAL ENCOUNTER (OUTPATIENT)
Dept: RADIATION ONCOLOGY | Facility: HOSPITAL | Age: 79
Discharge: HOME | End: 2024-01-09

## 2024-01-09 LAB
RAD ONC ARIA COURSE ID: NORMAL
RAD ONC ARIA COURSE INTENT: NORMAL
RAD ONC ARIA COURSE LAST TREATMENT DATE: NORMAL
RAD ONC ARIA COURSE START DATE: NORMAL
RAD ONC ARIA COURSE TREATMENT ELAPSED DAYS: 0
RAD ONC ARIA FIRST TREATMENT DATE: NORMAL
RAD ONC ARIA PLAN FRACTIONS TREATED TO DATE: 1
RAD ONC ARIA PLAN ID: NORMAL
RAD ONC ARIA PLAN PRESCRIBED DOSE PER FRACTION: 2.65 GY
RAD ONC ARIA PLAN PRIMARY REFERENCE POINT: 1
RAD ONC ARIA PLAN TOTAL FRACTIONS PRESCRIBED: 16
RAD ONC ARIA PLAN TOTAL PRESCRIBED DOSE: 4240 CGY
RAD ONC ARIA REFERENCE POINT DOSAGE GIVEN TO DATE: 2.65 GY
RAD ONC ARIA REFERENCE POINT ID: 1
RAD ONC ARIA REFERENCE POINT SESSION DOSAGE GIVEN: 2.65 GY

## 2024-01-09 PROCEDURE — 77280 THER RAD SIMULAJ FIELD SMPL: CPT | Performed by: STUDENT IN AN ORGANIZED HEALTH CARE EDUCATION/TRAINING PROGRAM

## 2024-01-09 PROCEDURE — 77412 RADIATION TX DELIVERY LVL 3: CPT | Performed by: STUDENT IN AN ORGANIZED HEALTH CARE EDUCATION/TRAINING PROGRAM

## 2024-01-09 PROCEDURE — 77427 RADIATION TX MANAGEMENT X5: CPT | Performed by: STUDENT IN AN ORGANIZED HEALTH CARE EDUCATION/TRAINING PROGRAM

## 2024-01-09 RX ORDER — LEVETIRACETAM 500 MG/1
TABLET ORAL
Qty: 45 TABLET | Refills: 1 | Status: SHIPPED | OUTPATIENT
Start: 2024-01-09

## 2024-01-10 ENCOUNTER — HOSPITAL ENCOUNTER (OUTPATIENT)
Dept: RADIATION ONCOLOGY | Facility: HOSPITAL | Age: 79
Discharge: HOME | End: 2024-01-10

## 2024-01-10 LAB
RAD ONC ARIA COURSE ID: NORMAL
RAD ONC ARIA COURSE INTENT: NORMAL
RAD ONC ARIA COURSE LAST TREATMENT DATE: NORMAL
RAD ONC ARIA COURSE START DATE: NORMAL
RAD ONC ARIA COURSE TREATMENT ELAPSED DAYS: 1
RAD ONC ARIA FIRST TREATMENT DATE: NORMAL
RAD ONC ARIA PLAN FRACTIONS TREATED TO DATE: 2
RAD ONC ARIA PLAN ID: NORMAL
RAD ONC ARIA PLAN PRESCRIBED DOSE PER FRACTION: 2.65 GY
RAD ONC ARIA PLAN PRIMARY REFERENCE POINT: 1
RAD ONC ARIA PLAN TOTAL FRACTIONS PRESCRIBED: 16
RAD ONC ARIA PLAN TOTAL PRESCRIBED DOSE: 4240 CGY
RAD ONC ARIA REFERENCE POINT DOSAGE GIVEN TO DATE: 5.3 GY
RAD ONC ARIA REFERENCE POINT ID: 1
RAD ONC ARIA REFERENCE POINT SESSION DOSAGE GIVEN: 2.65 GY

## 2024-01-10 PROCEDURE — 77412 RADIATION TX DELIVERY LVL 3: CPT | Performed by: STUDENT IN AN ORGANIZED HEALTH CARE EDUCATION/TRAINING PROGRAM

## 2024-01-11 ENCOUNTER — DOCUMENTATION (OUTPATIENT)
Dept: ONCOLOGY | Facility: CLINIC | Age: 79
End: 2024-01-11
Payer: MEDICARE

## 2024-01-11 RX ORDER — SPIRONOLACTONE 25 MG/1
25 TABLET ORAL DAILY
Qty: 30 TABLET | Refills: 4 | Status: SHIPPED | OUTPATIENT
Start: 2024-01-11

## 2024-01-11 NOTE — PROGRESS NOTES
Spoke to patient today.  Her depression is way better.  She is following up with psychiatry.  She is not suicidal or homicidal.  She was very appreciative that we call her and check on her depression.  She has not started her endocrine therapy with the evista.  She will started after radiation completes  Stephanie Chapman MD

## 2024-01-12 ENCOUNTER — HOSPITAL ENCOUNTER (OUTPATIENT)
Dept: RADIATION ONCOLOGY | Facility: HOSPITAL | Age: 79
Discharge: HOME | End: 2024-01-12

## 2024-01-12 LAB
RAD ONC ARIA COURSE ID: NORMAL
RAD ONC ARIA COURSE INTENT: NORMAL
RAD ONC ARIA COURSE LAST TREATMENT DATE: NORMAL
RAD ONC ARIA COURSE START DATE: NORMAL
RAD ONC ARIA COURSE TREATMENT ELAPSED DAYS: 3
RAD ONC ARIA FIRST TREATMENT DATE: NORMAL
RAD ONC ARIA PLAN FRACTIONS TREATED TO DATE: 3
RAD ONC ARIA PLAN ID: NORMAL
RAD ONC ARIA PLAN PRESCRIBED DOSE PER FRACTION: 2.65 GY
RAD ONC ARIA PLAN PRIMARY REFERENCE POINT: 1
RAD ONC ARIA PLAN TOTAL FRACTIONS PRESCRIBED: 16
RAD ONC ARIA PLAN TOTAL PRESCRIBED DOSE: 4240 CGY
RAD ONC ARIA REFERENCE POINT DOSAGE GIVEN TO DATE: 7.95 GY
RAD ONC ARIA REFERENCE POINT ID: 1
RAD ONC ARIA REFERENCE POINT SESSION DOSAGE GIVEN: 2.65 GY

## 2024-01-12 PROCEDURE — 77336 RADIATION PHYSICS CONSULT: CPT | Performed by: STUDENT IN AN ORGANIZED HEALTH CARE EDUCATION/TRAINING PROGRAM

## 2024-01-12 PROCEDURE — 77412 RADIATION TX DELIVERY LVL 3: CPT | Performed by: STUDENT IN AN ORGANIZED HEALTH CARE EDUCATION/TRAINING PROGRAM

## 2024-01-15 ENCOUNTER — RADIATION ONCOLOGY WEEKLY ASSESSMENT (OUTPATIENT)
Dept: RADIATION ONCOLOGY | Facility: HOSPITAL | Age: 79
End: 2024-01-15
Payer: MEDICARE

## 2024-01-15 ENCOUNTER — HOSPITAL ENCOUNTER (OUTPATIENT)
Dept: RADIATION ONCOLOGY | Facility: HOSPITAL | Age: 79
Discharge: HOME | End: 2024-01-15
Payer: MEDICARE

## 2024-01-15 VITALS
BODY MASS INDEX: 23.43 KG/M2 | SYSTOLIC BLOOD PRESSURE: 150 MMHG | WEIGHT: 140.8 LBS | HEART RATE: 61 BPM | DIASTOLIC BLOOD PRESSURE: 86 MMHG | OXYGEN SATURATION: 95 %

## 2024-01-15 DIAGNOSIS — D05.11 DUCTAL CARCINOMA IN SITU (DCIS) OF RIGHT BREAST: Primary | ICD-10-CM

## 2024-01-15 LAB
RAD ONC ARIA COURSE ID: NORMAL
RAD ONC ARIA COURSE INTENT: NORMAL
RAD ONC ARIA COURSE LAST TREATMENT DATE: NORMAL
RAD ONC ARIA COURSE START DATE: NORMAL
RAD ONC ARIA COURSE TREATMENT ELAPSED DAYS: 6
RAD ONC ARIA FIRST TREATMENT DATE: NORMAL
RAD ONC ARIA PLAN FRACTIONS TREATED TO DATE: 4
RAD ONC ARIA PLAN ID: NORMAL
RAD ONC ARIA PLAN PRESCRIBED DOSE PER FRACTION: 2.65 GY
RAD ONC ARIA PLAN PRIMARY REFERENCE POINT: 1
RAD ONC ARIA PLAN TOTAL FRACTIONS PRESCRIBED: 16
RAD ONC ARIA PLAN TOTAL PRESCRIBED DOSE: 4240 CGY
RAD ONC ARIA REFERENCE POINT DOSAGE GIVEN TO DATE: 10.6 GY
RAD ONC ARIA REFERENCE POINT ID: 1
RAD ONC ARIA REFERENCE POINT SESSION DOSAGE GIVEN: 2.65 GY

## 2024-01-15 PROCEDURE — 77412 RADIATION TX DELIVERY LVL 3: CPT | Performed by: STUDENT IN AN ORGANIZED HEALTH CARE EDUCATION/TRAINING PROGRAM

## 2024-01-15 NOTE — PROGRESS NOTES
Radiation Oncology  On-Treatment Note      Patient: Alexandria Yousif    MRN: [id number]    Attending Physician: aRshi Hood MD     Diagnosis:     ICD-10-CM ICD-9-CM   1. Ductal carcinoma in situ (DCIS) of right breast  D05.11 233.0       Radiation Therapy Visit:  Continue radiation therapy, Dosimetry plan remains acceptable, Films reviewed and remains acceptable, Pain assessed, Pain management planned, Radiation dose schedule reviewed and remains acceptable, Radiation technique remains acceptable, and Symptoms within expected range    Radiation Treatments       Active   Plans   [id number]   Most recent treatment: Dose planned: 265 cGy (fraction 4 on 1/15/2024)   Total: Dose planned: 4,240 cGy (16 fractions)   Elapsed Days: 6      Reference Points   1   Most recent treatment: Dose given: 265 cGy (on 1/15/2024)   Total: Dose given: 1,060 cGy   Elapsed Days: 6                      Physical Examination:  Vitals: Blood pressure 150/86, pulse 61, weight 63.9 kg (140 lb 12.8 oz), SpO2 95%, not currently breastfeeding.  Pain Score    01/15/24 1347   PainSc: 0-No pain       Fully active, able to carry on all pre-disease performance without restriction = 0    We examined the relevant areas: yes  Findings are within the expected range for this stage of treatment: yes  -------------------------------------------------------------------------------------------------------------------    ACTION ITEMS:  Patient tolerating treatment well and as expected for this stage in their treatment and Continue radiation therapy as planned    Estimated Completion Date: 2/6/2024      Rashi Hood MD  Radiation Oncology

## 2024-01-16 ENCOUNTER — OFFICE VISIT (OUTPATIENT)
Dept: PSYCHIATRY | Facility: HOSPITAL | Age: 79
End: 2024-01-16
Payer: MEDICARE

## 2024-01-16 ENCOUNTER — HOSPITAL ENCOUNTER (OUTPATIENT)
Dept: RADIATION ONCOLOGY | Facility: HOSPITAL | Age: 79
Discharge: HOME | End: 2024-01-16

## 2024-01-16 DIAGNOSIS — G47.00 INSOMNIA, UNSPECIFIED TYPE: ICD-10-CM

## 2024-01-16 DIAGNOSIS — F06.4 ANXIETY DISORDER DUE TO GENERAL MEDICAL CONDITION: Primary | ICD-10-CM

## 2024-01-16 DIAGNOSIS — F32.A DEPRESSION, UNSPECIFIED DEPRESSION TYPE: ICD-10-CM

## 2024-01-16 DIAGNOSIS — R23.2 HOT FLASHES: ICD-10-CM

## 2024-01-16 LAB
RAD ONC ARIA COURSE ID: NORMAL
RAD ONC ARIA COURSE INTENT: NORMAL
RAD ONC ARIA COURSE LAST TREATMENT DATE: NORMAL
RAD ONC ARIA COURSE START DATE: NORMAL
RAD ONC ARIA COURSE TREATMENT ELAPSED DAYS: 7
RAD ONC ARIA FIRST TREATMENT DATE: NORMAL
RAD ONC ARIA PLAN FRACTIONS TREATED TO DATE: 5
RAD ONC ARIA PLAN ID: NORMAL
RAD ONC ARIA PLAN PRESCRIBED DOSE PER FRACTION: 2.65 GY
RAD ONC ARIA PLAN PRIMARY REFERENCE POINT: 1
RAD ONC ARIA PLAN TOTAL FRACTIONS PRESCRIBED: 16
RAD ONC ARIA PLAN TOTAL PRESCRIBED DOSE: 4240 CGY
RAD ONC ARIA REFERENCE POINT DOSAGE GIVEN TO DATE: 13.25 GY
RAD ONC ARIA REFERENCE POINT ID: 1
RAD ONC ARIA REFERENCE POINT SESSION DOSAGE GIVEN: 2.65 GY

## 2024-01-16 PROCEDURE — 77417 THER RADIOLOGY PORT IMAGE(S): CPT | Performed by: STUDENT IN AN ORGANIZED HEALTH CARE EDUCATION/TRAINING PROGRAM

## 2024-01-16 PROCEDURE — 77412 RADIATION TX DELIVERY LVL 3: CPT | Performed by: STUDENT IN AN ORGANIZED HEALTH CARE EDUCATION/TRAINING PROGRAM

## 2024-01-16 NOTE — PROGRESS NOTES
Supportive Oncology Services  In person follow up session - The primary office location is [facility].     Subjective  Patient ID: Alexandria Yousif is a 78 y.o. female is seen face to face in the Supportive Oncology Services ([facility]) Clinic. 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  Status post right breast lumpectomy with negative margins.  She is currently undergoing radiation treatment and has about 3 weeks before having to decide if she is going to do oral hormone therapy or not. She is followed by Dr. Rashi Hood and Dr. Stephanie Chapman.     CC: sleeping better, feeling more hopeful and less irritable / on edge, feels like her medication is helping    Last seen in person: 1/3/24    HPI/ Interval History: This is a follow-up evaluation and medication management.  The patient reports that she is feeling much better.  She reports managing radiation treatment well so far, driving herself to appointments and feeling her level of fatigue is manageable so far.  She endorses having to make a decision in about 3 weeks regarding if she is going to do oral treatments or not, but endorse that she is taking things in stride and not allowing herself to become overwhelmed with what to do.    She feels like she is starting to get some quality sleep.  She has found that taking gabapentin 400 mg in the evening and then trazodone 50 mg 1/2 tablet at bedtime provides optimal benefits for sleep.  She reports that when she does not take trazodone she feels restless some nights of the week, but does feel like she is finally getting some rest, overall.      She also feels that her gabapentin is helping with her back pain, feeling it is now well controlled and making overall functionality more possible.  She reports feeling less jittery, irritable, on edge, and less anxious in the last few weeks, since increasing her gabapentin.  Additionally, she  has recently increased her Wellbutrin to 75 mg daily about 2 to 3 days ago, in addition to her Pristiq 100 mg daily.  She endorses feeling a little bit more jittery since increasing the Wellbutrin, but feels it has been more tolerable, compared with past attempts to increase it.      She endorses improving desire and interest in engaging and socializing with family and friends, which has been important to her in the past.  She has been developing a plan to prepare for future visits and entertaining in her home, trying to make these experiences feel more comfortable and doable, without heightened anxiety. She give examples such as planning to have professional  come into her home about once a month to deep clean, and ordering take out instead of trying to plan meals for her company.     She feels that, although her energy and concentration still waxes and wanes, that they are improving and she is feeling more hopeful.  She denies any changes in her appetite, endorses that it remains adequate and at its baseline. Overall, she endorses feeling lighter and looking forward to the future.  She reports that her sense of guilt and level of anxiety is gradually subsiding.  She feels that her level of depression is lifting and denies any active SI, HI, or AVH.  Additionally, she feels that her  has been more pleasant to be around and attributes this to him feeling less reactive around her, now that her mood is improving.      She continues to report having a loving and nurturing support system that she is grateful for.  She remains insightful and grounded about her current situation.  She verbalizes feeling that her medication and talk therapy have been helpful for her and plans to continue with her treatment.    Records reviewed.    PHQ-9 Total Score: 12 (previous score was 14 from her last visit on 1/3/24)  She endorses feeling down, depressed, or hopeless, trouble sleeping too much, and poor appetite or  overeating several days of the week within the last 2 weeks.  She reports little interest or pleasure in doing things, feeling bad about herself, feeling like a like she has let herself and her family down, and trouble concentrating on things more than half the days of the week within the last 2 weeks.  She endorses feeling tired or having little energy nearly every day of the week within the last.  She denies issues with any of the other questions.    GAD7 Total Score: 11 (previous score was 14 from her last visit on 1/3/24)  She reports feeling afraid as if something awful might happen several days of the week within the last 2 weeks.  She reports feeling nervous, anxious, or on edge, not being able to control worry, worrying too much about different things, trouble relaxing, and coming easily annoyed or irritable more than half the days of the week within the last 2 weeks.  She denies issues with any of the other questions.    Objective   Mental Status Exam  Appearance:  clean and casually dressed, appropriate and neat   Attitude toward clinician:  cooperative and agreeable , good eye contact  Speech:    Rate:  regular rate and rhythm   Volume:  normal  Motor:  no abnormal movements present  Mood:  Euthymic  Affect:  euthymic and mood congruent, at times bright  Thought Processes:  linear, logical, and goal directed  Thought Content:  normal  Suicidal Thoughts:  absent  Homicidal Thoughts:  absent  Perceptual Disturbance: no perceptual disturbance  Attention and Concentration:  good  Insight and Judgement:  good  Memory:  memory appears to be intact    Gait: within normal limits.  Assistive devices: None.    Exam: As above    Current Documented Allergies & Reactions  Allergies   Allergen Reactions    Betadine [Povidone Iodine] Rash     Burning/ stinging feeling on her rash    Codeine Nausea Only     BLURRED VISION, RASH     Current Psychotropic Medications:    ALPRAZolam (XANAX) 0.5 MG tablet, TAKE 1 TABLET BY  MOUTH DAILY AS NEEDED FOR ANXIETY, Disp: 45 tablet, Rfl: 0    buPROPion (WELLBUTRIN) 75 MG tablet, TAKE 1 TABLET BY MOUTH DAILY (Patient taking differently: Take 1 tablet by mouth Daily. ONLY TAKES IN WINTER MONTHS), Disp: 30 tablet, Rfl: 2    desvenlafaxine (PRISTIQ) 100 MG 24 hr tablet, TAKE 1 TABLET BY MOUTH DAILY, Disp: 90 tablet, Rfl: 1    gabapentin (NEURONTIN) 100 MG capsule, Take 1-2 capsules by mouth at dinnertime and/or bedtime as needed for sleep. May add one capsule per day until 600 mg total is reached in the evenings, as needed. May also take 1 capsule by mouth in the AM and one capsule at lunchtime as needed for anxiety., Disp: 90 capsule, Rfl: 0    gabapentin (NEURONTIN) 300 MG capsule, Take 1 to 2 capsules by mouth at dinnertime as needed for sleep., Disp: 60 capsule, Rfl: 0    traZODone (DESYREL) 50 MG tablet, Take 0.5-1 tablets by mouth At Night As Needed for Sleep. for sleep, Disp: 90 tablet, Rfl: 1    Current Outpatient Medications:     acetaminophen (TYLENOL) 500 MG tablet, Take 2 tablets by mouth Every 6 (Six) Hours As Needed for Mild Pain., Disp: , Rfl:     albuterol sulfate  (90 Base) MCG/ACT inhaler, INHALE 2 PUFFS BY MOUTH INTO THE LUNGS EVERY 4 HOURS AS NEEDED FOR WHEEZING, Disp: 8.5 g, Rfl: 0    Alum Hydroxide-Mag Trisilicate (GAVISCON) 80-14.2 MG chewable tablet, Chew 1 tablet 4 (Four) Times a Day As Needed (Heartburn)., Disp: 224 each, Rfl: 11    aspirin 81 MG tablet, Take 1 tablet by mouth Daily., Disp: , Rfl:     Biotin 10 MG tablet, HOLD PRIOR TO SURG, Disp: , Rfl:     Cholecalciferol 50 MCG (2000 UT) capsule, HOLD PRIOR TO SURG, Disp: , Rfl:     cyclobenzaprine (FLEXERIL) 5 MG tablet, Take 1 tablet by mouth At Night As Needed for Muscle Spasms., Disp: 30 tablet, Rfl: 0    fenofibrate (TRICOR) 145 MG tablet, TAKE 1 TABLET BY MOUTH EVERY DAY, Disp: 90 tablet, Rfl: 1    fluticasone (FLONASE) 50 MCG/ACT nasal spray, 2 sprays into the nostril(s) as directed by provider Daily for  30 days., Disp: 16 g, Rfl: 0    levETIRAcetam (KEPPRA) 500 MG tablet, TAKE 1/2 TABLET BY MOUTH DAILY AS NEEDED FOR HEADACHE, Disp: 45 tablet, Rfl: 1    metFORMIN ER (GLUCOPHAGE-XR) 500 MG 24 hr tablet, TAKE 1 TABLET BY MOUTH DAILY WITH LARGEST MEAL OF THE DAY AND A GLASS OF WATER (Patient taking differently: 1 tablet Every Evening. TAKE 1 TABLET BY MOUTH DAILY WITH LARGEST MEAL OF THE DAY AND A GLASS OF WATER), Disp: 90 tablet, Rfl: 01    metoprolol tartrate (LOPRESSOR) 25 MG tablet, Take 1 tablet by mouth 2 (Two) Times a Day., Disp: 180 tablet, Rfl: 3    Omega-3 Fatty Acids (FISH OIL) 1200 MG capsule capsule, HOLD PRIOR TO SURG, Disp: , Rfl:     omeprazole (priLOSEC) 40 MG capsule, TAKE 1 CAPSULE BY MOUTH EVERY DAY, Disp: 90 capsule, Rfl: 0    raloxifene (EVISTA) 60 MG tablet, Take 1 tablet by mouth Daily., Disp: 30 tablet, Rfl: 5    rosuvastatin (CRESTOR) 10 MG tablet, TAKE 1 TABLET BY MOUTH EVERY NIGHT, Disp: 90 tablet, Rfl: 1    spironolactone (ALDACTONE) 25 MG tablet, TAKE 1 TABLET BY MOUTH DAILY, Disp: 30 tablet, Rfl: 4    tretinoin (RETIN-A) 0.025 % cream, Apply 1 application  topically to the appropriate area as directed Every Night., Disp: , Rfl:     valACYclovir (VALTREX) 500 MG tablet, Take 2 tablets by mouth 2 (Two) Times a Day., Disp: 4 tablet, Rfl: 3  No current facility-administered medications for this visit.    Plan of Care/ Medical Decision Making  1) Continue gabapentin 300 mg by mouth at dinnertime and 100 mg by mouth at night.  2) Continue trazodone 50 mg, take 1/2 tablet by mouth at bedtime  3) Continue Pristiq 100 mg by mouth daily.  4) Continue Wellbutrin 75 mg, 1/2 tablet to 1 tablet by mouth daily as tolerated and as needed.  5) Provided support through active listening, empathy, talk therapy, reflection, normalization of feelings, and positive reframing.  She was encouraged to continue talk therapy with this provider and other trusted sources of support, to which she confirmed the desire  to continue with treatment and further talk therapy in the future, verbalizing that she is finding it helpful.  6) Will continue to provide additional resources in the after visit summary, patient instructions.  7) Notify provider if having any questions, concerns, or issues.    Follow up with GIOVANI Xavier, 2 to 4 weeks and as needed.    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 Depression, unspecified depression type    3. Insomnia, unspecified type    4. Hot flashes    I spent 68 minutes caring for Alexandria on this date of service. This time includes time spent by me in the following activities: preparing for the visit, obtaining and/or reviewing a separately obtained history, performing a medically appropriate examination and/or evaluation, counseling and educating the patient/family/caregiver, documenting information in the medical record, and care coordination.

## 2024-01-16 NOTE — PATIENT INSTRUCTIONS
GIOVANI Xavier, PMHNP  [facility]  [address] [place], [place]   [phone]      Please see below and attached for additional resources:  Group, individual (for the patient and family members), and family therapy    [employer]    [email or web address]/    Phone number: [phone]    Support for patients: matched with someone with a similar diagnosis and in survivorship    [employer] [facility] [employer]  [email or web address]    Self-referrals for wanting a primary care doctor or a specialist  PCP [phone]  Specialist [phone]    Bereavement Support Groups in the [place] Area  Grief Share: https://www.griefshare.org/countries/us/[facility]/[place]/[facility]/[place]  *If you go to the website, you can click on the specific group date and it will give additional info about the groups*    Therapy resources  Chronic Illness counseling center  www.[facility]  [address] , [address]  [place] [address]    [facility]:    Their phone # is: [phone].    They have two locations:      [address], [address]  [place] [address]      [address], [address]  [place] [address]  FREE Massages  Patients are allowed some massages for FREE through the supportive oncology department. Massages at the cancer center and are done by Radha. She does her own scheduling.  Office:  [phone]    Mobile:  [phone]    FREE exercise program  Free exercise program [employer] program through the [employer]:  https://www.[employer].org/what-we-do/healthy-living/fitness/livestrong    Chiropractic services ([facility])  [employer]  [phone]  Accupuncture  Dr. Dee Garcia does Accupuncture  [facility]  [address] [place], [place] [address] [phone] phone  [phone] fax  [employer]  testing  [email or web address]/gene-test-mental-health-medications/?utm_source=re&utm_medium=cpc&utm_campaign=[email or web address]&utm_content=8218807647498736&utm_term=genesight%20testing&utm_source=re&utm_medium=cpc&utm_campaign=branded&utm_content=2906227511031780&utm_term=genesight%20testing&[id number]

## 2024-01-17 ENCOUNTER — HOSPITAL ENCOUNTER (OUTPATIENT)
Dept: RADIATION ONCOLOGY | Facility: HOSPITAL | Age: 79
Discharge: HOME | End: 2024-01-17

## 2024-01-17 LAB
RAD ONC ARIA COURSE ID: NORMAL
RAD ONC ARIA COURSE INTENT: NORMAL
RAD ONC ARIA COURSE LAST TREATMENT DATE: NORMAL
RAD ONC ARIA COURSE START DATE: NORMAL
RAD ONC ARIA COURSE TREATMENT ELAPSED DAYS: 8
RAD ONC ARIA FIRST TREATMENT DATE: NORMAL
RAD ONC ARIA PLAN FRACTIONS TREATED TO DATE: 6
RAD ONC ARIA PLAN ID: NORMAL
RAD ONC ARIA PLAN PRESCRIBED DOSE PER FRACTION: 2.65 GY
RAD ONC ARIA PLAN PRIMARY REFERENCE POINT: 1
RAD ONC ARIA PLAN TOTAL FRACTIONS PRESCRIBED: 16
RAD ONC ARIA PLAN TOTAL PRESCRIBED DOSE: 4240 CGY
RAD ONC ARIA REFERENCE POINT DOSAGE GIVEN TO DATE: 15.9 GY
RAD ONC ARIA REFERENCE POINT ID: 1
RAD ONC ARIA REFERENCE POINT SESSION DOSAGE GIVEN: 2.65 GY

## 2024-01-17 PROCEDURE — 77427 RADIATION TX MANAGEMENT X5: CPT | Performed by: STUDENT IN AN ORGANIZED HEALTH CARE EDUCATION/TRAINING PROGRAM

## 2024-01-17 PROCEDURE — 77412 RADIATION TX DELIVERY LVL 3: CPT | Performed by: STUDENT IN AN ORGANIZED HEALTH CARE EDUCATION/TRAINING PROGRAM

## 2024-01-18 ENCOUNTER — HOSPITAL ENCOUNTER (OUTPATIENT)
Dept: RADIATION ONCOLOGY | Facility: HOSPITAL | Age: 79
Discharge: HOME | End: 2024-01-18

## 2024-01-18 LAB
RAD ONC ARIA COURSE ID: NORMAL
RAD ONC ARIA COURSE INTENT: NORMAL
RAD ONC ARIA COURSE LAST TREATMENT DATE: NORMAL
RAD ONC ARIA COURSE START DATE: NORMAL
RAD ONC ARIA COURSE TREATMENT ELAPSED DAYS: 9
RAD ONC ARIA FIRST TREATMENT DATE: NORMAL
RAD ONC ARIA PLAN FRACTIONS TREATED TO DATE: 7
RAD ONC ARIA PLAN ID: NORMAL
RAD ONC ARIA PLAN PRESCRIBED DOSE PER FRACTION: 2.65 GY
RAD ONC ARIA PLAN PRIMARY REFERENCE POINT: 1
RAD ONC ARIA PLAN TOTAL FRACTIONS PRESCRIBED: 16
RAD ONC ARIA PLAN TOTAL PRESCRIBED DOSE: 4240 CGY
RAD ONC ARIA REFERENCE POINT DOSAGE GIVEN TO DATE: 18.55 GY
RAD ONC ARIA REFERENCE POINT ID: 1
RAD ONC ARIA REFERENCE POINT SESSION DOSAGE GIVEN: 2.65 GY

## 2024-01-18 PROCEDURE — 77412 RADIATION TX DELIVERY LVL 3: CPT | Performed by: STUDENT IN AN ORGANIZED HEALTH CARE EDUCATION/TRAINING PROGRAM

## 2024-01-19 ENCOUNTER — HOSPITAL ENCOUNTER (OUTPATIENT)
Dept: RADIATION ONCOLOGY | Facility: HOSPITAL | Age: 79
Discharge: HOME | End: 2024-01-19

## 2024-01-19 LAB
RAD ONC ARIA COURSE ID: NORMAL
RAD ONC ARIA COURSE INTENT: NORMAL
RAD ONC ARIA COURSE LAST TREATMENT DATE: NORMAL
RAD ONC ARIA COURSE START DATE: NORMAL
RAD ONC ARIA COURSE TREATMENT ELAPSED DAYS: 10
RAD ONC ARIA FIRST TREATMENT DATE: NORMAL
RAD ONC ARIA PLAN FRACTIONS TREATED TO DATE: 8
RAD ONC ARIA PLAN ID: NORMAL
RAD ONC ARIA PLAN PRESCRIBED DOSE PER FRACTION: 2.65 GY
RAD ONC ARIA PLAN PRIMARY REFERENCE POINT: 1
RAD ONC ARIA PLAN TOTAL FRACTIONS PRESCRIBED: 16
RAD ONC ARIA PLAN TOTAL PRESCRIBED DOSE: 4240 CGY
RAD ONC ARIA REFERENCE POINT DOSAGE GIVEN TO DATE: 21.2 GY
RAD ONC ARIA REFERENCE POINT ID: 1
RAD ONC ARIA REFERENCE POINT SESSION DOSAGE GIVEN: 2.65 GY

## 2024-01-19 PROCEDURE — 77412 RADIATION TX DELIVERY LVL 3: CPT | Performed by: STUDENT IN AN ORGANIZED HEALTH CARE EDUCATION/TRAINING PROGRAM

## 2024-01-19 PROCEDURE — 77336 RADIATION PHYSICS CONSULT: CPT | Performed by: STUDENT IN AN ORGANIZED HEALTH CARE EDUCATION/TRAINING PROGRAM

## 2024-01-22 ENCOUNTER — RADIATION ONCOLOGY WEEKLY ASSESSMENT (OUTPATIENT)
Dept: RADIATION ONCOLOGY | Facility: HOSPITAL | Age: 79
End: 2024-01-22
Payer: MEDICARE

## 2024-01-22 ENCOUNTER — HOSPITAL ENCOUNTER (OUTPATIENT)
Dept: RADIATION ONCOLOGY | Facility: HOSPITAL | Age: 79
Discharge: HOME | End: 2024-01-22
Payer: MEDICARE

## 2024-01-22 VITALS
DIASTOLIC BLOOD PRESSURE: 84 MMHG | SYSTOLIC BLOOD PRESSURE: 131 MMHG | HEART RATE: 71 BPM | BODY MASS INDEX: 23.43 KG/M2 | OXYGEN SATURATION: 96 % | WEIGHT: 140.8 LBS

## 2024-01-22 DIAGNOSIS — D05.11 DUCTAL CARCINOMA IN SITU (DCIS) OF RIGHT BREAST: Primary | ICD-10-CM

## 2024-01-22 LAB
RAD ONC ARIA COURSE ID: NORMAL
RAD ONC ARIA COURSE INTENT: NORMAL
RAD ONC ARIA COURSE LAST TREATMENT DATE: NORMAL
RAD ONC ARIA COURSE START DATE: NORMAL
RAD ONC ARIA COURSE TREATMENT ELAPSED DAYS: 13
RAD ONC ARIA FIRST TREATMENT DATE: NORMAL
RAD ONC ARIA PLAN FRACTIONS TREATED TO DATE: 9
RAD ONC ARIA PLAN ID: NORMAL
RAD ONC ARIA PLAN PRESCRIBED DOSE PER FRACTION: 2.65 GY
RAD ONC ARIA PLAN PRIMARY REFERENCE POINT: 1
RAD ONC ARIA PLAN TOTAL FRACTIONS PRESCRIBED: 16
RAD ONC ARIA PLAN TOTAL PRESCRIBED DOSE: 4240 CGY
RAD ONC ARIA REFERENCE POINT DOSAGE GIVEN TO DATE: 23.85 GY
RAD ONC ARIA REFERENCE POINT ID: 1
RAD ONC ARIA REFERENCE POINT SESSION DOSAGE GIVEN: 2.65 GY

## 2024-01-22 PROCEDURE — 77412 RADIATION TX DELIVERY LVL 3: CPT | Performed by: STUDENT IN AN ORGANIZED HEALTH CARE EDUCATION/TRAINING PROGRAM

## 2024-01-22 NOTE — PROGRESS NOTES
Radiation Oncology  On-Treatment Note      Patient: Alexandria Yousif    MRN: [id number]    Attending Physician: Rashi Hood MD     Diagnosis:     ICD-10-CM ICD-9-CM   1. Ductal carcinoma in situ (DCIS) of right breast  D05.11 233.0       Radiation Therapy Visit:  Continue radiation therapy, Dosimetry plan remains acceptable, Films reviewed and remains acceptable, Pain assessed, Pain management planned, Radiation dose schedule reviewed and remains acceptable, Radiation technique remains acceptable, and Symptoms within expected range    Radiation Treatments       Active   Plans   [id number]   Most recent treatment: Dose planned: 265 cGy (fraction 8 on 1/19/2024)   Total: Dose planned: 4,240 cGy (16 fractions)   Elapsed Days: 10      Reference Points   1   Most recent treatment: Dose given: 265 cGy (on 1/19/2024)   Total: Dose given: 2,120 cGy   Elapsed Days: 10                      Physical Examination:  Vitals: Blood pressure 131/84, pulse 71, weight 63.9 kg (140 lb 12.8 oz), SpO2 96%, not currently breastfeeding.  Pain Score    01/22/24 1139   PainSc: 0-No pain       Restricted in physically strenuous activity but ambulatory and able to carry out work of a light or sedentary nature, e.g., light house work, office work = 1    We examined the relevant areas: yes  Findings are within the expected range for this stage of treatment: yes  -------------------------------------------------------------------------------------------------------------------    ACTION ITEMS:  Patient tolerating treatment well and as expected for this stage in their treatment and Continue radiation therapy as planned    Estimated Completion Date: 2/6/2024      Rashi Hood MD  Radiation Oncology

## 2024-01-22 NOTE — TELEPHONE ENCOUNTER
Rx Refill Note  Requested Prescriptions     Pending Prescriptions Disp Refills    metFORMIN ER (GLUCOPHAGE-XR) 500 MG 24 hr tablet [Pharmacy Med Name: METFORMIN ER 500MG 24HR TABS] 90 tablet 01     Sig: TAKE 1 TABLET BY MOUTH DAILY WITH LARGEST MEAL OF THE DAY AND A GLASS OF WATER      Last office visit with prescribing clinician: Visit date not found   Last telemedicine visit with prescribing clinician: Visit date not found   Next office visit with prescribing clinician: Visit date not found                         Would you like a call back once the refill request has been completed: [] Yes [] No    If the office needs to give you a call back, can they leave a voicemail: [] Yes [] No    Marli John MA  01/22/24, 13:43 EST

## 2024-01-23 ENCOUNTER — HOSPITAL ENCOUNTER (OUTPATIENT)
Dept: RADIATION ONCOLOGY | Facility: HOSPITAL | Age: 79
Discharge: HOME | End: 2024-01-23

## 2024-01-23 LAB
RAD ONC ARIA COURSE ID: NORMAL
RAD ONC ARIA COURSE INTENT: NORMAL
RAD ONC ARIA COURSE LAST TREATMENT DATE: NORMAL
RAD ONC ARIA COURSE START DATE: NORMAL
RAD ONC ARIA COURSE TREATMENT ELAPSED DAYS: 14
RAD ONC ARIA FIRST TREATMENT DATE: NORMAL
RAD ONC ARIA PLAN FRACTIONS TREATED TO DATE: 10
RAD ONC ARIA PLAN ID: NORMAL
RAD ONC ARIA PLAN PRESCRIBED DOSE PER FRACTION: 2.65 GY
RAD ONC ARIA PLAN PRIMARY REFERENCE POINT: 1
RAD ONC ARIA PLAN TOTAL FRACTIONS PRESCRIBED: 16
RAD ONC ARIA PLAN TOTAL PRESCRIBED DOSE: 4240 CGY
RAD ONC ARIA REFERENCE POINT DOSAGE GIVEN TO DATE: 26.5 GY
RAD ONC ARIA REFERENCE POINT ID: 1
RAD ONC ARIA REFERENCE POINT SESSION DOSAGE GIVEN: 2.65 GY

## 2024-01-23 PROCEDURE — 77412 RADIATION TX DELIVERY LVL 3: CPT | Performed by: STUDENT IN AN ORGANIZED HEALTH CARE EDUCATION/TRAINING PROGRAM

## 2024-01-23 PROCEDURE — 77417 THER RADIOLOGY PORT IMAGE(S): CPT | Performed by: STUDENT IN AN ORGANIZED HEALTH CARE EDUCATION/TRAINING PROGRAM

## 2024-01-23 RX ORDER — METFORMIN HYDROCHLORIDE 500 MG/1
TABLET, EXTENDED RELEASE ORAL
Qty: 90 TABLET | Refills: 1 | Status: SHIPPED | OUTPATIENT
Start: 2024-01-23

## 2024-01-24 ENCOUNTER — HOSPITAL ENCOUNTER (OUTPATIENT)
Dept: RADIATION ONCOLOGY | Facility: HOSPITAL | Age: 79
Discharge: HOME | End: 2024-01-24

## 2024-01-24 LAB
RAD ONC ARIA COURSE ID: NORMAL
RAD ONC ARIA COURSE INTENT: NORMAL
RAD ONC ARIA COURSE LAST TREATMENT DATE: NORMAL
RAD ONC ARIA COURSE START DATE: NORMAL
RAD ONC ARIA COURSE TREATMENT ELAPSED DAYS: 15
RAD ONC ARIA FIRST TREATMENT DATE: NORMAL
RAD ONC ARIA PLAN FRACTIONS TREATED TO DATE: 11
RAD ONC ARIA PLAN ID: NORMAL
RAD ONC ARIA PLAN PRESCRIBED DOSE PER FRACTION: 2.65 GY
RAD ONC ARIA PLAN PRIMARY REFERENCE POINT: 1
RAD ONC ARIA PLAN TOTAL FRACTIONS PRESCRIBED: 16
RAD ONC ARIA PLAN TOTAL PRESCRIBED DOSE: 4240 CGY
RAD ONC ARIA REFERENCE POINT DOSAGE GIVEN TO DATE: 29.15 GY
RAD ONC ARIA REFERENCE POINT ID: 1
RAD ONC ARIA REFERENCE POINT SESSION DOSAGE GIVEN: 2.65 GY

## 2024-01-24 PROCEDURE — 77014: CPT | Performed by: STUDENT IN AN ORGANIZED HEALTH CARE EDUCATION/TRAINING PROGRAM

## 2024-01-24 PROCEDURE — 77263 THER RADIOLOGY TX PLNG CPLX: CPT | Performed by: STUDENT IN AN ORGANIZED HEALTH CARE EDUCATION/TRAINING PROGRAM

## 2024-01-24 PROCEDURE — 77412 RADIATION TX DELIVERY LVL 3: CPT | Performed by: STUDENT IN AN ORGANIZED HEALTH CARE EDUCATION/TRAINING PROGRAM

## 2024-01-24 PROCEDURE — 77427 RADIATION TX MANAGEMENT X5: CPT | Performed by: STUDENT IN AN ORGANIZED HEALTH CARE EDUCATION/TRAINING PROGRAM

## 2024-01-25 ENCOUNTER — HOSPITAL ENCOUNTER (OUTPATIENT)
Dept: RADIATION ONCOLOGY | Facility: HOSPITAL | Age: 79
Discharge: HOME | End: 2024-01-25

## 2024-01-25 DIAGNOSIS — F41.9 ANXIETY: ICD-10-CM

## 2024-01-25 LAB
RAD ONC ARIA COURSE ID: NORMAL
RAD ONC ARIA COURSE INTENT: NORMAL
RAD ONC ARIA COURSE LAST TREATMENT DATE: NORMAL
RAD ONC ARIA COURSE START DATE: NORMAL
RAD ONC ARIA COURSE TREATMENT ELAPSED DAYS: 16
RAD ONC ARIA FIRST TREATMENT DATE: NORMAL
RAD ONC ARIA PLAN FRACTIONS TREATED TO DATE: 12
RAD ONC ARIA PLAN ID: NORMAL
RAD ONC ARIA PLAN PRESCRIBED DOSE PER FRACTION: 2.65 GY
RAD ONC ARIA PLAN PRIMARY REFERENCE POINT: 1
RAD ONC ARIA PLAN TOTAL FRACTIONS PRESCRIBED: 16
RAD ONC ARIA PLAN TOTAL PRESCRIBED DOSE: 4240 CGY
RAD ONC ARIA REFERENCE POINT DOSAGE GIVEN TO DATE: 31.8 GY
RAD ONC ARIA REFERENCE POINT ID: 1
RAD ONC ARIA REFERENCE POINT SESSION DOSAGE GIVEN: 2.65 GY

## 2024-01-25 PROCEDURE — 77412 RADIATION TX DELIVERY LVL 3: CPT | Performed by: STUDENT IN AN ORGANIZED HEALTH CARE EDUCATION/TRAINING PROGRAM

## 2024-01-25 RX ORDER — ALPRAZOLAM 0.5 MG/1
0.5 TABLET ORAL DAILY PRN
Qty: 45 TABLET | Refills: 0 | Status: SHIPPED | OUTPATIENT
Start: 2024-01-25

## 2024-01-25 NOTE — TELEPHONE ENCOUNTER
Rx Refill Note  Requested Prescriptions     Pending Prescriptions Disp Refills    ALPRAZolam (XANAX) 0.5 MG tablet [Pharmacy Med Name: ALPRAZOLAM 0.5MG TABLETS] 45 tablet      Sig: TAKE 1 TABLET BY MOUTH DAILY AS NEEDED FOR ANXIETY      Last office visit with prescribing clinician: 9/5/2023   Last telemedicine visit with prescribing clinician: Visit date not found   Next office visit with prescribing clinician: 3/5/2024                         Would you like a call back once the refill request has been completed: [] Yes [] No    If the office needs to give you a call back, can they leave a voicemail: [] Yes [] No    Juancarlos Laguna MA  01/25/24, 13:41 EST

## 2024-01-26 ENCOUNTER — HOSPITAL ENCOUNTER (OUTPATIENT)
Dept: RADIATION ONCOLOGY | Facility: HOSPITAL | Age: 79
Discharge: HOME | End: 2024-01-26

## 2024-01-26 LAB
RAD ONC ARIA COURSE ID: NORMAL
RAD ONC ARIA COURSE INTENT: NORMAL
RAD ONC ARIA COURSE LAST TREATMENT DATE: NORMAL
RAD ONC ARIA COURSE START DATE: NORMAL
RAD ONC ARIA COURSE TREATMENT ELAPSED DAYS: 17
RAD ONC ARIA FIRST TREATMENT DATE: NORMAL
RAD ONC ARIA PLAN FRACTIONS TREATED TO DATE: 13
RAD ONC ARIA PLAN ID: NORMAL
RAD ONC ARIA PLAN PRESCRIBED DOSE PER FRACTION: 2.65 GY
RAD ONC ARIA PLAN PRIMARY REFERENCE POINT: 1
RAD ONC ARIA PLAN TOTAL FRACTIONS PRESCRIBED: 16
RAD ONC ARIA PLAN TOTAL PRESCRIBED DOSE: 4240 CGY
RAD ONC ARIA REFERENCE POINT DOSAGE GIVEN TO DATE: 34.45 GY
RAD ONC ARIA REFERENCE POINT ID: 1
RAD ONC ARIA REFERENCE POINT SESSION DOSAGE GIVEN: 2.65 GY

## 2024-01-26 PROCEDURE — 77295 3-D RADIOTHERAPY PLAN: CPT | Performed by: STUDENT IN AN ORGANIZED HEALTH CARE EDUCATION/TRAINING PROGRAM

## 2024-01-26 PROCEDURE — 77334 RADIATION TREATMENT AID(S): CPT | Performed by: STUDENT IN AN ORGANIZED HEALTH CARE EDUCATION/TRAINING PROGRAM

## 2024-01-26 PROCEDURE — 77336 RADIATION PHYSICS CONSULT: CPT | Performed by: STUDENT IN AN ORGANIZED HEALTH CARE EDUCATION/TRAINING PROGRAM

## 2024-01-26 PROCEDURE — 77300 RADIATION THERAPY DOSE PLAN: CPT | Performed by: STUDENT IN AN ORGANIZED HEALTH CARE EDUCATION/TRAINING PROGRAM

## 2024-01-26 PROCEDURE — 77412 RADIATION TX DELIVERY LVL 3: CPT | Performed by: STUDENT IN AN ORGANIZED HEALTH CARE EDUCATION/TRAINING PROGRAM

## 2024-01-29 ENCOUNTER — RADIATION ONCOLOGY WEEKLY ASSESSMENT (OUTPATIENT)
Dept: RADIATION ONCOLOGY | Facility: HOSPITAL | Age: 79
End: 2024-01-29
Payer: MEDICARE

## 2024-01-29 ENCOUNTER — HOSPITAL ENCOUNTER (OUTPATIENT)
Dept: RADIATION ONCOLOGY | Facility: HOSPITAL | Age: 79
Discharge: HOME | End: 2024-01-29
Payer: MEDICARE

## 2024-01-29 VITALS
OXYGEN SATURATION: 98 % | SYSTOLIC BLOOD PRESSURE: 146 MMHG | WEIGHT: 141 LBS | HEART RATE: 66 BPM | DIASTOLIC BLOOD PRESSURE: 87 MMHG | BODY MASS INDEX: 23.46 KG/M2

## 2024-01-29 DIAGNOSIS — C50.911 MALIGNANT NEOPLASM OF RIGHT FEMALE BREAST, UNSPECIFIED ESTROGEN RECEPTOR STATUS, UNSPECIFIED SITE OF BREAST: Primary | ICD-10-CM

## 2024-01-29 PROBLEM — R23.2 HOT FLASHES: Status: ACTIVE | Noted: 2024-01-29

## 2024-01-29 LAB
RAD ONC ARIA COURSE ID: NORMAL
RAD ONC ARIA COURSE INTENT: NORMAL
RAD ONC ARIA COURSE LAST TREATMENT DATE: NORMAL
RAD ONC ARIA COURSE START DATE: NORMAL
RAD ONC ARIA COURSE TREATMENT ELAPSED DAYS: 20
RAD ONC ARIA FIRST TREATMENT DATE: NORMAL
RAD ONC ARIA PLAN FRACTIONS TREATED TO DATE: 14
RAD ONC ARIA PLAN ID: NORMAL
RAD ONC ARIA PLAN PRESCRIBED DOSE PER FRACTION: 2.65 GY
RAD ONC ARIA PLAN PRIMARY REFERENCE POINT: 1
RAD ONC ARIA PLAN TOTAL FRACTIONS PRESCRIBED: 16
RAD ONC ARIA PLAN TOTAL PRESCRIBED DOSE: 4240 CGY
RAD ONC ARIA REFERENCE POINT DOSAGE GIVEN TO DATE: 37.1 GY
RAD ONC ARIA REFERENCE POINT ID: 1
RAD ONC ARIA REFERENCE POINT SESSION DOSAGE GIVEN: 2.65 GY

## 2024-01-29 PROCEDURE — 77412 RADIATION TX DELIVERY LVL 3: CPT | Performed by: STUDENT IN AN ORGANIZED HEALTH CARE EDUCATION/TRAINING PROGRAM

## 2024-01-29 PROCEDURE — FACE2FACE: Performed by: INTERNAL MEDICINE

## 2024-01-29 RX ORDER — OMEPRAZOLE 40 MG/1
CAPSULE, DELAYED RELEASE ORAL
Qty: 90 CAPSULE | Refills: 0 | Status: SHIPPED | OUTPATIENT
Start: 2024-01-29

## 2024-01-29 NOTE — PROGRESS NOTES
Radiation Oncology  On-Treatment Note      Patient: Alexandria Yousif    MRN: [id number]    Attending Physician: Rashi Hood MD     Diagnosis:     ICD-10-CM ICD-9-CM   1. Malignant neoplasm of right female breast, unspecified estrogen receptor status, unspecified site of breast  C50.911 174.9         Radiation Therapy Visit:  Continue radiation therapy, Dosimetry plan remains acceptable, Films reviewed and remains acceptable, Pain assessed, Pain management planned, Radiation dose schedule reviewed and remains acceptable, Radiation technique remains acceptable, and Symptoms within expected range    Radiation Treatments       Active   Plans   [id number]   Most recent treatment: Dose planned: 265 cGy (fraction 14 on 1/29/2024)   Total: Dose planned: 4,240 cGy (16 fractions)   Elapsed Days: 20      Reference Points   1   Most recent treatment: Dose given: 265 cGy (on 1/29/2024)   Total: Dose given: 3,710 cGy   Elapsed Days: 20                      Physical Examination:  Vitals: Blood pressure 146/87, pulse 66, weight 64 kg (141 lb), SpO2 98%, not currently breastfeeding.  Pain Score    01/29/24 1127   PainSc: 0-No pain       Restricted in physically strenuous activity but ambulatory and able to carry out work of a light or sedentary nature, e.g., light house work, office work = 1    We examined the relevant areas: yes  Findings are within the expected range for this stage of treatment: yes  -------------------------------------------------------------------------------------------------------------------    ACTION ITEMS:  Patient tolerating treatment well and as expected for this stage in their treatment and Continue radiation therapy as planned    Estimated Completion Date: 2/6/2024    Discussed appropriate skin care

## 2024-01-30 ENCOUNTER — HOSPITAL ENCOUNTER (OUTPATIENT)
Dept: RADIATION ONCOLOGY | Facility: HOSPITAL | Age: 79
Discharge: HOME | End: 2024-01-30

## 2024-01-30 LAB
RAD ONC ARIA COURSE ID: NORMAL
RAD ONC ARIA COURSE INTENT: NORMAL
RAD ONC ARIA COURSE LAST TREATMENT DATE: NORMAL
RAD ONC ARIA COURSE START DATE: NORMAL
RAD ONC ARIA COURSE TREATMENT ELAPSED DAYS: 21
RAD ONC ARIA FIRST TREATMENT DATE: NORMAL
RAD ONC ARIA PLAN FRACTIONS TREATED TO DATE: 15
RAD ONC ARIA PLAN ID: NORMAL
RAD ONC ARIA PLAN PRESCRIBED DOSE PER FRACTION: 2.65 GY
RAD ONC ARIA PLAN PRIMARY REFERENCE POINT: 1
RAD ONC ARIA PLAN TOTAL FRACTIONS PRESCRIBED: 16
RAD ONC ARIA PLAN TOTAL PRESCRIBED DOSE: 4240 CGY
RAD ONC ARIA REFERENCE POINT DOSAGE GIVEN TO DATE: 39.75 GY
RAD ONC ARIA REFERENCE POINT ID: 1
RAD ONC ARIA REFERENCE POINT SESSION DOSAGE GIVEN: 2.65 GY

## 2024-01-30 PROCEDURE — 77412 RADIATION TX DELIVERY LVL 3: CPT | Performed by: STUDENT IN AN ORGANIZED HEALTH CARE EDUCATION/TRAINING PROGRAM

## 2024-01-30 PROCEDURE — 77417 THER RADIOLOGY PORT IMAGE(S): CPT | Performed by: STUDENT IN AN ORGANIZED HEALTH CARE EDUCATION/TRAINING PROGRAM

## 2024-01-31 ENCOUNTER — HOSPITAL ENCOUNTER (OUTPATIENT)
Dept: RADIATION ONCOLOGY | Facility: HOSPITAL | Age: 79
Discharge: HOME | End: 2024-01-31

## 2024-01-31 LAB
RAD ONC ARIA COURSE ID: NORMAL
RAD ONC ARIA COURSE INTENT: NORMAL
RAD ONC ARIA COURSE LAST TREATMENT DATE: NORMAL
RAD ONC ARIA COURSE START DATE: NORMAL
RAD ONC ARIA COURSE TREATMENT ELAPSED DAYS: 22
RAD ONC ARIA FIRST TREATMENT DATE: NORMAL
RAD ONC ARIA PLAN FRACTIONS TREATED TO DATE: 16
RAD ONC ARIA PLAN ID: NORMAL
RAD ONC ARIA PLAN PRESCRIBED DOSE PER FRACTION: 2.65 GY
RAD ONC ARIA PLAN PRIMARY REFERENCE POINT: 1
RAD ONC ARIA PLAN TOTAL FRACTIONS PRESCRIBED: 16
RAD ONC ARIA PLAN TOTAL PRESCRIBED DOSE: 4240 CGY
RAD ONC ARIA REFERENCE POINT DOSAGE GIVEN TO DATE: 42.4 GY
RAD ONC ARIA REFERENCE POINT ID: 1
RAD ONC ARIA REFERENCE POINT SESSION DOSAGE GIVEN: 2.65 GY

## 2024-01-31 PROCEDURE — 77412 RADIATION TX DELIVERY LVL 3: CPT | Performed by: RADIOLOGY

## 2024-02-01 ENCOUNTER — OFFICE VISIT (OUTPATIENT)
Dept: PSYCHIATRY | Facility: HOSPITAL | Age: 79
End: 2024-02-01
Payer: MEDICARE

## 2024-02-01 ENCOUNTER — HOSPITAL ENCOUNTER (OUTPATIENT)
Dept: RADIATION ONCOLOGY | Facility: HOSPITAL | Age: 79
Setting detail: RADIATION/ONCOLOGY SERIES
End: 2024-02-01
Payer: MEDICARE

## 2024-02-01 DIAGNOSIS — R23.2 HOT FLASHES: ICD-10-CM

## 2024-02-01 DIAGNOSIS — G47.00 INSOMNIA, UNSPECIFIED TYPE: ICD-10-CM

## 2024-02-01 DIAGNOSIS — F32.A DEPRESSION, UNSPECIFIED DEPRESSION TYPE: ICD-10-CM

## 2024-02-01 DIAGNOSIS — F41.1 GENERALIZED ANXIETY DISORDER: Primary | ICD-10-CM

## 2024-02-01 DIAGNOSIS — C50.911 MALIGNANT NEOPLASM OF RIGHT FEMALE BREAST, UNSPECIFIED ESTROGEN RECEPTOR STATUS, UNSPECIFIED SITE OF BREAST: ICD-10-CM

## 2024-02-01 LAB
RAD ONC ARIA COURSE ID: NORMAL
RAD ONC ARIA COURSE INTENT: NORMAL
RAD ONC ARIA COURSE LAST TREATMENT DATE: NORMAL
RAD ONC ARIA COURSE START DATE: NORMAL
RAD ONC ARIA COURSE TREATMENT ELAPSED DAYS: 23
RAD ONC ARIA FIRST TREATMENT DATE: NORMAL
RAD ONC ARIA PLAN FRACTIONS TREATED TO DATE: 1
RAD ONC ARIA PLAN ID: NORMAL
RAD ONC ARIA PLAN PRESCRIBED DOSE PER FRACTION: 2.5 GY
RAD ONC ARIA PLAN PRIMARY REFERENCE POINT: NORMAL
RAD ONC ARIA PLAN TOTAL FRACTIONS PRESCRIBED: 4
RAD ONC ARIA PLAN TOTAL PRESCRIBED DOSE: 1000 CGY
RAD ONC ARIA REFERENCE POINT DOSAGE GIVEN TO DATE: 2.5 GY
RAD ONC ARIA REFERENCE POINT ID: NORMAL
RAD ONC ARIA REFERENCE POINT SESSION DOSAGE GIVEN: 2.5 GY

## 2024-02-01 PROCEDURE — 77412 RADIATION TX DELIVERY LVL 3: CPT | Performed by: STUDENT IN AN ORGANIZED HEALTH CARE EDUCATION/TRAINING PROGRAM

## 2024-02-01 PROCEDURE — 77280 THER RAD SIMULAJ FIELD SMPL: CPT | Performed by: STUDENT IN AN ORGANIZED HEALTH CARE EDUCATION/TRAINING PROGRAM

## 2024-02-01 PROCEDURE — 1160F RVW MEDS BY RX/DR IN RCRD: CPT | Performed by: NURSE PRACTITIONER

## 2024-02-01 PROCEDURE — 1159F MED LIST DOCD IN RCRD: CPT | Performed by: NURSE PRACTITIONER

## 2024-02-01 PROCEDURE — 77387 GUIDANCE FOR RADJ TX DLVR: CPT | Performed by: STUDENT IN AN ORGANIZED HEALTH CARE EDUCATION/TRAINING PROGRAM

## 2024-02-01 PROCEDURE — 99215 OFFICE O/P EST HI 40 MIN: CPT | Performed by: NURSE PRACTITIONER

## 2024-02-02 ENCOUNTER — HOSPITAL ENCOUNTER (OUTPATIENT)
Dept: RADIATION ONCOLOGY | Facility: HOSPITAL | Age: 79
Discharge: HOME | End: 2024-02-02

## 2024-02-02 LAB
RAD ONC ARIA COURSE ID: NORMAL
RAD ONC ARIA COURSE INTENT: NORMAL
RAD ONC ARIA COURSE LAST TREATMENT DATE: NORMAL
RAD ONC ARIA COURSE START DATE: NORMAL
RAD ONC ARIA COURSE TREATMENT ELAPSED DAYS: 24
RAD ONC ARIA FIRST TREATMENT DATE: NORMAL
RAD ONC ARIA PLAN FRACTIONS TREATED TO DATE: 2
RAD ONC ARIA PLAN ID: NORMAL
RAD ONC ARIA PLAN PRESCRIBED DOSE PER FRACTION: 2.5 GY
RAD ONC ARIA PLAN PRIMARY REFERENCE POINT: NORMAL
RAD ONC ARIA PLAN TOTAL FRACTIONS PRESCRIBED: 4
RAD ONC ARIA PLAN TOTAL PRESCRIBED DOSE: 1000 CGY
RAD ONC ARIA REFERENCE POINT DOSAGE GIVEN TO DATE: 5 GY
RAD ONC ARIA REFERENCE POINT ID: NORMAL
RAD ONC ARIA REFERENCE POINT SESSION DOSAGE GIVEN: 2.5 GY

## 2024-02-02 PROCEDURE — 77336 RADIATION PHYSICS CONSULT: CPT | Performed by: STUDENT IN AN ORGANIZED HEALTH CARE EDUCATION/TRAINING PROGRAM

## 2024-02-02 PROCEDURE — 77014 CHG CT GUIDANCE RADIATION THERAPY FLDS PLACEMENT: CPT | Performed by: STUDENT IN AN ORGANIZED HEALTH CARE EDUCATION/TRAINING PROGRAM

## 2024-02-02 PROCEDURE — 77412 RADIATION TX DELIVERY LVL 3: CPT | Performed by: STUDENT IN AN ORGANIZED HEALTH CARE EDUCATION/TRAINING PROGRAM

## 2024-02-02 PROCEDURE — 77387 GUIDANCE FOR RADJ TX DLVR: CPT | Performed by: STUDENT IN AN ORGANIZED HEALTH CARE EDUCATION/TRAINING PROGRAM

## 2024-02-05 ENCOUNTER — HOSPITAL ENCOUNTER (OUTPATIENT)
Dept: RADIATION ONCOLOGY | Facility: HOSPITAL | Age: 79
Discharge: HOME | End: 2024-02-05
Payer: MEDICARE

## 2024-02-05 ENCOUNTER — RADIATION ONCOLOGY WEEKLY ASSESSMENT (OUTPATIENT)
Dept: RADIATION ONCOLOGY | Facility: HOSPITAL | Age: 79
End: 2024-02-05
Payer: MEDICARE

## 2024-02-05 VITALS
WEIGHT: 138.4 LBS | HEART RATE: 61 BPM | BODY MASS INDEX: 23.03 KG/M2 | OXYGEN SATURATION: 96 % | SYSTOLIC BLOOD PRESSURE: 124 MMHG | DIASTOLIC BLOOD PRESSURE: 84 MMHG

## 2024-02-05 DIAGNOSIS — C50.911 MALIGNANT NEOPLASM OF RIGHT FEMALE BREAST, UNSPECIFIED ESTROGEN RECEPTOR STATUS, UNSPECIFIED SITE OF BREAST: Primary | ICD-10-CM

## 2024-02-05 LAB
RAD ONC ARIA COURSE ID: NORMAL
RAD ONC ARIA COURSE INTENT: NORMAL
RAD ONC ARIA COURSE LAST TREATMENT DATE: NORMAL
RAD ONC ARIA COURSE START DATE: NORMAL
RAD ONC ARIA COURSE TREATMENT ELAPSED DAYS: 27
RAD ONC ARIA FIRST TREATMENT DATE: NORMAL
RAD ONC ARIA PLAN FRACTIONS TREATED TO DATE: 3
RAD ONC ARIA PLAN ID: NORMAL
RAD ONC ARIA PLAN PRESCRIBED DOSE PER FRACTION: 2.5 GY
RAD ONC ARIA PLAN PRIMARY REFERENCE POINT: NORMAL
RAD ONC ARIA PLAN TOTAL FRACTIONS PRESCRIBED: 4
RAD ONC ARIA PLAN TOTAL PRESCRIBED DOSE: 1000 CGY
RAD ONC ARIA REFERENCE POINT DOSAGE GIVEN TO DATE: 7.5 GY
RAD ONC ARIA REFERENCE POINT ID: NORMAL
RAD ONC ARIA REFERENCE POINT SESSION DOSAGE GIVEN: 2.5 GY

## 2024-02-05 PROCEDURE — G6002 STEREOSCOPIC X-RAY GUIDANCE: HCPCS | Performed by: STUDENT IN AN ORGANIZED HEALTH CARE EDUCATION/TRAINING PROGRAM

## 2024-02-05 PROCEDURE — 77387 GUIDANCE FOR RADJ TX DLVR: CPT | Performed by: STUDENT IN AN ORGANIZED HEALTH CARE EDUCATION/TRAINING PROGRAM

## 2024-02-05 PROCEDURE — 77412 RADIATION TX DELIVERY LVL 3: CPT | Performed by: STUDENT IN AN ORGANIZED HEALTH CARE EDUCATION/TRAINING PROGRAM

## 2024-02-05 RX ORDER — ONDANSETRON 4 MG/1
4 TABLET, ORALLY DISINTEGRATING ORAL EVERY 8 HOURS PRN
Qty: 30 TABLET | Refills: 2 | Status: SHIPPED | OUTPATIENT
Start: 2024-02-05

## 2024-02-05 NOTE — PROGRESS NOTES
Radiation Oncology  On-Treatment Note      Patient: Alexandria Yousif    MRN: [id number]    Attending Physician: Rashi Hood MD     Diagnosis:     ICD-10-CM ICD-9-CM   1. Malignant neoplasm of right female breast, unspecified estrogen receptor status, unspecified site of breast  C50.911 174.9       Radiation Therapy Visit:  Continue radiation therapy, Dosimetry plan remains acceptable, Films reviewed and remains acceptable, Pain assessed, Pain management planned, Radiation dose schedule reviewed and remains acceptable, Radiation technique remains acceptable, and Symptoms within expected range    Radiation Treatments       Active   Plans   [id number]   Most recent treatment: Dose planned: 250 cGy (fraction 3 on 2/5/2024)   Total: Dose planned: 1,000 cGy (4 fractions)   Elapsed Days: 27      Reference Points   1   Most recent treatment: Dose given: 265 cGy (on 1/31/2024)   Total: Dose given: 4,240 cGy   Elapsed Days: 22      PTV_Lump   Most recent treatment: Dose given: 250 cGy (on 2/5/2024)   Total: Dose given: 750 cGy   Elapsed Days: 27                      Physical Examination:  Vitals: Blood pressure 124/84, pulse 61, weight 62.8 kg (138 lb 6.4 oz), SpO2 96%, not currently breastfeeding.  Pain Score    02/05/24 1140   PainSc: 0-No pain       Restricted in physically strenuous activity but ambulatory and able to carry out work of a light or sedentary nature, e.g., light house work, office work = 1    We examined the relevant areas: yes  Findings are within the expected range for this stage of treatment: yes  -------------------------------------------------------------------------------------------------------------------    ACTION ITEMS:  Patient tolerating treatment well and as expected for this stage in their treatment and Continue radiation therapy as planned    Estimated Completion Date: 2/6/2024    Zofran for intermittent nausea, not having today    Follow up in 3 months      Rashi Hood MD  Radiation  Oncology

## 2024-02-06 ENCOUNTER — TREATMENT (OUTPATIENT)
Dept: RADIATION ONCOLOGY | Facility: HOSPITAL | Age: 79
End: 2024-02-06
Payer: MEDICARE

## 2024-02-06 ENCOUNTER — HOSPITAL ENCOUNTER (OUTPATIENT)
Dept: RADIATION ONCOLOGY | Facility: HOSPITAL | Age: 79
Discharge: HOME | End: 2024-02-06

## 2024-02-06 DIAGNOSIS — D05.11 DUCTAL CARCINOMA IN SITU (DCIS) OF RIGHT BREAST: Primary | ICD-10-CM

## 2024-02-06 LAB
RAD ONC ARIA COURSE ID: NORMAL
RAD ONC ARIA COURSE INTENT: NORMAL
RAD ONC ARIA COURSE LAST TREATMENT DATE: NORMAL
RAD ONC ARIA COURSE START DATE: NORMAL
RAD ONC ARIA COURSE TREATMENT ELAPSED DAYS: 28
RAD ONC ARIA FIRST TREATMENT DATE: NORMAL
RAD ONC ARIA PLAN FRACTIONS TREATED TO DATE: 4
RAD ONC ARIA PLAN ID: NORMAL
RAD ONC ARIA PLAN PRESCRIBED DOSE PER FRACTION: 2.5 GY
RAD ONC ARIA PLAN PRIMARY REFERENCE POINT: NORMAL
RAD ONC ARIA PLAN TOTAL FRACTIONS PRESCRIBED: 4
RAD ONC ARIA PLAN TOTAL PRESCRIBED DOSE: 1000 CGY
RAD ONC ARIA REFERENCE POINT DOSAGE GIVEN TO DATE: 10 GY
RAD ONC ARIA REFERENCE POINT ID: NORMAL
RAD ONC ARIA REFERENCE POINT SESSION DOSAGE GIVEN: 2.5 GY

## 2024-02-06 PROCEDURE — 77387 GUIDANCE FOR RADJ TX DLVR: CPT | Performed by: STUDENT IN AN ORGANIZED HEALTH CARE EDUCATION/TRAINING PROGRAM

## 2024-02-06 PROCEDURE — 77412 RADIATION TX DELIVERY LVL 3: CPT | Performed by: STUDENT IN AN ORGANIZED HEALTH CARE EDUCATION/TRAINING PROGRAM

## 2024-02-06 PROCEDURE — G6002 STEREOSCOPIC X-RAY GUIDANCE: HCPCS | Performed by: STUDENT IN AN ORGANIZED HEALTH CARE EDUCATION/TRAINING PROGRAM

## 2024-02-07 NOTE — PROGRESS NOTES
Radiation Treatment Summary Note      Patient Name: Alexandria Yousif  : 1945    Attending Provider: Rashi Hood MD      Diagnosis:     ICD-10-CM ICD-9-CM   1. Ductal carcinoma in situ (DCIS) of right breast  D05.11 233.0       Radiation Start Date: 2024    Radiation Completion Date: 2024      Prescription:     Sequential    Site: Right Breast  Laterality: Right  Total Dose: 4240cGy  Dose per Fraction: 265cGy  Total Fractions: 16  Daily or BID: Daily  Modality: Photon  Technique: 3DCRT  Bolus: No       THEN      2.   Site: Lumpectomy Boost  Laterality: Right  Total Dose: 1000cGy  Dose per Fraction: 250cGy  Total Fractions: 4  Daily or BID: Daily  Modality: Photon  Technique: 3DCRT  Bolus: No    Final Delivered Dose Deviated From Initially Prescribed Dose: No    Concurrent Chemotherapy: No    Patient Tolerated Treatment Without Unexpected Side Effects/Complications: Yes    ECOG: Fully active, able to carry on all pre-disease performance without restriction = 0    Pain Management Plan: None Indicated/PRN OTC    Follow-Up Plan: 3 months    Imaging Ordered for Follow-Up: None/NA        Rashi Hood MD

## 2024-02-08 RX ORDER — ROSUVASTATIN CALCIUM 10 MG/1
10 TABLET, COATED ORAL NIGHTLY
Qty: 90 TABLET | Refills: 1 | Status: SHIPPED | OUTPATIENT
Start: 2024-02-08

## 2024-02-08 RX ORDER — FENOFIBRATE 145 MG/1
TABLET, COATED ORAL
Qty: 90 TABLET | Refills: 1 | Status: SHIPPED | OUTPATIENT
Start: 2024-02-08

## 2024-02-12 DIAGNOSIS — F33.1 MODERATE EPISODE OF RECURRENT MAJOR DEPRESSIVE DISORDER: ICD-10-CM

## 2024-02-12 DIAGNOSIS — F41.1 GENERALIZED ANXIETY DISORDER: ICD-10-CM

## 2024-02-12 PROBLEM — F41.9 ANXIETY: Status: ACTIVE | Noted: 2024-02-12

## 2024-02-12 LAB
RAD ONC ARIA COURSE END DATE: NORMAL
RAD ONC ARIA COURSE ID: NORMAL
RAD ONC ARIA COURSE INTENT: NORMAL
RAD ONC ARIA COURSE LAST TREATMENT DATE: NORMAL
RAD ONC ARIA COURSE START DATE: NORMAL
RAD ONC ARIA COURSE TREATMENT ELAPSED DAYS: 28
RAD ONC ARIA FIRST TREATMENT DATE: NORMAL
RAD ONC ARIA PLAN FRACTIONS TREATED TO DATE: 16
RAD ONC ARIA PLAN FRACTIONS TREATED TO DATE: 4
RAD ONC ARIA PLAN ID: NORMAL
RAD ONC ARIA PLAN ID: NORMAL
RAD ONC ARIA PLAN NAME: NORMAL
RAD ONC ARIA PLAN NAME: NORMAL
RAD ONC ARIA PLAN PRESCRIBED DOSE PER FRACTION: 2.5 GY
RAD ONC ARIA PLAN PRESCRIBED DOSE PER FRACTION: 2.65 GY
RAD ONC ARIA PLAN PRIMARY REFERENCE POINT: 1
RAD ONC ARIA PLAN PRIMARY REFERENCE POINT: NORMAL
RAD ONC ARIA PLAN TOTAL FRACTIONS PRESCRIBED: 16
RAD ONC ARIA PLAN TOTAL FRACTIONS PRESCRIBED: 4
RAD ONC ARIA PLAN TOTAL PRESCRIBED DOSE: 1000 CGY
RAD ONC ARIA PLAN TOTAL PRESCRIBED DOSE: 4240 CGY
RAD ONC ARIA REFERENCE POINT DOSAGE GIVEN TO DATE: 10 GY
RAD ONC ARIA REFERENCE POINT DOSAGE GIVEN TO DATE: 42.4 GY
RAD ONC ARIA REFERENCE POINT ID: 1
RAD ONC ARIA REFERENCE POINT ID: NORMAL

## 2024-02-12 PROCEDURE — 99214 OFFICE O/P EST MOD 30 MIN: CPT | Performed by: NURSE PRACTITIONER

## 2024-02-12 RX ORDER — GABAPENTIN 100 MG/1
CAPSULE ORAL
Qty: 90 CAPSULE | Refills: 2 | Status: SHIPPED | OUTPATIENT
Start: 2024-02-12

## 2024-02-12 RX ORDER — GABAPENTIN 300 MG/1
CAPSULE ORAL
Qty: 60 CAPSULE | Refills: 2 | Status: SHIPPED | OUTPATIENT
Start: 2024-02-12

## 2024-02-16 RX ORDER — VALACYCLOVIR HYDROCHLORIDE 500 MG/1
1000 TABLET, FILM COATED ORAL 2 TIMES DAILY
Qty: 4 TABLET | Refills: 3 | Status: SHIPPED | OUTPATIENT
Start: 2024-02-16

## 2024-02-20 ENCOUNTER — OFFICE VISIT (OUTPATIENT)
Dept: PSYCHIATRY | Facility: HOSPITAL | Age: 79
End: 2024-02-20
Payer: MEDICARE

## 2024-02-20 DIAGNOSIS — F43.21 GRIEF REACTION: Primary | ICD-10-CM

## 2024-02-20 DIAGNOSIS — C50.911 MALIGNANT NEOPLASM OF RIGHT FEMALE BREAST, UNSPECIFIED ESTROGEN RECEPTOR STATUS, UNSPECIFIED SITE OF BREAST: ICD-10-CM

## 2024-02-20 DIAGNOSIS — R23.2 HOT FLASHES: ICD-10-CM

## 2024-02-20 DIAGNOSIS — G47.00 INSOMNIA, UNSPECIFIED TYPE: ICD-10-CM

## 2024-02-20 DIAGNOSIS — F41.1 GENERALIZED ANXIETY DISORDER: ICD-10-CM

## 2024-02-20 DIAGNOSIS — F32.A DEPRESSION, UNSPECIFIED DEPRESSION TYPE: ICD-10-CM

## 2024-02-20 RX ORDER — ALPRAZOLAM 0.5 MG/1
TABLET ORAL
Qty: 60 TABLET | Refills: 0 | Status: SHIPPED | OUTPATIENT
Start: 2024-02-20

## 2024-02-20 NOTE — PROGRESS NOTES
Supportive Oncology Services  In person follow up session - The primary office location is [facility].     Subjective  Patient ID: Alexandria Yousif is a 78 y.o. female is seen face to face in the Supportive Oncology Services (SOS) Clinic. The patient is currently in survivorship of  right breast DCIS, diagnosed in 2023.  Status post right breast lumpectomy with negative margins.  She just finished her last boost radiation treatment and is still trying to decide if she is going to do oral hormone therapy or not, but is thinking she will likely not do it. She is followed by Dr. Rashi Hood and Dr. Stephanie Chapman.       CC: Grief over recent loss of her dear friend, ruminative worry    Last seen in person: 2024    HPI/ Interval History: This is a follow-up evaluation, talk therapy, and medication management visit for ongoing issues of ruminative worry, anxiety, irritability, depression, hot flashes, poor sleep, and grief related to increased recent life stressors.  The patient reports that her dear friend recently  of pancreatic cancer and she is heavily feeling the weight of this loss currently.  They are planning to make the trip down for her  on Thursday.  She discussed the significant role her friend has played in her life and how they have supported each other through multiple life changing events.  She discussed at length her worries about traveling there, worries about what to say, and worries about having a panic attack while she is there.  She endorses having some recent ruminative worries and negative self-talk, feeling guilty that she was not able to be with her friend to hold her hand when she passed away, even though she is able to reason that if she had been able to she would have been there.  She verbalizes that her friend knew this about her and that she processing through her feelings.     She reports that she has been having to use her  as needed Xanax 0.5 mg 1/2 to 1 tablet at least once daily for the last few weeks due to her increase of anxiety and fear having a panic attack.  She also endorses that this has been helpful for her sleep, but has been trying to use it sparingly.  She reports feeling that her gabapentin has also been helpful for her level of anxiety and in her ability to sleep.  She endorses being cautious with how she is taking her medication so that she does not overdose or become overly sedated.    She reports that her sleep has been poor lately due to her ruminative worry and difficulty shutting her mind down, but that her medication does help her.  She continues to have interest in things she previously enjoyed and feels this has been continuously improving over the last few months.  She reports that her energy has been waxing and waning, and attributes this to her poor sleep recently and her radiation boosts.  She feels her concentration and attention have been somewhat limited due to her grief.  She a decreased appetite recently due to her grief, but does still eat regular meals. She feels that her mood has improved overall over the last several months and is grateful for this because she feels she is able to handle her recent grief better now that her own mental health is in a better place. She denies any active SI, plan or intent, HI, or AVH.    Records reviewed.    PHQ-9 Total Score: 7 (previously scored a 10 at her visit on 2/1/2024)  She reports little interest or pleasure in doing things, poor appetite or overeating, having bad about herself, and feeling like a failure, feeling like she has let herself or her family down several days of the week within the last 2 weeks.  She endorses feeling tired or having little energy and trouble concentrating on things more than half the days of the week within the last 2 weeks. She denies issues with any of the other questions.    GAD7 Total Score: 8 (previously scored a 6 at her  visit on 2/1/2024)  She endorses not being able to stop or control worry, worrying too much about different things, trouble relaxing, and feeling afraid as if something awful might happen several days of the week within the last 2 weeks.  She reports feeling nervous, anxious, or on edge, and becoming easily annoyed or irritable more than half the days of the week within the last 2 weeks. She denies issues with any of the other questions.    Objective   Mental Status Exam  Appearance:  clean and casually dressed, appropriate and neat   Attitude toward clinician:  cooperative and agreeable , good eye contact  Speech:    Rate:  regular rate and rhythm   Volume:  normal  Motor:  no abnormal movements present  Mood:  Sad, tearful - appropriate with the context of the conversation  Affect: Sad, tearful - mood congruent - appropriate with the context of the conversation  Thought Processes:  linear, logical, and goal directed  Thought Content:  normal  Suicidal Thoughts:  absent  Homicidal Thoughts:  absent  Perceptual Disturbance: no perceptual disturbance  Attention and Concentration:  fair  Insight and Judgement:  fair  Memory:  memory appears to be intact    Gait: Within normal limits.  Assistive devices: None.    Exam: As above    Current Documented Allergies & Reactions  Allergies   Allergen Reactions    Betadine [Povidone Iodine] Rash     Burning/ stinging feeling on her rash    Codeine Nausea Only     BLURRED VISION, RASH     Current Psychotropic Medications:    ALPRAZolam (XANAX) 0.5 MG tablet, TAKE 1 TABLET BY MOUTH DAILY AS NEEDED FOR ANXIETY, Disp: 45 tablet, Rfl: 0    buPROPion (WELLBUTRIN) 75 MG tablet, TAKE 1 TABLET BY MOUTH DAILY (Patient taking differently: Take 1 tablet by mouth Daily. ONLY TAKES IN WINTER MONTHS), Disp: 30 tablet, Rfl: 2    desvenlafaxine (PRISTIQ) 100 MG 24 hr tablet, TAKE 1 TABLET BY MOUTH DAILY, Disp: 90 tablet, Rfl: 1    gabapentin (NEURONTIN) 100 MG capsule, Take 1-2 capsules by  mouth at dinnertime and/or bedtime as needed for sleep. May add one capsule per day until 600 mg total is reached in the evenings, as needed. May also take 1 capsule by mouth in the AM and one capsule at lunchtime as needed for anxiety., Disp: 90 capsule, Rfl: 2    gabapentin (NEURONTIN) 300 MG capsule, Take 1 to 2 capsules by mouth at dinnertime as needed for sleep., Disp: 60 capsule, Rfl: 2    traZODone (DESYREL) 50 MG tablet, Take 0.5-1 tablets by mouth At Night As Needed for Sleep. for sleep, Disp: 90 tablet, Rfl: 1    Current Outpatient Medications:     fenofibrate (TRICOR) 145 MG tablet, TAKE 1 TABLET BY MOUTH EVERY DAY, Disp: 90 tablet, Rfl: 1    rosuvastatin (CRESTOR) 10 MG tablet, Take 1 tablet by mouth Every Night., Disp: 90 tablet, Rfl: 1    acetaminophen (TYLENOL) 500 MG tablet, Take 2 tablets by mouth Every 6 (Six) Hours As Needed for Mild Pain., Disp: , Rfl:     albuterol sulfate  (90 Base) MCG/ACT inhaler, INHALE 2 PUFFS BY MOUTH INTO THE LUNGS EVERY 4 HOURS AS NEEDED FOR WHEEZING, Disp: 8.5 g, Rfl: 0    Alum Hydroxide-Mag Trisilicate (GAVISCON) 80-14.2 MG chewable tablet, Chew 1 tablet 4 (Four) Times a Day As Needed (Heartburn)., Disp: 224 each, Rfl: 11    aspirin 81 MG tablet, Take 1 tablet by mouth Daily., Disp: , Rfl:     Biotin 10 MG tablet, HOLD PRIOR TO SURG, Disp: , Rfl:     Cholecalciferol 50 MCG (2000 UT) capsule, HOLD PRIOR TO SURG, Disp: , Rfl:     cyclobenzaprine (FLEXERIL) 5 MG tablet, Take 1 tablet by mouth At Night As Needed for Muscle Spasms., Disp: 30 tablet, Rfl: 0    fluticasone (FLONASE) 50 MCG/ACT nasal spray, 2 sprays into the nostril(s) as directed by provider Daily for 30 days., Disp: 16 g, Rfl: 0    metFORMIN ER (GLUCOPHAGE-XR) 500 MG 24 hr tablet, TAKE 1 TABLET BY MOUTH DAILY WITH LARGEST MEAL OF THE DAY AND A GLASS OF WATER, Disp: 90 tablet, Rfl: 01    metoprolol tartrate (LOPRESSOR) 25 MG tablet, Take 1 tablet by mouth 2 (Two) Times a Day., Disp: 180 tablet, Rfl:  3    Omega-3 Fatty Acids (FISH OIL) 1200 MG capsule capsule, HOLD PRIOR TO SURG, Disp: , Rfl:     omeprazole (priLOSEC) 40 MG capsule, TAKE 1 CAPSULE BY MOUTH EVERY DAY, Disp: 90 capsule, Rfl: 0    ondansetron ODT (ZOFRAN-ODT) 4 MG disintegrating tablet, Place 1 tablet on the tongue Every 8 (Eight) Hours As Needed for Nausea or Vomiting., Disp: 30 tablet, Rfl: 2    spironolactone (ALDACTONE) 25 MG tablet, TAKE 1 TABLET BY MOUTH DAILY, Disp: 30 tablet, Rfl: 4    tretinoin (RETIN-A) 0.025 % cream, Apply 1 application  topically to the appropriate area as directed Every Night., Disp: , Rfl:     valACYclovir (VALTREX) 500 MG tablet, Take 2 tablets by mouth 2 (Two) Times a Day., Disp: 4 tablet, Rfl: 3    Plan of Care/ Medical Decision Making  1) Continue previously prescribed medication regimen and treatment plan as discussed and directed. Will increase her Xanax 0.5 mg to BID prn temporarily while she goes through this recent loss.  2) Provided support talk therapy through active listening, empathy, reflection, normalization of feelings, and positive reframing. Discussed current methods of coping.   3) Encouraged ongoing talk therapy with this provider or a trusted source of support. Will continue to provide additional resources in the after visit summary, patient instructions.  4) Notify provider if having any questions, concerns, or issues.    Follow up with GIOVANI Xavier, 2-4 weeks and as needed.    Diagnoses and all orders for this visit:    1. Grief reaction (Primary)    2. Generalized anxiety disorder  -     ALPRAZolam (XANAX) 0.5 MG tablet; 1/2 to 1 tablet by mouth twice daily as needed for anxiety  Dispense: 60 tablet; Refill: 0    3. Depression, unspecified depression type    4. Insomnia, unspecified type    5. Hot flashes    6. Malignant neoplasm of right female breast, unspecified estrogen receptor status, unspecified site of breast    I spent 76 minutes caring for Alexandria on this date of service. This  time includes time spent by me in the following activities: preparing for the visit, obtaining and/or reviewing a separately obtained history, performing a medically appropriate examination and/or evaluation, counseling and educating the patient/family/caregiver, ordering medications, tests, or procedures, documenting information in the medical record, and care coordination

## 2024-02-20 NOTE — PATIENT INSTRUCTIONS
[facility]  [address]  [place], [place]   [phone]    Please see below and attached for additional resources:  Group, individual (for the patient and family members), and family therapy    [employer]    [email or web address]/    Phone number: [phone]      Support for patients: matched with someone with a similar diagnosis and in survivorship    [facility] Life  [email or web address]      Self-referrals for wanting a primary care doctor or a specialist  PCP [phone]  Specialist [phone]  Bereavement Support Groups in the [place] Area  Grief Share: https://www.griefshare.org/countries/us/[facility]/ky/[facility]/[place]  *If you go to the website, you can click on the specific group date and it will give additional info about the groups*    Therapy resources  Chronic Illness counseling center  [email or web address]  [address] , [address]  [place] [address]  [facility]: phone # [phone]    Two Locations:    [address]  [place] [address]    [address], [address]  [place] [address]  FREE Massages  Patients are allowed some massages for FREE through the supportive oncology department. Massages at the cancer center and are done by Radha. She does her own scheduling.  Office:  [phone]    Mobile:  [phone]    FREE exercise program  Free exercise program [employer] program through the [employer]:  https://www.[employer].org/what-we-do/healthy-living/fitness/livestrong    Chiropractic services ([facility])  ReRRsat[employer]DuckDuckGo  [phone]  Accupuncture  Dr. Dee Garcia does Accupuncture  [facility]  [address] [place] [address] [phone] phone  [phone] fax    Suicide Crisis Information  Crisis Line: 994           OR          [phone] Suicide & Crisis Lifeline  [facility]:  Alcohol / Substance Use and Dual Diagnosis Management  [address] [place] [address], [place], [place], [address]  [phone]  OR [phone] https://[employer]/locations-and-payment/  Domestic Violence  [phone]) [phone] (SAFE)                            www.kcadv.org   Sexual Assault Crisis Centers (for children and adults)  2(419) [phone] (HOPE)    Genesight testing  https://[employer]/gene-test-mental-health-medications/?utm_source=re&utm_medium=cpc&utm_campaign=220837562&utm_content=3157858403327645&utm_term=genesight%20testing&utm_source=re&utm_medium=cpc&utm_campaign=branded&utm_content=4853341330539346&utm_term=genesight%20testing&[id number]

## 2024-02-27 ENCOUNTER — PATIENT OUTREACH (OUTPATIENT)
Dept: OTHER | Facility: HOSPITAL | Age: 79
End: 2024-02-27
Payer: MEDICARE

## 2024-02-27 NOTE — PROGRESS NOTES
Called MsRadha Benja to see how she was doing. Left a message with my contact information and asked her to call back at her convenience. Will mail survivorship info as well.

## 2024-03-05 RX ORDER — TRAZODONE HYDROCHLORIDE 50 MG/1
25-50 TABLET ORAL NIGHTLY PRN
Qty: 90 TABLET | Refills: 1 | Status: SHIPPED | OUTPATIENT
Start: 2024-03-05

## 2024-03-05 NOTE — TELEPHONE ENCOUNTER
Rx Refill Note  Requested Prescriptions     Pending Prescriptions Disp Refills    traZODone (DESYREL) 50 MG tablet [Pharmacy Med Name: TRAZODONE 50MG TABLETS] 90 tablet 1     Sig: TAKE 1/2 TO 1 TABLET BY MOUTH AT NIGHT AS NEEDED FOR SLEEP      Last office visit with prescribing clinician: 9/5/2023   Last telemedicine visit with prescribing clinician: Visit date not found   Next office visit with prescribing clinician: 3/15/2024                         Would you like a call back once the refill request has been completed: [] Yes [] No    If the office needs to give you a call back, can they leave a voicemail: [] Yes [] No    Juancarlos Laguna MA  03/05/24, 08:43 EST

## 2024-03-13 ENCOUNTER — TELEPHONE (OUTPATIENT)
Dept: FAMILY MEDICINE CLINIC | Facility: CLINIC | Age: 79
End: 2024-03-13

## 2024-03-13 NOTE — TELEPHONE ENCOUNTER
Hub staff attempted to follow warm transfer process and was unsuccessful     Caller: Alexandria Yousif    Relationship to patient: Self    Best call back number: [phone]     Patient is needing: PATIENT ACCIDENTALLY CANCELED HER 8 AM APPT WITH DR BECK ON 3/15-CAN SHE PLEASE BE RESCHEDULED IN THAT SPOT? [facility] NOT ABLE TO SCHEDULE AS OFFICE VISIT, ONLY SHOWING TELEHEALTH IS AVAILABLE. PLEASE CALL TO RESCHEDULE OFFICE VISIT AND LABS.

## 2024-03-13 NOTE — TELEPHONE ENCOUNTER
Hub staff attempted to follow warm transfer process and was unsuccessful     Caller: Alexandria Yousif    Relationship to patient: Self    Best call back number: [phone]     Patient is needing: PATIENT ACCIDENTALLY CANCELLED HER APPOINTMENT WITH DR BECK ON 3/15/24 AT 8 AM VIA MY CHART. WOULD LIKE TO KNOW IF SHE CAN BE ADDED BACK ONTO THE SCHEDULE BECAUSE SHE WAS TRYING TO CANCEL A DIFFERENT DOCTORS APPOINTMENT INSTEAD.

## 2024-03-15 ENCOUNTER — OFFICE VISIT (OUTPATIENT)
Dept: FAMILY MEDICINE CLINIC | Facility: CLINIC | Age: 79
End: 2024-03-15
Payer: MEDICARE

## 2024-03-15 VITALS
HEIGHT: 66 IN | WEIGHT: 138 LBS | BODY MASS INDEX: 22.18 KG/M2 | TEMPERATURE: 96.2 F | HEART RATE: 60 BPM | DIASTOLIC BLOOD PRESSURE: 64 MMHG | OXYGEN SATURATION: 98 % | SYSTOLIC BLOOD PRESSURE: 97 MMHG

## 2024-03-15 DIAGNOSIS — E78.2 MIXED HYPERLIPIDEMIA: ICD-10-CM

## 2024-03-15 DIAGNOSIS — F41.9 ANXIETY: Primary | ICD-10-CM

## 2024-03-15 DIAGNOSIS — F32.89 OTHER DEPRESSION: ICD-10-CM

## 2024-03-15 DIAGNOSIS — Z23 ENCOUNTER FOR IMMUNIZATION: ICD-10-CM

## 2024-03-15 DIAGNOSIS — E11.9 TYPE 2 DIABETES MELLITUS WITHOUT COMPLICATION, WITHOUT LONG-TERM CURRENT USE OF INSULIN: ICD-10-CM

## 2024-03-15 NOTE — PROGRESS NOTES
"Chief Complaint  Hyperlipidemia (6 month follow up )    Subjective        Alexandria Yousif presents to [facility]  Hyperlipidemia    She is here for f/u on early NIDDM, anxiety --now followed by behavioral health, HTG and HL, GERD.  She was dx with right DCIS ER/NC+ s/p right lumpectomy with Dr. Dee Saunders.   Finished XRT treatment with Dr. Hood 3 weeks ago  Seeing APRN for mental health who is now managing her psych meds   taking wellbutrin, priistiqu and xanax  Dr. Saunders did right lumpectomy due to right DCIS with subsequent XRT  Dr. GOMES was oncologist.  Pt has decided to not due tamoxifen or the like.   Dr. Jackson is pulmonologist and he follows her for pulmonary nodules.  She had lung bx 5/2023 x2 which were benign.  F/u chest CT sched for 5/2023  Takes crestor for HL and fenofibrate for HTG.  Omeprazole for GERD.     Has chronic LBP and has seen PT and pain management. Has had epidurals.  Back pain is stable.  She is on gabapentin for anxiety which also can help with LBP.  She is considering repeating epidurals.    Objective   Vital Signs:  BP 97/64 (BP Location: Left arm, Patient Position: Sitting, Cuff Size: Adult)   Pulse 60   Temp 96.2 °F (35.7 °C)   Ht 167.6 cm (66\")   Wt 62.6 kg (138 lb)   SpO2 98%   BMI 22.27 kg/m²   Estimated body mass index is 22.27 kg/m² as calculated from the following:    Height as of this encounter: 167.6 cm (66\").    Weight as of this encounter: 62.6 kg (138 lb).       BMI is within normal parameters. No other follow-up for BMI required.      Physical Exam  Vitals and nursing note reviewed.   Constitutional:       Appearance: Normal appearance. She is well-developed.   HENT:      Head: Normocephalic and atraumatic.      Right Ear: External ear normal.      Left Ear: External ear normal.   Eyes:      Extraocular Movements: Extraocular movements intact.      Conjunctiva/sclera: Conjunctivae normal.   Neck:      Vascular: No carotid bruit. "   Cardiovascular:      Rate and Rhythm: Normal rate and regular rhythm.      Heart sounds: Normal heart sounds.      Comments: No bruits  Pulmonary:      Effort: Pulmonary effort is normal. No respiratory distress.      Breath sounds: Normal breath sounds. No stridor. No wheezing, rhonchi or rales.   Chest:      Chest wall: No tenderness.   Abdominal:      General: Bowel sounds are normal. There is no distension.      Palpations: Abdomen is soft. There is no mass.      Tenderness: There is no abdominal tenderness. There is no guarding or rebound.      Hernia: No hernia is present.   Musculoskeletal:      Cervical back: Neck supple.   Lymphadenopathy:      Cervical: No cervical adenopathy.   Skin:     General: Skin is warm.   Neurological:      Mental Status: She is alert and oriented to person, place, and time. Mental status is at baseline.   Psychiatric:         Mood and Affect: Mood normal.         Behavior: Behavior normal.         Thought Content: Thought content normal.         Judgment: Judgment normal.        Result Review :                     Assessment and Plan     Diagnoses and all orders for this visit:    1. Anxiety (Primary)    2. Mixed hyperlipidemia  -     Lipid Panel With LDL / HDL Ratio  -     Comprehensive Metabolic Panel  -     CK    3. Type 2 diabetes mellitus without complication, without long-term current use of insulin  -     Hemoglobin A1c  -     Comprehensive Metabolic Panel  -     Microalbumin / Creatinine Urine Ratio - Urine, Clean Catch    4. Other depression  -     Lipid Panel With LDL / HDL Ratio  -     Hemoglobin A1c  -     Comprehensive Metabolic Panel  -     CBC & Differential  -     TSH  -     T4, Free  -     Microalbumin / Creatinine Urine Ratio - Urine, Clean Catch    5. Encounter for immunization    Other orders  -     Pneumococcal Conjugate Vaccine 20-Valent (PCV20)    P20 today  Check microalbumin/creatinine ratio due to DM.  Continue metformin  Fasting labs ordered  Contract  for xanax is now with her mental health APRN and future refills will need to come only from her.  She voiced understanding.   Anxiety is improving now that shehas finished her XRT  Continue gabapentin anxiety in hopes of replacing xanax.  Gabapentin also helpful for chronic LBP.  Seeing PT prn  also has had success with epidurals.   NSG is Dr. Celis  GERD continue omeprazole  Fasting labs ordered to f/u on HL, HTG, NIDDM.    RTC 6 mo           Follow Up     Return in about 6 months (around 9/15/2024) for Recheck.  Patient was given instructions and counseling regarding her condition or for health maintenance advice. Please see specific information pulled into the AVS if appropriate.

## 2024-03-16 LAB
ALBUMIN SERPL-MCNC: 4.5 G/DL (ref 3.5–5.2)
ALBUMIN/CREAT UR: 14 MG/G CREAT (ref 0–29)
ALBUMIN/GLOB SERPL: 2.1 G/DL
ALP SERPL-CCNC: 62 U/L (ref 39–117)
ALT SERPL-CCNC: 24 U/L (ref 1–33)
AST SERPL-CCNC: 20 U/L (ref 1–32)
BASOPHILS # BLD AUTO: 0.07 10*3/MM3 (ref 0–0.2)
BASOPHILS NFR BLD AUTO: 1.8 % (ref 0–1.5)
BILIRUB SERPL-MCNC: 0.3 MG/DL (ref 0–1.2)
BUN SERPL-MCNC: 18 MG/DL (ref 8–23)
BUN/CREAT SERPL: 22 (ref 7–25)
CALCIUM SERPL-MCNC: 9.6 MG/DL (ref 8.6–10.5)
CHLORIDE SERPL-SCNC: 101 MMOL/L (ref 98–107)
CHOLEST SERPL-MCNC: 145 MG/DL (ref 0–200)
CK SERPL-CCNC: 53 U/L (ref 20–180)
CO2 SERPL-SCNC: 25.9 MMOL/L (ref 22–29)
CREAT SERPL-MCNC: 0.82 MG/DL (ref 0.57–1)
CREAT UR-MCNC: 88.3 MG/DL
EGFRCR SERPLBLD CKD-EPI 2021: 73.8 ML/MIN/1.73
EOSINOPHIL # BLD AUTO: 0.19 10*3/MM3 (ref 0–0.4)
EOSINOPHIL NFR BLD AUTO: 4.9 % (ref 0.3–6.2)
ERYTHROCYTE [DISTWIDTH] IN BLOOD BY AUTOMATED COUNT: 13.4 % (ref 12.3–15.4)
GLOBULIN SER CALC-MCNC: 2.1 GM/DL
GLUCOSE SERPL-MCNC: 95 MG/DL (ref 65–99)
HBA1C MFR BLD: 6.4 % (ref 4.8–5.6)
HCT VFR BLD AUTO: 43.7 % (ref 34–46.6)
HDLC SERPL-MCNC: 51 MG/DL (ref 40–60)
HGB BLD-MCNC: 14.3 G/DL (ref 12–15.9)
IMM GRANULOCYTES # BLD AUTO: 0.01 10*3/MM3 (ref 0–0.05)
IMM GRANULOCYTES NFR BLD AUTO: 0.3 % (ref 0–0.5)
LDLC SERPL CALC-MCNC: 68 MG/DL (ref 0–100)
LDLC/HDLC SERPL: 1.24 {RATIO}
LYMPHOCYTES # BLD AUTO: 0.9 10*3/MM3 (ref 0.7–3.1)
LYMPHOCYTES NFR BLD AUTO: 23.1 % (ref 19.6–45.3)
MCH RBC QN AUTO: 29.3 PG (ref 26.6–33)
MCHC RBC AUTO-ENTMCNC: 32.7 G/DL (ref 31.5–35.7)
MCV RBC AUTO: 89.5 FL (ref 79–97)
MICROALBUMIN UR-MCNC: 12.8 UG/ML
MONOCYTES # BLD AUTO: 0.61 10*3/MM3 (ref 0.1–0.9)
MONOCYTES NFR BLD AUTO: 15.7 % (ref 5–12)
NEUTROPHILS # BLD AUTO: 2.11 10*3/MM3 (ref 1.7–7)
NEUTROPHILS NFR BLD AUTO: 54.2 % (ref 42.7–76)
NRBC BLD AUTO-RTO: 0 /100 WBC (ref 0–0.2)
PLATELET # BLD AUTO: 297 10*3/MM3 (ref 140–450)
POTASSIUM SERPL-SCNC: 4.7 MMOL/L (ref 3.5–5.2)
PROT SERPL-MCNC: 6.6 G/DL (ref 6–8.5)
RBC # BLD AUTO: 4.88 10*6/MM3 (ref 3.77–5.28)
SODIUM SERPL-SCNC: 138 MMOL/L (ref 136–145)
T4 FREE SERPL-MCNC: 1.61 NG/DL (ref 0.93–1.7)
TRIGL SERPL-MCNC: 155 MG/DL (ref 0–150)
TSH SERPL DL<=0.005 MIU/L-ACNC: 1.6 UIU/ML (ref 0.27–4.2)
VLDLC SERPL CALC-MCNC: 26 MG/DL (ref 5–40)
WBC # BLD AUTO: 3.89 10*3/MM3 (ref 3.4–10.8)

## 2024-04-02 ENCOUNTER — TELEPHONE (OUTPATIENT)
Dept: RADIATION ONCOLOGY | Facility: HOSPITAL | Age: 79
End: 2024-04-02
Payer: MEDICARE

## 2024-04-02 NOTE — TELEPHONE ENCOUNTER
Pt called asking medical questions/concerns regarding after radiation. Relayed the message to 's nurse, Genevieve, and she took over the phone call.

## 2024-04-03 ENCOUNTER — OFFICE VISIT (OUTPATIENT)
Dept: RADIATION ONCOLOGY | Facility: HOSPITAL | Age: 79
End: 2024-04-03
Payer: MEDICARE

## 2024-04-03 VITALS
HEART RATE: 58 BPM | SYSTOLIC BLOOD PRESSURE: 146 MMHG | WEIGHT: 139.4 LBS | BODY MASS INDEX: 22.5 KG/M2 | OXYGEN SATURATION: 97 % | DIASTOLIC BLOOD PRESSURE: 78 MMHG

## 2024-04-03 DIAGNOSIS — N63.11 MASS OF UPPER OUTER QUADRANT OF RIGHT BREAST: Primary | ICD-10-CM

## 2024-04-03 NOTE — PROGRESS NOTES
[facility] Radiation Oncology   Follow Up    Chief Complaint  DCIS of the Right Breast        Diagnosis: DCIS of the Right Breast     Overall Stage 0     pTis: per Lumpectomy Pathology  cN0: per MRI Breast and Physical Exam  cM0: per Physical Exam      Radiation Completion Date: 2/6/2024        Prescription:      Sequential     Site: Right Breast  Laterality: Right  Total Dose: 4240cGy  Dose per Fraction: 265cGy  Total Fractions: 16  Daily or BID: Daily  Modality: Photon  Technique: 3DCRT  Bolus: No         THEN        2.   Site: Lumpectomy Boost  Laterality: Right  Total Dose: 1000cGy  Dose per Fraction: 250cGy  Total Fractions: 4  Daily or BID: Daily  Modality: Photon  Technique: 3DCRT  Bolus: No      Interval History:    Alexandria Yousif presents for  follow-up secondary to increasing tenderness and induration in the treated right breast.  The patient reports several weeks of tenderness in the right upper outer quadrant with some extension along the pectoral muscle.  She has not had any signs or symptoms concerning for infection.  She denies any erythema or nipple discharge.      Imaging:      No new relevant imaging      Pathology:      No new relevant pathology      Labs:    Lab Results   Component Value Date    CREATININE 0.82 03/15/2024             Problem List:  Patient Active Problem List   Diagnosis    Hypercalcemia    Mixed hyperlipidemia    Abnormal brain MRI    Vertigo    Anxiety disorder due to general medical condition    Depression    Gastroesophageal reflux disease    Impaired fasting glucose    Insomnia    Numbness of lower extremity    Osteopenia    PVCs (premature ventricular contractions)    PSVT (paroxysmal supraventricular tachycardia)    PAT (paroxysmal atrial tachycardia)    Polycythemia    Pulmonary nodule, right    Hyperalbuminemia    Type 2 diabetes mellitus without complication, without long-term current use of insulin    Coronary artery calcification    RBBB    Spinal stenosis of lumbar  region without neurogenic claudication    Malignant neoplasm of female breast    Hot flashes    Anxiety    Generalized anxiety disorder          Medications:  Current Outpatient Medications on File Prior to Visit   Medication Sig Dispense Refill    acetaminophen (TYLENOL) 500 MG tablet Take 2 tablets by mouth Every 6 (Six) Hours As Needed for Mild Pain.      albuterol sulfate  (90 Base) MCG/ACT inhaler INHALE 2 PUFFS BY MOUTH INTO THE LUNGS EVERY 4 HOURS AS NEEDED FOR WHEEZING 8.5 g 0    ALPRAZolam (XANAX) 0.5 MG tablet 1/2 to 1 tablet by mouth twice daily as needed for anxiety 60 tablet 0    Alum Hydroxide-Mag Trisilicate (GAVISCON) 80-14.2 MG chewable tablet Chew 1 tablet 4 (Four) Times a Day As Needed (Heartburn). 224 each 11    aspirin 81 MG tablet Take 1 tablet by mouth Daily.      Biotin 10 MG tablet HOLD PRIOR TO SURG      buPROPion (WELLBUTRIN) 75 MG tablet TAKE 1 TABLET BY MOUTH DAILY (Patient taking differently: Take 1 tablet by mouth Daily. ONLY TAKES IN WINTER MONTHS) 30 tablet 2    Cholecalciferol 50 MCG (2000 UT) capsule HOLD PRIOR TO SURG      cyclobenzaprine (FLEXERIL) 5 MG tablet Take 1 tablet by mouth At Night As Needed for Muscle Spasms. 30 tablet 0    desvenlafaxine (PRISTIQ) 100 MG 24 hr tablet TAKE 1 TABLET BY MOUTH DAILY 90 tablet 1    fenofibrate (TRICOR) 145 MG tablet TAKE 1 TABLET BY MOUTH EVERY DAY 90 tablet 1    fluticasone (FLONASE) 50 MCG/ACT nasal spray 2 sprays into the nostril(s) as directed by provider Daily for 30 days. 16 g 0    gabapentin (NEURONTIN) 100 MG capsule Take 1-2 capsules by mouth at dinnertime and/or bedtime as needed for sleep. May add one capsule per day until 600 mg total is reached in the evenings, as needed. May also take 1 capsule by mouth in the AM and one capsule at lunchtime as needed for anxiety. 90 capsule 2    gabapentin (NEURONTIN) 300 MG capsule Take 1 to 2 capsules by mouth at dinnertime as needed for sleep. 60 capsule 2    metFORMIN ER  "(GLUCOPHAGE-XR) 500 MG 24 hr tablet TAKE 1 TABLET BY MOUTH DAILY WITH LARGEST MEAL OF THE DAY AND A GLASS OF WATER 90 tablet 01    metoprolol tartrate (LOPRESSOR) 25 MG tablet Take 1 tablet by mouth 2 (Two) Times a Day. 180 tablet 3    Omega-3 Fatty Acids (FISH OIL) 1200 MG capsule capsule HOLD PRIOR TO SURG      omeprazole (priLOSEC) 40 MG capsule TAKE 1 CAPSULE BY MOUTH EVERY DAY 90 capsule 0    ondansetron ODT (ZOFRAN-ODT) 4 MG disintegrating tablet Place 1 tablet on the tongue Every 8 (Eight) Hours As Needed for Nausea or Vomiting. 30 tablet 2    rosuvastatin (CRESTOR) 10 MG tablet Take 1 tablet by mouth Every Night. 90 tablet 1    spironolactone (ALDACTONE) 25 MG tablet TAKE 1 TABLET BY MOUTH DAILY 30 tablet 4    traZODone (DESYREL) 50 MG tablet TAKE 1/2 TO 1 TABLET BY MOUTH AT NIGHT AS NEEDED FOR SLEEP 90 tablet 1    tretinoin (RETIN-A) 0.025 % cream Apply 1 Application topically to the appropriate area as directed Every Night.      valACYclovir (VALTREX) 500 MG tablet Take 2 tablets by mouth 2 (Two) Times a Day. 4 tablet 3     Current Facility-Administered Medications on File Prior to Visit   Medication Dose Route Frequency Provider Last Rate Last Admin    lidocaine (XYLOCAINE) 1 % injection 3 mL  3 mL Intradermal Once Dee Saunders MD              Allergies:  Allergies   Allergen Reactions    Betadine [Povidone Iodine] Rash     Burning/ stinging feeling on her rash    Codeine Nausea Only     BLURRED VISION, RASH           Vital Signs:  /78   Pulse 58   Wt 63.2 kg (139 lb 6.4 oz)   SpO2 97%   BMI 22.50 kg/m²   Estimated body mass index is 22.5 kg/m² as calculated from the following:    Height as of 3/15/24: 167.6 cm (66\").    Weight as of this encounter: 63.2 kg (139 lb 6.4 oz).  Pain Score    04/03/24 1358   PainSc:   5   PainLoc: Breast         ECOG: Restricted in physically strenuous activity but ambulatory and able to carry out work of a light or sedentary nature, e.g., light house work, " office work = 1    Physical Exam  Vitals reviewed.   Constitutional:       General: She is not in acute distress.     Appearance: Normal appearance.   HENT:      Head: Normocephalic and atraumatic.   Eyes:      Extraocular Movements: Extraocular movements intact.      Pupils: Pupils are equal, round, and reactive to light.   Pulmonary:      Effort: Pulmonary effort is normal.   Abdominal:      General: Abdomen is flat.      Palpations: Abdomen is soft.   Musculoskeletal:      Cervical back: Normal range of motion.   Skin:     General: Skin is warm and dry.   Neurological:      General: No focal deficit present.      Mental Status: She is alert and oriented to person, place, and time.   Psychiatric:         Mood and Affect: Mood normal.         Behavior: Behavior normal.     Left breast: No concerning nodules, no concerning axillary lymphadenopathy, no nipple discharge or inversion  Right breast: Some induration in the right upper outer quadrant with some tenderness to palpation.  No erythema.  No concerning axillary lymphadenopathy.  No nipple inversion or discharge.      Result Review :  The following data was reviewed by: Rashi Hood MD on 04/03/2024:  Labs: Last Creatinine         Diagnoses and all orders for this visit:    1. Mass of upper outer quadrant of right breast (Primary)  -     US Breast Right Complete; Future        Assessment:    Alexandria Yousif presents for  follow-up secondary to increasing tenderness and induration in the treated right breast.  The patient reports several weeks of tenderness in the right upper outer quadrant with some extension along the pectoral muscle.  She has not had any signs or symptoms concerning for infection.  She denies any erythema or nipple discharge.    I performed a detailed breast exam.  Overall, her findings are most consistent with some edema or ongoing issues with her seroma.  There is not strong evidence of infection.  Patient is anxious about her symptoms  and I offered her ultrasound to evaluate further.  The patient was amenable to this.  Ultrasound has been ordered.  I will call her with results.  Otherwise, the patient can follow-up with me in 3 months.      Plan:    - Ultrasound of the right breast ordered  - Patient can otherwise follow-up with me in 3 months       I spent 20 minutes caring for Alexandria on this date of service. This time includes time spent by me in the following activities:preparing for the visit, reviewing tests, obtaining and/or reviewing a separately obtained history, documenting information in the medical record, independently interpreting results and communicating that information with the patient/family/caregiver, and care coordination  Follow Up   No follow-ups on file.  Patient was given instructions and counseling regarding her condition or for health maintenance advice. Please see specific information pulled into the AVS if appropriate.     Rashi Hood MD

## 2024-04-04 ENCOUNTER — TELEPHONE (OUTPATIENT)
Dept: OTHER | Facility: HOSPITAL | Age: 79
End: 2024-04-04
Payer: MEDICARE

## 2024-04-04 NOTE — TELEPHONE ENCOUNTER
Called pt, lvm requesting call back to rescheduled appt on 04/16 with Elyssa Flaherty due to scheduling error

## 2024-04-09 ENCOUNTER — HOSPITAL ENCOUNTER (OUTPATIENT)
Dept: ULTRASOUND IMAGING | Facility: HOSPITAL | Age: 79
Discharge: HOME | End: 2024-04-09
Payer: MEDICARE

## 2024-04-09 DIAGNOSIS — N63.11 MASS OF UPPER OUTER QUADRANT OF RIGHT BREAST: ICD-10-CM

## 2024-04-09 DIAGNOSIS — C50.911 MALIGNANT NEOPLASM OF RIGHT FEMALE BREAST, UNSPECIFIED ESTROGEN RECEPTOR STATUS, UNSPECIFIED SITE OF BREAST: ICD-10-CM

## 2024-04-09 DIAGNOSIS — D05.11 DUCTAL CARCINOMA IN SITU (DCIS) OF RIGHT BREAST: Primary | ICD-10-CM

## 2024-04-22 ENCOUNTER — OFFICE VISIT (OUTPATIENT)
Dept: PSYCHIATRY | Facility: HOSPITAL | Age: 79
End: 2024-04-22
Payer: MEDICARE

## 2024-04-22 ENCOUNTER — HOSPITAL ENCOUNTER (OUTPATIENT)
Dept: MAMMOGRAPHY | Facility: HOSPITAL | Age: 79
Discharge: HOME | End: 2024-04-22
Payer: MEDICARE

## 2024-04-22 ENCOUNTER — HOSPITAL ENCOUNTER (OUTPATIENT)
Dept: ULTRASOUND IMAGING | Facility: HOSPITAL | Age: 79
Discharge: HOME | End: 2024-04-22
Payer: MEDICARE

## 2024-04-22 DIAGNOSIS — F41.1 GENERALIZED ANXIETY DISORDER: Primary | ICD-10-CM

## 2024-04-22 DIAGNOSIS — D05.11 DUCTAL CARCINOMA IN SITU (DCIS) OF RIGHT BREAST: ICD-10-CM

## 2024-04-22 DIAGNOSIS — C50.911 MALIGNANT NEOPLASM OF RIGHT FEMALE BREAST, UNSPECIFIED ESTROGEN RECEPTOR STATUS, UNSPECIFIED SITE OF BREAST: ICD-10-CM

## 2024-04-22 DIAGNOSIS — F43.21 GRIEF REACTION: ICD-10-CM

## 2024-04-22 DIAGNOSIS — G47.00 INSOMNIA, UNSPECIFIED TYPE: ICD-10-CM

## 2024-04-22 DIAGNOSIS — F32.A DEPRESSION, UNSPECIFIED DEPRESSION TYPE: ICD-10-CM

## 2024-04-22 DIAGNOSIS — R23.2 HOT FLASHES: ICD-10-CM

## 2024-04-22 PROCEDURE — G0279 TOMOSYNTHESIS, MAMMO: HCPCS

## 2024-04-22 PROCEDURE — 76642 ULTRASOUND BREAST LIMITED: CPT

## 2024-04-22 PROCEDURE — 77065 DX MAMMO INCL CAD UNI: CPT

## 2024-04-22 RX ORDER — DESVENLAFAXINE 100 MG/1
100 TABLET, EXTENDED RELEASE ORAL DAILY
Qty: 90 TABLET | Refills: 1 | Status: SHIPPED | OUTPATIENT
Start: 2024-04-22

## 2024-04-22 NOTE — PROGRESS NOTES
[facility]  In person follow up session - The primary office location is [facility].     Subjective  Patient ID: Alexandria Yousif is a 79 y.o. female is seen face to face in the [facility] ([facility]. 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  Status post right breast lumpectomy with negative margins.  She finished her last boost of radiation treatment a few months ago. She is followed by Dr. Rashi Hood and Dr. Stephanie Chapman.       CC: frustrated with some family dynamics, decreased motivation    Last seen in person: 02/20/2024    HPI/ Interval History: This is a follow-up evaluation, talk therapy, and medication management visit for ongoing issues of ruminative worry, anxiety, irritability, depression, hot flashes, poor sleep, and frustration with some family dynamics. She reports some recent frustration with having multiple medical issues and feeling some lack of support from her . She has been dealing with some increased irritability and frustration with this dynamic and expresses sadness that they are having a difficult time getting along. She feels her hot flashes are well controlled.      She expressed some ongoing ruminative worry that contributes to issues with sleep fragmentation and anxiety, but does feel her prn Xanax  and prn gabapentin continue to be helpful. She endorses using them only as needed and remaining cautious in how she utilizes them, avoiding potentially dangerous combinations. She denies any side effects or issues with her medications. She denies any pain at this time.    She admits to some decreased interest and motivation in doing things she previously enjoyed, as well as decreased motivation in daily chores, such as cleaning. Concentration and attention are poor. She states she and her  used to be avid readers and neither one has been able to do  "as much of that anymore, which has been hard for both of them for different reasons. She has difficulty retaining the information and he has difficulty with macular degeneration. Appetite remains adequate, still eating regular meals. She feels that her mood has improved overall over the last several months, but is a little down compared with her more recent visits. Expresses feeling frustrated and irritable, mainly feeling some marital stress. She continues to express gratitude, although she does feel her support is limited right now. She denies any active or passive SI, plan or intent, HI, or AVH.    Records reviewed.    PHQ-9 Total Score: 9 (previously scored 7 at her last visit on 02/20/2024)  Reports a \"0\" to question #9.    GAD7 Total Score: (previously scored 8 at her last visit on 02/20/2024)    Objective   Mental Status Exam  Appearance:  clean and casually dressed, appropriate and neat   Attitude toward clinician:  cooperative and agreeable , good eye contact  Speech:    Rate:  regular rate and rhythm   Volume:  normal  Motor:  no abnormal movements present  Mood:  Sad, frustrated  Affect:  tearful at times, blunted and mood congruent  Thought Processes:  linear, logical, and goal directed and associations intact  Thought Content:  normal  Suicidal Thoughts:  absent  Homicidal Thoughts:  absent  Perceptual Disturbance: no perceptual disturbance  Attention and Concentration:  good  Insight and Judgement:  good  Memory:  memory appears to be intact    Gait: within normal limits.  Assistive devices: None.    Exam: As above    Current Documented Allergies & Reactions  Allergies   Allergen Reactions    Betadine [Povidone Iodine] Rash     Burning/ stinging feeling on her rash    Codeine Nausea Only     BLURRED VISION, RASH       Current Psychotropic Medications:    ALPRAZolam (XANAX) 0.5 MG tablet, TAKE 1 TABLET BY MOUTH DAILY AS NEEDED FOR ANXIETY    buPROPion (WELLBUTRIN) 75 MG tablet, TAKE 1 TABLET BY MOUTH " "DAILY (Patient taking differently: Take 1 tablet by mouth Daily. ONLY TAKES IN WINTER MONTHS)    desvenlafaxine (PRISTIQ) 100 MG 24 hr tablet, TAKE 1 TABLET BY MOUTH DAILY    gabapentin (NEURONTIN) 100 MG capsule, Take 1-2 capsules by mouth at dinnertime and/or bedtime as needed for sleep. May add one capsule per day until 600 mg total is reached in the evenings, as needed. May also take 1 capsule by mouth in the AM and one capsule at lunchtime as needed for anxiety.    gabapentin (NEURONTIN) 300 MG capsule, Take 1 to 2 capsules by mouth at dinnertime as needed for sleep.    traZODone (DESYREL) 50 MG tablet, Take 0.5-1 tablets by mouth At Night As Needed for Sleep. for sleep       Plan of Care/ Medical Decision Making  1) Continue current medication regimen.  2) Provided supportive talk therapy through active listening, empathy, reflection, normalization of feelings, and positive reframing. Discussed current methods of coping. We discussed altering our perception to look through the view of the other person, ways of expressing gratitude within ourself and verbalizing our gratitude with our loved ones.  3) She was given homework and was encouraged to start every day with expressing one thing she was grateful for, both for herself and also for her  and talking to him about it. Briefly discussed a book by Ashkan David called \"For her eyes only\" that might be helpful some of the dynamics with her .   4)The patient was encouraged to do ongoing talk therapy with this provider or a trusted source of support. She was encouraged to seek out couples, individual, and group counseling through [employer]'s club as an added layer of support. She was given their information for reference. Will continue to provide additional resources in the after visit summary, patient instructions.  5) Notify provider if having any questions, concerns, or issues.    Follow up with GIOVANI Xavier, 1-2 weeks and as " needed.    Diagnoses and all orders for this visit:    1. Generalized anxiety disorder (Primary)    2. Depression, unspecified depression type    3. Insomnia, unspecified type    4. Hot flashes    5. Grief reaction    6. Malignant neoplasm of right female breast, unspecified estrogen receptor status, unspecified site of breast      I spent 73 minutes caring for Alexandria on this date of service. This time includes time spent by me in the following activities: preparing for the visit, obtaining and/or reviewing a separately obtained history, performing a medically appropriate examination and/or evaluation, counseling and educating the patient/family/caregiver, ordering medications, tests, or procedures, referring and communicating with other health care professionals, documenting information in the medical record, and care coordination. Provided supportive therapy through active listening and therapeutic alliance. Addressed the patient's current stressors and feelings. Affirmed and encouraged ongoing communication with sources of support and treatment team.  Discussed current methods of coping. Provided additional options for sources of support through community resources and alternative treatment options.

## 2024-04-22 NOTE — PATIENT INSTRUCTIONS
[facility] Supportive Oncology  [address]  [place], [place]   [phone]    Please see below and attached for additional resources:  Group, individual (for the patient and family members), and family therapy    [employer]    [email or web address]    Phone number: [phone]    Support for patients: matched with someone with a similar diagnosis and in survivorship    [facility]  [email or web address]/    Self-referrals for wanting a primary care doctor or a specialist  PCP [phone]  Specialist [phone]  Bereavement Support Groups in the [place] Area  Grief Share: https://www.griefshare.org/countries/[place]/[facility]/ky/[facility]/[place]  *If you go to the website, you can click on the specific group date and it will give additional info about the groups*  Therapy resources  Chronic Illness counseling center  www.chronic[facility] HospitalOneexchangestreet.Punch!  [address] , [address]  [place] [address]  [facility]: phone # [phone]    Two Locations:    [address]  [place] [address]      [address], [address]  [place] [address]  FREE Massages  Patients are allowed some massages for FREE through the supportive oncology department. Massages at the cancer center and are done by Radha. She does her own scheduling.  Office:  [phone]    Mobile:  [phone]  FREE exercise program  Free exercise program [employer] program through the [employer]:  https://www.[employer].org/what-we-do/healthy-living/fitness/livestrong  Chiropractic services ([facility])  [facility] F[facility]ract[facility].Punch!  [phone]  Accupuncture  Dr. Dee Garcia does Accupuncture  [facility]  [address], [place] [id number] [phone] phone  [phone] fax    [facility]: Alcohol / Substance Use and Dual Diagnosis Management  [address] [address], [place], [place], [id number] [phone]  OR [phone] [email or web address]/locations-[email or web address]-[email or web address]  Domestic Violence  [phone] (SAFE)                            www.kcadv.org   Sexual Assault Crisis Centers (for children and adults)  [phone] (HOPE)  Genesight testing  https://[employer]/gene-test-mental-health-medications/?utm_source=re&utm_medium=cpc&utm_campaign=232579239&utm_content=8512104526818923&utm_term=genesight%20testing&utm_source=re&utm_medium=cpc&utm_campaign=branded&utm_content=8678746126669050&utm_term=genesight%20testing&nbbkaiu=[email or web address]

## 2024-04-23 ENCOUNTER — TELEPHONE (OUTPATIENT)
Dept: RADIATION ONCOLOGY | Facility: HOSPITAL | Age: 79
End: 2024-04-23
Payer: MEDICARE

## 2024-04-29 RX ORDER — OMEPRAZOLE 40 MG/1
CAPSULE, DELAYED RELEASE ORAL
Qty: 90 CAPSULE | Refills: 0 | Status: SHIPPED | OUTPATIENT
Start: 2024-04-29

## 2024-05-01 ENCOUNTER — TELEPHONE (OUTPATIENT)
Dept: FAMILY MEDICINE CLINIC | Facility: CLINIC | Age: 79
End: 2024-05-01

## 2024-05-01 NOTE — TELEPHONE ENCOUNTER
Caller: Alexandria Yousif    Relationship: Self    Best call back number: [phone]    What was the call regarding: PATIENT STATED SHE WENT TO URGENT CARE A WEEK AGO SUNDAY, AND WAS TREATED FOR A UTI.    SHE STATED THEY HAD PRESCRIBED NITROFURANTOIN, TWICE A DAY FOR 7 DAYS, AS WELL AS ANOTHER MEDICATION FOR 2 DAYS, UNSURE OF THE NAME.    SHE STATED SYMPTOMS OF PAINFUL URINATION AND BLOOD IN URINE ARE BACK, AND SHE WOULD LIKE TO KNOW WHAT DR BECK ADVISES.    PLEASE CALL TO ADVISE.

## 2024-05-02 ENCOUNTER — OFFICE VISIT (OUTPATIENT)
Dept: FAMILY MEDICINE CLINIC | Facility: CLINIC | Age: 79
End: 2024-05-02
Payer: MEDICARE

## 2024-05-02 ENCOUNTER — HOSPITAL ENCOUNTER (OUTPATIENT)
Dept: CT IMAGING | Facility: HOSPITAL | Age: 79
Discharge: HOME | End: 2024-05-02
Admitting: FAMILY MEDICINE
Payer: MEDICARE

## 2024-05-02 VITALS
SYSTOLIC BLOOD PRESSURE: 120 MMHG | HEART RATE: 62 BPM | BODY MASS INDEX: 22.5 KG/M2 | OXYGEN SATURATION: 97 % | DIASTOLIC BLOOD PRESSURE: 70 MMHG | WEIGHT: 140 LBS | HEIGHT: 66 IN | TEMPERATURE: 97.7 F

## 2024-05-02 DIAGNOSIS — R31.0 GROSS HEMATURIA: ICD-10-CM

## 2024-05-02 DIAGNOSIS — N30.01 ACUTE CYSTITIS WITH HEMATURIA: Primary | ICD-10-CM

## 2024-05-02 LAB
BILIRUB BLD-MCNC: NEGATIVE MG/DL
CLARITY, POC: ABNORMAL
COLOR UR: ABNORMAL
EXPIRATION DATE: ABNORMAL
GLUCOSE UR STRIP-MCNC: NEGATIVE MG/DL
KETONES UR QL: NEGATIVE
LEUKOCYTE EST, POC: ABNORMAL
Lab: ABNORMAL
NITRITE UR-MCNC: POSITIVE MG/ML
PH UR: 7 [PH] (ref 5–8)
PROT UR STRIP-MCNC: ABNORMAL MG/DL
RBC # UR STRIP: ABNORMAL /UL
SP GR UR: 1.01 (ref 1–1.03)
UROBILINOGEN UR QL: ABNORMAL

## 2024-05-02 PROCEDURE — 81003 URINALYSIS AUTO W/O SCOPE: CPT | Performed by: FAMILY MEDICINE

## 2024-05-02 PROCEDURE — 99214 OFFICE O/P EST MOD 30 MIN: CPT | Performed by: FAMILY MEDICINE

## 2024-05-02 PROCEDURE — 74176 CT ABD & PELVIS W/O CONTRAST: CPT

## 2024-05-02 RX ORDER — CEPHALEXIN 500 MG/1
500 CAPSULE ORAL 2 TIMES DAILY
Qty: 14 CAPSULE | Refills: 0 | Status: SHIPPED | OUTPATIENT
Start: 2024-05-02

## 2024-05-02 RX ORDER — PHENAZOPYRIDINE HYDROCHLORIDE 200 MG/1
200 TABLET, FILM COATED ORAL 3 TIMES DAILY PRN
Qty: 6 TABLET | Refills: 0 | Status: SHIPPED | OUTPATIENT
Start: 2024-05-02

## 2024-05-02 NOTE — TELEPHONE ENCOUNTER
HUB UNABLE TO WARM TRANSFER    PATIENT CALLED BACK CHECKING IN ON THIS CONCERN.    PLEASE GIVE HER A CALL ASAP

## 2024-05-02 NOTE — PROGRESS NOTES
"Chief Complaint  Difficulty Urinating (Went to  on Sunday (received 2 medications, and completed antibiotics) symptoms returned Tuesday afternoon.) and Blood in Urine    Subjective        Alexandria Yousif presents to [facility]   History of Present Illness with complaints of urinary frequency and burning for the last 10 days.  Patient went to urgent care 10 days ago and diagnosed with UTI started on antibiotic and Pyridium.  She finished antibiotic 4 days ago and started having symptoms since Tuesday.  She is again complaining of increased urinary frequency and burning and blood in the urine.  Denies any fever denies any nausea with giving history of passing blood in the urine.      Objective   Vital Signs:  /70   Pulse 62   Temp 97.7 °F (36.5 °C) (Temporal)   Ht 167.6 cm (65.98\")   Wt 63.5 kg (140 lb)   SpO2 97%   BMI 22.61 kg/m²   Estimated body mass index is 22.61 kg/m² as calculated from the following:    Height as of this encounter: 167.6 cm (65.98\").    Weight as of this encounter: 63.5 kg (140 lb).       BMI is within normal parameters. No other follow-up for BMI required.      Physical Exam  Constitutional:       General: She is not in acute distress.     Appearance: Normal appearance. She is well-developed.   HENT:      Head: Normocephalic and atraumatic.      Right Ear: Tympanic membrane normal.      Left Ear: Tympanic membrane normal.      Mouth/Throat:      Mouth: Mucous membranes are moist.   Eyes:      General:         Right eye: No discharge.         Left eye: No discharge.      Extraocular Movements: Extraocular movements intact.      Pupils: Pupils are equal, round, and reactive to light.   Cardiovascular:      Rate and Rhythm: Normal rate and regular rhythm.      Pulses: Normal pulses.      Heart sounds: Normal heart sounds.   Pulmonary:      Effort: Pulmonary effort is normal.      Breath sounds: Normal breath sounds. No wheezing or rales.   Abdominal: "      General: Bowel sounds are normal.      Palpations: Abdomen is soft. There is no mass.      Tenderness: There is abdominal tenderness in the suprapubic area. There is no right CVA tenderness or left CVA tenderness.   Musculoskeletal:      Cervical back: Normal range of motion and neck supple.      Right lower leg: No edema.      Left lower leg: No edema.   Lymphadenopathy:      Cervical: No cervical adenopathy.   Neurological:      General: No focal deficit present.      Mental Status: She is alert and oriented to person, place, and time.        Result Review :                   Assessment and Plan   Diagnoses and all orders for this visit:    1. Acute cystitis with hematuria (Primary)  -     CT Abdomen Pelvis Stone Protocol  -     cephalexin (Keflex) 500 MG capsule; Take 1 capsule by mouth 2 (Two) Times a Day.  Dispense: 14 capsule; Refill: 0  -     POC Urinalysis Dipstick, Automated  -     phenazopyridine (Pyridium) 200 MG tablet; Take 1 tablet by mouth 3 (Three) Times a Day As Needed for Bladder Spasms.  Dispense: 6 tablet; Refill: 0  -     Urine Culture - Urine, Urine, Clean Catch    2. Gross hematuria  -     CT Abdomen Pelvis Stone Protocol  -     cephalexin (Keflex) 500 MG capsule; Take 1 capsule by mouth 2 (Two) Times a Day.  Dispense: 14 capsule; Refill: 0  -     phenazopyridine (Pyridium) 200 MG tablet; Take 1 tablet by mouth 3 (Three) Times a Day As Needed for Bladder Spasms.  Dispense: 6 tablet; Refill: 0  -     Urine Culture - Urine, Urine, Clean Catch      Alexandria Yousif  Is a 79-year-old female patient with recent history of breast cancer status postlumpectomy and radiation 2 months ago seen today for  Acute cystitis and gross hematuria urine dip done in the office positive for nitrite and large blood.  I reviewed her notes from urgent care 10 days ago her culture was negative at that time.  She finished 7 days of Macrobid.  As she is having gross hematuria and recurrent UTI the differential  diagnosis discussed with patient including nephrolithiasis I will schedule her for CT abdomen and pelvis start her on Keflex and Pyridamal.  Will send urine for culture and sensitivity again.  Follow-up as needed           Follow Up   There are no Patient Instructions on file for this visit.   No follow-ups on file.  Patient was given instructions and counseling regarding her condition or for health maintenance advice. Please see specific information pulled into the AVS if appropriate.

## 2024-05-06 LAB
BACTERIA UR CULT: ABNORMAL
BACTERIA UR CULT: ABNORMAL
OTHER ANTIBIOTIC SUSC ISLT: ABNORMAL

## 2024-05-08 ENCOUNTER — OFFICE VISIT (OUTPATIENT)
Dept: PSYCHIATRY | Facility: HOSPITAL | Age: 79
End: 2024-05-08
Payer: MEDICARE

## 2024-05-08 DIAGNOSIS — F33.1 MODERATE EPISODE OF RECURRENT MAJOR DEPRESSIVE DISORDER: ICD-10-CM

## 2024-05-08 DIAGNOSIS — F41.1 GENERALIZED ANXIETY DISORDER: ICD-10-CM

## 2024-05-08 DIAGNOSIS — G47.00 INSOMNIA, UNSPECIFIED TYPE: ICD-10-CM

## 2024-05-08 DIAGNOSIS — F32.A DEPRESSION, UNSPECIFIED DEPRESSION TYPE: ICD-10-CM

## 2024-05-10 RX ORDER — GABAPENTIN 300 MG/1
CAPSULE ORAL
Qty: 60 CAPSULE | Refills: 2 | Status: SHIPPED | OUTPATIENT
Start: 2024-05-10

## 2024-05-10 RX ORDER — ALPRAZOLAM 0.5 MG/1
TABLET ORAL
Qty: 60 TABLET | Refills: 0 | Status: SHIPPED | OUTPATIENT
Start: 2024-05-10

## 2024-05-10 RX ORDER — DESVENLAFAXINE 100 MG/1
100 TABLET, EXTENDED RELEASE ORAL DAILY
Qty: 90 TABLET | Refills: 1 | Status: SHIPPED | OUTPATIENT
Start: 2024-05-10

## 2024-05-10 RX ORDER — GABAPENTIN 100 MG/1
CAPSULE ORAL
Qty: 90 CAPSULE | Refills: 2 | Status: SHIPPED | OUTPATIENT
Start: 2024-05-10
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
## 2024-05-16 ENCOUNTER — OFFICE VISIT (OUTPATIENT)
Dept: CARDIOLOGY | Facility: CLINIC | Age: 79
End: 2024-05-16
Payer: MEDICARE

## 2024-05-16 VITALS
WEIGHT: 139.2 LBS | HEART RATE: 64 BPM | HEIGHT: 66 IN | SYSTOLIC BLOOD PRESSURE: 125 MMHG | DIASTOLIC BLOOD PRESSURE: 85 MMHG | OXYGEN SATURATION: 94 % | BODY MASS INDEX: 22.37 KG/M2

## 2024-05-16 DIAGNOSIS — E78.2 MIXED HYPERLIPIDEMIA: ICD-10-CM

## 2024-05-16 DIAGNOSIS — I45.10 RBBB: ICD-10-CM

## 2024-05-16 DIAGNOSIS — C50.911 MALIGNANT NEOPLASM OF RIGHT FEMALE BREAST, UNSPECIFIED ESTROGEN RECEPTOR STATUS, UNSPECIFIED SITE OF BREAST: ICD-10-CM

## 2024-05-16 DIAGNOSIS — I25.84 CORONARY ARTERY CALCIFICATION: ICD-10-CM

## 2024-05-16 DIAGNOSIS — I47.19 PAT (PAROXYSMAL ATRIAL TACHYCARDIA): Primary | ICD-10-CM

## 2024-05-16 DIAGNOSIS — I25.10 CORONARY ARTERY CALCIFICATION: ICD-10-CM

## 2024-05-16 PROCEDURE — 93000 ELECTROCARDIOGRAM COMPLETE: CPT | Performed by: NURSE PRACTITIONER

## 2024-05-16 PROCEDURE — 99214 OFFICE O/P EST MOD 30 MIN: CPT | Performed by: NURSE PRACTITIONER

## 2024-05-16 PROCEDURE — 1159F MED LIST DOCD IN RCRD: CPT | Performed by: NURSE PRACTITIONER

## 2024-05-16 PROCEDURE — 1160F RVW MEDS BY RX/DR IN RCRD: CPT | Performed by: NURSE PRACTITIONER

## 2024-05-16 NOTE — PROGRESS NOTES
"    CARDIOLOGY        Patient Name: Alexandria Yousif  :1945  Age: 79 y.o.  Primary Cardiologist: Geoff Rhodes MD and Judy Lynch MD  Encounter Provider:  GIOVANI Olivarez    Date of Service: 24      CHIEF COMPLAINT / REASON FOR OFFICE VISIT     Follow-Up and Hypertension      HISTORY OF PRESENT ILLNESS       HPI  Alexandria Yousif is a 79 y.o. female who presents today for annual evaluation.     Pt has a  history significant for palpitations, atrial tachycardia, SVT, PVCs, prior TIA.    Patient initially established care in 2017 with dyspnea and a systolic murmur.  She reports family history of CAD including a mother who had 3 pacemakers and CHF and a sister who had a fatal MI at the age of 64.  She has another sister with atrial fibrillation.  Her father had four-vessel CABG at the age of 62.    Patient reports that since last evaluation, she has been diagnosed with breast cancer, she had a right lumpectomy and has been treated with radiation.  No chemo.  As a result she has been experiencing increased fatigue and she seems to be more anxious and \"jumpy\".  She only reports palpitations on rate occasions.  She reports that her BP has been controlled, she has occasions where this has been high and she is symptomatic when this occurs.  She denies chest pain, dyspnea, DONG.  She does want to return to a walking regimen when her stamina improves.     The following portions of the patient's history were reviewed and updated as appropriate: allergies, current medications, past family history, past medical history, past social history, past surgical history and problem list.      VITAL SIGNS     Visit Vitals  /85 (BP Location: Left arm, Patient Position: Sitting)   Pulse 64   Ht 167.6 cm (65.98\")   Wt 63.1 kg (139 lb 3.2 oz)   LMP  (LMP Unknown)   SpO2 94%   BMI 22.48 kg/m²         Wt Readings from Last 3 Encounters:   24 63.1 kg (139 lb 3.2 oz)   24 63.5 kg (140 lb) "   04/03/24 63.2 kg (139 lb 6.4 oz)     Body mass index is 22.48 kg/m².      REVIEW OF SYSTEMS   Review of Systems   Constitutional: Positive for malaise/fatigue. Negative for chills, fever, weight gain and weight loss.   Cardiovascular:  Negative for irregular heartbeat, leg swelling and palpitations.   Respiratory:  Negative for cough, shortness of breath, snoring and wheezing.    Hematologic/Lymphatic: Negative for bleeding problem. Does not bruise/bleed easily.   Skin:  Negative for color change.   Musculoskeletal:  Negative for falls, joint pain and myalgias.   Gastrointestinal:  Negative for melena.   Genitourinary:  Negative for hematuria.   Neurological:  Negative for excessive daytime sleepiness.   Psychiatric/Behavioral:  Negative for depression. The patient is not nervous/anxious.            PHYSICAL EXAMINATION     Vitals and nursing note reviewed.   Constitutional:       Appearance: Normal appearance. Well-developed.   Eyes:      Conjunctiva/sclera: Conjunctivae normal.   Neck:      Vascular: No carotid bruit.   Pulmonary:      Breath sounds: Normal breath sounds.   Cardiovascular:      Normal rate. Regular rhythm. Normal S1 with normal intensity. Normal S2 with normal intensity.       Murmurs: There is no murmur.      No gallop.  No click. No rub.   Edema:     Peripheral edema absent.   Musculoskeletal: Normal range of motion. Skin:     General: Skin is warm and dry.   Neurological:      Mental Status: Alert and oriented to person, place, and time.      GCS: GCS eye subscore is 4. GCS verbal subscore is 5. GCS motor subscore is 6.   Psychiatric:         Speech: Speech normal.         Behavior: Behavior normal.         Thought Content: Thought content normal.         Judgment: Judgment normal.           REVIEWED DATA       ECG 12 Lead    Date/Time: 5/16/2024 10:05 AM  Performed by: Emma Nova APRN    Authorized by: Emma Nova APRN  Comparison: compared with previous ECG from  8/22/2023  Rhythm: sinus rhythm  Rate: normal  Conduction: right bundle branch block and left posterior fascicular block  ST Segments: ST segments normal  T Waves: T waves normal  QRS axis: normal    Clinical impression: abnormal EKG          Cardiac Procedures:  Stress echocardiogram 10/17/2017.  LVEF 64%.  No ischemia.  No valvular disease.  Carotid artery Doppler 5/2/2019.  Normal.  Echocardiogram 5/2/2019.  LVEF 67%.  Mild LVH.  Grade 2 diastolic dysfunction.  Holter monitor 6/6/2019.  2 very brief runs of atrial tachycardia.  Longest being 7 beats in length.  ZIO monitor 12/9/2019.  5 episodes of SVT with longest lasting 11 beats.  Recurrent PVCs comprising 5% of total.  Rare ventricular bigeminy and trigeminy.  No ventricular tachycardia.  Of note, patient had significant reaction to adhesive with rash on blood sugars which actually required steroid injections and oral steroids  Echocardiogram 11/21/2022.  LVEF 60%.  Estimated RVSP less than 35 mmHg.        BUN   Date Value Ref Range Status   03/15/2024 18 8 - 23 mg/dL Final   11/09/2023 16 8 - 23 mg/dL Final     Creatinine   Date Value Ref Range Status   03/15/2024 0.82 0.57 - 1.00 mg/dL Final   11/09/2023 0.66 0.57 - 1.00 mg/dL Final   10/23/2023 1.20 0.60 - 1.30 mg/dL Final     Comment:     Serial Number: [id number]:  [id number]     Potassium   Date Value Ref Range Status   03/15/2024 4.7 3.5 - 5.2 mmol/L Final   11/09/2023 4.2 3.5 - 5.2 mmol/L Final     ALT (SGPT)   Date Value Ref Range Status   03/15/2024 24 1 - 33 U/L Final     AST (SGOT)   Date Value Ref Range Status   03/15/2024 20 1 - 32 U/L Final           ASSESSMENT & PLAN     Diagnoses and all orders for this visit:    1. PAT (paroxysmal atrial tachycardia) (Primary)  Overview:  12 very brief runs of atrial tachycardia with the longest 7 beats in duration per 24-hour Holter monitor in June 2019    Assessment & Plan:  Denies palpitations or dyspnea  Continue metoprolol tartrate    Orders:  -     ECG  12 Lead  -     metoprolol tartrate (LOPRESSOR) 25 MG tablet; Take 1 tablet by mouth 2 (Two) Times a Day.  Dispense: 180 tablet; Refill: 3    2. Coronary artery calcification  Assessment & Plan:  Denies angina or dyspnea  Continue aspirin and rosuvastatin    Orders:  -     ECG 12 Lead    3. RBBB  -     ECG 12 Lead    4. Mixed hyperlipidemia  Overview:  Lipid Panel          3/15/2024    09:11   Lipid Panel   Total Cholesterol 145    Triglycerides 155    HDL Cholesterol 51    VLDL Cholesterol 26    LDL Cholesterol  68    LDL/HDL Ratio 1.24          Assessment & Plan:  Continue rosuvastatin, LDL at goal      5. Malignant neoplasm of right female breast, unspecified estrogen receptor status, unspecified site of breast        Return in about 1 year (around 5/16/2025) for Dr. Lynch-follow up.    Future Appointments         Provider Department Center    5/21/2024 11:00 AM Elyssa Ruiz APRN [facility] HEMATOLOGY & ONCOLOGY     5/22/2024 11:00 AM [facility]    5/24/2024 11:30 AM Anita Muse MD [facility] PRIMARY CARE [facility]    9/11/2024 10:15 AM Dee Saunders MD [facility] GENERAL SURGERY [facility]    9/13/2024 10:00 AM Anita Muse MD [facility] PRIMARY CARE [facility]    5/22/2025 10:00 AM Judy Lynch MD [facility] CARDIOLOGY [facility]              MEDICATIONS         Discharge Medications            Accurate as of May 16, 2024  4:20 PM. If you have any questions, ask your nurse or doctor.                Changes to Medications        Instructions Start Date   buPROPion 75 MG tablet  Commonly known as: WELLBUTRIN  What changed: additional instructions   TAKE 1 TABLET BY MOUTH DAILY             Continue These Medications        Instructions Start Date   acetaminophen 500 MG tablet  Commonly known as: TYLENOL   1,000 mg, Oral, Every 6 Hours PRN      albuterol sulfate  (90 Base) MCG/ACT  inhaler  Commonly known as: PROVENTIL HFA;VENTOLIN HFA;PROAIR HFA   INHALE 2 PUFFS BY MOUTH INTO THE LUNGS EVERY 4 HOURS AS NEEDED FOR WHEEZING      ALPRAZolam 0.5 MG tablet  Commonly known as: XANAX   1/2 to 1 tablet by mouth twice daily as needed for anxiety      Alum Hydroxide-Mag Trisilicate 80-14.2 MG chewable tablet  Commonly known as: Gaviscon   1 tablet, Oral, 4 Times Daily PRN      aspirin 81 MG tablet   81 mg, Oral, Daily      Biotin 10 MG tablet   HOLD PRIOR TO SURG      cephalexin 500 MG capsule  Commonly known as: Keflex   500 mg, Oral, 2 Times Daily      Cholecalciferol 50 MCG (2000 UT) capsule   HOLD PRIOR TO SURG      cyclobenzaprine 5 MG tablet  Commonly known as: FLEXERIL   5 mg, Oral, Nightly PRN      desvenlafaxine 100 MG 24 hr tablet  Commonly known as: PRISTIQ   100 mg, Oral, Daily      fenofibrate 145 MG tablet  Commonly known as: TRICOR   TAKE 1 TABLET BY MOUTH EVERY DAY      fish oil 1200 MG capsule capsule   HOLD PRIOR TO SURG      fluticasone 50 MCG/ACT nasal spray  Commonly known as: FLONASE   2 sprays, Nasal, Daily      gabapentin 300 MG capsule  Commonly known as: NEURONTIN   Take 1 to 2 capsules by mouth at dinnertime as needed for sleep.      gabapentin 100 MG capsule  Commonly known as: NEURONTIN   Take 1-2 capsules by mouth at dinnertime and/or bedtime as needed for sleep. May add one capsule per day until 600 mg total is reached in the evenings, as needed. May also take 1 capsule by mouth in the AM and one capsule at lunchtime as needed for anxiety.      metFORMIN  MG 24 hr tablet  Commonly known as: GLUCOPHAGE-XR   TAKE 1 TABLET BY MOUTH DAILY WITH LARGEST MEAL OF THE DAY AND A GLASS OF WATER      metoprolol tartrate 25 MG tablet  Commonly known as: LOPRESSOR   25 mg, Oral, 2 Times Daily      omeprazole 40 MG capsule  Commonly known as: priLOSEC   TAKE 1 CAPSULE BY MOUTH EVERY DAY      ondansetron ODT 4 MG disintegrating tablet  Commonly known as: ZOFRAN-ODT   4 mg,  Translingual, Every 8 Hours PRN      rosuvastatin 10 MG tablet  Commonly known as: CRESTOR   10 mg, Oral, Nightly      spironolactone 25 MG tablet  Commonly known as: ALDACTONE   25 mg, Oral, Daily      traZODone 50 MG tablet  Commonly known as: DESYREL   25-50 mg, Oral, Nightly PRN      tretinoin 0.025 % cream  Commonly known as: RETIN-A   1 application , Topical, Nightly      valACYclovir 500 MG tablet  Commonly known as: VALTREX   1,000 mg, Oral, 2 Times Daily             Stop These Medications      phenazopyridine 200 MG tablet  Commonly known as: Pyridium  Stopped by: GIOVANI Olivarez     raloxifene 60 MG tablet  Commonly known as: EVISTA  Stopped by: GIOVANI Olivarez                  **Dragon Disclaimer:   Much of this encounter note is an electronic transcription/translation of spoken language to printed text. The electronic translation of spoken language may permit erroneous, or at times, nonsensical words or phrases to be inadvertently transcribed. Although I have reviewed the note for such errors, some may still exist.

## 2024-05-18 NOTE — PATIENT INSTRUCTIONS
[facility]ive [facility]  [address]  [place], [place]   [phone]    Please see below and attached for additional resources:  Group, individual (for the patient and family members), and family therapy    [employer]    https://www.[employer].org/    Phone number: (613) 219-1939    Support for patients: matched with someone with a similar diagnosis and in survivorship    [facility] for Life  https://www.tigqnh2orwh.org/    Self-referrals for wanting a primary care doctor or a specialist  PCP [phone]  Specialist [phone]  Bereavement Support Groups in the [place] Area  Grief Share: https://www.griefshare.org/countries/us/[facility]/ky/[facility]/[place]  *If you go to the website, you can click on the specific group date and it will give additional info about the groups*  Therapy resources  Chronic Illness counseling center  www.chronicWal[facility] Centerco[facility] Hospital[facility].[employer]  [address] , [address]  [place] [address]  [facility]: phone # ([phone]    Two Locations:    [address]  [place] [address]      [address], [address]  [place] [address]  FREE Massages  Patients are allowed some massages for FREE through the supportive oncology department. Massages at the cancer center and are done by Radha. She does her own scheduling.  Office:  [phone]    Mobile:  [phone]  FREE exercise program  Free exercise program [employer] program through the [employer]:  https://www.[employer].org/what-we-do/healthy-living/fitness/livestrong  Chiropractic services ([facility])  [employer]  [phone]  Accupuncture  Dr. Dee Garcia does Accupuncture  [facility] PRIMARY CARE  [address] [place] [address] [phone] phone  [phone] fax    Suicide / Crisis Hotline  Call: 988    OR   Go online and use their text / chat function - 988 Suicide & Crisis Lifeline - Call. Text. Chat.  (988I-Tech.org)  Unified Protocol Safety Plan lesson  National Suicide Hotline Number (749-291-3167)  National safety text line (Text HOME to 712410)    [facility]: Alcohol / Substance Use and Dual Diagnosis Management  [address] [place] [address], [place], [place], [address]  [phone]  OR (279) [phone] https://[employer]/locations-and-payment/  Domestic Violence  6(093) 112-9130 (SAFE)                            www.kcadv.org   Sexual Assault Crisis Centers (for children and adults)  1(348) 883-5796 ([place])  Genesight testing  https://[employer]/gene-test-mental-health-medications/?utm_source=re&utm_medium=cpc&utm_campaign=244322405&utm_content=6888843438474275&utm_term=genesight%20testing&utm_source=re&utm_medium=cpc&utm_campaign=branded&utm_content=0037288559194731&utm_term=genesight%20testing&vvznkot=[id number]

## 2024-05-21 ENCOUNTER — TELEPHONE (OUTPATIENT)
Dept: RADIATION ONCOLOGY | Facility: HOSPITAL | Age: 79
End: 2024-05-21
Payer: MEDICARE

## 2024-05-21 NOTE — TELEPHONE ENCOUNTER
Called pt to schedule 3mo F/U in July with Dr. Hood. Pt did not answer, LVM to give the office a call.

## 2024-05-22 ENCOUNTER — HOSPITAL ENCOUNTER (OUTPATIENT)
Dept: CT IMAGING | Facility: HOSPITAL | Age: 79
Discharge: HOME | End: 2024-05-22
Admitting: INTERNAL MEDICINE
Payer: MEDICARE

## 2024-05-22 DIAGNOSIS — R91.1 PULMONARY NODULE: ICD-10-CM

## 2024-05-22 PROCEDURE — 71250 CT THORAX DX C-: CPT

## 2024-05-24 ENCOUNTER — OFFICE VISIT (OUTPATIENT)
Dept: FAMILY MEDICINE CLINIC | Facility: CLINIC | Age: 79
End: 2024-05-24
Payer: MEDICARE

## 2024-05-24 VITALS
TEMPERATURE: 97 F | DIASTOLIC BLOOD PRESSURE: 62 MMHG | HEART RATE: 62 BPM | BODY MASS INDEX: 22.26 KG/M2 | WEIGHT: 138.5 LBS | SYSTOLIC BLOOD PRESSURE: 110 MMHG | HEIGHT: 66 IN | OXYGEN SATURATION: 96 %

## 2024-05-24 DIAGNOSIS — N39.0 RECURRENT UTI: Primary | ICD-10-CM

## 2024-05-24 PROCEDURE — 1159F MED LIST DOCD IN RCRD: CPT | Performed by: INTERNAL MEDICINE

## 2024-05-24 PROCEDURE — 1126F AMNT PAIN NOTED NONE PRSNT: CPT | Performed by: INTERNAL MEDICINE

## 2024-05-24 PROCEDURE — 99213 OFFICE O/P EST LOW 20 MIN: CPT | Performed by: INTERNAL MEDICINE

## 2024-05-24 PROCEDURE — G2211 COMPLEX E/M VISIT ADD ON: HCPCS | Performed by: INTERNAL MEDICINE

## 2024-05-24 PROCEDURE — 1160F RVW MEDS BY RX/DR IN RCRD: CPT | Performed by: INTERNAL MEDICINE

## 2024-05-24 NOTE — PROGRESS NOTES
"Chief Complaint  Office Visit (2 week follow up with CT results )    Subjective        Alexanrdia Yousif presents to [facility]  History of Present Illness  Had UTI a few weeks ago and was seen by Dr. Campo.   Dr. Campo recommended that she f/u with me following UTI.  She had pain related with the UTI and also noticed blood in urine therefore stone was suspected.  Pain suddenly stopped after a few hours.  Pain was in lower abdomen.   CT  Scan was neg for stone.  The thought was that she passed a small stone.  UTI was e coli and sensitive to keflex.   Sx resolved. No UTI sx now.    She also had f/u chest CT with her pulmonary doc 2 days ago due to lung nodules      Answers submitted by the patient for this visit:  Other (Submitted on 5/17/2024)  Please describe your symptoms.: Recheck after UTI  Have you had these symptoms before?: Yes  How long have you been having these symptoms?: Greater than 2 weeks  Primary Reason for Visit (Submitted on 5/17/2024)  What is the primary reason for your visit?: Other    She did a CT scan with her pulmonologist this week as well but those results are not back yet.  She sees Dr. Barry next week.    Objective   Vital Signs:  /62 (BP Location: Left arm, Patient Position: Sitting, Cuff Size: Adult)   Pulse 62   Temp 97 °F (36.1 °C)   Ht 167.6 cm (66\")   Wt 62.8 kg (138 lb 8 oz)   SpO2 96%   BMI 22.35 kg/m²   Estimated body mass index is 22.35 kg/m² as calculated from the following:    Height as of this encounter: 167.6 cm (66\").    Weight as of this encounter: 62.8 kg (138 lb 8 oz).       BMI is within normal parameters. No other follow-up for BMI required.      Physical Exam  Vitals and nursing note reviewed.   Constitutional:       Appearance: Normal appearance. She is well-developed.   HENT:      Head: Normocephalic and atraumatic.      Right Ear: External ear normal.      Left Ear: External ear normal.   Eyes:      Extraocular Movements: " Extraocular movements intact.      Conjunctiva/sclera: Conjunctivae normal.   Neck:      Vascular: No carotid bruit.   Cardiovascular:      Rate and Rhythm: Normal rate and regular rhythm.      Heart sounds: Normal heart sounds.      Comments: No bruits  Pulmonary:      Effort: Pulmonary effort is normal. No respiratory distress.      Breath sounds: Normal breath sounds. No stridor. No wheezing, rhonchi or rales.   Chest:      Chest wall: No tenderness.   Abdominal:      General: Bowel sounds are normal. There is no distension.      Palpations: Abdomen is soft. There is no mass.      Tenderness: There is no abdominal tenderness. There is no right CVA tenderness, left CVA tenderness, guarding or rebound.      Hernia: No hernia is present.   Musculoskeletal:      Cervical back: Neck supple.      Right lower leg: No edema.      Left lower leg: No edema.   Lymphadenopathy:      Cervical: No cervical adenopathy.   Skin:     General: Skin is warm.   Neurological:      General: No focal deficit present.      Mental Status: She is alert and oriented to person, place, and time. Mental status is at baseline.      Gait: Gait normal.   Psychiatric:         Mood and Affect: Mood normal.         Behavior: Behavior normal.         Thought Content: Thought content normal.         Judgment: Judgment normal.        Result Review :                     Assessment and Plan     Diagnoses and all orders for this visit:    1. Recurrent UTI (Primary)  -     Urinalysis With Culture If Indicated -    Start D mannose  Repeat ua c/s to document cure.  Reviewed risk factors for UTI  Also, advised to void after intercourse and at regular intervals.   Hydrates well already.         Follow Up     Return in about 6 months (around 11/24/2024).  Patient was given instructions and counseling regarding her condition or for health maintenance advice. Please see specific information pulled into the AVS if appropriate.

## 2024-05-25 LAB
APPEARANCE UR: CLEAR
BACTERIA #/AREA URNS HPF: NORMAL /[HPF]
BILIRUB UR QL STRIP: NEGATIVE
CASTS URNS QL MICRO: NORMAL /LPF
COLOR UR: YELLOW
EPI CELLS #/AREA URNS HPF: NORMAL /HPF (ref 0–10)
GLUCOSE UR QL STRIP: NEGATIVE
HGB UR QL STRIP: NEGATIVE
KETONES UR QL STRIP: NEGATIVE
LEUKOCYTE ESTERASE UR QL STRIP: NEGATIVE
MICRO URNS: NORMAL
MICRO URNS: NORMAL
NITRITE UR QL STRIP: NEGATIVE
PH UR STRIP: 7 [PH] (ref 5–7.5)
PROT UR QL STRIP: NEGATIVE
RBC #/AREA URNS HPF: NORMAL /HPF (ref 0–2)
SP GR UR STRIP: 1.01 (ref 1–1.03)
URINALYSIS REFLEX: NORMAL
UROBILINOGEN UR STRIP-MCNC: 0.2 MG/DL (ref 0.2–1)
WBC #/AREA URNS HPF: NORMAL /HPF (ref 0–5)

## 2024-06-24 ENCOUNTER — OFFICE VISIT (OUTPATIENT)
Dept: PSYCHIATRY | Facility: HOSPITAL | Age: 79
End: 2024-06-24
Payer: MEDICARE

## 2024-06-24 DIAGNOSIS — F41.1 GENERALIZED ANXIETY DISORDER: Primary | ICD-10-CM

## 2024-06-24 DIAGNOSIS — G47.00 INSOMNIA, UNSPECIFIED TYPE: ICD-10-CM

## 2024-06-24 DIAGNOSIS — F33.41 RECURRENT MAJOR DEPRESSIVE DISORDER, IN PARTIAL REMISSION: ICD-10-CM

## 2024-06-24 RX ORDER — ALPRAZOLAM 0.5 MG/1
TABLET ORAL
Qty: 60 TABLET | Refills: 0 | Status: SHIPPED | OUTPATIENT
Start: 2024-06-24

## 2024-06-24 RX ORDER — DESVENLAFAXINE 100 MG/1
100 TABLET, EXTENDED RELEASE ORAL DAILY
Qty: 90 TABLET | Refills: 1 | Status: SHIPPED | OUTPATIENT
Start: 2024-06-24

## 2024-06-24 NOTE — PROGRESS NOTES
Supportive Oncology Services  In person follow up session - The primary office location is [facility].     Subjective  Patient ID: Alexandria Yousif is a 79 y.o. female is seen face to face in the [facility] ([facility] Clinic. 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  Status post right breast lumpectomy with negative margins and s/p radiation treatment. She is followed by Dr. Rashi Hood and Dr. Stephanie Chapman.     CC: Follow up medication and symptom management    Last seen in person: 05/08/2024    HPI/ Interval History: This is a follow-up evaluation of mood, medication management, and talk therapy.  The patient reports recently completing radiation treatment and having good scans, being told that her condition is stable, which she is grateful for.  The patient reports ongoing marital strain, discord, and complicated family dynamics, which continues to impact her level of anxiety, ruminative worry, level of agitation/irritability, depression, and frustration.  She endorses noticing some cognitive changes recently and wondering if he might need to have a further evaluation for cognitive decline.  She is also missing her mother more and grieving her loss.     She reports restorative sleep with the utilization of her as needed Xanax or gabapentin, which she continues to use sparingly and separately.  She continues to deny any concerns or issues with her medications and she denies any side effects. Additionally, she continues to utilize her Pristiq 100 mg daily, feeling she has stabilized at her current dosage and denies any side effects or issues with it. she endorses improved interest and motivation to do things she previously enjoyed and thinks she feels like she needs to accomplish.  She reports having some improved energy.  She continues to have variability in her level of concentration and attention, depending  "on the day and the activity, but feeling she is seeing a little bit of improvement and is able to concentrate on things like reading more lately, which she has always enjoyed.  She denies any changes in her appetite, remaining at baseline.      She describes her mood as overall improved, but still having episodes of anxiety here and there.  She admits to feeling like she is able to redirect and distract herself more easily.  She denies any passive or active SI, plan or intent, self-harming behaviors, HI, AVH, christal, hypomania, delusional thinking, or psychosis. She endorses feeling increased marital strain, feeling as though there is a disconnect between her and her  and not understanding following what the problem is, which has been concerning and frustrating for her.  She endorses feeling as though her main source of support is not currently supportive, and this has been very difficult for her.     Records reviewed.    PHQ-9 Total Score: (P) 7 (previously scored 10 at last visit on 5/8/2024)  Reports a score of \"0\" to question 9.    GAD7 Total Score: (previously scored 8 on 02/20/2024)   Objective   Mental Status Exam  Appearance:  clean and casually dressed, appropriate and neat   Attitude toward clinician:  cooperative and agreeable , good eye contact  Speech:    Rate:  regular rate and rhythm   Volume:  normal  Motor:  no abnormal movements present  Mood:  overall improved, but still having episodes of anxiety here and there  Affect: Mildly dysthymic, mood congruent, full range  Thought Processes:  linear, logical, and goal directed and associations intact  Thought Content:  normal  Suicidal Thoughts:  absent  Homicidal Thoughts:  absent  Perceptual Disturbance: no perceptual disturbance, no evidence of responding to internal stimuli  Attention and Concentration:  fair  Insight and Judgement:  good  Memory:  memory appears to be intact    Gait: Within normal limits.      Assistive devices: None.   "     Exam: As above    Recent Labs Reviewed: The patient continues to be followed and managed by their oncology team.   Office Visit on 05/24/2024   Component Date Value    Specific Gravity, UA 05/24/2024 1.014     pH, UA 05/24/2024 7.0     Color, UA 05/24/2024 Yellow     Appearance, UA 05/24/2024 Clear     Leukocytes, UA 05/24/2024 Negative     Protein 05/24/2024 Negative     Glucose, UA 05/24/2024 Negative     Ketones 05/24/2024 Negative     Blood, UA 05/24/2024 Negative     Bilirubin, UA 05/24/2024 Negative     Urobilinogen, UA 05/24/2024 0.2     Nitrite, UA 05/24/2024 Negative     Microscopic Examination 05/24/2024 Comment     Microscopic Examination 05/24/2024 See below:     Urinalysis Reflex 05/24/2024 Comment     WBC, UA 05/24/2024 None seen     RBC, UA 05/24/2024 None seen     Epithelial Cells (non re* 05/24/2024 None seen     Casts 05/24/2024 None seen     Bacteria, UA 05/24/2024 None seen    Office Visit on 05/02/2024   Component Date Value    Color 05/02/2024 Dark Yellow     Clarity, UA 05/02/2024 Slightly Cloudy (A)     Specific Gravity  05/02/2024 1.015     pH, Urine 05/02/2024 7.0     Leukocytes 05/02/2024 Small (1+) (A)     Nitrite, UA 05/02/2024 Positive (A)     Protein, POC 05/02/2024 Trace (A)     Glucose, UA 05/02/2024 Negative     Ketones, UA 05/02/2024 Negative     Urobilinogen, UA 05/02/2024 0.2 E.U./dL     Bilirubin 05/02/2024 Negative     Blood, UA 05/02/2024 Moderate (A)     Lot Number 05/02/2024 [id number]     Expiration Date 05/02/2024 07/31/2024     Urine Culture 05/02/2024 Final report (A)     Result 1 05/02/2024 Escherichia coli (A)     Susceptibility Testing 05/02/2024 Comment        Current Documented Allergies & Reactions  Allergies   Allergen Reactions    Betadine [Povidone Iodine] Rash     Burning/ stinging feeling on her rash    Codeine Nausea Only     BLURRED VISION, RASH       Current Psychotropic Medications:    ALPRAZolam (XANAX) 0.5 MG tablet, 1/2 to 1 tablet by mouth twice daily  as needed for anxiety    buPROPion (WELLBUTRIN) 75 MG tablet, TAKE 1 TABLET BY MOUTH DAILY (Patient taking differently: ONLY TAKES IN WINTER MONTHS) - followed by Dr. Anita Muse    desvenlafaxine (PRISTIQ) 100 MG 24 hr tablet, TAKE 1 TABLET BY MOUTH DAILY    gabapentin (NEURONTIN) 100 MG capsule, Take 1-2 capsules by mouth at dinnertime and/or bedtime as needed for sleep. May add one capsule per day until 600 mg total is reached in the evenings, as needed. May also take 1 capsule by mouth in the AM and one capsule at lunchtime as needed for anxiety.    gabapentin (NEURONTIN) 300 MG capsule, Take 1 to 2 capsules by mouth at dinnertime as needed for sleep.    traZODone (DESYREL) 50 MG tablet, TAKE 1/2 TO 1 TABLET BY MOUTH AT NIGHT AS NEEDED FOR SLEEP -  by Dr. Anita Muse    Diagnoses and all orders for this visit:    1. Generalized anxiety disorder (Primary)  -     ALPRAZolam (XANAX) 0.5 MG tablet; 1/2 to 1 tablet by mouth twice daily as needed for anxiety  Dispense: 60 tablet; Refill: 0    2. Recurrent major depressive disorder, in partial remission  -     desvenlafaxine (PRISTIQ) 100 MG 24 hr tablet; Take 1 tablet by mouth Daily.  Dispense: 90 tablet; Refill: 1    3. Insomnia, unspecified type      Plan of Care/ Medical Decision Making  1) Continue current medication regimen and treatment plan.  2) Provided supportive talk therapy through active listening, empathy, reflection, normalization of feelings, and positive reframing. Discussed current methods of coping.   3) Encouraged ongoing talk therapy with a trusted source of support. Will continue to provide additional resources in the after visit summary, patient instructions.The patient was made aware that this provider will be going on a leave of absence. She is agreeable to seeing this provider's counterpart, GIOVANI Chakraborty, for medication management and Aracelis Guzman LCSW, for cancer-related therapy or community resources, such as [employer]'s club  for an added layer of support.  4) The patient was encouraged to have an open conversation with her  about recent concerns of cognitive decline and following up with his PCP.  4) Notify provider if having any questions, concerns, or issues.    Follow up with GIOVANI Chakraborty, 3-4 weeks and as needed for medication management, and a therapy session with Aracelis Guzman LCSW.    During my visit with Alexandria on this date of service, the following activities were incorporated in their care  preparing for the visit, obtaining and/or reviewing a separately obtained history, performing a medically appropriate examination and/or evaluation, counseling and educating the patient/family/caregiver, ordering medications, tests, or procedures, referring and communicating with other health care professionals, documenting information in the medical record, and care coordination.    Psychotherapy time -  47 minutes were spent providing supportive therapy through active listening and therapeutic alliance. Addressed the patient's current stressors and feelings. Affirmed and encouraged ongoing communication with sources of support and treatment team.  Discussed current methods of coping. Provided additional options for sources of support through community resources and alternative treatment options.

## 2024-06-24 NOTE — PATIENT INSTRUCTIONS
[facility]  [address]  [place], [place]   [phone]    Please see below and attached for additional resources:  Group, individual (for the patient and family members), and family therapy    [employer]    [email or web address]/    Phone number: [phone]    Support for patients: matched with someone with a similar diagnosis and in survivorship    [employer] [facility] [employer]  https://[email or web address]/    Self-referrals for wanting a primary care doctor or a specialist  PCP [phone]  Specialist [phone]  Bereavement Support Groups in the [place] Area  Grief Share: https://www.griefshare.org/countries/us/\[facility] [place] [facility]\""/ky/[facility]/[place]  *If you go to the website, you can click on the specific group date and it will give additional info about the groups*  Therapy resources  Chronic Illness counseling center  www.[facility]ronic[facility]  [address] , [address]  [place] [address]  [facility]: phone # [phone]    Two Locations:    [address]  [place] [address]      [address], [address]  [place] [address]  FREE Massages  Patients are allowed some massages for FREE through the supportive oncology department. Massages at the cancer center and are done by Radha. She does her own scheduling.  Office:  [phone]    Mobile:  [phone]  FREE exercise program  Free exercise program [employer]est[employer] program through the [employer]:  https://www.[employer].org/what-we-do/healthy-living/fitness/livestrong  Chiropractic services ([facility])  [employer]  [phone]  Accupuncture  Dr. Dee Garcia does Accupuncture  [facility]  [address] [place] [address] [phone] phone  [phone] fax    Suicide / Crisis Hotline  Call: 988    OR   Go online and use their text / chat function - 988 Suicide & Crisis Lifeline - Call. Text. Chat.  (988Mipso.org)  Unified Protocol Safety Plan lesson  National Suicide Hotline Number (934-623-8732)  National safety text line (Text HOME to 299606)    [facility]: Alcohol / Substance Use and Dual Diagnosis Management  [address] [place] [address], [place], [place], [address]  [phone]  OR (545) 183-9170 https://[facility]/locations-and-payment/  Domestic Violence  2(101) 323-3427 (SAFE)                            www.kcadv.org   Sexual Assault Crisis Centers (for children and adults)  9(754) 270-6001 (HOPE)  Genesight testing  https://[employer]/gene-test-mental-health-medications/?utm_source=re&utm_medium=cpc&utm_campaign=702221468&utm_content=0709872642489082&utm_term=genesight%20testing&utm_source=re&utm_medium=cpc&utm_campaign=branded&utm_content=5188781470544814&utm_term=genesight%20testing&ewdagdp=[id number]

## 2024-07-10 ENCOUNTER — TELEPHONE (OUTPATIENT)
Dept: RADIATION ONCOLOGY | Facility: HOSPITAL | Age: 79
End: 2024-07-10
Payer: MEDICARE

## 2024-07-11 ENCOUNTER — OFFICE VISIT (OUTPATIENT)
Dept: RADIATION ONCOLOGY | Facility: HOSPITAL | Age: 79
End: 2024-07-11
Payer: MEDICARE

## 2024-07-11 VITALS
BODY MASS INDEX: 22.27 KG/M2 | DIASTOLIC BLOOD PRESSURE: 85 MMHG | SYSTOLIC BLOOD PRESSURE: 135 MMHG | OXYGEN SATURATION: 97 % | WEIGHT: 138 LBS | HEART RATE: 67 BPM

## 2024-07-11 DIAGNOSIS — D05.11 DUCTAL CARCINOMA IN SITU (DCIS) OF RIGHT BREAST: Primary | ICD-10-CM

## 2024-07-11 NOTE — PROGRESS NOTES
[facility] Radiation Oncology   Follow Up    Chief Complaint  DCIS of the Right Breast        Diagnosis: DCIS of the Right Breast     Overall Stage 0     pTis: per Lumpectomy Pathology  cN0: per MRI Breast and Physical Exam  cM0: per Physical Exam        Radiation Completion Date: 2/6/2024        Prescription:      Sequential     Site: Right Breast  Laterality: Right  Total Dose: 4240cGy  Dose per Fraction: 265cGy  Total Fractions: 16  Daily or BID: Daily  Modality: Photon  Technique: 3DCRT  Bolus: No         THEN        2.   Site: Lumpectomy Boost  Laterality: Right  Total Dose: 1000cGy  Dose per Fraction: 250cGy  Total Fractions: 4  Daily or BID: Daily  Modality: Photon  Technique: 3DCRT  Bolus: No      Interval History:    Alexandria Yousif presents for regularly scheduled follow-up approximately 5 months after completion of radiation therapy for DCIS of the right breast.  The patient had some tenderness in her resection bed which was evaluated in April which was ultimately felt to be benign.  She reports that this is much improved.  She continues to follow with psychiatry, cardiology, and has follow-up with Dr. Saunders later this year.  She has no new or concerning complaints.  She reports that she is overall feeling much better in terms of her mood and general health.      Imaging:      CT AP 5/2/2024    IMPRESSION:  There is no evidence for a genitourinary calculus or acute  abnormality of the abdomen and pelvis.      CT Chest 5/22/2024    IMPRESSION:  Stable micronodules      Pathology:      No new relevant pathology      Labs:    Lab Results   Component Value Date    CREATININE 0.82 03/15/2024             Problem List:  Patient Active Problem List   Diagnosis    Hypercalcemia    Mixed hyperlipidemia    Abnormal brain MRI    Vertigo    Anxiety disorder due to general medical condition    Depression    Gastroesophageal reflux disease    Impaired fasting glucose    Insomnia    Numbness of lower extremity     Osteopenia    PVCs (premature ventricular contractions)    PSVT (paroxysmal supraventricular tachycardia)    PAT (paroxysmal atrial tachycardia)    Polycythemia    Pulmonary nodule, right    Hyperalbuminemia    Type 2 diabetes mellitus without complication, without long-term current use of insulin    Coronary artery calcification    RBBB    Spinal stenosis of lumbar region without neurogenic claudication    Malignant neoplasm of female breast    Hot flashes    Anxiety    Generalized anxiety disorder          Medications:  Current Outpatient Medications on File Prior to Visit   Medication Sig Dispense Refill    acetaminophen (TYLENOL) 500 MG tablet Take 2 tablets by mouth Every 6 (Six) Hours As Needed for Mild Pain.      albuterol sulfate  (90 Base) MCG/ACT inhaler INHALE 2 PUFFS BY MOUTH INTO THE LUNGS EVERY 4 HOURS AS NEEDED FOR WHEEZING 8.5 g 0    ALPRAZolam (XANAX) 0.5 MG tablet 1/2 to 1 tablet by mouth twice daily as needed for anxiety 60 tablet 0    Alum Hydroxide-Mag Trisilicate (GAVISCON) 80-14.2 MG chewable tablet Chew 1 tablet 4 (Four) Times a Day As Needed (Heartburn). 224 each 11    aspirin 81 MG tablet Take 1 tablet by mouth Daily.      Biotin 10 MG tablet HOLD PRIOR TO SURG      buPROPion (WELLBUTRIN) 75 MG tablet TAKE 1 TABLET BY MOUTH DAILY (Patient taking differently: Take 1 tablet by mouth Daily. ONLY TAKES IN WINTER MONTHS) 30 tablet 2    Cholecalciferol 50 MCG (2000 UT) capsule HOLD PRIOR TO SURG      cyclobenzaprine (FLEXERIL) 5 MG tablet Take 1 tablet by mouth At Night As Needed for Muscle Spasms. 30 tablet 0    desvenlafaxine (PRISTIQ) 100 MG 24 hr tablet Take 1 tablet by mouth Daily. 90 tablet 1    fenofibrate (TRICOR) 145 MG tablet TAKE 1 TABLET BY MOUTH EVERY DAY 90 tablet 1    fluticasone (FLONASE) 50 MCG/ACT nasal spray 2 sprays into the nostril(s) as directed by provider Daily for 30 days. 16 g 0    gabapentin (NEURONTIN) 100 MG capsule Take 1-2 capsules by mouth at dinnertime  and/or bedtime as needed for sleep. May add one capsule per day until 600 mg total is reached in the evenings, as needed. May also take 1 capsule by mouth in the AM and one capsule at lunchtime as needed for anxiety. 90 capsule 2    gabapentin (NEURONTIN) 300 MG capsule Take 1 to 2 capsules by mouth at dinnertime as needed for sleep. 60 capsule 2    metFORMIN ER (GLUCOPHAGE-XR) 500 MG 24 hr tablet TAKE 1 TABLET BY MOUTH DAILY WITH LARGEST MEAL OF THE DAY AND A GLASS OF WATER 90 tablet 01    metoprolol tartrate (LOPRESSOR) 25 MG tablet Take 1 tablet by mouth 2 (Two) Times a Day. 180 tablet 3    Omega-3 Fatty Acids (FISH OIL) 1200 MG capsule capsule HOLD PRIOR TO SURG      omeprazole (priLOSEC) 40 MG capsule TAKE 1 CAPSULE BY MOUTH EVERY DAY 90 capsule 0    ondansetron ODT (ZOFRAN-ODT) 4 MG disintegrating tablet Place 1 tablet on the tongue Every 8 (Eight) Hours As Needed for Nausea or Vomiting. 30 tablet 2    rosuvastatin (CRESTOR) 10 MG tablet Take 1 tablet by mouth Every Night. 90 tablet 1    spironolactone (ALDACTONE) 25 MG tablet TAKE 1 TABLET BY MOUTH DAILY 30 tablet 4    traZODone (DESYREL) 50 MG tablet TAKE 1/2 TO 1 TABLET BY MOUTH AT NIGHT AS NEEDED FOR SLEEP 90 tablet 1    tretinoin (RETIN-A) 0.025 % cream Apply 1 Application topically to the appropriate area as directed Every Night.      valACYclovir (VALTREX) 500 MG tablet Take 2 tablets by mouth 2 (Two) Times a Day. 4 tablet 3     Current Facility-Administered Medications on File Prior to Visit   Medication Dose Route Frequency Provider Last Rate Last Admin    lidocaine (XYLOCAINE) 1 % injection 3 mL  3 mL Intradermal Once Dee Saunders MD              Allergies:  Allergies   Allergen Reactions    Betadine [Povidone Iodine] Rash     Burning/ stinging feeling on her rash    Codeine Nausea Only     BLURRED VISION, RASH           Vital Signs:  /85   Pulse 67   Wt 62.6 kg (138 lb)   SpO2 97%   BMI 22.27 kg/m²   Estimated body mass index is  "22.27 kg/m² as calculated from the following:    Height as of 5/24/24: 167.6 cm (66\").    Weight as of this encounter: 62.6 kg (138 lb).  Pain Score    07/11/24 1159   PainSc: 0-No pain         ECOG: Fully active, able to carry on all pre-disease performance without restriction = 0    Physical Exam  Vitals reviewed.   Constitutional:       General: She is not in acute distress.     Appearance: Normal appearance.   HENT:      Head: Normocephalic and atraumatic.   Eyes:      Extraocular Movements: Extraocular movements intact.      Pupils: Pupils are equal, round, and reactive to light.   Pulmonary:      Effort: Pulmonary effort is normal.   Abdominal:      General: Abdomen is flat.      Palpations: Abdomen is soft.   Musculoskeletal:      Cervical back: Normal range of motion.   Skin:     General: Skin is warm and dry.   Neurological:      General: No focal deficit present.      Mental Status: She is alert and oriented to person, place, and time.   Psychiatric:         Mood and Affect: Mood normal.         Behavior: Behavior normal.        Left Breast: Normal breast exam, no concerning nodules in the breast or axilla.  No nipple discharge or inversion  Right Breast: Well-healed lumpectomy scar.  No concerning nodules in the breast or axilla.  No nipple discharge or inversion.      Result Review :  The following data was reviewed by: Rashi Hood MD on 07/11/2024:  Labs: Last Creatinine   Data reviewed : Radiologic studies CT AP, CT Chest             Diagnoses and all orders for this visit:    1. Ductal carcinoma in situ (DCIS) of right breast (Primary)        Assessment:      Alexandria Yousif presents for regularly scheduled follow-up approximately 5 months after completion of radiation therapy for DCIS of the right breast.  The patient had some tenderness in her resection bed which was evaluated in April which was ultimately felt to be benign.  She reports that this is much improved.  She continues to follow with " psychiatry, cardiology, and has follow-up with Dr. Saunders later this year.  She has no new or concerning complaints.  She reports that she is overall feeling much better in terms of her mood and general health.    I met with the patient and performed a detailed breast exam.  There were no concerning findings.  She is not following with medical oncology as she did not move forward with any endocrine therapy.  She is scheduled to follow-up with Dr. Saunders in September.  She can follow-up with me in 1 year.      Plan:    -Follow-up in 1 year       I spent 20 minutes caring for Alexandria on this date of service. This time includes time spent by me in the following activities:preparing for the visit, reviewing tests, obtaining and/or reviewing a separately obtained history, documenting information in the medical record, independently interpreting results and communicating that information with the patient/family/caregiver, and care coordination  Follow Up   No follow-ups on file.  Patient was given instructions and counseling regarding her condition or for health maintenance advice. Please see specific information pulled into the AVS if appropriate.     Rashi Hood MD

## 2024-07-17 RX ORDER — METFORMIN HYDROCHLORIDE 500 MG/1
TABLET, EXTENDED RELEASE ORAL
Qty: 90 TABLET | Refills: 1 | Status: SHIPPED | OUTPATIENT
Start: 2024-07-17

## 2024-07-29 RX ORDER — OMEPRAZOLE 40 MG/1
CAPSULE, DELAYED RELEASE ORAL
Qty: 90 CAPSULE | Refills: 0 | Status: SHIPPED | OUTPATIENT
Start: 2024-07-29

## 2024-07-31 ENCOUNTER — DOCUMENTATION (OUTPATIENT)
Dept: OTHER | Facility: HOSPITAL | Age: 79
End: 2024-07-31
Payer: MEDICARE

## 2024-07-31 ENCOUNTER — OFFICE VISIT (OUTPATIENT)
Dept: PSYCHIATRY | Facility: HOSPITAL | Age: 79
End: 2024-07-31
Payer: MEDICARE

## 2024-07-31 DIAGNOSIS — F41.1 GENERALIZED ANXIETY DISORDER: Primary | ICD-10-CM

## 2024-07-31 PROCEDURE — 1159F MED LIST DOCD IN RCRD: CPT | Performed by: NURSE PRACTITIONER

## 2024-07-31 PROCEDURE — 99214 OFFICE O/P EST MOD 30 MIN: CPT | Performed by: NURSE PRACTITIONER

## 2024-07-31 PROCEDURE — 1160F RVW MEDS BY RX/DR IN RCRD: CPT | Performed by: NURSE PRACTITIONER

## 2024-07-31 RX ORDER — ALPRAZOLAM 0.5 MG/1
0.5 TABLET, EXTENDED RELEASE ORAL EVERY MORNING
Qty: 30 TABLET | Refills: 0 | Status: SHIPPED | OUTPATIENT
Start: 2024-07-31

## 2024-07-31 NOTE — PROGRESS NOTES
Oncology Social Work  Supportive Oncology Services    Chief Complaint:  crippling anxiety, sadness     Subjective  Patient ID: Alexandria Yousif is a 79 y.o. female who presents to the Supportive Oncology Services (SOS) clinic for initial consultation at the request of GIOVANI Xavier for supportive therapy.  Chart reviewed. Patient diagnosed with DCIS of right breast.  S/P lumpectomy and radiation therapy (20 tx's) .  From a cancer perspective patient is stable and in survivorship phase of her journey. Patient has hx of diabetes, mixed hyperlipidemia, anxiety and depression. Is here today for short term supportive counseling     Social History  Marital Status: - 2nd marriage   Children: Yes- 1 Adult son and 2 grandchildren   Support Community: Spouse, Children, and friends   Highest Level of Education: college graduate  Career: retired    Tobacco Use: The patient denies current or previous tobacco use.  Alcohol Use: does not drink  Marijuana/ Other drug Use: denied.    Medical History  Psychiatric History: Outpatient    Objective   Mental Status Exam  Appearance:  clean and casually dressed, appropriate  Attitude toward clinician:  cooperative and agreeable   Speech:    Rate:  regular rate and rhythm   Volume:  normal  Motor:  no abnormal movements present  Mood:  anxious  Affect:  anxious and mood congruent  Thought Processes:  linear, logical, and goal directed and associations intact  Thought Content:  normal  Suicidal Thoughts:  absent  Homicidal Thoughts:  absent  Perceptual Disturbance: no perceptual disturbance  Attention and Concentration:  good  Insight and Judgement:  good  Memory:  memory appears to be intact    Physical Exam  Station and gait observed to be WNL - patient is active and independent with ADL's and Mobility .    Assessment: LINDA, Grief reaction    Plan of Care    OSW met with patient for our first session of short term counseling.  Explained role of OSW and services we  can assist with.  Patient states that she has been in and out of therapy for many years.  She feels that her anxiety started after her son was  born 52 years ago.  Said it improved over time and with psychiatric medications and therapy.  She is currently on Pristiq, Gabapentin with Xanax and Wellbutrin PRN. States that she is using Xanax more often to aid in sleep.  Still reports some difficulty falling asleep.   Will cont to see for grief therapy and supportive therapy.  Patient endorses low motivation. Patient reports Major life transitions that have negatively impacted her emotional well-being.  Will  examine effective interventions that target the three most common transitions in later life, namely bereavement, care home, and relocation. Another major impact in patient's life is her husbands physical decline and his ability to do things he once did ( drive, read and independence).   is more dependent on her for many things that he once took care of - adjustment , adapting and acceptance work.     Next Appt:  8/12/2024 - 10:30AM

## 2024-07-31 NOTE — PROGRESS NOTES
Supportive Oncology Services  In Person Session    Subjective  Patient ID: Alexandria Yousif is a 79 y.o. female is seen face to face in the Supportive Oncology Services (SOS) Clinic.    CC: Anxiety, insomnia    HPI/ Interval History:   Pt is seen in follow up regarding complex experience of breast cancer survivorship, current grief surrounding aging of self and . Identifies ongoing grief and loss, complicated by cognitive challenges in spouse. Describes current paralyzing anxiety, reporting difficulty putting one foot in front of another. Even notices reduced showering.     Over the past two weeks, pt has begun setting alarm to wake up between 7:30-8 AM. Reports appropriate initiation and continuity of sleep when taking gabapentin 300 mg q dinner and 100 mg q hs, and trazodone 50 mg q hs. Feels rested in the morning, typically doing well with 7 hours of sleep. Pt reports hx biking, hiking but is not participating in any of this at this time. Anticipates hiking on upcoming trip, somewhat nervous about this. Reports having a [employer] within 1 mile of home, but hasn't been able to force self to go. Has been working with Elyssa and now Aracelis Guzman, OSW, to consider barriers of grief.     Pt has been on Pristiq 100 mg for appx 10 years, questioning whether she has plateaued on this medication. Is going on a big trip at the end of August, noting reservations regarding any big changes prior to this. Has been treated long term for major depressive disorder, general anixety disorder. Hx paxil with tremendous benefit but no libido. Pristiq was initially helpful but seems to have reduced efficacy at this time. Endorses added anxiety with current stressors, specifically considering impact of 's illness, preparation for big trip.    Pt does report regular use of xanax 0.5 mg daily, reporting tremendous benefit. Does have additional 0.25 mg mg daily, although is not using due to fear of dependence, cognitive impact.  Unfortunately, acknowledges ongoing impact of anxiety, poorly managed at this time.     Exam: As above    Recent Labs Reviewed:  No visits with results within 2 Month(s) from this visit.   Latest known visit with results is:   Office Visit on 05/24/2024   Component Date Value    Specific Gravity, UA 05/24/2024 1.014     pH, UA 05/24/2024 7.0     Color, UA 05/24/2024 Yellow     Appearance, UA 05/24/2024 Clear     Leukocytes, UA 05/24/2024 Negative     Protein 05/24/2024 Negative     Glucose, UA 05/24/2024 Negative     Ketones 05/24/2024 Negative     Blood, UA 05/24/2024 Negative     Bilirubin, UA 05/24/2024 Negative     Urobilinogen, UA 05/24/2024 0.2     Nitrite, UA 05/24/2024 Negative     Microscopic Examination 05/24/2024 Comment     Microscopic Examination 05/24/2024 See below:     Urinalysis Reflex 05/24/2024 Comment     WBC, UA 05/24/2024 None seen     RBC, UA 05/24/2024 None seen     Epithelial Cells (non re* 05/24/2024 None seen     Casts 05/24/2024 None seen     Bacteria, UA 05/24/2024 None seen    Labs reviewed    Current Psychotropic Medications:  Pristiq 100 mg daily  Alprazolam 0.5 mg daily    Plan of Care/ Medical Decision Making  Adjust alprazolam to extended release formulation to assist with rebound anxiety, more consistent mgmt of current sx. Supported pt in minimization of benzo with continued education provided regarding risk to cognitive health in geriatric patient.  Reviewed potential alternate strategy to pristiq, supporting patient in waiting until return from trip to adjust. Consider trial of trintellix if able to get covered.  Supported ongoing dedication to routine, use of alarm, behavioral activation.  FU scheduled in 4 weeks.    Diagnoses and all orders for this visit:    1. Generalized anxiety disorder (Primary)  -     ALPRAZolam XR (Xanax XR) 0.5 MG 24 hr tablet; Take 1 tablet by mouth Every Morning.  Dispense: 30 tablet; Refill: 0    I spent 39 minutes caring for Adolyn on this date of  service. This time includes time spent by me in the following activities: preparing for the visit, reviewing tests, obtaining and/or reviewing a separately obtained history, performing a medically appropriate examination and/or evaluation, counseling and educating the patient/family/caregiver, ordering medications, tests, or procedures, and documenting information in the medical record.

## 2024-08-12 ENCOUNTER — DOCUMENTATION (OUTPATIENT)
Dept: PSYCHIATRY | Facility: HOSPITAL | Age: 79
End: 2024-08-12
Payer: MEDICARE

## 2024-08-12 RX ORDER — FENOFIBRATE 145 MG/1
TABLET, COATED ORAL
Qty: 90 TABLET | Refills: 1 | Status: SHIPPED | OUTPATIENT
Start: 2024-08-12

## 2024-08-12 RX ORDER — ROSUVASTATIN CALCIUM 10 MG/1
10 TABLET, COATED ORAL NIGHTLY
Qty: 90 TABLET | Refills: 1 | Status: SHIPPED | OUTPATIENT
Start: 2024-08-12

## 2024-08-12 RX ORDER — FENOFIBRATE 145 MG/1
TABLET, COATED ORAL
Qty: 90 TABLET | Refills: 1 | Status: SHIPPED | OUTPATIENT
Start: 2024-08-12 | End: 2024-08-12 | Stop reason: SDUPTHER

## 2024-08-12 NOTE — TELEPHONE ENCOUNTER
Rx Refill Note  Requested Prescriptions     Pending Prescriptions Disp Refills    fenofibrate (TRICOR) 145 MG tablet [Pharmacy Med Name: FENOFIBRATE 145MG TABLETS] 90 tablet 1     Sig: TAKE 1 TABLET BY MOUTH EVERY DAY    rosuvastatin (CRESTOR) 10 MG tablet [Pharmacy Med Name: ROSUVASTATIN 10MG TABLETS] 90 tablet 1     Sig: TAKE 1 TABLET BY MOUTH EVERY NIGHT      Last office visit with prescribing clinician: 5/24/2024   Last telemedicine visit with prescribing clinician: Visit date not found   Next office visit with prescribing clinician: 8/11/2024                         Would you like a call back once the refill request has been completed: [] Yes [] No    If the office needs to give you a call back, can they leave a voicemail: [] Yes [] No    Marli John MA  08/12/24, 12:45 EDT

## 2024-08-12 NOTE — PROGRESS NOTES
Oncology Social Work  Supportive Oncology Services     Chief Complaint:  crippling anxiety, sadness      Subjective  Patient ID: Alexandria Yousif is a 79 y.o. female who presents to the Supportive Oncology Services (SOS) clinic for initial consultation at the request of GIOVANI Xavier for supportive therapy.  Chart reviewed. Patient diagnosed with DCIS of right breast.  S/P lumpectomy and radiation therapy (20 tx's) .  From a cancer perspective patient is stable and in survivorship phase of her journey. Patient has hx of diabetes, mixed hyperlipidemia, anxiety and depression. Is here today for short term supportive counseling.  OSW met with patient on 8/12 for supportive therapy- patient continues to endorse debilitating anxiety that is preventing her from doing much at home. Discussed ways to ask for help and ways to increase her self-efficacy. Reviewed the self talk and narrative that is playing over and over in her thoughts.         Objective   Mental Status Exam  Appearance:  clean and casually dressed, appropriate  Attitude toward clinician:  cooperative and agreeable   Speech:               Rate:  regular rate and rhythm              Volume:  normal  Motor:  no abnormal movements present  Mood:  anxious  Affect:  anxious and mood congruent  Thought Processes:  linear, logical, and goal directed and associations intact  Thought Content:  normal  Suicidal Thoughts:  absent  Homicidal Thoughts:  absent  Perceptual Disturbance: no perceptual disturbance  Attention and Concentration:  good  Insight and Judgement:  good  Memory:  memory appears to be intact        Assessment: LINDA, Grief reaction     Plan of Care   Will cont to see for supportive therapy to help reduce negative and self defeating thoughts that have hijacked her thinking.  Encouraged her to ask for help from friends - even though this is difficult for her.     Next Appt:  9/6/2024 - 11:30AM  Aracelis Guzman, SHELDON, LCSW

## 2024-08-13 ENCOUNTER — TELEPHONE (OUTPATIENT)
Dept: PSYCHIATRY | Facility: HOSPITAL | Age: 79
End: 2024-08-13
Payer: MEDICARE

## 2024-08-13 ENCOUNTER — TRANSCRIBE ORDERS (OUTPATIENT)
Dept: ADMINISTRATIVE | Facility: HOSPITAL | Age: 79
End: 2024-08-13
Payer: MEDICARE

## 2024-08-13 ENCOUNTER — TELEPHONE (OUTPATIENT)
Dept: NEUROSURGERY | Facility: CLINIC | Age: 79
End: 2024-08-13
Payer: MEDICARE

## 2024-08-13 DIAGNOSIS — F41.1 GENERALIZED ANXIETY DISORDER: ICD-10-CM

## 2024-08-13 DIAGNOSIS — Z12.31 SCREENING MAMMOGRAM FOR BREAST CANCER: Primary | ICD-10-CM

## 2024-08-13 RX ORDER — METHYLPREDNISOLONE 4 MG/1
TABLET ORAL
Qty: 21 TABLET | Refills: 0 | Status: SHIPPED | OUTPATIENT
Start: 2024-08-13

## 2024-08-13 RX ORDER — ALPRAZOLAM 0.25 MG/1
0.25 TABLET ORAL TAKE AS DIRECTED
Qty: 90 TABLET | Refills: 0 | Status: SHIPPED | OUTPATIENT
Start: 2024-08-13 | End: 2025-08-13

## 2024-08-13 NOTE — TELEPHONE ENCOUNTER
Supportive Oncology Services    Supportive call returned to pt regarding lack of response to alprazolam XR 0.5 mg q AM.     Reports taking this q AM as directed, although without perception of response. Continued to feel overwhelmed, trying this for appx 7 days. At this time, added 0.25 IR q afternoon which was somewhat helpful. Adjusted dosing to 0.25 mg q AM, using XR dosing in afternoon, and has found benefit to this, although ultimately decided XR dosing not helpful and returned to IR, max 0.75 mg daily. Anxiously anticipates upcoming travel, hopeful to be able to enjoy this. Questions how much xanax she can take. Remains open to transition to alternate SSRI, SNRI with plans to discuss this at next visit, scheduled following travel.    Explored current pt QOL goals, open to consideration of alternate SSRI/ SNRI, but seeing anxiety as barrier to upcoming travel. Directly discussed risk of benzo in geriatric patient at any dose, goals of discontinuing. Risks and benefits reviewed. Will adjust current dosing to 0.25 mg tid on schedule, reserving 0.25 mg PRN as needed for anxiety. Discussed importance of this  vs 0.5 and 0.25 due to potential for rebound anxiety, goals of more complete coverage. Pt acknowledges this is a temporary strategy and aware we will begin reducing next month. Pt aware max dosing of xanax is 1 mg daily, reiterating this given availability of alternate dosing of med. Pt aware not to drink alcohol, and is aware of recommendation not to drive on med.    Continue gabapentin 300 mg q evening meal and 100 mg q hs. Does feel this contributes some to cognitive fog, and thus do not recommend using more of this during day in excess of other recommendation.    Directly considered current polypharmacy, lack of response to current treatment. Will review with patient at next visit.     Keep FU as scheduled.

## 2024-08-13 NOTE — TELEPHONE ENCOUNTER
"S/w patient and informed per Dr. Celis,    \"I put a prescription in for this.  From here on she should get medications from her PCP however.\"    Patient expressed understanding    __________________________________________    Dr. Celis Mrs. Yousif called to find out if you can send an Rx to her pharmacy for MDP, she is about to go out of town and she is currently having a back flare up   "

## 2024-09-04 ENCOUNTER — HOSPITAL ENCOUNTER (OUTPATIENT)
Dept: MAMMOGRAPHY | Facility: HOSPITAL | Age: 79
Discharge: HOME | End: 2024-09-04
Admitting: STUDENT IN AN ORGANIZED HEALTH CARE EDUCATION/TRAINING PROGRAM
Payer: MEDICARE

## 2024-09-04 DIAGNOSIS — Z12.31 SCREENING MAMMOGRAM FOR BREAST CANCER: ICD-10-CM

## 2024-09-04 PROCEDURE — 77063 BREAST TOMOSYNTHESIS BI: CPT

## 2024-09-04 PROCEDURE — 77067 SCR MAMMO BI INCL CAD: CPT

## 2024-09-04 NOTE — TELEPHONE ENCOUNTER
Rx Refill Note  Requested Prescriptions     Pending Prescriptions Disp Refills    spironolactone (ALDACTONE) 25 MG tablet [Pharmacy Med Name: SPIRONOLACTONE 25MG TABLETS] 90 tablet 1     Sig: TAKE 1 TABLET BY MOUTH DAILY      Last office visit with prescribing clinician: 5/24/2024   Last telemedicine visit with prescribing clinician: Visit date not found   Next office visit with prescribing clinician: 9/13/2024                         Would you like a call back once the refill request has been completed: [] Yes [] No    If the office needs to give you a call back, can they leave a voicemail: [] Yes [] No    Saranya Barker Rep  09/04/24, 09:25 EDT

## 2024-09-05 RX ORDER — SPIRONOLACTONE 25 MG/1
25 TABLET ORAL DAILY
Qty: 90 TABLET | Refills: 1 | Status: SHIPPED | OUTPATIENT
Start: 2024-09-05

## 2024-09-10 ENCOUNTER — TELEPHONE (OUTPATIENT)
Dept: OTHER | Facility: HOSPITAL | Age: 79
End: 2024-09-10
Payer: MEDICARE

## 2024-09-10 NOTE — TELEPHONE ENCOUNTER
Oncology Social Work    OSW called to let patient know that I am leaving [facility] oncology program and would not be able to continue therapy with her.  OSW did email her a list of community providers.  A couple of the providers so accept medicare insurance.     Aracelis Guzman, FLEXW, LCSW

## 2024-09-11 ENCOUNTER — OFFICE VISIT (OUTPATIENT)
Dept: SURGERY | Facility: CLINIC | Age: 79
End: 2024-09-11
Payer: MEDICARE

## 2024-09-11 VITALS
BODY MASS INDEX: 22.18 KG/M2 | DIASTOLIC BLOOD PRESSURE: 62 MMHG | WEIGHT: 138 LBS | HEIGHT: 66 IN | SYSTOLIC BLOOD PRESSURE: 110 MMHG

## 2024-09-11 DIAGNOSIS — Z85.3 HISTORY OF BREAST CANCER: Primary | ICD-10-CM

## 2024-09-11 PROCEDURE — 99213 OFFICE O/P EST LOW 20 MIN: CPT | Performed by: STUDENT IN AN ORGANIZED HEALTH CARE EDUCATION/TRAINING PROGRAM

## 2024-09-11 PROCEDURE — 1160F RVW MEDS BY RX/DR IN RCRD: CPT | Performed by: STUDENT IN AN ORGANIZED HEALTH CARE EDUCATION/TRAINING PROGRAM

## 2024-09-11 PROCEDURE — 1159F MED LIST DOCD IN RCRD: CPT | Performed by: STUDENT IN AN ORGANIZED HEALTH CARE EDUCATION/TRAINING PROGRAM

## 2024-09-11 RX ORDER — TRAZODONE HYDROCHLORIDE 50 MG/1
25-50 TABLET, FILM COATED ORAL NIGHTLY PRN
Qty: 90 TABLET | Refills: 1 | Status: SHIPPED | OUTPATIENT
Start: 2024-09-11

## 2024-09-11 NOTE — PROGRESS NOTES
General Surgery Breast Cancer History and Physical Exam     Summary:    Alexandria Yousif is a 79 y.o. 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      A multidisciplinary plan has been formulated for the patient:    (1) Breast Surgical Oncology:  -[employer] 9 panel genetic testing: negative.   -s/p right breast wire localized lumpectomy 11/2023.  -Annual mammogram 8/2024 BIRADS2. Mammogram due 8/2025.    -Follow up in 1 year.    (2) Medical Oncology:  -Saw Dr. Chapman. Declined endocrine therapy.     (3) Radiation Oncology:  -Completed radiation therapy.     Referring Provider: No ref. provider found    Chief Complaint: abnormal breast imaging    History of Present Illness: Ms. Alexandria Yousif is a 79 y.o. year old lady, seen at the request of No ref. provider found for a new diagnosis of right breast cancer.      This was initially detected as an imaging abnormality. She has had annual mammograms each year. She denies any prior history of abnormal mammograms or breast biopsies. Her work-up is detailed in the oncologic history below.     She denies any breast lumps, pain, skin changes, or nipple discharge. She denies any family history of breast or ovarian cancer. Her mother had a hysterectomy for pre-cancerous cells.     12/11/2023 She presents today for follow up.  She has done well since surgery.  She did have a significant skin reaction after surgery but this is improving.  Her pain is controlled.    9/11/2024 She presents today for follow up. She is overall doing well. She has no concerns with her breast exam. She completed radiation therapy. She had extreme fatigue but otherwise did ok. She is following with oncology. She elected to not proceed with endocrine therapy. She is up to date on PCP exam; she sees them Friday. Her colonoscopy is due next year.      Workup of Current Diagnosis:    8/21/2023 Bilateral Screening  Mammogram:  IMPRESSION:  1. Microcalcifications in the middle third upper outer quadrant of the right breast are noted. Further evaluation with spot magnification CC and 90 degree lateral images is recommended.  2. There are no findings suspicious for malignancy in the left breast.  BI-RADS CATEGORY 0    9/13/2023 Right Breast Diagnostic Mammogram:   The patient returned for diagnostic imaging with coned magnified views in both projections and R 2 and digital tomosynthesis. These more clearly delineate clustered microcalcifications which have a slightly pleomorphic configuration and are considered indeterminant. They appear to be new since an earlier mammogram dated 08/17/2022 and therefore further evaluation with stereotactic core biopsy is recommended.  CONCLUSION: Suspicious clustered microcalcifications in middle third of upper outer right breast and further evaluation with stereotactic core biopsy is recommended. These findings have been discussed with the  patient.   BI-RADS 4. Suspicious abnormality.    10/16/2023 Right Breast Stereotactic Biopsy:   PRE-PROCEDURAL CONSULTATION: Details of the procedure and possible limitations and complications were discussed with the patient. After addressing questions and concerns, written informed consent was obtained.    PROCEDURE: The calcifications in the outer middle right breast was/were localized and targeted under stereotactic/tomosynthesis guidance via a(n) lateral approach. The skin of the breast was cleansed and prepped.  1 ml of 1% lidocaine and 8 ml of 1% lidocaine with epinephrine were used for local anesthesia. An 8 gauge needle used with a vacuum assisted biopsy device was advanced into the breast and 6 core specimens were  obtained. Specimen radiography demonstrated the targeted calcifications. A bowtie clip was then deployed at the biopsy site. The patient tolerated the procedure well with no immediate complications.  Post-procedural digital mammographic  views of the right breast demonstrate the bowtie clip at the expected location at the site of biopsy in the outer central middle right breast, where there is at least 1 adjacent residual calcification.   IMPRESSION:   1. Stereotactic/Tomosynthesis guided core needle biopsy of a group of calcifications at 9:00 in the middle right breast, marked with a bowtie biopsy clip. Pathology is malignant and concordant with the imaging  assessment. Recommend surgical consultation.    10/16/2023 Pathology:   Final Diagnosis   1. Right Breast, 9:00, Stereotactic-Guided Core Needle Biopsy for Calcifications:  A. 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               B. Greatest contiguous extent of DCIS measures 10 mm.               C. See biomarker template.     10/23/2023 Bilateral Breast MRI   IMPRESSION:  1. Biopsy-proven site of DCIS in the right breast in the middle-third at the 9-o'clock position represented by the bowtie shaped metallic clip within a postbiopsy cavity with thin linear enhancement measuring up to 0.7 cm at the anterior, superior and posterior margins of the biopsy cavity. The entire region including the biopsy cavity and linear enhancement measures up to 2.4 cm in greatest dimension. No other suspicious findings are seen within the right breast and there is no  evidence for right axillary adenopathy.  2. There are no findings suspicious for malignancy in the left breast.  BI-RADS Category 6: Known biopsy-proven malignancy.    11/20/2023 right breast wire localized lumpectomy   Final Diagnosis   1. Right Breast, Needle-Localized Lumpectomy (25 G):               A. HIGH-GRADE DUCTAL CARCINOMA IN SITU (DCIS) WITH PARTIAL INVOLVEMENT OF A       RADIAL SCAR:                            1. Solid, comedo, and pagetoid types with associated necrosis and calcifications.                            2. Extent of  DCIS: 29 mm (measured across slides 1F to 1M).                            3. Margins are negative for in situ carcinoma; closest distance: DCIS is present 0.4 mm       from the inferior margin (superseded by specimen #5).               B. Clip and biopsy site changes are present adjacent to in situ carcinoma.               C. See synoptic report (to include parts 2-7).     2. Right Breast, Additional Superior Margin, Reexcision:               A. Breast parenchyma with no significant histopathologic changes       (additional 6 mm of tissue free of carcinoma).     3. Right Breast, Additional Anterior Margin, Reexcision:               A. Breast parenchyma with no significant histopathologic changes       (additional 10 mm of tissue free of carcinoma).     4. Right Breast, Additional Posterior Margin, Reexcision:               A. Fibroadipose tissue with no significant histopathologic changes       (additional 10 mm of tissue free of carcinoma).     5. Right Breast, Additional Inferior Margin, Reexcision:               A. Breast parenchyma with small radial scar, otherwise no significant histopathologic changes       (additional 14 mm of tissue free of carcinoma).     6. Right Breast, Additional Lateral Margin, Reexcision:               A. Breast parenchyma with no significant histopathologic changes       (additional 6 mm of tissue free of carcinoma).     7. Right Breast, Additional Medial Margin, Reexcision:               A. Breast parenchyma with no significant histopathologic changes        (additional 8 mm of tissue free of carcinoma).     4/22/2024 Right Breast Diagnostic Mammogram and US:   There are scattered areas of fibroglandular density.    There is a marker overlying the upper outer middle right breast marking the area of concern indicated by the patient. Deep to the marker in the slightly upper outer middle and posterior right breast, there are new  benign-appearing postsurgical changes, including an  approximately 2.3 cm macroscopic fat density oval mass consistent with benign fat necrosis. This area was further assessed with ultrasound. There are benign-appearing calcifications. There are no suspicious masses, calcifications, or areas of architectural distortion.   ULTRASOUND:  Targeted sonographic evaluation of the right breast was performed in the area of concern indicated by the patient from 10:00 to 12:00 and axillary tail. At 11:00, 5 cm from the nipple, benign-appearing postsurgical changes are identified corresponding to the area of benign-appearing fat necrosis on mammogram. There is extension of linear scarring to the overlying skin. No suspicious cystic or solid mass is identified.   IMPRESSION:  No evidence of malignancy in the right breast. Further management of the patient's right breast symptoms should be based on clinical assessment. Recommend annual screening mammogram in 2024.  BI-RADS Category 2: Benign    2024 CT Abdomen/Pelvis:   IMPRESSION: There is no evidence for a genitourinary calculus or acute abnormality of the abdomen and pelvis.    2024 Bilateral Screening Mammogram:  IMPRESSION/RECOMMENDATION(S):  No mammographic evidence of malignancy. Recommend annual screening mammogram in one year.  BI-RADS Category 2: Benign      Gynecologic History:   . P:1 AB:0  Age at first childbirth: 27  Lactation/How long: none  Age at menarche: 16  Age at menopause: 49  Total years of oral contraceptive use: 6 years previously  Total years of hormone replacement therapy: on premarin, just stopped     Past Medical History:   HTN  DM  CAD, Dr. Syed CAMPOS    Past Surgical History:    Colonoscopy  Hysterectomy  Knee surgery  Tonsillectomy and adenoidectomy  Trigger point injection    Family History:    As above    Social History:  Denies tobacco use  Occasional alcohol use    Allergies:   Allergies   Allergen Reactions    Betadine [Povidone Iodine] Rash     Burning/ stinging feeling on her  rash    Codeine Nausea Only     BLURRED VISION, RASH     Medications:     Current Outpatient Medications:     acetaminophen (TYLENOL) 500 MG tablet, Take 2 tablets by mouth Every 6 (Six) Hours As Needed for Mild Pain., Disp: , Rfl:     albuterol sulfate  (90 Base) MCG/ACT inhaler, INHALE 2 PUFFS BY MOUTH INTO THE LUNGS EVERY 4 HOURS AS NEEDED FOR WHEEZING, Disp: 8.5 g, Rfl: 0    ALPRAZolam (Xanax) 0.25 MG tablet, Take 1 tablet by mouth Take As Directed. 1 tab po tid. May take additional 0.25 mg po once daily PRN anxiety., Disp: 90 tablet, Rfl: 0    ALPRAZolam XR (Xanax XR) 0.5 MG 24 hr tablet, Take 1 tablet by mouth Every Morning., Disp: 30 tablet, Rfl: 0    Alum Hydroxide-Mag Trisilicate (GAVISCON) 80-14.2 MG chewable tablet, Chew 1 tablet 4 (Four) Times a Day As Needed (Heartburn)., Disp: 224 each, Rfl: 11    aspirin 81 MG tablet, Take 1 tablet by mouth Daily., Disp: , Rfl:     Biotin 10 MG tablet, HOLD PRIOR TO SURG, Disp: , Rfl:     Cholecalciferol 50 MCG (2000 UT) capsule, HOLD PRIOR TO SURG, Disp: , Rfl:     cyclobenzaprine (FLEXERIL) 5 MG tablet, Take 1 tablet by mouth At Night As Needed for Muscle Spasms., Disp: 30 tablet, Rfl: 0    desvenlafaxine (PRISTIQ) 100 MG 24 hr tablet, Take 1 tablet by mouth Daily., Disp: 90 tablet, Rfl: 1    fenofibrate (TRICOR) 145 MG tablet, TAKE 1 TABLET BY MOUTH EVERY DAY, Disp: 90 tablet, Rfl: 1    fluticasone (FLONASE) 50 MCG/ACT nasal spray, 2 sprays into the nostril(s) as directed by provider Daily for 30 days., Disp: 16 g, Rfl: 0    gabapentin (NEURONTIN) 100 MG capsule, Take 1-2 capsules by mouth at dinnertime and/or bedtime as needed for sleep. May add one capsule per day until 600 mg total is reached in the evenings, as needed. May also take 1 capsule by mouth in the AM and one capsule at lunchtime as needed for anxiety., Disp: 90 capsule, Rfl: 2    gabapentin (NEURONTIN) 300 MG capsule, Take 1 to 2 capsules by mouth at dinnertime as needed for sleep., Disp: 60  capsule, Rfl: 2    metFORMIN ER (GLUCOPHAGE-XR) 500 MG 24 hr tablet, TAKE 1 TABLET BY MOUTH DAILY WITH LARGEST MEAL OF THE DAY AND A GLASS OF WATER, Disp: 90 tablet, Rfl: 01    methylPREDNISolone (MEDROL) 4 MG dose pack, Take as directed on package instructions., Disp: 21 tablet, Rfl: 0    metoprolol tartrate (LOPRESSOR) 25 MG tablet, Take 1 tablet by mouth 2 (Two) Times a Day., Disp: 180 tablet, Rfl: 3    Omega-3 Fatty Acids (FISH OIL) 1200 MG capsule capsule, HOLD PRIOR TO SURG, Disp: , Rfl:     omeprazole (priLOSEC) 40 MG capsule, TAKE 1 CAPSULE BY MOUTH EVERY DAY, Disp: 90 capsule, Rfl: 0    ondansetron ODT (ZOFRAN-ODT) 4 MG disintegrating tablet, Place 1 tablet on the tongue Every 8 (Eight) Hours As Needed for Nausea or Vomiting., Disp: 30 tablet, Rfl: 2    rosuvastatin (CRESTOR) 10 MG tablet, TAKE 1 TABLET BY MOUTH EVERY NIGHT, Disp: 90 tablet, Rfl: 1    spironolactone (ALDACTONE) 25 MG tablet, TAKE 1 TABLET BY MOUTH DAILY, Disp: 90 tablet, Rfl: 1    traZODone (DESYREL) 50 MG tablet, TAKE 1/2 TO 1 TABLET BY MOUTH AT NIGHT AS NEEDED FOR SLEEP, Disp: 90 tablet, Rfl: 1    tretinoin (RETIN-A) 0.025 % cream, Apply 1 Application topically to the appropriate area as directed Every Night., Disp: , Rfl:     valACYclovir (VALTREX) 500 MG tablet, Take 2 tablets by mouth 2 (Two) Times a Day., Disp: 4 tablet, Rfl: 3  No current facility-administered medications for this visit.    Facility-Administered Medications Ordered in Other Visits:     lidocaine (XYLOCAINE) 1 % injection 3 mL, 3 mL, Intradermal, Once, Dee Saunders MD    Laboratory Values:    Labs from 3/15/2024 reviewed    Review of Systems:   Influenza-like illness: no fever, no  cough, no  sore throat, no  body aches, no loss of sense of taste or smell, no known exposure to person with Covid-19.  Constitutional: Negative for fevers or chills  HENT: Negative for hearing loss or runny nose  Eyes: Negative for vision changes or scleral icterus  Respiratory:  Negative for cough or shortness of breath  Cardiovascular: Negative for chest pain or heart palpitations  Gastrointestinal: Negative for abdominal pain, nausea, vomiting, constipation, melena, or hematochezia  Genitourinary: Negative for hematuria or dysuria  Musculoskeletal: Negative for joint swelling or gait instability  Neurologic: Negative for tremors or seizures  Psychiatric: Negative for suicidal ideations or depression  All other systems reviewed and negative    Physical Exam:   ECO - Asymptomatic  Constitutional: Well-developed well-nourished, no acute distress  Eyes: Conjunctiva normal, sclera nonicteric  ENMT: Hearing grossly normal, oral mucosa moist  Neck: Supple, no palpable mass, trachea midline  Respiratory: Clear to auscultation, normal inspiratory effort  Cardiovascular: Regular rate, no peripheral edema, no jugular venous distention  Breast: symmetric  Right: No visible abnormalities on inspection while seated, with arms raised or hands on hips. No masses, skin changes, or nipple abnormalities. Right breast incision well healed.   Left: No visible abnormalities on inspection while seated, with arms raised or hands on hips. No masses, skin changes, or nipple abnormalities.  No clinical chest wall involvement.  Gastrointestinal: Soft, nontender  Lymphatics (palpable nodes): No cervical, supraclavicular or axillary lymphadenopathy  Skin:  Warm, dry, no rash on visualized skin surfaces  Musculoskeletal: Symmetric strength, normal gait  Psychiatric: Alert and oriented ×3, normal affect     KEAGAN MURILLO M.D.  General and Endoscopic Surgery  [facility]    [address], [address]  [place], [place], [address]  P: [phone]  F: [phone]

## 2024-09-12 ENCOUNTER — TELEMEDICINE (OUTPATIENT)
Dept: PSYCHIATRY | Facility: HOSPITAL | Age: 79
End: 2024-09-12
Payer: MEDICARE

## 2024-09-12 DIAGNOSIS — F41.1 GENERALIZED ANXIETY DISORDER: Primary | ICD-10-CM

## 2024-09-12 PROCEDURE — 99214 OFFICE O/P EST MOD 30 MIN: CPT | Performed by: NURSE PRACTITIONER

## 2024-09-12 PROCEDURE — 1159F MED LIST DOCD IN RCRD: CPT | Performed by: NURSE PRACTITIONER

## 2024-09-12 PROCEDURE — 1160F RVW MEDS BY RX/DR IN RCRD: CPT | Performed by: NURSE PRACTITIONER

## 2024-09-12 RX ORDER — ALPRAZOLAM 0.25 MG
0.25 TABLET ORAL 2 TIMES DAILY PRN
Qty: 45 TABLET | Refills: 0 | Status: SHIPPED | OUTPATIENT
Start: 2024-09-12 | End: 2025-09-12

## 2024-09-12 RX ORDER — GABAPENTIN 100 MG/1
100 CAPSULE ORAL 4 TIMES DAILY
Qty: 120 CAPSULE | Refills: 2 | Status: SHIPPED | OUTPATIENT
Start: 2024-09-12 | End: 2025-09-12

## 2024-09-12 NOTE — PROGRESS NOTES
Behavioral Oncology Services  Video visit conducted via [employer] Video Visit  You have chosen to receive care through a telehealth visit.  Do you consent to use a video/audio connection for your medical care today? YES  Telehealth provided in patient's home.  Location of Provider: [place], [place]     Subjective  Patient ID: Alexandria Yousif is a 79 y.o. female is seen via telehealth in the Behavioral Oncology Clinic.    CC: Anxiety    HPI/ Interval History:   Pt is seen in follow up regarding ongoing anxiety in survivorship of breast cancer, on endocrine therapy, and as caregiver for .    Pt reports positive response to alprazolam 0.25 mg bid to tid, allowing preparation for vacation and significant enjoyment of this trip. Has recognized increase in anxiety with return home, noting reduction in close support, normal stressors of every day life. Pt is aware of transition from [employer], OSW, to other employment venture, unable to continue therapy. Pt has list of available resources, but has not contacted yet. Unsure what she needs in this realm.     Medications again reviewed, inclusive of pristiq 100 mg daily, trazodone 50 mg q hs, gabapentin 200 mg q evening meal, 100 mg q hs. Has appreciated benefit of this to hot flashes, agitation, and sleep disruption. Is sleeping well, although not able to sleep with gabapentin alone. Has also used gabapentin during the day, with benefit, although with concern for drowsiness. Does note reduction in xanax to 0.25 mg q AM, occasional 1/2 tab later in day. Is in agreement with ongoing reduction.    Exam: As above    Recent Labs Reviewed:  No visits with results within 2 Month(s) from this visit.   Latest known visit with results is:   Office Visit on 05/24/2024   Component Date Value    Specific Gravity, UA 05/24/2024 1.014     pH, UA 05/24/2024 7.0     Color, UA 05/24/2024 Yellow     Appearance, UA 05/24/2024 Clear     Leukocytes, UA 05/24/2024 Negative      Protein 05/24/2024 Negative     Glucose, UA 05/24/2024 Negative     Ketones 05/24/2024 Negative     Blood, UA 05/24/2024 Negative     Bilirubin, UA 05/24/2024 Negative     Urobilinogen, UA 05/24/2024 0.2     Nitrite, UA 05/24/2024 Negative     Microscopic Examination 05/24/2024 Comment     Microscopic Examination 05/24/2024 See below:     Urinalysis Reflex 05/24/2024 Comment     WBC, UA 05/24/2024 None seen     RBC, UA 05/24/2024 None seen     Epithelial Cells (non re* 05/24/2024 None seen     Casts 05/24/2024 None seen     Bacteria, UA 05/24/2024 None seen    Labs reviewed    Current Psychotropic Medications:  Alprazolam 0.25 mg 1-2 daily  Gabapentin 100 mg x3 daily  Trazodone 50 mg q hs  Pristiq 100 mg daily    Plan of Care/ Medical Decision Making  Reduce alprazolam with goals of minimizing and discontinuing.  Increase frequency of gabapentin utilizing 100 mg q AM in addition to current dosing with hopes for ongoing pain and anxiety management  Continue pristiq at this time, although will continue to assess.  Discussed opportunities for FU inclusive of availability of ongoing group support alongside shared med apt in clinic. Pt interested in this approach and has been scheduled in this manner.    Diagnoses and all orders for this visit:    1. Generalized anxiety disorder (Primary)  -     ALPRAZolam (Xanax) 0.25 MG tablet; Take 1 tablet by mouth 2 (Two) Times a Day As Needed for Anxiety.  Dispense: 45 tablet; Refill: 0  -     gabapentin (Neurontin) 100 MG capsule; Take 1 capsule by mouth 4 (Four) Times a Day.  Dispense: 120 capsule; Refill: 2    I spent 35 minutes caring for Alexandria on this date of service. This time includes time spent by me in the following activities: preparing for the visit, reviewing tests, obtaining and/or reviewing a separately obtained history, performing a medically appropriate examination and/or evaluation, counseling and educating the patient/family/caregiver, ordering medications,  tests, or procedures, and documenting information in the medical record.

## 2024-09-13 ENCOUNTER — OFFICE VISIT (OUTPATIENT)
Dept: FAMILY MEDICINE CLINIC | Facility: CLINIC | Age: 79
End: 2024-09-13
Payer: MEDICARE

## 2024-09-13 VITALS
OXYGEN SATURATION: 96 % | WEIGHT: 138.5 LBS | SYSTOLIC BLOOD PRESSURE: 116 MMHG | HEART RATE: 69 BPM | BODY MASS INDEX: 22.26 KG/M2 | DIASTOLIC BLOOD PRESSURE: 66 MMHG | HEIGHT: 66 IN

## 2024-09-13 DIAGNOSIS — F41.9 ANXIETY: ICD-10-CM

## 2024-09-13 DIAGNOSIS — E11.9 TYPE 2 DIABETES MELLITUS WITHOUT COMPLICATION, WITHOUT LONG-TERM CURRENT USE OF INSULIN: ICD-10-CM

## 2024-09-13 DIAGNOSIS — G47.09 OTHER INSOMNIA: ICD-10-CM

## 2024-09-13 DIAGNOSIS — K21.9 GASTROESOPHAGEAL REFLUX DISEASE, UNSPECIFIED WHETHER ESOPHAGITIS PRESENT: ICD-10-CM

## 2024-09-13 DIAGNOSIS — R73.01 IMPAIRED FASTING GLUCOSE: ICD-10-CM

## 2024-09-13 DIAGNOSIS — F32.89 OTHER DEPRESSION: ICD-10-CM

## 2024-09-13 DIAGNOSIS — E78.2 MIXED HYPERLIPIDEMIA: Primary | ICD-10-CM

## 2024-09-13 NOTE — ASSESSMENT & PLAN NOTE
Patient's depression is a single episode that is mild without psychosis. 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    Plan:   Continue current medication therapy   She is in current therapy annd under care of psych APRN    Followup in 6 months.

## 2024-09-13 NOTE — ASSESSMENT & PLAN NOTE
Diabetes is stable.   Continue current treatment regimen.  Regular aerobic exercise.  Reminded to get yearly retinal exam.  Diabetes will be reassessed in 6 months

## 2024-09-13 NOTE — ASSESSMENT & PLAN NOTE
Lipid abnormalities are stable    Plan:  Continue same medication/s without change.      Discussed medication dosage, use, side effects, and goals of treatment in detail.    Counseled patient on lifestyle modifications to help control hyperlipidemia.   Advised patient to exercise for 150 minutes weekly. (30 minute brisk walk, 5 days a week for example)    Patient Treatment Goals:   LDL goal is less than 70    Followup in 6 months.

## 2024-09-13 NOTE — PROGRESS NOTES
Subjective   The ABCs of the Annual Wellness Visit  Medicare Wellness Visit      Alexandria Yousif is a 79 y.o. patient who presents for a Medicare Wellness Visit.    The following portions of the patient's history were reviewed and   updated as appropriate: allergies, current medications, past family history, past medical history, past social history, past surgical history, and problem list.    Compared to one year ago, the patient's physical   health is better.  Compared to one year ago, the patient's mental   health is the same.    Recent Hospitalizations:  She was not admitted to the hospital during the last year.     Current Medical Providers:  Patient Care Team:  Anita Muse MD as PCP - General  Anita Muse MD as PCP - Family Medicine  Rajinder Corbett MD as Consulting Physician (Otolaryngology)  Daquan Rhodes MD as Consulting Physician (Cardiology)  Amador Pierce II, MD as Consulting Physician (Hematology and Oncology)  Rashi Hood MD as Consulting Physician (Radiation Oncology)  Dee Saunders MD as Referring Physician (General Surgery)  Stephanie Chapman MD as Consulting Physician (Hematology and Oncology)    Outpatient Medications Prior to Visit   Medication Sig Dispense Refill    acetaminophen (TYLENOL) 500 MG tablet Take 2 tablets by mouth Every 6 (Six) Hours As Needed for Mild Pain.      albuterol sulfate  (90 Base) MCG/ACT inhaler INHALE 2 PUFFS BY MOUTH INTO THE LUNGS EVERY 4 HOURS AS NEEDED FOR WHEEZING 8.5 g 0    ALPRAZolam (Xanax) 0.25 MG tablet Take 1 tablet by mouth 2 (Two) Times a Day As Needed for Anxiety. 45 tablet 0    Alum Hydroxide-Mag Trisilicate (GAVISCON) 80-14.2 MG chewable tablet Chew 1 tablet 4 (Four) Times a Day As Needed (Heartburn). 224 each 11    aspirin 81 MG tablet Take 1 tablet by mouth Daily.      Biotin 10 MG tablet HOLD PRIOR TO SURG      Cholecalciferol 50 MCG (2000 UT) capsule HOLD PRIOR TO SURG      cyclobenzaprine (FLEXERIL) 5 MG  tablet Take 1 tablet by mouth At Night As Needed for Muscle Spasms. 30 tablet 0    desvenlafaxine (PRISTIQ) 100 MG 24 hr tablet Take 1 tablet by mouth Daily. 90 tablet 1    fenofibrate (TRICOR) 145 MG tablet TAKE 1 TABLET BY MOUTH EVERY DAY 90 tablet 1    gabapentin (Neurontin) 100 MG capsule Take 1 capsule by mouth 4 (Four) Times a Day. 120 capsule 2    metFORMIN ER (GLUCOPHAGE-XR) 500 MG 24 hr tablet TAKE 1 TABLET BY MOUTH DAILY WITH LARGEST MEAL OF THE DAY AND A GLASS OF WATER 90 tablet 01    metoprolol tartrate (LOPRESSOR) 25 MG tablet Take 1 tablet by mouth 2 (Two) Times a Day. 180 tablet 3    Omega-3 Fatty Acids (FISH OIL) 1200 MG capsule capsule HOLD PRIOR TO SURG      omeprazole (priLOSEC) 40 MG capsule TAKE 1 CAPSULE BY MOUTH EVERY DAY 90 capsule 0    rosuvastatin (CRESTOR) 10 MG tablet TAKE 1 TABLET BY MOUTH EVERY NIGHT 90 tablet 1    spironolactone (ALDACTONE) 25 MG tablet TAKE 1 TABLET BY MOUTH DAILY 90 tablet 1    traZODone (DESYREL) 50 MG tablet TAKE 1/2 TO 1 TABLET BY MOUTH AT NIGHT AS NEEDED FOR SLEEP 90 tablet 1    valACYclovir (VALTREX) 500 MG tablet Take 2 tablets by mouth 2 (Two) Times a Day. 4 tablet 3    fluticasone (FLONASE) 50 MCG/ACT nasal spray 2 sprays into the nostril(s) as directed by provider Daily for 30 days. 16 g 0     Facility-Administered Medications Prior to Visit   Medication Dose Route Frequency Provider Last Rate Last Admin    lidocaine (XYLOCAINE) 1 % injection 3 mL  3 mL Intradermal Once Dee Saunders MD         No opioid medication identified on active medication list. I have reviewed chart for other potential  high risk medication/s and harmful drug interactions in the elderly.      Aspirin is on active medication list. Aspirin use is indicated based on review of current medical condition/s. Pros and cons of this therapy have been discussed today. Benefits of this medication outweigh potential harm.  Patient has been encouraged to continue taking this medication.   ".      Patient Active Problem List   Diagnosis    Hypercalcemia    Mixed hyperlipidemia    Abnormal brain MRI    Vertigo    Anxiety disorder due to general medical condition    Depression    Gastroesophageal reflux disease    Impaired fasting glucose    Insomnia    Numbness of lower extremity    Osteopenia    PVCs (premature ventricular contractions)    PSVT (paroxysmal supraventricular tachycardia)    PAT (paroxysmal atrial tachycardia)    Polycythemia    Pulmonary nodule, right    Hyperalbuminemia    Type 2 diabetes mellitus without complication, without long-term current use of insulin    Coronary artery calcification    RBBB    Spinal stenosis of lumbar region without neurogenic claudication    Malignant neoplasm of female breast    Hot flashes    Anxiety    Generalized anxiety disorder     Advance Care Planning Advance Directive is on file.  ACP discussion was held with the patient during this visit. Patient has an advance directive in EMR which is still valid.             Objective   Vitals:    09/13/24 1015   BP: 116/66   BP Location: Left arm   Patient Position: Sitting   Cuff Size: Adult   Pulse: 69   SpO2: 96%   Weight: 62.8 kg (138 lb 8 oz)   Height: 167.6 cm (66\")       Estimated body mass index is 22.35 kg/m² as calculated from the following:    Height as of this encounter: 167.6 cm (66\").    Weight as of this encounter: 62.8 kg (138 lb 8 oz).    BMI is within normal parameters. No other follow-up for BMI required.       Does the patient have evidence of cognitive impairment? No                                                                                               Health  Risk Assessment    Smoking Status:  Social History     Tobacco Use   Smoking Status Never    Passive exposure: Never   Smokeless Tobacco Never     Alcohol Consumption:  Social History     Substance and Sexual Activity   Alcohol Use Yes    Alcohol/week: 3.0 - 4.0 standard drinks of alcohol    Types: 2 - 3 Glasses of wine, 1 Cans " of beer per week    Comment: liquor-1 to 2 month       Fall Risk Screen  ANJANA Fall Risk Assessment was completed, and patient is at LOW risk for falls.Assessment completed on:2024    Depression Screenin/13/2024    10:15 AM   PHQ-2/PHQ-9 Depression Screening   Little Interest or Pleasure in Doing Things 0-->not at all   Feeling Down, Depressed or Hopeless 1-->several days   PHQ-9: Brief Depression Severity Measure Score 1     Health Habits and Functional and Cognitive Screenin/12/2024     2:10 PM   Functional & Cognitive Status   Do you have difficulty preparing food and eating? No   Do you have difficulty bathing yourself, getting dressed or grooming yourself? No   Do you have difficulty using the toilet? No   Do you have difficulty moving around from place to place? No   Do you have trouble with steps or getting out of a bed or a chair? No   Current Diet Low Fat Diet   Dental Exam Up to date   Eye Exam Up to date   Exercise (times per week) 1 times per week   Current Exercises Include Gardening;House Cleaning;Walking   Do you need help using the phone?  No   Are you deaf or do you have serious difficulty hearing?  Yes   Do you need help to go to places out of walking distance? No   Do you need help shopping? No   Do you need help preparing meals?  No   Do you need help with housework?  No   Do you need help with laundry? No   Do you need help taking your medications? No   Do you need help managing money? No   Do you ever drive or ride in a car without wearing a seat belt? No   Have you felt unusual stress, anger or loneliness in the last month? Yes   Who do you live with? Spouse   If you need help, do you have trouble finding someone available to you? No   Have you been bothered in the last four weeks by sexual problems? No   Do you have difficulty concentrating, remembering or making decisions? Yes           Age-appropriate Screening Schedule:  Refer to the list below for future screening  recommendations based on patient's age, sex and/or medical conditions. Orders for these recommended tests are listed in the plan section. The patient has been provided with a written plan.    Health Maintenance List  Health Maintenance   Topic Date Due    DXA SCAN  06/26/2022    COVID-19 Vaccine (7 - 2023-24 season) 09/01/2024    ANNUAL WELLNESS VISIT  09/05/2024    INFLUENZA VACCINE  08/01/2024    HEMOGLOBIN A1C  09/15/2024    COLORECTAL CANCER SCREENING  02/17/2025    RSV Vaccine - Adults (1 - 1-dose 60+ series) 03/15/2025 (Originally 4/27/2006)    LIPID PANEL  03/15/2025    URINE MICROALBUMIN  03/15/2025    DIABETIC EYE EXAM  07/02/2025    MAMMOGRAM  09/04/2025    TDAP/TD VACCINES (3 - Td or Tdap) 10/21/2030    Pneumococcal Vaccine 65+  Completed    ZOSTER VACCINE  Completed    HEPATITIS C SCREENING  Addressed                                                                                                                                                CMS Preventative Services Quick Reference  Risk Factors Identified During Encounter  Immunizations Discussed/Encouraged: Influenza and RSV (Respiratory Syncytial Virus)  Inactivity/Sedentary: Patient was advised to exercise at least 150 minutes a week per CDC recommendations.  Dental Screening Recommended  Vision Screening Recommended    The above risks/problems have been discussed with the patient.  Pertinent information has been shared with the patient in the After Visit Summary.  An After Visit Summary and PPPS were made available to the patient.    Follow Up:   Next Medicare Wellness visit to be scheduled in 1 year.     Assessment & Plan  Mixed hyperlipidemia   Lipid abnormalities are stable    Plan:  Continue same medication/s without change.      Discussed medication dosage, use, side effects, and goals of treatment in detail.    Counseled patient on lifestyle modifications to help control hyperlipidemia.   Advised patient to exercise for 150 minutes weekly.  (30 minute brisk walk, 5 days a week for example)    Patient Treatment Goals:   LDL goal is less than 70    Followup in 6 months.  Impaired fasting glucose    Type 2 diabetes mellitus without complication, without long-term current use of insulin  Diabetes is stable.   Continue current treatment regimen.  Regular aerobic exercise.  Reminded to get yearly retinal exam.  Diabetes will be reassessed in 6 months  Gastroesophageal reflux disease, unspecified whether esophagitis present    Anxiety    Other depression  Patient's depression is a single episode that is mild without psychosis. 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    Plan:   Continue current medication therapy   She is in current therapy annd under care of psych APRN    Followup in 6 months.   Other insomnia      Orders Placed This Encounter   Procedures    Lipid Panel With LDL / HDL Ratio    Hemoglobin A1c    Comprehensive Metabolic Panel    T4, Free    TSH    Microalbumin / Creatinine Urine Ratio - Urine, Clean Catch    CBC & Differential             Follow Up:   No follow-ups on file.

## 2024-09-13 NOTE — PROGRESS NOTES
"Chief Complaint  Medicare Wellness-subsequent    Subjective        Alexandria Yousif presents to [facility]    History of Present Illness  The patient is a 79-year-old female who presents for a wellness visit.    She is currently undergoing treatment for depression and anxiety at the cancer center, where her mental health medications are being adjusted. She has been advised against long-term use of Xanax due to potential cognitive issues in seniors. Her current regimen includes 0.25 mg of Xanax during the day and 100 mg of gabapentin 3-4 times daily, with one dose at bedtime. Gabapentin, which initially helped with her hot flashes, is now being increased  as she reduces her Xanax intake. She is also dealing with relationship issues with her , which she is addressing in therapy. Group therapy has been suggested as a beneficial step for her.    Despite these challenges, she reports an improvement in her physical health over the past year, attributing it to her cancer-free status. She has not required any overnight hospital stays this year and takes aspirin daily. She reports no falls in the past year and does not have glaucoma. 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    She recently returned from a vacation and is considering getting the COVID-19 vaccine after a mild case of the virus two weeks ago.  She had a faint + test but subsequent testing was negative.  No URI complaints today.    SOCIAL HISTORY  She does not drink more than 1 drink of alcohol a day.       Objective   Vital Signs:  /66 (BP Location: Left arm, Patient Position: Sitting, Cuff Size: Adult)   Pulse 69   Ht 167.6 cm (66\")   Wt 62.8 kg (138 lb 8 oz)   SpO2 96%   BMI 22.35 kg/m²   Estimated body mass index is 22.35 kg/m² as calculated from the following:    Height as of this encounter: 167.6 cm " "(66\").    Weight as of this encounter: 62.8 kg (138 lb 8 oz).    BMI is within normal parameters. No other follow-up for BMI required.      Physical Exam  Vitals and nursing note reviewed.   Constitutional:       Appearance: Normal appearance. She is well-developed.   HENT:      Head: Normocephalic and atraumatic.      Right Ear: External ear normal.      Left Ear: External ear normal.   Eyes:      Extraocular Movements: Extraocular movements intact.      Conjunctiva/sclera: Conjunctivae normal.   Neck:      Vascular: No carotid bruit.   Cardiovascular:      Rate and Rhythm: Normal rate and regular rhythm.      Heart sounds: Normal heart sounds.      Comments: No bruits  Pulmonary:      Effort: Pulmonary effort is normal. No respiratory distress.      Breath sounds: Normal breath sounds. No stridor. No wheezing, rhonchi or rales.   Chest:      Chest wall: No tenderness.   Abdominal:      General: Bowel sounds are normal. There is no distension.      Palpations: Abdomen is soft. There is no mass.      Tenderness: There is no abdominal tenderness. There is no guarding or rebound.      Hernia: No hernia is present.   Musculoskeletal:      Cervical back: Neck supple.      Right lower leg: No edema.      Left lower leg: No edema.   Lymphadenopathy:      Cervical: No cervical adenopathy.   Skin:     General: Skin is warm.      Capillary Refill: Capillary refill takes less than 2 seconds.   Neurological:      General: No focal deficit present.      Mental Status: She is alert and oriented to person, place, and time. Mental status is at baseline.   Psychiatric:         Mood and Affect: Mood normal.         Behavior: Behavior normal.         Thought Content: Thought content normal.         Judgment: Judgment normal.        Result Review :                Assessment and Plan   Diagnoses and all orders for this visit:    1. Mixed hyperlipidemia (Primary)  Assessment & Plan:   Lipid abnormalities are stable    Plan:  Continue " same medication/s without change.      Discussed medication dosage, use, side effects, and goals of treatment in detail.    Counseled patient on lifestyle modifications to help control hyperlipidemia.   Advised patient to exercise for 150 minutes weekly. (30 minute brisk walk, 5 days a week for example)    Patient Treatment Goals:   LDL goal is less than 70    Followup in 6 months.    Orders:  -     Lipid Panel With LDL / HDL Ratio; Future  -     Hemoglobin A1c; Future  -     Comprehensive Metabolic Panel; Future  -     CBC & Differential; Future  -     T4, Free; Future  -     TSH; Future  -     Microalbumin / Creatinine Urine Ratio - Urine, Clean Catch; Future    2. Impaired fasting glucose  -     Lipid Panel With LDL / HDL Ratio; Future  -     Hemoglobin A1c; Future  -     Comprehensive Metabolic Panel; Future  -     CBC & Differential; Future  -     T4, Free; Future  -     TSH; Future  -     Microalbumin / Creatinine Urine Ratio - Urine, Clean Catch; Future    3. Type 2 diabetes mellitus without complication, without long-term current use of insulin  Assessment & Plan:  Diabetes is stable.   Continue current treatment regimen.  Regular aerobic exercise.  Reminded to get yearly retinal exam.  Diabetes will be reassessed in 6 months    Orders:  -     Lipid Panel With LDL / HDL Ratio; Future  -     Hemoglobin A1c; Future  -     Comprehensive Metabolic Panel; Future  -     CBC & Differential; Future  -     T4, Free; Future  -     TSH; Future  -     Microalbumin / Creatinine Urine Ratio - Urine, Clean Catch; Future    4. Gastroesophageal reflux disease, unspecified whether esophagitis present    5. Anxiety    6. Other depression  Assessment & Plan:  Patient's depression is a single episode that is mild without psychosis. 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    Plan:   Continue current medication therapy   She is in current therapy annd under care of psych APRN    Followup in 6 months.        7. Other insomnia             Assessment & Plan  1. Depression.  She is currently in treatment at the cancer center with a nurse practitioner specializing in psychiatry. She is being tapered off Xanax due to potential cognitive issues, especially in seniors. The current regimen includes 0.25 mg of Xanax as needed and 100 mg of gabapentin 3-4 times a day, with one dose at bedtime. She is also considering group therapy at the [facility], which may help with her tendency to isolate during bad times. If symptoms do not improve, other medications will be considered.    2. Anxiety.  She is being tapered off Xanax due to potential cognitive issues, especially in seniors. The current regimen includes 0.25 mg of Xanax as needed and 100 mg of gabapentin 3-4 times a day, with one dose at bedtime. She is also considering group therapy at the [facility], which may help with her tendency to isolate during bad times. If symptoms do not improve, other medications will be considered.  She will continue pristiq 100 mg qd    3. Hypertension.  She takes Lopressor 25 mg twice daily and spironolactone 25 mg daily. She should continue her current medication regimen. Blood pressure will be monitored regularly.    4. Hyperlipidemia.  She takes Crestor 10 mg daily and tricor 145 mg qd for HTG.  She should continue her current medication regimen. Lipid levels will be monitored regularly.check liver q 6 mo    5. Hypertriglyceridemia.  She takes fenofibrate 145 mg daily and fish oil over the counter. She should continue her current medication regimen. Triglyceride levels will be monitored regularly.    6. Gastroesophageal Reflux Disease (GERD).  She takes omeprazole 40 mg daily. She should continue her current medication regimen. Symptoms will be monitored.    7. Insomnia.  She takes trazodone 50 mg at night for sleep. She should continue her current medication regimen. Sleep quality will be monitored.    8. Health Maintenance.  She  is eligible for a flu shot and the RSV vaccine in October. She should consider getting both vaccines. She may also consider the COVID-19 vaccine in November, given a possible mild case of COVID-19 two weeks ago.    9. NIDDM--continue metformin  mg qd. And check A1c every 6 mo           Follow Up   Return in about 6 months (around 3/13/2025) for Recheck.  Patient was given instructions and counseling regarding her condition or for health maintenance advice. Please see specific information pulled into the AVS if appropriate.         Patient or patient representative verbalized consent for the use of Ambient Listening during the visit with  Anita Muse MD for chart documentation. 9/15/2024  08:13 EDT    Anita Muse MD   08:06 EDT   [unfilled]

## 2024-09-17 DIAGNOSIS — E11.9 TYPE 2 DIABETES MELLITUS WITHOUT COMPLICATION, WITHOUT LONG-TERM CURRENT USE OF INSULIN: ICD-10-CM

## 2024-09-17 DIAGNOSIS — E78.2 MIXED HYPERLIPIDEMIA: ICD-10-CM

## 2024-09-17 DIAGNOSIS — R73.01 IMPAIRED FASTING GLUCOSE: ICD-10-CM

## 2024-09-18 LAB
ALBUMIN SERPL-MCNC: 5 G/DL (ref 3.5–5.2)
ALBUMIN/CREAT UR: 9 MG/G CREAT (ref 0–29)
ALBUMIN/GLOB SERPL: 2.3 G/DL
ALP SERPL-CCNC: 72 U/L (ref 39–117)
ALT SERPL-CCNC: 21 U/L (ref 1–33)
AST SERPL-CCNC: 25 U/L (ref 1–32)
BASOPHILS # BLD AUTO: 0.06 10*3/MM3 (ref 0–0.2)
BASOPHILS NFR BLD AUTO: 1.3 % (ref 0–1.5)
BILIRUB SERPL-MCNC: 0.3 MG/DL (ref 0–1.2)
BUN SERPL-MCNC: 19 MG/DL (ref 8–23)
BUN/CREAT SERPL: 25.3 (ref 7–25)
CALCIUM SERPL-MCNC: 10.3 MG/DL (ref 8.6–10.5)
CHLORIDE SERPL-SCNC: 100 MMOL/L (ref 98–107)
CHOLEST SERPL-MCNC: 154 MG/DL (ref 0–200)
CO2 SERPL-SCNC: 26.1 MMOL/L (ref 22–29)
CREAT SERPL-MCNC: 0.75 MG/DL (ref 0.57–1)
CREAT UR-MCNC: 143.2 MG/DL
EGFRCR SERPLBLD CKD-EPI 2021: 81.6 ML/MIN/1.73
EOSINOPHIL # BLD AUTO: 0.1 10*3/MM3 (ref 0–0.4)
EOSINOPHIL NFR BLD AUTO: 2.1 % (ref 0.3–6.2)
ERYTHROCYTE [DISTWIDTH] IN BLOOD BY AUTOMATED COUNT: 13 % (ref 12.3–15.4)
GLOBULIN SER CALC-MCNC: 2.2 GM/DL
GLUCOSE SERPL-MCNC: 103 MG/DL (ref 65–99)
HBA1C MFR BLD: 6.2 % (ref 4.8–5.6)
HCT VFR BLD AUTO: 49 % (ref 34–46.6)
HDLC SERPL-MCNC: 51 MG/DL (ref 40–60)
HGB BLD-MCNC: 15.4 G/DL (ref 12–15.9)
IMM GRANULOCYTES # BLD AUTO: 0.01 10*3/MM3 (ref 0–0.05)
IMM GRANULOCYTES NFR BLD AUTO: 0.2 % (ref 0–0.5)
LDLC SERPL CALC-MCNC: 75 MG/DL (ref 0–100)
LDLC/HDLC SERPL: 1.37 {RATIO}
LYMPHOCYTES # BLD AUTO: 1.11 10*3/MM3 (ref 0.7–3.1)
LYMPHOCYTES NFR BLD AUTO: 23.8 % (ref 19.6–45.3)
MCH RBC QN AUTO: 29.4 PG (ref 26.6–33)
MCHC RBC AUTO-ENTMCNC: 31.4 G/DL (ref 31.5–35.7)
MCV RBC AUTO: 93.7 FL (ref 79–97)
MICROALBUMIN UR-MCNC: 13 UG/ML
MONOCYTES # BLD AUTO: 0.49 10*3/MM3 (ref 0.1–0.9)
MONOCYTES NFR BLD AUTO: 10.5 % (ref 5–12)
NEUTROPHILS # BLD AUTO: 2.9 10*3/MM3 (ref 1.7–7)
NEUTROPHILS NFR BLD AUTO: 62.1 % (ref 42.7–76)
NRBC BLD AUTO-RTO: 0 /100 WBC (ref 0–0.2)
PLATELET # BLD AUTO: 315 10*3/MM3 (ref 140–450)
POTASSIUM SERPL-SCNC: 4.1 MMOL/L (ref 3.5–5.2)
PROT SERPL-MCNC: 7.2 G/DL (ref 6–8.5)
RBC # BLD AUTO: 5.23 10*6/MM3 (ref 3.77–5.28)
SODIUM SERPL-SCNC: 138 MMOL/L (ref 136–145)
T4 FREE SERPL-MCNC: 1.31 NG/DL (ref 0.92–1.68)
TRIGL SERPL-MCNC: 165 MG/DL (ref 0–150)
TSH SERPL DL<=0.005 MIU/L-ACNC: 1.06 UIU/ML (ref 0.27–4.2)
VLDLC SERPL CALC-MCNC: 28 MG/DL (ref 5–40)
WBC # BLD AUTO: 4.67 10*3/MM3 (ref 3.4–10.8)

## 2024-10-09 ENCOUNTER — OFFICE VISIT (OUTPATIENT)
Dept: PSYCHIATRY | Facility: HOSPITAL | Age: 79
End: 2024-10-09
Payer: MEDICARE

## 2024-10-09 DIAGNOSIS — F33.41 RECURRENT MAJOR DEPRESSIVE DISORDER, IN PARTIAL REMISSION: ICD-10-CM

## 2024-10-09 DIAGNOSIS — F43.21 GRIEF REACTION: ICD-10-CM

## 2024-10-09 DIAGNOSIS — F41.1 GENERALIZED ANXIETY DISORDER: Primary | ICD-10-CM

## 2024-10-09 NOTE — PROGRESS NOTES
Subjective  Patient ID: Alexandria Yousif is a 79 y.o.. female seen in regularly scheduled group session.  Group participants have consented to group conducted in person    Total Group Time, Face to Face: 75 minutes  Total Group Participants: 4, plus facilitation per GIOVANI Maloney    Group Therapy  Group topics explored including development of new normal, interpersonal sensitivity, short and long term effects of diagnosis and treatment, significant other or family conflict, anticipitory anxiety, physical deconditioning, social determinants of health (finances, living arrangements, etc), and lifestyle choices. Shares caution celebrating a positive response to treatment, while identifying distrust of clear scans, not wanting to be caught off guard. Share life joys, appreciated engagement in various activities and time with loved ones. Shared goals of full force living, use of experience to better the experiences of others.    Counseling provided regarding cognitive behavioral therapy (CBT) strategies, behavioral activation/ activity scheduling, reintroduction to activity through graded tasks, balancing avoidance with approach , sleep hygiene, recognition and allowance of need for rest, pharmacological and non pharmacological management of sleep disturbance, lifestyle counseling, allowance of self care, exploration of quality of life goals, survivorship issues, current programming available in community and clinic, anxiety/ mood management , medication education, and existential distress. Identified active therapeutic factors of group, sharing of information, support of others. Considered cautious optimism, consideration of impact on today's choices, mindfulness.    Discussed current programming available in [facility] and community.    Benefits of group discussed while also acknowledging the need for 1:1 consultation at times. Members invited to discuss any concerns privately with me following group setting  or to schedule a 1:1 visit if needs not being met in group.    Next shared medical visit: November 13 at 3:30 PM    Patient response to group: Pt is alert and oriented, relating well with other members and appreciating interaction.     Medications Management  GIOVANI Maloney present for medication discussion and management.  Pt continues in survivorship of breast cancer.    CC: Anxiety  HPI: Pt is seen in follow up regarding ongoing survivorship of breast cancer. Appreciates reserved celebration with recent clear findings on recent mammogram. Reports feeling anxiety more when at home, and notes ongoing lack of support. Fortunately maintains robust support network in extended family. Has allowed use of  in the home to assist with demands. Has used this time to reengage in reading. Is communicating more openly with spouse. Has appreciated improved communication with spouse, allowance of self care.  Exam: As Above positive response to anxiety with dedication to this.  Current medication regimen: Alprazolam 0.25 mg 1-2 daily, Gabapentin 100 mg QID, Trazodone 50 mg q hs, Pristiq 100 mg daily  Lab Review:   Orders Only on 09/17/2024   Component Date Value    Creatinine, Urine 09/17/2024 143.2     Microalbumin, Urine 09/17/2024 13.0     Microalbumin/Creatinine * 09/17/2024 9     TSH 09/17/2024 1.060     Free T4 09/17/2024 1.31     WBC 09/17/2024 4.67     RBC 09/17/2024 5.23     Hemoglobin 09/17/2024 15.4     Hematocrit 09/17/2024 49.0 (H)     MCV 09/17/2024 93.7     MCH 09/17/2024 29.4     MCHC 09/17/2024 31.4 (L)     RDW 09/17/2024 13.0     Platelets 09/17/2024 315     Neutrophil Rel % 09/17/2024 62.1     Lymphocyte Rel % 09/17/2024 23.8     Monocyte Rel % 09/17/2024 10.5     Eosinophil Rel % 09/17/2024 2.1     Basophil Rel % 09/17/2024 1.3     Neutrophils Absolute 09/17/2024 2.90     Lymphocytes Absolute 09/17/2024 1.11     Monocytes Absolute 09/17/2024 0.49     Eosinophils Absolute 09/17/2024 0.10      Basophils Absolute 09/17/2024 0.06     Immature Granulocyte Rel* 09/17/2024 0.2     Immature Grans Absolute 09/17/2024 0.01     nRBC 09/17/2024 0.0     Glucose 09/17/2024 103 (H)     BUN 09/17/2024 19     Creatinine 09/17/2024 0.75     EGFR Result 09/17/2024 81.6     BUN/Creatinine Ratio 09/17/2024 25.3 (H)     Sodium 09/17/2024 138     Potassium 09/17/2024 4.1     Chloride 09/17/2024 100     Total CO2 09/17/2024 26.1     Calcium 09/17/2024 10.3     Total Protein 09/17/2024 7.2     Albumin 09/17/2024 5.0     Globulin 09/17/2024 2.2     A/G Ratio 09/17/2024 2.3     Total Bilirubin 09/17/2024 0.3     Alkaline Phosphatase 09/17/2024 72     AST (SGOT) 09/17/2024 25     ALT (SGPT) 09/17/2024 21     Hemoglobin A1C 09/17/2024 6.20 (H)     Total Cholesterol 09/17/2024 154     Triglycerides 09/17/2024 165 (H)     HDL Cholesterol 09/17/2024 51     VLDL Cholesterol Franklyn 09/17/2024 28     LDL Chol Calc ([facility]) 09/17/2024 75     LDL/HDL RATIO 09/17/2024 1.37      MDM:  Meds reviewed and renewed as appropriate.  Continue alprazolam 1/2 of 0.25 mg tab once daily PRN anxiety, gabapentin, pristiq, and trazodone.  FU scheduled in group setting.    Diagnoses and all orders for this visit:    1. Generalized anxiety disorder (Primary)    2. Recurrent major depressive disorder, in partial remission    3. Grief reaction

## 2024-10-28 RX ORDER — OMEPRAZOLE 40 MG/1
CAPSULE, DELAYED RELEASE ORAL
Qty: 90 CAPSULE | Refills: 0 | Status: SHIPPED | OUTPATIENT
Start: 2024-10-28

## 2024-11-22 DIAGNOSIS — F41.1 GENERALIZED ANXIETY DISORDER: ICD-10-CM

## 2024-11-22 RX ORDER — GABAPENTIN 100 MG/1
100 CAPSULE ORAL 4 TIMES DAILY
Qty: 120 CAPSULE | Refills: 2 | Status: SHIPPED | OUTPATIENT
Start: 2024-11-22 | End: 2025-11-22

## 2024-11-25 ENCOUNTER — OFFICE VISIT (OUTPATIENT)
Dept: PSYCHIATRY | Facility: HOSPITAL | Age: 79
End: 2024-11-25
Payer: MEDICARE

## 2024-11-25 DIAGNOSIS — F33.41 RECURRENT MAJOR DEPRESSIVE DISORDER, IN PARTIAL REMISSION: ICD-10-CM

## 2024-11-25 DIAGNOSIS — G47.00 INSOMNIA, UNSPECIFIED TYPE: ICD-10-CM

## 2024-11-25 DIAGNOSIS — F41.1 GENERALIZED ANXIETY DISORDER: Primary | ICD-10-CM

## 2024-11-25 RX ORDER — ALPRAZOLAM 0.25 MG/1
0.25 TABLET ORAL TAKE AS DIRECTED
Qty: 30 TABLET | Refills: 0 | Status: SHIPPED | OUTPATIENT
Start: 2024-11-25 | End: 2025-11-25

## 2024-11-25 RX ORDER — DESVENLAFAXINE 100 MG/1
100 TABLET, EXTENDED RELEASE ORAL DAILY
Qty: 90 TABLET | Refills: 0 | Status: SHIPPED | OUTPATIENT
Start: 2024-11-25

## 2024-11-25 NOTE — PROGRESS NOTES
[facility]  In Person Session    Subjective  Patient ID: Alexandria Yousif is a 79 y.o. female is seen face to face in the [facility].    CC: Anxiety much improved since addition of gabapentin; care giver distress    HPI/ Interval History:   Pt is seen in follow up regarding ongoing anxiety in survivorship of breast cancer, conitnued challenges with aging/ declining health of .    PHQ 9 remains subthreshhold with score of 4. Denies SI. Anxiety remains elevated with LINDA 7 score of 11. Continuse to do well, remaining actively engaged in various activities despite having limited support. Has been caring for  in survivorship of recent surgery, appreciating ability for rehab to allow appropriate care of him following. Described acute anxietywtih his discharge upon leaving rehab facilitaty, although rarely using xanax except for during this period of time, noting 0.25 gmg daily max dose. Continuse on gabapentin 100 mg QID reporting significant benefit to this. Ha appreciatied regular walking with dog or by self, deep breathing, and working actively with medical team to remain proactive with anticipated stressors. Shares ability to go out of house for full day to horse show with family, appreciating ability to breathe and seeing this as necessary self care .No longer waking up with uncomfortable feelings of anxiety. Continuse to sleep well. Energy is accepable, reporting improvement. Reports interest in getting up and doing things, feeling less overwhelmed and more able to get into day as desired. Endorses feelings of achievement, self satisfaction, and appreciating benefit in sense of mastery. Also appreciates 's positive remarks regarding her care for him.    Exam: As above    Recent Labs Reviewed:  No visits with results within 2 Month(s) from this visit.   Latest known visit with results is:   Orders Only on 09/17/2024   Component Date Value     Creatinine, Urine 09/17/2024 143.2     Microalbumin, Urine 09/17/2024 13.0     Microalbumin/Creatinine * 09/17/2024 9     TSH 09/17/2024 1.060     Free T4 09/17/2024 1.31     WBC 09/17/2024 4.67     RBC 09/17/2024 5.23     Hemoglobin 09/17/2024 15.4     Hematocrit 09/17/2024 49.0 (H)     MCV 09/17/2024 93.7     MCH 09/17/2024 29.4     MCHC 09/17/2024 31.4 (L)     RDW 09/17/2024 13.0     Platelets 09/17/2024 315     Neutrophil Rel % 09/17/2024 62.1     Lymphocyte Rel % 09/17/2024 23.8     Monocyte Rel % 09/17/2024 10.5     Eosinophil Rel % 09/17/2024 2.1     Basophil Rel % 09/17/2024 1.3     Neutrophils Absolute 09/17/2024 2.90     Lymphocytes Absolute 09/17/2024 1.11     Monocytes Absolute 09/17/2024 0.49     Eosinophils Absolute 09/17/2024 0.10     Basophils Absolute 09/17/2024 0.06     Immature Granulocyte Rel* 09/17/2024 0.2     Immature Grans Absolute 09/17/2024 0.01     nRBC 09/17/2024 0.0     Glucose 09/17/2024 103 (H)     BUN 09/17/2024 19     Creatinine 09/17/2024 0.75     EGFR Result 09/17/2024 81.6     BUN/Creatinine Ratio 09/17/2024 25.3 (H)     Sodium 09/17/2024 138     Potassium 09/17/2024 4.1     Chloride 09/17/2024 100     Total CO2 09/17/2024 26.1     Calcium 09/17/2024 10.3     Total Protein 09/17/2024 7.2     Albumin 09/17/2024 5.0     Globulin 09/17/2024 2.2     A/G Ratio 09/17/2024 2.3     Total Bilirubin 09/17/2024 0.3     Alkaline Phosphatase 09/17/2024 72     AST (SGOT) 09/17/2024 25     ALT (SGPT) 09/17/2024 21     Hemoglobin A1C 09/17/2024 6.20 (H)     Total Cholesterol 09/17/2024 154     Triglycerides 09/17/2024 165 (H)     HDL Cholesterol 09/17/2024 51     VLDL Cholesterol Franklyn 09/17/2024 28     LDL Chol Calc ([facility]) 09/17/2024 75     LDL/HDL RATIO 09/17/2024 1.37    Labs reviewed    Current Psychotropic Medications:  Pristiq  mg q hs   Trazodone 25-50 mg q hs  Gabapentin 100 mg qid  Alprazolam 0.125-0.25 mg PRN anxiety    Plan of Care/ Medical Decision Making  Continue Pristiq 100 mg  daily.  Continue trazodone, gabapentin, and xanax as written. Additional discussion regarding goals of further reducing and discontinuing xanax to reduce polypharmacy and eliminate benzo. Recommend use of gabapentin earlier for moments of increasing anxiety or panic. Also reviewed non pharmacologically anxiety reducing techniques. Will prescribe xanax with goals of continued reduction, use sparingly.   FU scheduled in group setting, per patient preference.    Diagnoses and all orders for this visit:    1. Generalized anxiety disorder (Primary)  -     ALPRAZolam (Xanax) 0.25 MG tablet; Take 1 tablet by mouth Take As Directed. 1/2 to 1 tab daily PO PRN anxiety  Dispense: 30 tablet; Refill: 0    2. Recurrent major depressive disorder, in partial remission  -     desvenlafaxine (PRISTIQ) 100 MG 24 hr tablet; Take 1 tablet by mouth Daily.  Dispense: 90 tablet; Refill: 0    3. Insomnia, unspecified type    I spent 36 minutes caring for Alexandria on this date of service. This time includes time spent by me in the following activities: preparing for the visit, reviewing tests, obtaining and/or reviewing a separately obtained history, performing a medically appropriate examination and/or evaluation, counseling and educating the patient/family/caregiver, ordering medications, tests, or procedures, and documenting information in the medical record.

## 2024-12-18 ENCOUNTER — OFFICE VISIT (OUTPATIENT)
Dept: PSYCHIATRY | Facility: HOSPITAL | Age: 79
End: 2024-12-18
Payer: MEDICARE

## 2024-12-18 DIAGNOSIS — F33.42 RECURRENT MAJOR DEPRESSIVE DISORDER, IN FULL REMISSION: ICD-10-CM

## 2024-12-18 DIAGNOSIS — F41.1 GENERALIZED ANXIETY DISORDER: Primary | ICD-10-CM

## 2024-12-18 DIAGNOSIS — F43.21 GRIEF REACTION: ICD-10-CM

## 2024-12-18 NOTE — PROGRESS NOTES
Subjective  Patient ID: Alexandria Yousif is a 79 y.o.. female seen in regularly scheduled group session.  Group participants have consented to group conducted in person    Total Group Time, Face to Face: 75 minutes  Total Group Participants: 6, plus facilitation per GIOVANI Maloney     Group Therapy  Group topics explored including development of new normal, interpersonal sensitivity, short and long term effects of diagnosis and treatment, significant other or family conflict, anticipitory anxiety, physical deconditioning, social determinants of health (finances, living arrangements, etc), and lifestyle choices. Members shared added challenges with role strain, care giving responsibilities, feelings surrounding desire for self care amidst tremendous responsibility.      Counseling provided regarding cognitive behavioral therapy (CBT) strategies, behavioral activation/ activity scheduling, reintroduction to activity through graded tasks, balancing avoidance with approach , sleep hygiene, discussion of need for restorative sleep with consideration of sleep apnea, recognition and allowance of need for rest, pharmacological and non pharmacological management of sleep disturbance, lifestyle counseling, risks associated with substance use, benefits of exercise and strategies for managing barriers to engagement, allowance of self care, exploration of quality of life goals, survivorship issues, current programming available in community and clinic, anxiety/ mood management , medication education, risks and expectations alongside prescribing of controlled substances, specifically regarding risk management and safe use, as well as expectations of prescribing, refills, storage, and diversion. , and existential distress. Considered benefits of personal boundaries, allowing self to uphold these. Reviewed personal expectations, current medication mgmt, risks of controlled substances, and strategies for reducing an  discontinuing benzodiazapines. Non pharmacological anxiety reduction techniques demonstrated.     Discussed current programming available in [facility] and community.     Benefits of group discussed while also acknowledging the need for 1:1 consultation at times. Members invited to discuss any concerns privately with me following group setting or to schedule a 1:1 visit if needs not being met in group.     Next shared medical visit: January 29 at 2:00 PM    Patient response to group: Pt supports other members and jumps eagerly into conversation.    Medications Management  Pt continues in survivorship of breast cancer.    CC: Anxiety  HPI: Pt is seen in follow up regarding sx of anxiety and brain fog in survivorship of breast cancer. Reports to be doing well, appreciating benefit to ongoing group interactions. Has increased gabapentin to 100 mg QID, appreciating response to pain and panic while mourning potential contribution to brain fog -specifically in AM. Does feel this has improved over time. Has omitted morning dose on some days with need to drive in AM, agreeing this assists fog and does not seem to contribute to resurgence of pain or anxiety. Reiterates non pharmacological strategies for managing anxiety, continuing to appreciate stability on Pristiq, low dose trazodone, and low dose benzo q hs.  Exam: As Above  Current medication regimen: Pristiq  mg q hs, Trazodone 25-50 mg q hs, Gabapentin 100 mg qid, Alprazolam 0.125 mg daily  Lab Review:   No visits with results within 2 Month(s) from this visit.   Latest known visit with results is:   Orders Only on 09/17/2024   Component Date Value    Creatinine, Urine 09/17/2024 143.2     Microalbumin, Urine 09/17/2024 13.0     Microalbumin/Creatinine * 09/17/2024 9     TSH 09/17/2024 1.060     Free T4 09/17/2024 1.31     WBC 09/17/2024 4.67     RBC 09/17/2024 5.23     Hemoglobin 09/17/2024 15.4     Hematocrit 09/17/2024 49.0 (H)     MCV 09/17/2024 93.7      MCH 09/17/2024 29.4     MCHC 09/17/2024 31.4 (L)     RDW 09/17/2024 13.0     Platelets 09/17/2024 315     Neutrophil Rel % 09/17/2024 62.1     Lymphocyte Rel % 09/17/2024 23.8     Monocyte Rel % 09/17/2024 10.5     Eosinophil Rel % 09/17/2024 2.1     Basophil Rel % 09/17/2024 1.3     Neutrophils Absolute 09/17/2024 2.90     Lymphocytes Absolute 09/17/2024 1.11     Monocytes Absolute 09/17/2024 0.49     Eosinophils Absolute 09/17/2024 0.10     Basophils Absolute 09/17/2024 0.06     Immature Granulocyte Rel* 09/17/2024 0.2     Immature Grans Absolute 09/17/2024 0.01     nRBC 09/17/2024 0.0     Glucose 09/17/2024 103 (H)     BUN 09/17/2024 19     Creatinine 09/17/2024 0.75     EGFR Result 09/17/2024 81.6     BUN/Creatinine Ratio 09/17/2024 25.3 (H)     Sodium 09/17/2024 138     Potassium 09/17/2024 4.1     Chloride 09/17/2024 100     Total CO2 09/17/2024 26.1     Calcium 09/17/2024 10.3     Total Protein 09/17/2024 7.2     Albumin 09/17/2024 5.0     Globulin 09/17/2024 2.2     A/G Ratio 09/17/2024 2.3     Total Bilirubin 09/17/2024 0.3     Alkaline Phosphatase 09/17/2024 72     AST (SGOT) 09/17/2024 25     ALT (SGPT) 09/17/2024 21     Hemoglobin A1C 09/17/2024 6.20 (H)     Total Cholesterol 09/17/2024 154     Triglycerides 09/17/2024 165 (H)     HDL Cholesterol 09/17/2024 51     VLDL Cholesterol Franklyn 09/17/2024 28     LDL Chol Calc ([facility]) 09/17/2024 75     LDL/HDL RATIO 09/17/2024 1.37      MDM:  Meds reviewed and renewed as appropriate.  Reduce gabapentin to three times daily dosing, omitting AM dose to assist with brain fog.  Support reduction and discontinuation of alprazolam.  Denies need for refills today.  FU scheduled in group setting.    Diagnoses and all orders for this visit:    1. Generalized anxiety disorder (Primary)    2. Recurrent major depressive disorder, in full remission    3. Grief reaction

## 2025-01-03 ENCOUNTER — TELEPHONE (OUTPATIENT)
Dept: NEUROSURGERY | Facility: CLINIC | Age: 80
End: 2025-01-03
Payer: MEDICARE

## 2025-01-03 NOTE — TELEPHONE ENCOUNTER
Provider: DR FARLEY    Caller: IVAN JOHNSON    Relationship to Patient: SELF      Phone Number: [phone]    Reason for Call: PATIENT HAS HAD A FLARE UP WITH HER BACK AND IS IN A LOT OF PAIN.  WE DID SCHEDULE HER A FOLLOW UP W/APC, BUT THE FIRST AVAILABLE WAS 2-6-25.  SHE STATES BEFORE WHEN THIS HAPPENED SHE WENT TO PAIN MGT.  CAN SHE BE ORDERED ANOTHER INJECTION?  PLEASE ADVISE.    When was the patient last seen: 9-11-23

## 2025-01-07 ENCOUNTER — TELEPHONE (OUTPATIENT)
Dept: NEUROSURGERY | Facility: CLINIC | Age: 80
End: 2025-01-07

## 2025-01-07 NOTE — TELEPHONE ENCOUNTER
Left message for patient to call the office and confirm that she plans on coming to her appointment today @ 100.

## 2025-01-07 NOTE — TELEPHONE ENCOUNTER
PT CALLED WANTING TO RESCHEDULE BECAUSE OF THE WEATHER. PT WANTS TO KNOW IF IT IS POSSIBLE TO GET AN MRI SCHEDULED PRIOR TO APPT AS TO SAVE TIME WITH DX. PLEASE ADVISE.    CONTACT: [phone]   AVAILABLE ANYTIME  PLEASE [employer] OR [employer] MESSAGE

## 2025-01-10 NOTE — TELEPHONE ENCOUNTER
"S/w patient and informed per Shikha,    \"She probably needs to be seen first to make sure the right imaging is being ordered.\"    Patient expressed understanding and scheduled an appt with Dr. Celis      "

## 2025-01-10 NOTE — PROGRESS NOTES
Subjective   Patient ID: Alexandria Yousif is a 79 y.o. female is here today for follow-up low back pain. No recent imaging.    Today patient states that she is having low back pain, along with numbness in the L calf, and neck pain and intermittent N/T in the R arm    History of Present Illness    This patient was last here in September 2023.  She was having pain across her lower back with radiation into her buttocks.  There was some pain in her anterior thighs down to her knees.  Worst pain however was in her back.  For the last month patient has been having severe pain all the way across her lower back.  She has some numbness in her left calf and some intermittent numbness and tingling in her right arm.    The following portions of the patient's history were reviewed and updated as appropriate: allergies, current medications, past family history, past medical history, past social history, past surgical history, and problem list.      Objective     There were no vitals filed for this visit.  There is no height or weight on file to calculate BMI.    Tobacco Use: Low Risk  (1/14/2025)    Patient History     Smoking Tobacco Use: Never     Smokeless Tobacco Use: Never     Passive Exposure: Never          Physical Exam    Neurological:      Mental Status: He is alert and oriented to person, place, and time.       Neurological Exam    Mental Status  Alert. Oriented to person, place, and time.            Assessment & Plan   Independent Review of Radiographic Studies:      I personally reviewed the images from the following studies.    I reviewed an MRI of the lumbar spine done on 30 June of 2023.  This does show some stenosis at L4-5 where there is a spondylolisthesis and a little narrowing at L3-4.    Medical Decision Making:      I told the patient we should go ahead and do a cervical and lumbar myelogram.  I told the patient what a myelogram involves.  I explained that there is a 50% chance of developing a bad headache  and nausea as a result of the test.  I explained that there is also a very small chance of infection, seizures, and bleeding.  I explained how we would treat a post myelogram headache including bedrest, caffeinated fluids, steroids, and blood patch.  The patient does ask to proceed.    Diagnoses and all orders for this visit:    1. Spinal stenosis of lumbar region without neurogenic claudication (Primary)  -     Obtain Informed Consent; Standing  -     IR Myelogram 2 or More Areas; Future  -     CT Cervical Spine With Intrathecal Contrast; Future  -     CT Lumbar Spine With Intrathecal Contrast; Future  -     XR Spine Cervical Complete With Flex Ext; Future  -     XR Spine Lumbar Complete With Flex & Ext; Future  -     No Lab Testing Needed; Standing  -     dexAMETHasone (DECADRON) 4 MG tablet; Take 2 tablets by mouth Take As Directed. Take both tablets by mouth 2 hours before myelogram  Dispense: 2 tablet; Refill: 0    2. Cervical radiculitis  -     Obtain Informed Consent; Standing  -     IR Myelogram 2 or More Areas; Future  -     CT Cervical Spine With Intrathecal Contrast; Future  -     CT Lumbar Spine With Intrathecal Contrast; Future  -     XR Spine Cervical Complete With Flex Ext; Future  -     XR Spine Lumbar Complete With Flex & Ext; Future  -     No Lab Testing Needed; Standing  -     dexAMETHasone (DECADRON) 4 MG tablet; Take 2 tablets by mouth Take As Directed. Take both tablets by mouth 2 hours before myelogram  Dispense: 2 tablet; Refill: 0      Return for After radiology test.

## 2025-01-14 ENCOUNTER — OFFICE VISIT (OUTPATIENT)
Dept: NEUROSURGERY | Facility: CLINIC | Age: 80
End: 2025-01-14
Payer: MEDICARE

## 2025-01-14 DIAGNOSIS — M48.061 SPINAL STENOSIS OF LUMBAR REGION WITHOUT NEUROGENIC CLAUDICATION: Primary | ICD-10-CM

## 2025-01-14 DIAGNOSIS — M54.12 CERVICAL RADICULITIS: ICD-10-CM

## 2025-01-14 PROCEDURE — 1159F MED LIST DOCD IN RCRD: CPT | Performed by: NEUROLOGICAL SURGERY

## 2025-01-14 PROCEDURE — 99214 OFFICE O/P EST MOD 30 MIN: CPT | Performed by: NEUROLOGICAL SURGERY

## 2025-01-14 PROCEDURE — 1160F RVW MEDS BY RX/DR IN RCRD: CPT | Performed by: NEUROLOGICAL SURGERY

## 2025-01-14 RX ORDER — DEXAMETHASONE 4 MG/1
8 TABLET ORAL TAKE AS DIRECTED
Qty: 2 TABLET | Refills: 0 | Status: SHIPPED | OUTPATIENT
Start: 2025-01-14

## 2025-01-17 NOTE — PROGRESS NOTES
01/30/25 0001   Pre-Procedure Phone Call   Procedure Time Verified Yes   Arrival Time 0600   Procedure Location Verified Yes   Medical History Reviewed No   NPO Status Reinforced Yes   Ride and Caregiver Arranged Yes   Patient Knows to Bring Current Medications   (no changes in medications sice last reviewed . Decadron ordered)   Bring Outside Films Requested   (Dr Celis patient)

## 2025-01-20 RX ORDER — METFORMIN HYDROCHLORIDE 500 MG/1
TABLET, EXTENDED RELEASE ORAL
Qty: 90 TABLET | Refills: 1 | Status: SHIPPED | OUTPATIENT
Start: 2025-01-20

## 2025-01-30 ENCOUNTER — HOSPITAL ENCOUNTER (OUTPATIENT)
Dept: GENERAL RADIOLOGY | Facility: HOSPITAL | Age: 80
Discharge: HOME | End: 2025-01-30
Payer: MEDICARE

## 2025-01-30 ENCOUNTER — HOSPITAL ENCOUNTER (OUTPATIENT)
Dept: CT IMAGING | Facility: HOSPITAL | Age: 80
Discharge: HOME | End: 2025-01-30
Payer: MEDICARE

## 2025-01-30 VITALS
WEIGHT: 130 LBS | SYSTOLIC BLOOD PRESSURE: 108 MMHG | TEMPERATURE: 97.1 F | DIASTOLIC BLOOD PRESSURE: 59 MMHG | HEIGHT: 66 IN | HEART RATE: 60 BPM | OXYGEN SATURATION: 95 % | RESPIRATION RATE: 14 BRPM | BODY MASS INDEX: 20.89 KG/M2

## 2025-01-30 DIAGNOSIS — M54.12 CERVICAL RADICULITIS: ICD-10-CM

## 2025-01-30 DIAGNOSIS — M48.061 SPINAL STENOSIS OF LUMBAR REGION WITHOUT NEUROGENIC CLAUDICATION: ICD-10-CM

## 2025-01-30 PROCEDURE — 63710000001 ACETAMINOPHEN 325 MG TABLET: Performed by: NEUROLOGICAL SURGERY

## 2025-01-30 PROCEDURE — 25010000002 LIDOCAINE 1 % SOLUTION: Performed by: NEUROLOGICAL SURGERY

## 2025-01-30 PROCEDURE — 25510000001 IOPAMIDOL 61 % SOLUTION: Performed by: NEUROLOGICAL SURGERY

## 2025-01-30 PROCEDURE — 72240 MYELOGRAPHY NECK SPINE: CPT

## 2025-01-30 PROCEDURE — 72114 X-RAY EXAM L-S SPINE BENDING: CPT

## 2025-01-30 PROCEDURE — 72052 X-RAY EXAM NECK SPINE 6/>VWS: CPT

## 2025-01-30 PROCEDURE — 72132 CT LUMBAR SPINE W/DYE: CPT

## 2025-01-30 PROCEDURE — 72126 CT NECK SPINE W/DYE: CPT

## 2025-01-30 PROCEDURE — A9270 NON-COVERED ITEM OR SERVICE: HCPCS | Performed by: NEUROLOGICAL SURGERY

## 2025-01-30 PROCEDURE — 62305 MYELOGRAPHY LUMBAR INJECTION: CPT

## 2025-01-30 RX ORDER — HYDROCODONE BITARTRATE AND ACETAMINOPHEN 5; 325 MG/1; MG/1
1 TABLET ORAL EVERY 4 HOURS PRN
Status: DISCONTINUED | OUTPATIENT
Start: 2025-01-30 | End: 2025-02-01 | Stop reason: HOSPADM

## 2025-01-30 RX ORDER — ACETAMINOPHEN 325 MG/1
650 TABLET ORAL EVERY 4 HOURS PRN
Status: DISCONTINUED | OUTPATIENT
Start: 2025-01-30 | End: 2025-02-01 | Stop reason: HOSPADM

## 2025-01-30 RX ORDER — LIDOCAINE HYDROCHLORIDE 10 MG/ML
10 INJECTION, SOLUTION INFILTRATION; PERINEURAL ONCE
Status: COMPLETED | OUTPATIENT
Start: 2025-01-30 | End: 2025-01-30

## 2025-01-30 RX ORDER — IOPAMIDOL 612 MG/ML
15 INJECTION, SOLUTION INTRATHECAL
Status: COMPLETED | OUTPATIENT
Start: 2025-01-30 | End: 2025-01-30

## 2025-01-30 RX ADMIN — LIDOCAINE HYDROCHLORIDE 3 ML: 10 INJECTION, SOLUTION INFILTRATION; PERINEURAL at 07:40

## 2025-01-30 RX ADMIN — IOPAMIDOL 15 ML: 612 INJECTION, SOLUTION INTRATHECAL at 07:44

## 2025-01-30 RX ADMIN — ACETAMINOPHEN 325MG 650 MG: 325 TABLET ORAL at 08:12

## 2025-01-30 NOTE — DISCHARGE INSTRUCTIONS
EDUCATION /DISCHARGE INSTRUCTIONS:    A myelogram is a special radiology procedure of the spinal cord, spinal nerves and other related structures.  You will be awake during the examination.  An area of your lower back will be cleansed with an antiseptic solution.  The physician will inject a numbing medication in your lower back.  While your back is numb, a needle will be placed in the lower back area.  A small amount of spinal fluid may be withdrawn and sent to the lab if ordered by your physician. While the needle is in the back, an injection of a contrast material (xray dye) will be given through the needle.  The contrast material will allow the physician to see the spinal cord and spinal nerves.  Once injected, the needle will be removed and a band aid will be placed over the injection site.  The table will be tilted during the process to allow the contrast material to flow to particular areas in the spine.  Following the injection and xrays, you will be taken to the CT scanner where more pictures will be taken. After the procedure is finished, the contrast material will be absorbed by your body and eliminated through your kidneys.  The radiologist will study and interpret your myelogram and send the results to your physician.  Procedure risks of a myelogram include, but are not limited to:  *  Bleeding   *  Seizure  *  Infection   *  Headache, possibly severe requiring a blood patch  *  Contrast reaction  *  Nerve or cord injury  *  Paralysis and death    Benefits of the procedure:  *  Best examination for delineating pathology related to spinal cord compression from a disc and/or nerve root compression  Alternatives to the procedure:  MRI - a non invasive procedure requiring intravenous contrast injection.  Cannot be done on patients with certain pacemakers or metal in the body.  MRI risks include possible reaction to the contrast material, movement of metal located in the body.Benefit to MRI:  Non-invasive  and usually painless procedure.  THIS EDUCATION INFORMATION WAS REVIEWED PRIOR TO PROCEDURE AND CONSENT. Patient initials________________Time__________________    24 hour rest period ends ____________________.    Important information following your myelogram:  * ACTIVITY:   *  You may sit up in the car to go home.  *  When you get home, remain on bed rest (flat on your back or on your side) for 24 hours. You may place a rolled up towel under your neck for support  * You may get up to the bathroom and sit up to eat and drink then lie back down  * Drink additional fluids for 24 hours after the myelogram.   * Continue to drink additional fluids for the next 2-3 days. Water and caffeinated beverages are encouraged.  * Remain less active for the next two to three days.  * Do not drive for 24 hours following a myelogram.  * You may remove the bandage and shower in the morning.  * Resume taking  (Glucophage/Metformin) in 48 hrs. Your next dose will be: ______________  *Resume taking blood thinner_________________ or aspirin today      CALL YOUR PHYSICIAN FOR THE FOLLOWING:  * Pain at the injection site  * Redness, swelling, bruising or drainage at the injection site.  * A fever by mouth of 101.0 or any new symptoms  Headaches are a common side effect after a myelogram.  If you get a headache, you should stay flat in bed and drink plenty of fluids. If the headache persists and does not go away with rest/medication, CALL Dr. Celis at [phone]

## 2025-01-30 NOTE — POST-PROCEDURE NOTE
Preop diagnosis:  Cervical radiculopathy and Lumbar radiculopathy  Postop diagnosis:  same  LP at L5S1  15 mL of 300M Isovue  Complications: none  EBL: 0 mL  Specimens: none

## 2025-02-03 NOTE — PROGRESS NOTES
Subjective   Patient ID: Alexandria Yousif is a 79 y.o. female is here today for follow-up with a new C/L-spine done on 1/30/2025.    Today patient' symptoms are unchanged    History of Present Illness  History of Present Illness      This patient continues with pain in her anterior thighs down to her knees.  She has pain in her lower back as well.  She has some numbness in her left calf and also intermittent numbness and tingling in her right arm.    The following portions of the patient's history were reviewed and updated as appropriate: allergies, current medications, past family history, past medical history, past social history, past surgical history, and problem list.      Objective     There were no vitals filed for this visit.  There is no height or weight on file to calculate BMI.    Tobacco Use: Low Risk  (2/4/2025)    Patient History     Smoking Tobacco Use: Never     Smokeless Tobacco Use: Never     Passive Exposure: Never          Physical Exam    Neurological:      Mental Status: He is alert and oriented to person, place, and time.           Mental Status  Alert. Oriented to person, place, and time.      Assessment & Plan   Independent Review of Radiographic Studies:      I personally reviewed the images from the following studies.    Results      I reviewed her plain films, myelogram, and CT scan in the cervical and lumbar spine done on 30 January.  The plain films in the lumbar spine show a grade 1 spondylolisthesis of L4 on L5.  I do not see any change in alignment on flexion and extension.  In the cervical spine there is multilevel degenerative disease but again no evidence of instability.  On the myelogram itself there is pretty good nerve root filling of the lumbar spine above the L4-5 level but there appears to be a subtotal block at L4-5.  There is not good opacification in the cervical spine.  On the post myelographic CT scan in the cervical spine C2-3 is widely open.  C3-4 is open as well.   C4-5 shows a little spondylitic spurring to the left but it is not really narrowing the foramen very much.  C5-6 is fairly open as is C6-7 and C7-T1.  There is certainly nothing in the cervical spine I would recommend surgery for.  In the lumbar spine the lateral film looks pretty good.  On the axial images the lower thoracic spine down to L3 looks okay.  L3-4 shows a little narrowing of the lateral recess but it is not severe.  L4-5 shows pretty severe stenosis bilaterally.  L5-S1 mostly looks okay.  I think of any surgery done she would require an L4-5 laminectomy and fusion.    Diagnoses and all orders for this visit:    1. Spinal stenosis of lumbar region without neurogenic claudication (Primary)  -     Ambulatory Referral to Physical Therapy for Evaluation & Treatment    2. Cervical radiculitis      Assessment & Plan      I told the patient about the imaging.  I told her we could consider surgery in the lumbar spine at L4-5 but there is no surgery for the neck.  I told the patient about the risks, complications and expected outcome of the lumbar surgery.  I explained that there was an 80% chance of getting rid of the pain in the leg.  I explained that there would still be back pain after the surgery.  Initially this will be quite severe but will improve over time.  There is a 2 or 3% chance of infection, bleeding, CSF leak, damage to the nerve as a result of surgery, paralysis, as well as anesthetic risk.  There is a 10% chance of recurrent problems.  There is a 10% chance of nonunion or failure of the instrumentation.  We discussed the postoperative hospital and home course.  The patient does ask to think about it and will call if she wishes to proceed.  In the meantime we will start her on some physical therapy.    If she calls she will need to be scheduled for a: Lumbar 4 to lumbar 5 laminectomy with fusion and instrumentation    Return if symptoms worsen or fail to improve.

## 2025-02-04 ENCOUNTER — OFFICE VISIT (OUTPATIENT)
Dept: NEUROSURGERY | Facility: CLINIC | Age: 80
End: 2025-02-04
Payer: MEDICARE

## 2025-02-04 DIAGNOSIS — M54.12 CERVICAL RADICULITIS: ICD-10-CM

## 2025-02-04 DIAGNOSIS — M48.061 SPINAL STENOSIS OF LUMBAR REGION WITHOUT NEUROGENIC CLAUDICATION: Primary | ICD-10-CM

## 2025-02-04 PROCEDURE — 99214 OFFICE O/P EST MOD 30 MIN: CPT | Performed by: NEUROLOGICAL SURGERY

## 2025-02-04 PROCEDURE — 1159F MED LIST DOCD IN RCRD: CPT | Performed by: NEUROLOGICAL SURGERY

## 2025-02-04 PROCEDURE — 1160F RVW MEDS BY RX/DR IN RCRD: CPT | Performed by: NEUROLOGICAL SURGERY

## 2025-02-07 NOTE — TELEPHONE ENCOUNTER
Rx Refill Note  Requested Prescriptions     Pending Prescriptions Disp Refills    omeprazole (priLOSEC) 40 MG capsule 90 capsule 0     Sig: Take 1 capsule by mouth Daily.      Last office visit with prescribing clinician: 9/13/2024   Last telemedicine visit with prescribing clinician: Visit date not found   Next office visit with prescribing clinician: Visit date not found                         Would you like a call back once the refill request has been completed: [] Yes [] No    If the office needs to give you a call back, can they leave a voicemail: [] Yes [] No    Marli John MA  02/07/25, 13:39 EST

## 2025-02-09 RX ORDER — OMEPRAZOLE 40 MG/1
40 CAPSULE, DELAYED RELEASE ORAL DAILY
Qty: 90 CAPSULE | Refills: 0 | Status: SHIPPED | OUTPATIENT
Start: 2025-02-09

## 2025-02-10 RX ORDER — ROSUVASTATIN CALCIUM 10 MG/1
10 TABLET, COATED ORAL NIGHTLY
Qty: 90 TABLET | Refills: 1 | Status: SHIPPED | OUTPATIENT
Start: 2025-02-10

## 2025-02-11 RX ORDER — FENOFIBRATE 145 MG/1
TABLET, COATED ORAL
Qty: 90 TABLET | Refills: 1 | Status: SHIPPED | OUTPATIENT
Start: 2025-02-11

## 2025-02-19 DIAGNOSIS — F41.1 GENERALIZED ANXIETY DISORDER: ICD-10-CM

## 2025-02-19 DIAGNOSIS — F33.41 RECURRENT MAJOR DEPRESSIVE DISORDER, IN PARTIAL REMISSION: ICD-10-CM

## 2025-02-19 RX ORDER — GABAPENTIN 100 MG/1
100 CAPSULE ORAL 4 TIMES DAILY
Qty: 120 CAPSULE | Refills: 2 | Status: SHIPPED | OUTPATIENT
Start: 2025-02-19 | End: 2026-02-19

## 2025-02-19 RX ORDER — DESVENLAFAXINE 100 MG/1
100 TABLET, EXTENDED RELEASE ORAL DAILY
Qty: 90 TABLET | Refills: 0 | Status: SHIPPED | OUTPATIENT
Start: 2025-02-19

## 2025-02-19 RX ORDER — ALPRAZOLAM 0.25 MG/1
0.25 TABLET ORAL TAKE AS DIRECTED
Qty: 30 TABLET | Refills: 0 | Status: SHIPPED | OUTPATIENT
Start: 2025-02-19 | End: 2026-02-19

## 2025-02-19 NOTE — PROGRESS NOTES
"Subjective   Patient ID: Alexandria Yousif is a 79 y.o. female is here today for follow-up for   PER DR FARLEY,   OK PER DR MAGDALENO   NP, PAST PT OF [facility] 2018  IR myelogram on 1-  CT lumbar CT cervical on 1-  Dizziness and TIAs part of her history    History of Present Illness  79-year-old female who was previously seen by Dr. 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  Patient was lost to follow-up and has been having new symptoms that are concerning to her.  She was originally seen in 2018 for evaluation and treatment recommendations of abnormal brain imaging. She had sudden onset of severe vertigo in her sleep in April 2018. It slowly improved over one week. When she had the episodes, typically when lying flat and improved with sitting up. Her episodes have currently included periods of wanting her body to do something but not responding to her thoughts. She has had associated nausea and visual changes and imbalance but no falls with these episodes. She took prednisone with previously and symptoms improved fairly quickly. She had confusion- with difficulty comprehending conversations as well as inappropriate thinking and decision making was present with \"a fogginess\". She was told she may have had some TIAs. She had VNG and Waters-[place] maneuver as well as evaluation with ENT in the past.  Her workup in 2018 was negative for any vascular abnormality.    The following portions of the patient's history were reviewed and updated as appropriate: She  has a past medical history of Abnormal ECG (2019), Allergic (1980), Anxiety and depression, Arthritis of back (?), Arthritis of neck (?), Basal cell carcinoma (03/2008), Cervical herniated disc, CHF (congestive heart failure) (2019), Colon polyp (02/2020), Coronary artery disease (2019), CVA (cerebral vascular accident) (2018), GERD (gastroesophageal reflux disease), Grade II diastolic dysfunction, " Headache, Heart murmur, HL (hearing loss) (2019), Hypercalcemia (03/2016), Hyperlipidemia, Hypertension, Injury of neck (1971), Low back pain (1971), Lumbosacral disc disease (1971), LVH (left ventricular hypertrophy) (2019), Malignant neoplasm of female breast (10/23/2023), Neck strain (1971), OA (osteoarthritis), Osteopenia, PAT (paroxysmal atrial tachycardia), Pneumonia (08/2023), Postoperative wound infection, PSVT (paroxysmal supraventricular tachycardia), PVCs (premature ventricular contractions), Rheumatic fever, Rotator cuff syndrome (1990s), Shortness of breath, TIA (transient ischemic attack), Tinnitus, Type II diabetes mellitus (2019), Urinary tract infection (5-2024), and Vertigo.  She does not have any pertinent problems on file.  She  has a past surgical history that includes Hysterectomy; Oophorectomy; Knee surgery (Right); Mohs surgery; Cerebral angiogram (N/A, 10/08/2018); Esophagogastroduodenoscopy (02/27/2017); Upper gastrointestinal endoscopy (2016); Colonoscopy; Colonoscopy (N/A, 02/17/2020); Trigger point injection (1990s); Tonsillectomy and adenoidectomy (1965); Bronchoscopy (N/A, 03/21/2023); Breast biopsy (10/16/2023); Breast lumpectomy (Right, 11/16/2023); and Breast surgery (11-16-23).  Her family history includes Anxiety disorder in her paternal grandmother; Arthritis in her mother; Atrial fibrillation in her sister; Cancer in her father and mother; Colon polyps in her father; Depression in her paternal grandmother; Diabetes in her maternal grandmother and mother; Hearing loss in her mother; Heart disease in her father, maternal grandmother, mother, paternal grandfather, paternal grandmother, and sister; Hyperlipidemia in her maternal grandmother, mother, paternal grandmother, and sister; Kidney disease in her maternal grandmother and mother; Osteoporosis in her mother; Rheumatologic disease in her maternal aunt; Skin cancer in her father; Stroke in her maternal grandmother, paternal  grandfather, and paternal grandmother; Uterine cancer in her mother; Vision loss in her mother and sister.  She  reports that she has never smoked. She has never been exposed to tobacco smoke. She has never used smokeless tobacco. She reports current alcohol use of about 3.0 - 4.0 standard drinks of alcohol per week. She reports that she does not use drugs.  Current Outpatient Medications   Medication Sig Dispense Refill    acetaminophen (TYLENOL) 500 MG tablet Take 2 tablets by mouth Every 6 (Six) Hours As Needed for Mild Pain.      albuterol sulfate  (90 Base) MCG/ACT inhaler INHALE 2 PUFFS BY MOUTH INTO THE LUNGS EVERY 4 HOURS AS NEEDED FOR WHEEZING 8.5 g 0    ALPRAZolam (Xanax) 0.25 MG tablet Take 1 tablet by mouth Take As Directed. 1/2 to 1 tab daily PO PRN anxiety 30 tablet 0    Alum Hydroxide-Mag Trisilicate (GAVISCON) 80-14.2 MG chewable tablet Chew 1 tablet 4 (Four) Times a Day As Needed (Heartburn). 224 each 11    aspirin 81 MG tablet Take 1 tablet by mouth Daily.      Biotin 10 MG tablet HOLD PRIOR TO SURG      Cholecalciferol 50 MCG (2000 UT) capsule HOLD PRIOR TO SURG      cyclobenzaprine (FLEXERIL) 5 MG tablet Take 1 tablet by mouth At Night As Needed for Muscle Spasms. 30 tablet 0    desvenlafaxine (PRISTIQ) 100 MG 24 hr tablet Take 1 tablet by mouth Daily. 90 tablet 0    fenofibrate (TRICOR) 145 MG tablet TAKE 1 TABLET BY MOUTH EVERY DAY 90 tablet 1    gabapentin (Neurontin) 100 MG capsule Take 1 capsule by mouth 4 (Four) Times a Day. 120 capsule 2    metFORMIN ER (GLUCOPHAGE-XR) 500 MG 24 hr tablet TAKE 1 TABLET BY MOUTH DAILY WITH LARGEST MEAL OF THE DAY AND A GLASS OF WATER 90 tablet 01    metoprolol tartrate (LOPRESSOR) 25 MG tablet Take 1 tablet by mouth 2 (Two) Times a Day. 180 tablet 3    Omega-3 Fatty Acids (FISH OIL) 1200 MG capsule capsule HOLD PRIOR TO SURG      omeprazole (priLOSEC) 40 MG capsule Take 1 capsule by mouth Daily. 90 capsule 0    rosuvastatin (CRESTOR) 10 MG tablet  TAKE 1 TABLET BY MOUTH EVERY NIGHT 90 tablet 1    spironolactone (ALDACTONE) 25 MG tablet TAKE 1 TABLET BY MOUTH DAILY 90 tablet 1    traZODone (DESYREL) 50 MG tablet TAKE 1/2 TO 1 TABLET BY MOUTH AT NIGHT AS NEEDED FOR SLEEP 90 tablet 1    valACYclovir (VALTREX) 500 MG tablet Take 2 tablets by mouth 2 (Two) Times a Day. 4 tablet 3    fluticasone (FLONASE) 50 MCG/ACT nasal spray 2 sprays into the nostril(s) as directed by provider Daily for 30 days. 16 g 0     No current facility-administered medications for this visit.     Facility-Administered Medications Ordered in Other Visits   Medication Dose Route Frequency Provider Last Rate Last Admin    lidocaine (XYLOCAINE) 1 % injection 3 mL  3 mL Intradermal Once Dee Saunders MD         Current Outpatient Medications on File Prior to Visit   Medication Sig    acetaminophen (TYLENOL) 500 MG tablet Take 2 tablets by mouth Every 6 (Six) Hours As Needed for Mild Pain.    albuterol sulfate  (90 Base) MCG/ACT inhaler INHALE 2 PUFFS BY MOUTH INTO THE LUNGS EVERY 4 HOURS AS NEEDED FOR WHEEZING    ALPRAZolam (Xanax) 0.25 MG tablet Take 1 tablet by mouth Take As Directed. 1/2 to 1 tab daily PO PRN anxiety    Alum Hydroxide-Mag Trisilicate (GAVISCON) 80-14.2 MG chewable tablet Chew 1 tablet 4 (Four) Times a Day As Needed (Heartburn).    aspirin 81 MG tablet Take 1 tablet by mouth Daily.    Biotin 10 MG tablet HOLD PRIOR TO SURG    Cholecalciferol 50 MCG (2000 UT) capsule HOLD PRIOR TO SURG    cyclobenzaprine (FLEXERIL) 5 MG tablet Take 1 tablet by mouth At Night As Needed for Muscle Spasms.    desvenlafaxine (PRISTIQ) 100 MG 24 hr tablet Take 1 tablet by mouth Daily.    fenofibrate (TRICOR) 145 MG tablet TAKE 1 TABLET BY MOUTH EVERY DAY    gabapentin (Neurontin) 100 MG capsule Take 1 capsule by mouth 4 (Four) Times a Day.    metFORMIN ER (GLUCOPHAGE-XR) 500 MG 24 hr tablet TAKE 1 TABLET BY MOUTH DAILY WITH LARGEST MEAL OF THE DAY AND A GLASS OF WATER    metoprolol  "tartrate (LOPRESSOR) 25 MG tablet Take 1 tablet by mouth 2 (Two) Times a Day.    Omega-3 Fatty Acids (FISH OIL) 1200 MG capsule capsule HOLD PRIOR TO SURG    omeprazole (priLOSEC) 40 MG capsule Take 1 capsule by mouth Daily.    rosuvastatin (CRESTOR) 10 MG tablet TAKE 1 TABLET BY MOUTH EVERY NIGHT    spironolactone (ALDACTONE) 25 MG tablet TAKE 1 TABLET BY MOUTH DAILY    traZODone (DESYREL) 50 MG tablet TAKE 1/2 TO 1 TABLET BY MOUTH AT NIGHT AS NEEDED FOR SLEEP    valACYclovir (VALTREX) 500 MG tablet Take 2 tablets by mouth 2 (Two) Times a Day.    fluticasone (FLONASE) 50 MCG/ACT nasal spray 2 sprays into the nostril(s) as directed by provider Daily for 30 days.     Current Facility-Administered Medications on File Prior to Visit   Medication    lidocaine (XYLOCAINE) 1 % injection 3 mL     She is allergic to betadine [povidone iodine] and codeine..    Review of Systems   All other systems reviewed and are negative.    Objective     Vitals:    02/25/25 1020   BP: 110/70   Pulse: 61   Temp: 98 °F (36.7 °C)   SpO2: 97%   Weight: 60.8 kg (134 lb)   Height: 167 cm (65.75\")     Body mass index is 21.79 kg/m².    Physical Exam  Vitals and nursing note reviewed.   Constitutional:       General: She is not in acute distress.     Appearance: She is normal weight.   Eyes:      Extraocular Movements: Extraocular movements intact.   Cardiovascular:      Rate and Rhythm: Normal rate.   Pulmonary:      Effort: Pulmonary effort is normal.   Musculoskeletal:         General: Normal range of motion.      Cervical back: Normal range of motion.   Skin:     General: Skin is warm and dry.   Neurological:      General: No focal deficit present.      Mental Status: She is alert and oriented to person, place, and time.      Cranial Nerves: No cranial nerve deficit.      Sensory: No sensory deficit.      Motor: No weakness.      Coordination: Coordination normal.      Gait: Gait normal.     Neurological Exam  Mental Status  Alert. Oriented " to person, place, and time.    Cranial Nerves  CN III, IV, VI: Extraocular movements intact bilaterally.    Gait   Normal gait.      Assessment & Plan   Independent Review of Radiographic Studies:      I personally reviewed the images from the following studies.    The MRI of the brain performed on 2018 as well as the cerebral angiogram and venogram dated 10/8/2018 were all reviewed with the patient and show no evidence of a dural fistula or pathologic venous drainage pattern.  Some variant anatomy is present which is typical in the cerebral venous drainage.  Prominent Pacchionian granulations are again identified, but no evidence of IIH is seen in the patient's previous dural sinus pressures were normal.    Medical Decision Makin-year-old female with concerns for developing dementia as well as possible TIAs or some other abnormality like seizures that are causing some occasional aphasia like symptoms.  Referral to neurology will be made as they can best workup and test the patient with these issues.  Patient remains on medical therapy further cerebrovascular risk factors including Crestor, antihypertensive medication as well as oral diabetes medicine.  She continues on a baby aspirin as well.  Diagnoses and all orders for this visit:    1. Expressive aphasia (Primary)  -     Ambulatory Referral to Neurology    2. Abnormal brain MRI    3. Vertigo  -     Ambulatory Referral to Neurology    4. Anxiety  -     Ambulatory Referral to Neurology    5. Mixed hyperlipidemia    6. Type 2 diabetes mellitus without complication, without long-term current use of insulin    7. Malignant neoplasm of right female breast, unspecified estrogen receptor status, unspecified site of breast    8. PSVT (paroxysmal supraventricular tachycardia)    9. PVCs (premature ventricular contractions)    10. PAT (paroxysmal atrial tachycardia)      Return for Next scheduled follow up with Neurology.     I spent 20 minutes caring for  Alexandria on this date of service. This time includes time spent by me in the following activities: preparing for the visit, reviewing tests, performing a medically appropriate examination and/or evaluation, referring and communicating with other health care professionals, documenting information in the medical record, independently interpreting results and communicating that information with the patient/family/caregiver, care coordination, and reviewing a separately obtained history.

## 2025-02-22 ENCOUNTER — TELEPHONE (OUTPATIENT)
Dept: NEUROSURGERY | Facility: CLINIC | Age: 80
End: 2025-02-22
Payer: MEDICARE

## 2025-02-22 NOTE — TELEPHONE ENCOUNTER
Left message for patient regarding her referral for physical therapy. Patient is to call the office and ask for Rebekah.

## 2025-02-25 ENCOUNTER — OFFICE VISIT (OUTPATIENT)
Dept: NEUROSURGERY | Facility: CLINIC | Age: 80
End: 2025-02-25
Payer: MEDICARE

## 2025-02-25 VITALS
DIASTOLIC BLOOD PRESSURE: 70 MMHG | OXYGEN SATURATION: 97 % | WEIGHT: 134 LBS | HEIGHT: 66 IN | BODY MASS INDEX: 21.53 KG/M2 | HEART RATE: 61 BPM | TEMPERATURE: 98 F | SYSTOLIC BLOOD PRESSURE: 110 MMHG

## 2025-02-25 DIAGNOSIS — F41.9 ANXIETY: ICD-10-CM

## 2025-02-25 DIAGNOSIS — R90.89 ABNORMAL BRAIN MRI: ICD-10-CM

## 2025-02-25 DIAGNOSIS — I47.10 PSVT (PAROXYSMAL SUPRAVENTRICULAR TACHYCARDIA): ICD-10-CM

## 2025-02-25 DIAGNOSIS — C50.911 MALIGNANT NEOPLASM OF RIGHT FEMALE BREAST, UNSPECIFIED ESTROGEN RECEPTOR STATUS, UNSPECIFIED SITE OF BREAST: ICD-10-CM

## 2025-02-25 DIAGNOSIS — E11.9 TYPE 2 DIABETES MELLITUS WITHOUT COMPLICATION, WITHOUT LONG-TERM CURRENT USE OF INSULIN: ICD-10-CM

## 2025-02-25 DIAGNOSIS — E78.2 MIXED HYPERLIPIDEMIA: ICD-10-CM

## 2025-02-25 DIAGNOSIS — I49.3 PVCS (PREMATURE VENTRICULAR CONTRACTIONS): ICD-10-CM

## 2025-02-25 DIAGNOSIS — R42 VERTIGO: ICD-10-CM

## 2025-02-25 DIAGNOSIS — R47.01 EXPRESSIVE APHASIA: Primary | ICD-10-CM

## 2025-02-25 DIAGNOSIS — I47.19 PAT (PAROXYSMAL ATRIAL TACHYCARDIA): ICD-10-CM

## 2025-02-26 ENCOUNTER — OFFICE VISIT (OUTPATIENT)
Dept: PSYCHIATRY | Facility: HOSPITAL | Age: 80
End: 2025-02-26
Payer: MEDICARE

## 2025-02-26 DIAGNOSIS — F41.1 GENERALIZED ANXIETY DISORDER: ICD-10-CM

## 2025-02-26 RX ORDER — GABAPENTIN 100 MG/1
100 CAPSULE ORAL 3 TIMES DAILY
Qty: 90 CAPSULE | Refills: 2 | Status: SHIPPED | OUTPATIENT
Start: 2025-02-26 | End: 2026-02-26

## 2025-02-26 NOTE — PROGRESS NOTES
"Subjective  Patient ID: Alexandria Yousif is a 79 y.o.. female seen in regularly scheduled group session.  Group participants have consented to group conducted in person    Total Group Time, Face to Face: 75 minutes  Total Group Participants: 5, plus facilitation per GIOVANI Maloney    Group Therapy  Group topics explored including development of new normal, interpersonal sensitivity, short and long term effects of diagnosis and treatment, significant other or family conflict, anticipitory anxiety, physical deconditioning, weight management, social determinants of health (finances, living arrangements, etc), and lifestyle choices. Individuals discuss dissonance from baseline self and new normal, ongoing challenges accepting. Discussed current efforts to approach desired functional status. Discussed anxieties, approach, and persistent grief. Supported other members in unique challenges.    Counseling provided regarding cognitive behavioral therapy (CBT) strategies, behavioral activation/ activity scheduling, reintroduction to activity through graded tasks, balancing avoidance with approach , sleep hygiene, recognition and allowance of need for rest, pharmacological and non pharmacological management of sleep disturbance, lifestyle counseling, benefits of exercise and strategies for managing barriers to engagement, allowance of self care, exploration of quality of life goals, survivorship issues, current programming available in community and clinic, anxiety/ mood management , medication education, and existential distress. Explored impact of recent challenges and victories, individual goals, use of accountability, importance of sleep schedule, recognition of selfdetermined \"self care\" behaviors as positive or enabling with consideration of impact on QOL goals. Supported developing group dynamics, supporting collaboration.    Discussed current programming available in [facility] and community.    Benefits of " group discussed while also acknowledging the need for 1:1 consultation at times. Members invited to discuss any concerns privately with me following group setting or to schedule a 1:1 visit if needs not being met in group.    Next shared medical visit: April 9 at 3:30 PM    Patient response to group: Pt shares in group, appreciated support of other members and facilitating conversation with other participants.    Medications Management  Pt continues in survivorship of breast cancer.    CC: Anxiety  HPI: Pt is seen in follow up regarding ongoing experience of anxiety in survivorship of breast cancer. Continues as caregiver for , recognizing acute challenges in this. Recognizes importance of self care, management of personal health issues. Fortunately, is sleeping well while recognizing tendency to stay up too late watching TV or reading and appreciating quiet time. Continues to eat well and rest in afternoons. Is looking forward to re engaging in outside activities with warming weather.  Exam: As Above  Current medication regimen: alprazolam 0.25 mg PRN anxiety, trazodone 50 mg q hs, gabapentin 100 mg tid, pristiq 100 mg daily  Lab Review:   No visits with results within 2 Month(s) from this visit.   Latest known visit with results is:   Orders Only on 09/17/2024   Component Date Value    Creatinine, Urine 09/17/2024 143.2     Microalbumin, Urine 09/17/2024 13.0     Microalbumin/Creatinine * 09/17/2024 9     TSH 09/17/2024 1.060     Free T4 09/17/2024 1.31     WBC 09/17/2024 4.67     RBC 09/17/2024 5.23     Hemoglobin 09/17/2024 15.4     Hematocrit 09/17/2024 49.0 (H)     MCV 09/17/2024 93.7     MCH 09/17/2024 29.4     MCHC 09/17/2024 31.4 (L)     RDW 09/17/2024 13.0     Platelets 09/17/2024 315     Neutrophil Rel % 09/17/2024 62.1     Lymphocyte Rel % 09/17/2024 23.8     Monocyte Rel % 09/17/2024 10.5     Eosinophil Rel % 09/17/2024 2.1     Basophil Rel % 09/17/2024 1.3     Neutrophils Absolute 09/17/2024  2.90     Lymphocytes Absolute 09/17/2024 1.11     Monocytes Absolute 09/17/2024 0.49     Eosinophils Absolute 09/17/2024 0.10     Basophils Absolute 09/17/2024 0.06     Immature Granulocyte Rel* 09/17/2024 0.2     Immature Grans Absolute 09/17/2024 0.01     nRBC 09/17/2024 0.0     Glucose 09/17/2024 103 (H)     BUN 09/17/2024 19     Creatinine 09/17/2024 0.75     EGFR Result 09/17/2024 81.6     BUN/Creatinine Ratio 09/17/2024 25.3 (H)     Sodium 09/17/2024 138     Potassium 09/17/2024 4.1     Chloride 09/17/2024 100     Total CO2 09/17/2024 26.1     Calcium 09/17/2024 10.3     Total Protein 09/17/2024 7.2     Albumin 09/17/2024 5.0     Globulin 09/17/2024 2.2     A/G Ratio 09/17/2024 2.3     Total Bilirubin 09/17/2024 0.3     Alkaline Phosphatase 09/17/2024 72     AST (SGOT) 09/17/2024 25     ALT (SGPT) 09/17/2024 21     Hemoglobin A1C 09/17/2024 6.20 (H)     Total Cholesterol 09/17/2024 154     Triglycerides 09/17/2024 165 (H)     HDL Cholesterol 09/17/2024 51     VLDL Cholesterol Franklyn 09/17/2024 28     LDL Chol Calc (NIH) 09/17/2024 75     LDL/HDL RATIO 09/17/2024 1.37      MDM:  Meds reviewed and renewed as appropriate.  Risks and benefits explored; support continuation of medications as written. Low dose polypharmacy reviewed; will address more specifically at next visit.  FU scheduled in group setting.    Diagnoses and all orders for this visit:    1. Generalized anxiety disorder  -     gabapentin (Neurontin) 100 MG capsule; Take 1 capsule by mouth 3 (Three) Times a Day.  Dispense: 90 capsule; Refill: 2

## 2025-03-04 ENCOUNTER — PATIENT MESSAGE (OUTPATIENT)
Dept: NEUROSURGERY | Facility: CLINIC | Age: 80
End: 2025-03-04
Payer: MEDICARE

## 2025-03-06 RX ORDER — SPIRONOLACTONE 25 MG/1
25 TABLET ORAL DAILY
Qty: 90 TABLET | Refills: 1 | Status: SHIPPED | OUTPATIENT
Start: 2025-03-06

## 2025-03-07 RX ORDER — TRAZODONE HYDROCHLORIDE 50 MG/1
25-50 TABLET ORAL NIGHTLY PRN
Qty: 90 TABLET | Refills: 1 | Status: SHIPPED | OUTPATIENT
Start: 2025-03-07

## 2025-03-07 NOTE — TELEPHONE ENCOUNTER
Rx Refill Note  Requested Prescriptions     Pending Prescriptions Disp Refills    traZODone (DESYREL) 50 MG tablet [Pharmacy Med Name: TRAZODONE 50MG TABLETS] 90 tablet 1     Sig: TAKE 1/2 TO 1 TABLET BY MOUTH AT NIGHT AS NEEDED FOR SLEEP      Last office visit with prescribing clinician: 9/13/2024   Last telemedicine visit with prescribing clinician: Visit date not found   Next office visit with prescribing clinician: Visit date not found                         Would you like a call back once the refill request has been completed: [] Yes [] No    If the office needs to give you a call back, can they leave a voicemail: [] Yes [] No    Juancarlos Laguna MA  03/07/25, 11:21 EST

## 2025-03-19 ENCOUNTER — OFFICE VISIT (OUTPATIENT)
Dept: PSYCHIATRY | Facility: HOSPITAL | Age: 80
End: 2025-03-19
Payer: MEDICARE

## 2025-03-19 DIAGNOSIS — F41.1 GENERALIZED ANXIETY DISORDER: ICD-10-CM

## 2025-03-19 RX ORDER — ALPRAZOLAM 0.25 MG
0.25 TABLET ORAL TAKE AS DIRECTED
Qty: 30 TABLET | Refills: 0 | Status: SHIPPED | OUTPATIENT
Start: 2025-03-19 | End: 2026-03-19

## 2025-03-19 NOTE — PROGRESS NOTES
[facility]  In Person Session    Subjective  Patient ID: Alexandria Yousif is a 79 y.o. female is seen face to face in the [facility].    CC: Anxiety    HPI/ Interval History:   Pt is seen in follow up regarding worsening sx of depression, caregiving responsibilities.  continues to have tremendous needs. Feels she was OK caring for him for the first 3 months, although he is currently 5 months from surgery and continuing to decline. Feels she is watching him deteriorate right in front of her eyes. Currently able to take care of the most necessary things, while not feeling she is in a healthy mental place at this time. Reports having a break down, screaming, yelling, and crying in bedroom. Reports limited showering, recognizing isolation and personal decline. States son and DIL are in [facility], very unaware of how much she is struggling.    Current medication strategies reviewed; continues to take xanax 0.125-0.25 mg daily, trying to keep this at a minimum given long term effects of this on cognition. Does find this remains tremendously helpful, allowing her to decompress and do the things that need to be done.Continues on Pristiq 100 mg daily, trazodone 50 mg q hs.    Exam: As above    Recent Labs Reviewed:  No visits with results within 2 Month(s) from this visit.   Latest known visit with results is:   Orders Only on 09/17/2024   Component Date Value    Creatinine, Urine 09/17/2024 143.2     Microalbumin, Urine 09/17/2024 13.0     Microalbumin/Creatinine * 09/17/2024 9     TSH 09/17/2024 1.060     Free T4 09/17/2024 1.31     WBC 09/17/2024 4.67     RBC 09/17/2024 5.23     Hemoglobin 09/17/2024 15.4     Hematocrit 09/17/2024 49.0 (H)     MCV 09/17/2024 93.7     MCH 09/17/2024 29.4     MCHC 09/17/2024 31.4 (L)     RDW 09/17/2024 13.0     Platelets 09/17/2024 315     Neutrophil Rel % 09/17/2024 62.1     Lymphocyte Rel % 09/17/2024 23.8     Monocyte Rel %  09/17/2024 10.5     Eosinophil Rel % 09/17/2024 2.1     Basophil Rel % 09/17/2024 1.3     Neutrophils Absolute 09/17/2024 2.90     Lymphocytes Absolute 09/17/2024 1.11     Monocytes Absolute 09/17/2024 0.49     Eosinophils Absolute 09/17/2024 0.10     Basophils Absolute 09/17/2024 0.06     Immature Granulocyte Rel* 09/17/2024 0.2     Immature Grans Absolute 09/17/2024 0.01     nRBC 09/17/2024 0.0     Glucose 09/17/2024 103 (H)     BUN 09/17/2024 19     Creatinine 09/17/2024 0.75     EGFR Result 09/17/2024 81.6     BUN/Creatinine Ratio 09/17/2024 25.3 (H)     Sodium 09/17/2024 138     Potassium 09/17/2024 4.1     Chloride 09/17/2024 100     Total CO2 09/17/2024 26.1     Calcium 09/17/2024 10.3     Total Protein 09/17/2024 7.2     Albumin 09/17/2024 5.0     Globulin 09/17/2024 2.2     A/G Ratio 09/17/2024 2.3     Total Bilirubin 09/17/2024 0.3     Alkaline Phosphatase 09/17/2024 72     AST (SGOT) 09/17/2024 25     ALT (SGPT) 09/17/2024 21     Hemoglobin A1C 09/17/2024 6.20 (H)     Total Cholesterol 09/17/2024 154     Triglycerides 09/17/2024 165 (H)     HDL Cholesterol 09/17/2024 51     VLDL Cholesterol Franklyn 09/17/2024 28     LDL Chol Calc (NIH) 09/17/2024 75     LDL/HDL RATIO 09/17/2024 1.37    Labs reviewed    Current Psychotropic Medications:  Alprazolam 0.125 mg daily    Plan of Care/ Medical Decision Making  Continue meds as written while continued counseling regarding xanax use, limitations of medications, etc.  Discussed with patient available resourece including [facility], discussing sx with PCP, and incorporating adult children into discussion regarding concerns.  Behavioral activation discussed, firmly impressing the importance of self care, continued dedication to personal activities.   Virtual and in person counseling discussed, and resources provided.  Will plan for continued connection via group, although with 1 1:1 visit scheduled to further review meds.    Diagnoses and all orders for this  visit:    1. Generalized anxiety disorder  -     ALPRAZolam (Xanax) 0.25 MG tablet; Take 1 tablet by mouth Take As Directed. 1/2 to 1 tab daily PO PRN anxiety  Dispense: 30 tablet; Refill: 0    I spent 35 minutes caring for Alexandria on this date of service. This time includes time spent by me in the following activities: preparing for the visit, reviewing tests, obtaining and/or reviewing a separately obtained history, performing a medically appropriate examination and/or evaluation, counseling and educating the patient/family/caregiver, ordering medications, tests, or procedures, and documenting information in the medical record.

## 2025-04-09 ENCOUNTER — OFFICE VISIT (OUTPATIENT)
Dept: PSYCHIATRY | Facility: HOSPITAL | Age: 80
End: 2025-04-09
Payer: MEDICARE

## 2025-04-09 DIAGNOSIS — C50.911 MALIGNANT NEOPLASM OF RIGHT FEMALE BREAST, UNSPECIFIED ESTROGEN RECEPTOR STATUS, UNSPECIFIED SITE OF BREAST: ICD-10-CM

## 2025-04-09 DIAGNOSIS — F41.1 GENERALIZED ANXIETY DISORDER: Primary | ICD-10-CM

## 2025-04-09 DIAGNOSIS — F33.41 RECURRENT MAJOR DEPRESSIVE DISORDER, IN PARTIAL REMISSION: ICD-10-CM

## 2025-04-09 NOTE — PROGRESS NOTES
Subjective  Patient ID: Alexandria Yousif is a 79 y.o.. female seen in regularly scheduled group session.  Group participants have consented to group conducted in person    Total Group Time, Face to Face: 75  Total Group Participants: 4, plus facilitation per GIOVANI Maloney    Group Therapy  Group topics explored including development of new normal, interpersonal sensitivity, short and long term effects of diagnosis and treatment, significant other or family conflict, anticipitory anxiety, social determinants of health (finances, living arrangements, etc), and lifestyle choices.  Members shared various unique challenges in survivorship, inclusive of grief and loss, caregiving responsibilities, personal decline, intrusive medical comorbidities, and external stressors.    Counseling provided regarding cognitive behavioral therapy (CBT) strategies, behavioral activation/ activity scheduling, reintroduction to activity through graded tasks, balancing avoidance with approach , discussion of need for restorative sleep with consideration of sleep apnea, recognition and allowance of need for rest, pharmacological and non pharmacological management of sleep disturbance, benefits of exercise and strategies for managing barriers to engagement, allowance of self care, exploration of quality of life goals, survivorship issues, anxiety/ mood management , medication education, and existential distress.  Specific counseling provided surrounding sleep hygiene, use of medications, risks of controlled substances specifically in geriatric patient, nonpharmacological strategies for increasing engagement, managing anxiety, and furthering progress toward identified goals.    Discussed current programming available in [facility] and community.    Benefits of group discussed while also acknowledging the need for 1:1 consultation at times. Members invited to discuss any concerns privately with me following group setting or to  schedule a 1:1 visit if needs not being met in group.    Next shared medical visit: May 21 at 330    Patient response to group: Patient interacts well with other group members, sharing previous experiences and participating openly in conversation.    Medications Management  Pt continues in survivorship of breast cancer.    CC: Anxiety, sleep  HPI: Pt is seen in follow up regarding ongoing experience of anxiety and depression in survivorship of breast cancer. Remains confident in decision not to take AI. Continues to explore difficult caregiver responibilities impacting personal stress and QOL. Remains incredibly fatigued, noting fear of stress for personal health and cancer recurrence, and mgmt of chronic healht conditions. Continues to appreciate music for stress relief, also enjoying walking when weather permits.  He is on Pristiq 100 mg daily feeling this is working acceptably.  Continues to report regular as needed use of Xanax, taking 0.125 mg approximately once daily reported benefit.  Has attempted to replace this with gabapentin, although continues to feel Xanax is more helpful.  Exam: As Above  Current medication regimen: Pristiq 100 mg daily, Xanax 0.125 mg approximately once daily as needed anxiety  Lab Review:   No visits with results within 2 Month(s) from this visit.   Latest known visit with results is:   Orders Only on 09/17/2024   Component Date Value    Creatinine, Urine 09/17/2024 143.2     Microalbumin, Urine 09/17/2024 13.0     Microalbumin/Creatinine * 09/17/2024 9     TSH 09/17/2024 1.060     Free T4 09/17/2024 1.31     WBC 09/17/2024 4.67     RBC 09/17/2024 5.23     Hemoglobin 09/17/2024 15.4     Hematocrit 09/17/2024 49.0 (H)     MCV 09/17/2024 93.7     MCH 09/17/2024 29.4     MCHC 09/17/2024 31.4 (L)     RDW 09/17/2024 13.0     Platelets 09/17/2024 315     Neutrophil Rel % 09/17/2024 62.1     Lymphocyte Rel % 09/17/2024 23.8     Monocyte Rel % 09/17/2024 10.5     Eosinophil Rel % 09/17/2024  2.1     Basophil Rel % 09/17/2024 1.3     Neutrophils Absolute 09/17/2024 2.90     Lymphocytes Absolute 09/17/2024 1.11     Monocytes Absolute 09/17/2024 0.49     Eosinophils Absolute 09/17/2024 0.10     Basophils Absolute 09/17/2024 0.06     Immature Granulocyte Rel* 09/17/2024 0.2     Immature Grans Absolute 09/17/2024 0.01     nRBC 09/17/2024 0.0     Glucose 09/17/2024 103 (H)     BUN 09/17/2024 19     Creatinine 09/17/2024 0.75     EGFR Result 09/17/2024 81.6     BUN/Creatinine Ratio 09/17/2024 25.3 (H)     Sodium 09/17/2024 138     Potassium 09/17/2024 4.1     Chloride 09/17/2024 100     Total CO2 09/17/2024 26.1     Calcium 09/17/2024 10.3     Total Protein 09/17/2024 7.2     Albumin 09/17/2024 5.0     Globulin 09/17/2024 2.2     A/G Ratio 09/17/2024 2.3     Total Bilirubin 09/17/2024 0.3     Alkaline Phosphatase 09/17/2024 72     AST (SGOT) 09/17/2024 25     ALT (SGPT) 09/17/2024 21     Hemoglobin A1C 09/17/2024 6.20 (H)     Total Cholesterol 09/17/2024 154     Triglycerides 09/17/2024 165 (H)     HDL Cholesterol 09/17/2024 51     VLDL Cholesterol Franklyn 09/17/2024 28     LDL Chol Calc ([facility]) 09/17/2024 75     LDL/HDL RATIO 09/17/2024 1.37      MDM:  Meds reviewed and renewed as appropriate.  Continue medications as written.  Patient acknowledges risks and benefits of current regimen.  FU scheduled in group setting.    Diagnoses and all orders for this visit:    1. Generalized anxiety disorder (Primary)    2. Recurrent major depressive disorder, in partial remission    3. Malignant neoplasm of right female breast, unspecified estrogen receptor status, unspecified site of breast

## 2025-04-25 DIAGNOSIS — F41.1 GENERALIZED ANXIETY DISORDER: ICD-10-CM

## 2025-04-25 RX ORDER — ALPRAZOLAM 0.25 MG
0.25 TABLET ORAL TAKE AS DIRECTED
Qty: 30 TABLET | Refills: 0 | Status: SHIPPED | OUTPATIENT
Start: 2025-04-25 | End: 2026-04-25

## 2025-05-02 ENCOUNTER — OFFICE VISIT (OUTPATIENT)
Dept: PSYCHIATRY | Facility: HOSPITAL | Age: 80
End: 2025-05-02
Payer: MEDICARE

## 2025-05-02 DIAGNOSIS — F33.41 RECURRENT MAJOR DEPRESSIVE DISORDER, IN PARTIAL REMISSION: ICD-10-CM

## 2025-05-02 DIAGNOSIS — F41.1 GENERALIZED ANXIETY DISORDER: ICD-10-CM

## 2025-05-02 RX ORDER — GABAPENTIN 100 MG/1
100 CAPSULE ORAL 3 TIMES DAILY
Qty: 90 CAPSULE | Refills: 2 | Status: SHIPPED | OUTPATIENT
Start: 2025-05-02 | End: 2026-05-02

## 2025-05-02 RX ORDER — DESVENLAFAXINE 100 MG/1
100 TABLET, EXTENDED RELEASE ORAL DAILY
Qty: 90 TABLET | Refills: 0 | Status: SHIPPED | OUTPATIENT
Start: 2025-05-02

## 2025-05-02 RX ORDER — TRAZODONE HYDROCHLORIDE 50 MG/1
25-50 TABLET ORAL NIGHTLY PRN
Qty: 90 TABLET | Refills: 0 | Status: SHIPPED | OUTPATIENT
Start: 2025-05-02

## 2025-05-02 NOTE — PROGRESS NOTES
[facility]  In Person Session    Subjective  Patient ID: Alexandria Yousif is a 80 y.o. female is seen face to face in the [facility].    CC: Depression, anxiety, caregiver distress - improved    HPI/ Interval History:   Patient is seen in follow-up regarding ongoing symptoms of anxiety, depression, and increased distress alongside significant caregiver responsibilities, personal survivorship of breast cancer.    Pt reports to be feeling much better. Feels the weather is helpful, also appreciating allowance of xanax 0.125-0.25 mg PRN anxiety and appreciating significant benefit to this. Has been walking regularly with dog, appreciating ability to walk outside in the sunshine. Continues to mourn care giving responsibilities and distress, while recognzing benefit to allowance of 1:1 time by self in room with music she loves. Appreciates sense of calm and grounding in these moments. Is looking forward to going shopping today, not enjoying this recently and currently looking forward to a day out by herself.     Cotninues on helpful medication regimen of pristiq 100 mg daily, trazodone 50 mg q hs, and gabapentin in addition to PRN xanax. Reports significant improvement in perception of schedule and routine, showering regularly, and denying persistence of intrusive symptoms at this time.  Continues to greatly appreciate therapy in group setting, eager to continue.    Exam: As above    Recent Labs Reviewed:  No visits with results within 2 Month(s) from this visit.   Latest known visit with results is:   Orders Only on 09/17/2024   Component Date Value    Creatinine, Urine 09/17/2024 143.2     Microalbumin, Urine 09/17/2024 13.0     Microalbumin/Creatinine * 09/17/2024 9     TSH 09/17/2024 1.060     Free T4 09/17/2024 1.31     WBC 09/17/2024 4.67     RBC 09/17/2024 5.23     Hemoglobin 09/17/2024 15.4     Hematocrit 09/17/2024 49.0 (H)     MCV 09/17/2024 93.7     MCH  09/17/2024 29.4     MCHC 09/17/2024 31.4 (L)     RDW 09/17/2024 13.0     Platelets 09/17/2024 315     Neutrophil Rel % 09/17/2024 62.1     Lymphocyte Rel % 09/17/2024 23.8     Monocyte Rel % 09/17/2024 10.5     Eosinophil Rel % 09/17/2024 2.1     Basophil Rel % 09/17/2024 1.3     Neutrophils Absolute 09/17/2024 2.90     Lymphocytes Absolute 09/17/2024 1.11     Monocytes Absolute 09/17/2024 0.49     Eosinophils Absolute 09/17/2024 0.10     Basophils Absolute 09/17/2024 0.06     Immature Granulocyte Rel* 09/17/2024 0.2     Immature Grans Absolute 09/17/2024 0.01     nRBC 09/17/2024 0.0     Glucose 09/17/2024 103 (H)     BUN 09/17/2024 19     Creatinine 09/17/2024 0.75     EGFR Result 09/17/2024 81.6     BUN/Creatinine Ratio 09/17/2024 25.3 (H)     Sodium 09/17/2024 138     Potassium 09/17/2024 4.1     Chloride 09/17/2024 100     Total CO2 09/17/2024 26.1     Calcium 09/17/2024 10.3     Total Protein 09/17/2024 7.2     Albumin 09/17/2024 5.0     Globulin 09/17/2024 2.2     A/G Ratio 09/17/2024 2.3     Total Bilirubin 09/17/2024 0.3     Alkaline Phosphatase 09/17/2024 72     AST (SGOT) 09/17/2024 25     ALT (SGPT) 09/17/2024 21     Hemoglobin A1C 09/17/2024 6.20 (H)     Total Cholesterol 09/17/2024 154     Triglycerides 09/17/2024 165 (H)     HDL Cholesterol 09/17/2024 51     VLDL Cholesterol Franklyn 09/17/2024 28     LDL Chol Calc (NIH) 09/17/2024 75     LDL/HDL RATIO 09/17/2024 1.37    Labs reviewed    Current Psychotropic Medications:  Pristiq 100 mg daily  Trazodone 50 mg q hs  [id number] mg q afternoon and 200 mg q hs  Alprazolam 0.125 mg daily to bid PRN anxiety    Plan of Care/ Medical Decision Making  Support continuing of medications as written; iterated risks of polypharmacy, ongoing benzodiazepine use.  Patient voices acceptance of risk.  Support continuing as written as have assisted with functional remission.  Support continued non pharmacological strategies for anxiety mgmt.  FU kept in group setting as  scheduled.    Diagnoses and all orders for this visit:    1. Generalized anxiety disorder  -     gabapentin (Neurontin) 100 MG capsule; Take 1 capsule by mouth 3 (Three) Times a Day.  Dispense: 90 capsule; Refill: 2    2. Recurrent major depressive disorder, in partial remission  -     desvenlafaxine (PRISTIQ) 100 MG 24 hr tablet; Take 1 tablet by mouth Daily.  Dispense: 90 tablet; Refill: 0    Other orders  -     traZODone (DESYREL) 50 MG tablet; Take 0.5-1 tablets by mouth At Night As Needed for Sleep.  Dispense: 90 tablet; Refill: 0    I spent 34 minutes caring for Alexandria on this date of service. This time includes time spent by me in the following activities: preparing for the visit, reviewing tests, obtaining and/or reviewing a separately obtained history, performing a medically appropriate examination and/or evaluation, counseling and educating the patient/family/caregiver, ordering medications, tests, or procedures, and documenting information in the medical record.

## 2025-05-06 RX ORDER — OMEPRAZOLE 40 MG/1
40 CAPSULE, DELAYED RELEASE ORAL DAILY
Qty: 90 CAPSULE | Refills: 1 | Status: SHIPPED | OUTPATIENT
Start: 2025-05-06

## 2025-05-15 ENCOUNTER — TELEPHONE (OUTPATIENT)
Dept: ONCOLOGY | Facility: CLINIC | Age: 80
End: 2025-05-15
Payer: MEDICARE

## 2025-05-15 NOTE — TELEPHONE ENCOUNTER
Called patient to inform her that she would need to check with her primary care doctor since she has not been seen at this office in 2023. Patient verbalized understanding.

## 2025-05-15 NOTE — TELEPHONE ENCOUNTER
Caller: Alexandria Yousif    Relationship: Self    Best call back number: [phone]     What is the best time to reach you: ANYTIME - OKAY TO LEAVE A VM.     Who are you requesting to speak with (clinical staff, provider,  specific staff member): CLINICAL    What was the call regarding: PT WAS A PT OF DR RICKS AND LAST SEEN HER IN 12/02/23. PT ALSO HAD SEEN DR [facility]. THE PT HAS SOME QUESTIONS AND IS NOT SURE WHO WOULD BEST ANSWER THEM OR FILL IN FOR DR GOMES    THE PT HAS SOME QUESTIONS ABOUT SOME TOPICAL CREAM AND ORAL MEDS FOR HAIR LOSS?    SHE IS WANTING TO START TAKING THESE, BUT WAS ADVISED BY HER PCP TO REACH OUT TO HER ONCOLOGIST. PT IS WANTING TO SEE IF SHE COULD TAKE MINOXIDIL AND/OR NATURES BOUNTY - HAIR SKIN AND NAILS.     Is it okay if the provider responds through MyChart: YES

## 2025-05-21 ENCOUNTER — OFFICE VISIT (OUTPATIENT)
Dept: PSYCHIATRY | Facility: HOSPITAL | Age: 80
End: 2025-05-21
Payer: MEDICARE

## 2025-05-21 DIAGNOSIS — F41.1 GENERALIZED ANXIETY DISORDER: ICD-10-CM

## 2025-05-21 DIAGNOSIS — F33.41 RECURRENT MAJOR DEPRESSIVE DISORDER, IN PARTIAL REMISSION: ICD-10-CM

## 2025-05-21 DIAGNOSIS — C50.911 MALIGNANT NEOPLASM OF RIGHT FEMALE BREAST, UNSPECIFIED ESTROGEN RECEPTOR STATUS, UNSPECIFIED SITE OF BREAST: Primary | ICD-10-CM

## 2025-05-21 PROCEDURE — 90853 GROUP PSYCHOTHERAPY: CPT | Performed by: NURSE PRACTITIONER

## 2025-05-21 PROCEDURE — 99213 OFFICE O/P EST LOW 20 MIN: CPT | Performed by: NURSE PRACTITIONER

## 2025-05-21 PROCEDURE — 1159F MED LIST DOCD IN RCRD: CPT | Performed by: NURSE PRACTITIONER

## 2025-05-21 PROCEDURE — 1160F RVW MEDS BY RX/DR IN RCRD: CPT | Performed by: NURSE PRACTITIONER

## 2025-05-21 RX ORDER — DESVENLAFAXINE 100 MG/1
100 TABLET, EXTENDED RELEASE ORAL DAILY
Qty: 90 TABLET | Refills: 0 | Status: SHIPPED | OUTPATIENT
Start: 2025-05-21

## 2025-05-21 RX ORDER — TRAZODONE HYDROCHLORIDE 50 MG/1
25-50 TABLET ORAL NIGHTLY PRN
Qty: 90 TABLET | Refills: 0 | Status: SHIPPED | OUTPATIENT
Start: 2025-05-21

## 2025-05-21 RX ORDER — GABAPENTIN 100 MG/1
100 CAPSULE ORAL 3 TIMES DAILY
Qty: 90 CAPSULE | Refills: 2 | Status: SHIPPED | OUTPATIENT
Start: 2025-05-21 | End: 2026-05-21

## 2025-05-21 RX ORDER — ALPRAZOLAM 0.25 MG
0.25 TABLET ORAL TAKE AS DIRECTED
Qty: 30 TABLET | Refills: 0 | Status: SHIPPED | OUTPATIENT
Start: 2025-05-21 | End: 2026-05-21

## 2025-05-21 NOTE — PROGRESS NOTES
Subjective  Patient ID: Alexandria Yousif is a 80 y.o.. female seen in regularly scheduled group session.  Group participants have consented to group conducted in person    Total Group Time, Face to Face: 65 minutes  Total Group Participants: 3, plus facilitation per GIOVANI Maloney    Group Therapy  Group topics explored including development of new normal, interpersonal sensitivity, short and long term effects of diagnosis and treatment, significant other or family conflict, anticipitory anxiety, anxiety surrounding adjusted treatment plan or adjusted follow up, pain management, physical deconditioning, weight management, social determinants of health (finances, living arrangements, etc), and lifestyle choices.Patients share eagerness for survivorship resources, knowledge of surveillance plan, and watchful vigilance to prevent recurrence. Share appointment burden, difficulty managing care giving responsibilities. Shares helpful strategies, sources of hope, and allowance of mindfulness/ living in the present.    Counseling provided regarding cognitive behavioral therapy (CBT) strategies, behavioral activation/ activity scheduling, reintroduction to activity through graded tasks, balancing avoidance with approach , sleep hygiene, recognition and allowance of need for rest, pharmacological and non pharmacological management of sleep disturbance, lifestyle counseling, benefits of exercise and strategies for managing barriers to engagement, allowance of self care, exploration of quality of life goals, survivorship issues, current programming available in community and clinic, anxiety/ mood management , medication education, and existential distress. Shared available resources including speech evaluation, PT, OT for cognitive or physical concerns in survivorship. Explored community programming, previous engagement, barriers to current goals, caregiver responsibilities and opporuntities for self care.    Discussed  "current programming available in [facility] and community.    Benefits of group discussed while also acknowledging the need for 1:1 consultation at times. Members invited to discuss any concerns privately with me following group setting or to schedule a 1:1 visit if needs not being met in group.    Next shared medical visit: July 2 at 3:30    Patient response to group: Pt shares challenges, goals, and helpful strategies in small group setting.    Medications Management  Pt continues in survivorship of breast cancer.    CC: Anxiety, depression, caregiver distress  HPI: Pt is seen in follow up regarding anxiety and depression in survivorship of breast cancer, complicated by caregiver responsibilities. Continues to celebrate survivorship, while endorsing anxiety regarding fears of recurrence. Remains on medication regimen of pristiq 100 mg daily. Continues to have days where she fights desire to go to bed and pull covers over head. Denies doing this regularly, while continuing to report these difficult days from time to twan. Generally correlates these with complex interpersonal dynamics, differences in communication styles. Appreciates robust support network with ability to engage with others when desired. Is walking dog regularly. Enjoys music as a \"break away\" vs going to bed most days.    Continues on gabapentin 100 mg q afternoon and 200 mg q hs with tremendous report of benefit to pain and hot flashes. Is sleeping well with use of trazodone. Continues low dose alprazolam PRN anxiety and feels sx are well managed at this time.  Exam: As Above  Current medication regimen: Pristiq 100 mg daily, alprazolam 0.25 mg PRN anxiety, trazodone 50 mg q hs  Lab Review:   No visits with results within 2 Month(s) from this visit.   Latest known visit with results is:   Orders Only on 09/17/2024   Component Date Value    Creatinine, Urine 09/17/2024 143.2     Microalbumin, Urine 09/17/2024 13.0     Microalbumin/Creatinine * " 09/17/2024 9     TSH 09/17/2024 1.060     Free T4 09/17/2024 1.31     WBC 09/17/2024 4.67     RBC 09/17/2024 5.23     Hemoglobin 09/17/2024 15.4     Hematocrit 09/17/2024 49.0 (H)     MCV 09/17/2024 93.7     MCH 09/17/2024 29.4     MCHC 09/17/2024 31.4 (L)     RDW 09/17/2024 13.0     Platelets 09/17/2024 315     Neutrophil Rel % 09/17/2024 62.1     Lymphocyte Rel % 09/17/2024 23.8     Monocyte Rel % 09/17/2024 10.5     Eosinophil Rel % 09/17/2024 2.1     Basophil Rel % 09/17/2024 1.3     Neutrophils Absolute 09/17/2024 2.90     Lymphocytes Absolute 09/17/2024 1.11     Monocytes Absolute 09/17/2024 0.49     Eosinophils Absolute 09/17/2024 0.10     Basophils Absolute 09/17/2024 0.06     Immature Granulocyte Rel* 09/17/2024 0.2     Immature Grans Absolute 09/17/2024 0.01     nRBC 09/17/2024 0.0     Glucose 09/17/2024 103 (H)     BUN 09/17/2024 19     Creatinine 09/17/2024 0.75     EGFR Result 09/17/2024 81.6     BUN/Creatinine Ratio 09/17/2024 25.3 (H)     Sodium 09/17/2024 138     Potassium 09/17/2024 4.1     Chloride 09/17/2024 100     Total CO2 09/17/2024 26.1     Calcium 09/17/2024 10.3     Total Protein 09/17/2024 7.2     Albumin 09/17/2024 5.0     Globulin 09/17/2024 2.2     A/G Ratio 09/17/2024 2.3     Total Bilirubin 09/17/2024 0.3     Alkaline Phosphatase 09/17/2024 72     AST (SGOT) 09/17/2024 25     ALT (SGPT) 09/17/2024 21     Hemoglobin A1C 09/17/2024 6.20 (H)     Total Cholesterol 09/17/2024 154     Triglycerides 09/17/2024 165 (H)     HDL Cholesterol 09/17/2024 51     VLDL Cholesterol [place] 09/17/2024 28     LDL Chol Calc ([facility]) 09/17/2024 75     LDL/HDL RATIO 09/17/2024 1.37      MDM:  Meds reviewed and renewed as appropriate.  Continue medications as written.  FU scheduled in group setting.    Diagnoses and all orders for this visit:    1. Malignant neoplasm of right female breast, unspecified estrogen receptor status, unspecified site of breast (Primary)  -     Ambulatory Referral to Speech Therapy for  Evaluation & Treatment    2. Generalized anxiety disorder  -     gabapentin (Neurontin) 100 MG capsule; Take 1 capsule by mouth 3 (Three) Times a Day.  Dispense: 90 capsule; Refill: 2  -     ALPRAZolam (Xanax) 0.25 MG tablet; Take 1 tablet by mouth Take As Directed. 1/2 to 1 tab daily PO PRN anxiety  Dispense: 30 tablet; Refill: 0    3. Recurrent major depressive disorder, in partial remission  -     desvenlafaxine (PRISTIQ) 100 MG 24 hr tablet; Take 1 tablet by mouth Daily.  Dispense: 90 tablet; Refill: 0    Other orders  -     traZODone (DESYREL) 50 MG tablet; Take 0.5-1 tablets by mouth At Night As Needed for Sleep.  Dispense: 90 tablet; Refill: 0

## 2025-06-02 DIAGNOSIS — I47.19 PAT (PAROXYSMAL ATRIAL TACHYCARDIA): ICD-10-CM

## 2025-06-02 RX ORDER — METOPROLOL TARTRATE 25 MG/1
25 TABLET, FILM COATED ORAL 2 TIMES DAILY
Qty: 180 TABLET | Refills: 1 | Status: SHIPPED | OUTPATIENT
Start: 2025-06-02

## 2025-06-05 ENCOUNTER — OFFICE VISIT (OUTPATIENT)
Dept: CARDIOLOGY | Facility: CLINIC | Age: 80
End: 2025-06-05
Payer: MEDICARE

## 2025-06-05 VITALS
HEART RATE: 70 BPM | RESPIRATION RATE: 16 BRPM | DIASTOLIC BLOOD PRESSURE: 84 MMHG | WEIGHT: 137 LBS | HEIGHT: 66 IN | OXYGEN SATURATION: 98 % | BODY MASS INDEX: 22.02 KG/M2 | SYSTOLIC BLOOD PRESSURE: 124 MMHG

## 2025-06-05 DIAGNOSIS — I49.3 PVCS (PREMATURE VENTRICULAR CONTRACTIONS): ICD-10-CM

## 2025-06-05 DIAGNOSIS — I47.19 PAT (PAROXYSMAL ATRIAL TACHYCARDIA): ICD-10-CM

## 2025-06-05 DIAGNOSIS — I10 PRIMARY HYPERTENSION: Primary | ICD-10-CM

## 2025-06-05 DIAGNOSIS — I25.10 CORONARY ARTERY CALCIFICATION: ICD-10-CM

## 2025-06-05 DIAGNOSIS — E78.2 MIXED HYPERLIPIDEMIA: ICD-10-CM

## 2025-06-05 DIAGNOSIS — E11.9 TYPE 2 DIABETES MELLITUS WITHOUT COMPLICATION, WITHOUT LONG-TERM CURRENT USE OF INSULIN: ICD-10-CM

## 2025-06-05 DIAGNOSIS — I45.10 RBBB: ICD-10-CM

## 2025-06-05 RX ORDER — VALSARTAN 40 MG/1
40 TABLET ORAL DAILY
Qty: 90 TABLET | Refills: 3 | Status: SHIPPED | OUTPATIENT
Start: 2025-06-05

## 2025-06-05 NOTE — PROGRESS NOTES
CARDIOLOGY    Judy Lynch MD    ENCOUNTER DATE:  06/05/2025    Alexandria Yousif / 80 y.o. / female        CHIEF COMPLAINT / REASON FOR OFFICE VISIT     Follow-up (1 year follow up/PAT, Coronary artery calcification, RBBB, RT  Breast Cancer, Hyperlipidemia )      HISTORY OF PRESENT ILLNESS       HPI    Alexandria Yousif is a 80 y.o. female     This is a patient who was previously followed by Dr. Rhodes. She was initially seen in October of 2017 with shortness of breath and a systolic murmur. She has a family history of heart disease including her mother who had 3 pacemakers and heart failure. She had a sister who had a fatal MI at 64. She has another sister with atrial fibrillation. Her father had 4 vessel CABG at 62. She had a stress echo in October of 2017 which was normal. In 2019 she had palpitations. Carotid Doppler in May of 2019 was normal. Echo in May of 2019 showed normal LV function with an ejection fraction of 67% with grade 2 diastolic dysfunction. Holter monitor in June of 2019 showed brief runs of atrial tachycardia, the longest being 7 beats. Zio patch in December of 2019 showed brief episodes of SVT, the longest being 11 beats. She was noted to have PVCs 5% of the time.      Of note, she did have a severe reaction to the Zio patch adhesive with rash and blisters which required steroid injections and oral steroids.      Echocardiogram in November 2022 showed normal LV function ejection fraction of 61%, normal diastolic function and no significant valve disease.    She comes in today with concerns about her blood pressure.  She says she has had a really stressful year.  She has been dealing with breast cancer and then her  had a knee replacement and she has been having to do a lot of the things around the house.  The other day her blood pressure was 162/99 and she felt dizzy and had a headache.  She took her metoprolol and laid down.  This has been happening with increased  "frequency.       VITAL SIGNS     Visit Vitals  /84   Pulse 70   Resp 16   Ht 167 cm (65.75\")   Wt 62.1 kg (137 lb)   LMP  (LMP Unknown)   SpO2 98%   BMI 22.28 kg/m²         Wt Readings from Last 3 Encounters:   06/05/25 62.1 kg (137 lb)   02/25/25 60.8 kg (134 lb)   01/30/25 59 kg (130 lb)     Body mass index is 22.28 kg/m².      PHYSICAL EXAMINATION     Physical Exam      REVIEWED DATA       ECG 12 Lead    Date/Time: 6/5/2025 2:36 PM  Performed by: Judy Lynch MD    Authorized by: Judy Lynch MD  Comparison: compared with previous ECG from 5/16/2024  Similar to previous ECG  Rhythm: sinus rhythm  BPM: 70  Conduction: right bundle branch block and left posterior fascicular block    Clinical impression: abnormal EKG            Lipid Panel          9/17/2024    09:48   Lipid Panel   Total Cholesterol 154    Triglycerides 165    HDL Cholesterol 51    VLDL Cholesterol 28    LDL Cholesterol  75    LDL/HDL Ratio 1.37        Lab Results   Component Value Date    GLUCOSE 103 (H) 09/17/2024    BUN 19 09/17/2024    CREATININE 0.75 09/17/2024     09/17/2024    K 4.1 09/17/2024     09/17/2024    CALCIUM 10.3 09/17/2024    PROTEINTOT 7.2 09/17/2024    ALBUMIN 5.0 09/17/2024    ALT 21 09/17/2024    AST 25 09/17/2024    ALKPHOS 72 09/17/2024    BILITOT 0.3 09/17/2024    GLOB 2.2 09/17/2024    AGRATIO 2.3 09/17/2024    BCR 25.3 (H) 09/17/2024    ANIONGAP 10.0 11/09/2023    EGFR 81.6 09/17/2024       ASSESSMENT & PLAN      Diagnosis Plan   1. Primary hypertension  ECG 12 Lead    CBC (No Diff)    Comprehensive Metabolic Panel    Lipid Panel    Thyroid Panel With TSH    Hemoglobin A1c      2. Mixed hyperlipidemia  ECG 12 Lead    CBC (No Diff)    Comprehensive Metabolic Panel    Lipid Panel    Thyroid Panel With TSH    Hemoglobin A1c      3. PVCs (premature ventricular contractions)  ECG 12 Lead    CBC (No Diff)    Comprehensive Metabolic Panel    Lipid Panel    Thyroid Panel With TSH    Hemoglobin A1c    "   4. PAT (paroxysmal atrial tachycardia)  ECG 12 Lead    CBC (No Diff)    Comprehensive Metabolic Panel    Lipid Panel    Thyroid Panel With TSH    Hemoglobin A1c      5. Coronary artery calcification  ECG 12 Lead    CBC (No Diff)    Comprehensive Metabolic Panel    Lipid Panel    Thyroid Panel With TSH    Hemoglobin A1c      6. RBBB  ECG 12 Lead    CBC (No Diff)    Comprehensive Metabolic Panel    Lipid Panel    Thyroid Panel With TSH    Hemoglobin A1c      7. Type 2 diabetes mellitus without complication, without long-term current use of insulin  ECG 12 Lead    CBC (No Diff)    Comprehensive Metabolic Panel    Lipid Panel    Thyroid Panel With TSH    Hemoglobin A1c          1.  Hypertension.  Her blood pressure has been fluctuating but she is having some high readings.  She is currently on metoprolol tartrate 25 mg twice a day and this is being used for blood pressure as well as her premature atrial tachycardia and PVCs.  She is on spironolactone 25 mg a day which is for blood pressure as well as improve hair growth.  I recommend adding valsartan at 40 mg a day for some renal protection with the diabetes.  Starting with the very lowest dose of valsartan since her blood pressure is labile.  I instructed her to take her time with changing positions.  She is going to monitor her blood pressure and bring in a list when she follows up in a month with [facility].  I will put in for blood work in 2 weeks.  2.  Paroxysmal atrial tachycardia.  Short bursts on Holter monitor in June 2019.  -Continue metoprolol.  3.  PVCs 5% of the time.  4.  History of TIA  5.  Hyperlipidemia.  I reviewed her lipid panel as above.  Continue rosuvastatin.  6.  Diabetes.  Per Dr. Muse.  7.  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  No change.  8.  Allergic reaction to Zio patch.  9.  Depression and anxiety.  Follow-up with Dr. Muse.  10.  Coronary artery calcification.  CTA shows coronary artery calcification in  the LAD and the aortic arch.  - No angina  - Aspirin 81 mg a day  - Rosuvastatin 10 mg nightly    Blood work in 2 weeks and follow up in 4 weeks.  I will forward her lab work onto Dr. Muse when I receive it.    Orders Placed This Encounter   Procedures    CBC (No Diff)     Standing Status:   Future     Expected Date:   6/10/2025     Expiration Date:   6/5/2026     Release to patient:   Routine Release [[id number]]    Comprehensive Metabolic Panel     Standing Status:   Future     Expected Date:   6/10/2025     Expiration Date:   6/5/2026     Release to patient:   Routine Release [[id number]]    Lipid Panel     Standing Status:   Future     Expected Date:   6/10/2025     Expiration Date:   6/5/2026     Release to patient:   Routine Release [[id number]]    Thyroid Panel With TSH     Standing Status:   Future     Expected Date:   6/10/2025     Expiration Date:   6/5/2026     Release to patient:   Routine Release [[id number]]    Hemoglobin A1c     Standing Status:   Future     Expected Date:   6/10/2025     Expiration Date:   9/5/2026     Release to patient:   Routine Release [[id number]]    ECG 12 Lead     This order was created via procedure documentation     Release to patient:   Routine Release [[id number]]           MEDICATIONS         Discharge Medications            Accurate as of June 5, 2025  2:41 PM. If you have any questions, ask your nurse or doctor.                New Medications        Instructions Start Date   valsartan 40 MG tablet  Commonly known as: DIOVAN  Started by: Judy Lynch   40 mg, Oral, Daily             Continue These Medications        Instructions Start Date   acetaminophen 500 MG tablet  Commonly known as: TYLENOL   1,000 mg, Every 6 Hours PRN      albuterol sulfate  (90 Base) MCG/ACT inhaler  Commonly known as: PROVENTIL HFA;VENTOLIN HFA;PROAIR HFA   INHALE 2 PUFFS BY MOUTH INTO THE LUNGS EVERY 4 HOURS AS NEEDED FOR WHEEZING      ALPRAZolam 0.25 MG tablet  Commonly known as:  Xanax   0.25 mg, Oral, Take As Directed, 1/2 to 1 tab daily PO PRN anxiety      Alum Hydroxide-Mag Trisilicate 80-14.2 MG chewable tablet  Commonly known as: Gaviscon   1 tablet, Oral, 4 Times Daily PRN      aspirin 81 MG tablet   81 mg, Daily      Biotin 10 MG tablet   HOLD PRIOR TO SURG      Cholecalciferol 50 MCG (2000 UT) capsule   HOLD PRIOR TO SURG      desvenlafaxine 100 MG 24 hr tablet  Commonly known as: PRISTIQ   100 mg, Oral, Daily      fenofibrate 145 MG tablet  Commonly known as: TRICOR   TAKE 1 TABLET BY MOUTH EVERY DAY      fish oil 1200 MG capsule capsule   HOLD PRIOR TO SURG      fluticasone 50 MCG/ACT nasal spray  Commonly known as: FLONASE   2 sprays, Nasal, Daily      gabapentin 100 MG capsule  Commonly known as: Neurontin   100 mg, Oral, 3 Times Daily      metFORMIN  MG 24 hr tablet  Commonly known as: GLUCOPHAGE-XR   TAKE 1 TABLET BY MOUTH DAILY WITH LARGEST MEAL OF THE DAY AND A GLASS OF WATER      metoprolol tartrate 25 MG tablet  Commonly known as: LOPRESSOR   25 mg, Oral, 2 Times Daily      omeprazole 40 MG capsule  Commonly known as: priLOSEC   40 mg, Oral, Daily      rosuvastatin 10 MG tablet  Commonly known as: CRESTOR   10 mg, Oral, Nightly      spironolactone 25 MG tablet  Commonly known as: ALDACTONE   25 mg, Oral, Daily      traZODone 50 MG tablet  Commonly known as: DESYREL   25-50 mg, Oral, Nightly PRN      valACYclovir 500 MG tablet  Commonly known as: VALTREX   1,000 mg, Oral, 2 Times Daily                 Judy Lynch MD  06/05/25  14:41 EDT    Part of this note may be an electronic transcription/translation of spoken language to printed text using the Dragon dictation system.

## 2025-06-23 ENCOUNTER — OFFICE VISIT (OUTPATIENT)
Dept: NEUROLOGY | Facility: CLINIC | Age: 80
End: 2025-06-23
Payer: MEDICARE

## 2025-06-23 ENCOUNTER — PATIENT ROUNDING (BHMG ONLY) (OUTPATIENT)
Dept: NEUROLOGY | Facility: CLINIC | Age: 80
End: 2025-06-23
Payer: MEDICARE

## 2025-06-23 VITALS
SYSTOLIC BLOOD PRESSURE: 124 MMHG | BODY MASS INDEX: 22.12 KG/M2 | OXYGEN SATURATION: 98 % | HEART RATE: 65 BPM | WEIGHT: 136 LBS | DIASTOLIC BLOOD PRESSURE: 88 MMHG

## 2025-06-23 DIAGNOSIS — R68.89 FORGETFULNESS: Primary | ICD-10-CM

## 2025-06-23 PROCEDURE — 3079F DIAST BP 80-89 MM HG: CPT | Performed by: PSYCHIATRY & NEUROLOGY

## 2025-06-23 PROCEDURE — 1159F MED LIST DOCD IN RCRD: CPT | Performed by: PSYCHIATRY & NEUROLOGY

## 2025-06-23 PROCEDURE — 3074F SYST BP LT 130 MM HG: CPT | Performed by: PSYCHIATRY & NEUROLOGY

## 2025-06-23 PROCEDURE — 1160F RVW MEDS BY RX/DR IN RCRD: CPT | Performed by: PSYCHIATRY & NEUROLOGY

## 2025-06-23 PROCEDURE — 99204 OFFICE O/P NEW MOD 45 MIN: CPT | Performed by: PSYCHIATRY & NEUROLOGY

## 2025-06-23 NOTE — PROGRESS NOTES
"Chief Complaint   Patient presents with    Transient Ischemic Attack       Patient ID: Alexandria Yousif is a 80 y.o. female.    HPI: I have had the pleasure of seeing your patient today.  As you may know she is a 80-year-old female here for the evaluation of forgetfulness.  She states that she is status post radiation therapy for breast cancer.  She noted that after the therapy she began having issues with short-term memory.  She mostly noted issues with words.  She would have a lot of trouble coming up with the right word.  She states that she has gotten lost while driving since all this began.  That is quite unusual for her.  She states that she feels a certain \"disconnect\" where she feels disconnected from things that are occurring around her.  She says that some days she is able to perform better than others.  She will have difficulty coming up with the right word when speaking.  She says that she suffered from a \"TIA\" about 5 years ago.  At that time her symptoms were dizziness related to a \"spinning\" feeling.  That essentially resolved however came back when she was having increased blood pressure issues.  She is on a blood pressure lowering medication and feels better from a dizziness standpoint.  She has been told that she has had \"mild ischemia\" on the brain in the past.  Her mother had memory issues and her 80s.  No severe head trauma.  No alcohol abuse.  No resting tremor.  No specific changes of gait.  As I mentioned she does have a history of breast cancer and received upwards of 20 radiation treatments at the time.    The following portions of the patient's history were reviewed and updated as appropriate: allergies, current medications, past family history, past medical history, past social history, past surgical history and problem list.    Review of Systems   Musculoskeletal:  Negative for gait problem.   Neurological:  Positive for speech difficulty. Negative for dizziness, tremors, seizures, " syncope, facial asymmetry, weakness, light-headedness, numbness and headaches.   Psychiatric/Behavioral:  Positive for confusion. Negative for agitation, behavioral problems, decreased concentration, dysphoric mood, hallucinations, self-injury, sleep disturbance and suicidal ideas. The patient is not nervous/anxious and is not hyperactive.       I have reviewed the review of systems above performed by my medical assistant.      Vitals:    06/23/25 1341   BP: 124/88   Pulse: 65   SpO2: 98%       Neurological Exam  Mental Status  Awake, alert and oriented to person, place and time. Recent and remote memory are intact. Speech is normal. Language is fluent with no aphasia. Attention and concentration are normal. Fund of knowledge is appropriate for level of education.    Cranial Nerves  CN I: Sense of smell is normal.  CN II: Visual acuity is normal.  CN III, IV, VI: Extraocular movements intact bilaterally. Pupils equal round and reactive to light bilaterally.  CN V: Facial sensation is normal.  CN VII: Full and symmetric facial movement.  CN XI: Shoulder shrug strength is normal.  CN XII: Tongue midline without atrophy or fasciculations.    Motor  Normal muscle bulk throughout. No fasciculations present. Normal muscle tone. No abnormal involuntary movements. No pronator drift.                                             Right                     Left  Rhomboids                            5                          5  Infraspinatus                          5                          5  Supraspinatus                       5                          5  Deltoid                                   5                          5   Biceps                                   5                          5  Brachioradialis                      5                          5   Triceps                                  5                          5   Pronator                                5                          5   Supinator                               5                           5   Wrist flexor                            5                          5   Wrist extensor                       5                          5   Finger flexor                          5                          5   Finger extensor                     5                          5   Interossei                              5                          5   Abductor pollicis brevis         5                          5   Flexor pollicis brevis             5                          5   Opponens pollicis                 5                          5  Extensor digitorum               5                          5  Abductor digiti minimi           5                          5   Abdominal                            5                          5  Glutei                                    5                          5  Hip abductor                         5                          5  Hip adductor                         5                          5   Iliopsoas                               5                          5   Quadriceps                           5                          5   Hamstring                             5                          5   Gastrocnemius                     5                           5   Anterior tibialis                      5                          5   Posterior tibialis                    5                          5   Peroneal                               5                          5  Ankle dorsiflexor                   5                          5  Ankle plantar flexor              5                           5  Extensor hallucis longus      5                           5    Sensory  Sensation is intact to light touch, pinprick, vibration and proprioception in all four extremities.    Reflexes  Deep tendon reflexes are 2+ and symmetric in all four extremities.    Right pathological reflexes: Regine's absent.  Left pathological reflexes: Regine's  absent.    Coordination    Finger-to-nose, rapid alternating movements and heel-to-shin normal bilaterally without dysmetria.    Gait  Normal casual, toe, heel and tandem gait.       Physical Exam  Vitals reviewed.   Constitutional:       General: She is not in acute distress.     Appearance: She is well-developed.   HENT:      Head: Normocephalic and atraumatic.   Eyes:      Extraocular Movements: Extraocular movements intact.      Pupils: Pupils are equal, round, and reactive to light.   Cardiovascular:      Rate and Rhythm: Normal rate and regular rhythm.      Heart sounds: Normal heart sounds.   Pulmonary:      Effort: Pulmonary effort is normal. No respiratory distress.      Breath sounds: Normal breath sounds.   Abdominal:      General: Bowel sounds are normal. There is no distension.      Palpations: Abdomen is soft.      Tenderness: There is no abdominal tenderness.   Musculoskeletal:         General: No deformity.      Cervical back: Normal range of motion.   Skin:     General: Skin is warm.      Findings: No rash.   Neurological:      Coordination: Coordination is intact.      Deep Tendon Reflexes: Reflexes are normal and symmetric.   Psychiatric:         Speech: Speech normal.         Judgment: Judgment normal.         Procedures    Assessment/Plan: I would like to schedule an MRI of her brain both with and without contrast.  We will check memory labs today as well as schedule her for a neuropsych test.  Will see her back after testing.         Diagnoses and all orders for this visit:    1. Forgetfulness (Primary)  -     Vitamin D,25-Hydroxy  -     Vitamin B12  -     TSH Rfx On Abnormal To Free T4  -     Sedimentation Rate  -     MRI Brain With & Without Contrast; Future  -     Ambulatory Referral to Neuropsychology           Cheko Monique II, MD

## 2025-06-30 ENCOUNTER — OFFICE VISIT (OUTPATIENT)
Dept: PSYCHIATRY | Facility: HOSPITAL | Age: 80
End: 2025-06-30
Payer: MEDICARE

## 2025-06-30 DIAGNOSIS — F41.1 GENERALIZED ANXIETY DISORDER: ICD-10-CM

## 2025-06-30 DIAGNOSIS — C50.911 MALIGNANT NEOPLASM OF RIGHT FEMALE BREAST, UNSPECIFIED ESTROGEN RECEPTOR STATUS, UNSPECIFIED SITE OF BREAST: Primary | ICD-10-CM

## 2025-06-30 DIAGNOSIS — F43.20 GRIEF REACTION: ICD-10-CM

## 2025-06-30 DIAGNOSIS — R23.2 HOT FLASHES: ICD-10-CM

## 2025-06-30 DIAGNOSIS — F33.41 RECURRENT MAJOR DEPRESSIVE DISORDER, IN PARTIAL REMISSION: ICD-10-CM

## 2025-06-30 RX ORDER — ALPRAZOLAM 0.25 MG
0.25 TABLET ORAL TAKE AS DIRECTED
Qty: 30 TABLET | Refills: 0 | Status: SHIPPED | OUTPATIENT
Start: 2025-06-30 | End: 2026-06-30

## 2025-06-30 NOTE — PROGRESS NOTES
Subjective  Patient ID: Alexandria Yousif is a 80 y.o.. female seen in regularly scheduled group session.  Group participants have consented to group conducted in person    Total Group Time, Face to Face: 60 minutes  Total Group Participants: 2, plus facilitation per GIOVANI Maloney    Group Therapy  Group topics explored including development of new normal, interpersonal sensitivity, short and long term effects of diagnosis and treatment, significant other or family conflict, intimacy concerns, anticipitory anxiety, anxiety surrounding adjusted treatment plan or adjusted follow up, physical deconditioning, social determinants of health (finances, living arrangements, etc), and lifestyle choices. Patients share emotions surrounding loss of loved one to cancer, continued concerns regarding fears of medical complexity, ongoing surveillance for past cancers. Shares recent victories, ideas of full force living, and appreciation of felice in complex medical and life experience.  Specific concerns regarding sleep discussed at length.    Counseling provided regarding cognitive behavioral therapy (CBT) strategies, behavioral activation/ activity scheduling, reintroduction to activity through graded tasks, balancing avoidance with approach , sleep hygiene, recognition and allowance of need for rest, lifestyle counseling, benefits of exercise and strategies for managing barriers to engagement, allowance of self care, exploration of quality of life goals, and survivorship issues. Discussed sleep hygiene techniques, benefits of behavioral activation, exercise during day, and medication education. Reviewed symptoms of survivor's grief, fears and anxieties associated with previous cancer history and threats of recurrence.  CBT-I techniques reviewed for nonpharmacological sleep adjustment.  Explored expectations of sleep, hours allowed in bed, sleep restriction, out of bed when not sleeping, and general sleep  hygiene.    Discussed current programming available in [facility] and community.    Benefits of group discussed while also acknowledging the need for 1:1 consultation at times. Members invited to discuss any concerns privately with me following group setting or to schedule a 1:1 visit if needs not being met in group.    Next shared medical visit: August 13 at 3:30 PM in person    Patient response to group: Pt continues to support other members openly, sharing recent victories with other group members.    Medications Management  Pt continues in survivorship of breast cancer.    CC: Anxiety, insomnia  HPI: Pt is seen in follow up regarding ongoing anxiety in survivorship of breast cancer. Reports some anxiety in regard to upcoming follow up with med onc, specifically regarding some new pain in breast area. Does report recent appointment with neurologist, Dr. Monique, appreciating excellent rapport with plan to address current concerns regarding memory and cognitive changes. Does have neurocognitive testing scheduled.    Does feel depression remains improved, endorsing improved initiative, engagement. Continues to appreciate use of gabapentin, taking 2 q hs on most nights. Is using third dose on occasion for pain, appreciating this while reporting some fatigue and thus not using regularly.   Continues to manage care for , appreciating recent company who came in town to see them. Greatly appreciates time with them, appreciating alpraozlam in days in preparation of this. Otherwise, not using regularly. Continues to endorse perception of meltdown and ager at times, acknolwedging imapct of grief.   Exam: As Above  Current medication regimen: gabapentin 200 mg q hs, trazodone  mg q hs, alprazolam 0.25 mg PRN anxiety, pristiq 100 mg daily  Lab Review:   Results Encounter on 06/10/2025   Component Date Value    WBC 06/23/2025 5.43     RBC 06/23/2025 4.71     Hemoglobin 06/23/2025 14.1     Hematocrit 06/23/2025  44.1     MCV 06/23/2025 93.6     MCH 06/23/2025 29.9     MCHC 06/23/2025 32.0     RDW 06/23/2025 12.8     Platelets 06/23/2025 303     Glucose 06/23/2025 108 (H)     BUN 06/23/2025 17.0     Creatinine 06/23/2025 0.63     EGFR Result 06/23/2025 90.4     BUN/Creatinine Ratio 06/23/2025 27.0 (H)     Sodium 06/23/2025 136     Potassium 06/23/2025 4.5     Chloride 06/23/2025 96 (L)     Total CO2 06/23/2025 25.3     Calcium 06/23/2025 10.4     Total Protein 06/23/2025 7.0     Albumin 06/23/2025 4.7     Globulin 06/23/2025 2.3     A/G Ratio 06/23/2025 2.0     Total Bilirubin 06/23/2025 0.2     Alkaline Phosphatase 06/23/2025 72     AST (SGOT) 06/23/2025 24     ALT (SGPT) 06/23/2025 18     Total Cholesterol 06/23/2025 142     Triglycerides 06/23/2025 291 (H)     HDL Cholesterol 06/23/2025 47     VLDL Cholesterol Frank[place] 06/23/2025 45 (H)     LDL Chol Calc ([facility]) 06/23/2025 50     TSH 06/23/2025 1.160     T4, Total 06/23/2025 8.3     T3 Uptake 06/23/2025 29     Free Thyroxine Index 06/23/2025 2.4     Hemoglobin A1C 06/23/2025 6.10 (H)      MDM:  Meds reviewed and renewed as appropriate.  Continue medications as written. Reiterated potential cognitive sx of current medications, reiterating limited low dose, infrequent use.   FU scheduled in group setting.    Diagnoses and all orders for this visit:    1. Malignant neoplasm of right female breast, unspecified estrogen receptor status, unspecified site of breast (Primary)    2. Generalized anxiety disorder    3. Grief reaction    4. Recurrent major depressive disorder, in partial remission    5. Hot flashes

## 2025-07-07 NOTE — PROGRESS NOTES
Date of Office Visit: 2025  Encounter Provider: GIOVANI Harper  Place of Service: [facility] CARDIOLOGY  Patient Name: Alexandria Yousif  :1945    Chief complaint  Hypertension    History of Present Illness  Patient is a 80 y.o. year old female  patient of Dr. Lynch who was previously followed by Dr. Rhodes. She was initially seen in 2017 with shortness of breath and a systolic murmur. She has a family history of heart disease including her mother who had 3 pacemakers and heart failure. She had a sister who had a fatal MI at 64. She has another sister with atrial fibrillation. Her father had 4 vessel CABG at 62. She had a stress echo in 2017 which was normal. In  she had palpitations. Carotid Doppler in May of 2019 was normal. Echo in May of 2019 showed normal LV function with an ejection fraction of 67% with grade 2 diastolic dysfunction. Holter monitor in 2019 showed brief runs of atrial tachycardia, the longest being 7 beats. Zio patch in 2019 showed brief episodes of SVT, the longest being 11 beats. She was noted to have PVCs 5% of the time.      Of note, she did have a severe reaction to the Zio patch adhesive with rash and blisters which required steroid injections and oral steroids.      Echocardiogram in 2022 showed normal LV function ejection fraction of 61%, normal diastolic function and no significant valve disease.  At last visit she was hypertensive and valsartan was added.    Interval history  Patient presents today for follow-up of hypertension.  I will visit with her for the first time today and have reviewed her medical record.  Since last visit blood pressure has significantly improved since the addition of valsartan.  Home readings are similar to our office reading today.  She also states she feels significantly better since blood pressure has improved.  She denies palpitations, shortness  "of breath, edema, dizziness, chest pain or chest pressure, fatigue, syncope or presyncope.  Labs done at the end of June after initiation of valsartan showed normal kidney function and electrolytes.  She did have an episode earlier this week where she felt like \"her food did not go down\".  She was seen in urgent care and was felt to have esophageal inflammation.  She is due for her routine colonoscopy and plans to discuss this with GI.    Past Medical History:   Diagnosis Date    Abnormal ECG 2019    DR BLAND    Allergic 1980    Anxiety and depression     Arrhythmia 2019    Arthritis of back ?    Arthritis of neck ?    Atrial fibrillation 2019?    Basal cell carcinoma 03/2008    Scalp and face    Cervical herniated disc     CHF (congestive heart failure) 2019    Claustrophobia 1990s    Cluster headache     Colon polyp 02/2020    Coronary artery disease 2019    CVA (cerebral vascular accident) 2018    Mild to moderat ischemia    GERD (gastroesophageal reflux disease)     Grade II diastolic dysfunction     Headache     Headache, tension-type     2016    Heart murmur     Heart valve disease 2019    HL (hearing loss) 2019    Hypercalcemia 03/2016    mild at best when corrected for ongoing abnormal albumin levels with normal albumin being up to 4.7 g/dL though that is an issue with reference range here    Hyperlipidemia     Hypertension     Injury of neck 1971    Hernieated disc when broke my back    Low back pain 1971    Broken back    Lumbosacral disc disease 1971    Multiple fractures/herniated disc/accident    LVH (left ventricular hypertrophy) 2019    Malignant neoplasm of female breast 10/23/2023    Memory loss     Neck strain 1971    Neuropathy in diabetes     OA (osteoarthritis)     Osteopenia     PAT (paroxysmal atrial tachycardia)     12 very brief runs of atrial tachycardia with the longest 7 beats in duration per 24-hour Holter monitor in June 2019    Pneumonia 08/2023 8/2023    Postoperative wound " "infection     7/2023 FROM FOOT INJURY    PSVT (paroxysmal supraventricular tachycardia)     5 episodes of PSVT the longest 11 beats in duration per Zio patch monitor in December 2019    PVCs (premature ventricular contractions)     5% burden per Zio patch monitor in December 2019    Rheumatic fever     Age 17    Rotator cuff syndrome 1990s    Shortness of breath     WITH EXERTION    Thoracic disc disorder 1971    TIA (transient ischemic attack)     Tinnitus     Trigeminal neuralgia     Type II diabetes mellitus 2019    Began Metformin    Urinary tract infection 5-2024    UTI or stone    Vertigo     Vision loss     Age related     Past Surgical History:   Procedure Laterality Date    BREAST BIOPSY  10/16/2023    At     BREAST LUMPECTOMY Right 11/16/2023    Procedure: right breast wire localized lumpectomy;  Surgeon: Dee Saunders MD;  [place]  [facility] [place]: General;  Laterality: Right;    BREAST SURGERY  11-16-23    Lumpectomy & radiation    BRONCHOSCOPY N/A 03/21/2023    Procedure: BRONCHOSCOPY WITH BX;  Surgeon: Mae Barry MD;  Location: [facility] ENDOSCOPY;  Service: Pulmonary;  Laterality: N/A;  PRE/POST-ABNORMAL IMAGING     CEREBRAL ANGIOGRAM N/A 10/08/2018    Procedure: CEREBRAL ANGIOGRAM AND VENOGRAM WITH INTRASINUS PRESSURE RECORDING;  Surgeon: Alfredo Caruso MD;  Location: [facility] HYBRID OR 18/19;  Service: Neurosurgery    COLONOSCOPY      2010    COLONOSCOPY N/A 02/17/2020    Procedure: COLONOSCOPY TO CECUM WITH COLD BIOPSY POLYPECTOMY;  Surgeon: Stan Flood MD;  Location: [facility] ENDOSCOPY;  Service: Gastroenterology;  Laterality: N/A;  PRE- FAMILY HX COLON POLYPS  POST- POLYP, HEMORRHOIDS    ENDOSCOPY  02/27/2017    Esophagitis, gastritis, small hh    HYSTERECTOMY      KNEE SURGERY Right     MOHS SURGERY      OOPHORECTOMY      TONSILLECTOMY AND ADENOIDECTOMY  1965    TRIGGER POINT INJECTION  1990s    Spinal    UPPER GASTROINTESTINAL ENDOSCOPY  2016    \"Extremely severe " "GERD\"     Outpatient Medications Prior to Visit   Medication Sig Dispense Refill    acetaminophen (TYLENOL) 500 MG tablet Take 2 tablets by mouth Every 6 (Six) Hours As Needed for Mild Pain.      albuterol sulfate  (90 Base) MCG/ACT inhaler INHALE 2 PUFFS BY MOUTH INTO THE LUNGS EVERY 4 HOURS AS NEEDED FOR WHEEZING 8.5 g 0    ALPRAZolam (Xanax) 0.25 MG tablet Take 1 tablet by mouth Take As Directed. 1/2 to 1 tab daily PO PRN anxiety 30 tablet 0    Alum Hydroxide-Mag Trisilicate (GAVISCON) 80-14.2 MG chewable tablet Chew 1 tablet 4 (Four) Times a Day As Needed (Heartburn). 224 each 11    aspirin 81 MG tablet Take 1 tablet by mouth Daily.      Biotin 10 MG tablet HOLD PRIOR TO SURG      Cholecalciferol 50 MCG (2000 UT) capsule HOLD PRIOR TO SURG      desvenlafaxine (PRISTIQ) 100 MG 24 hr tablet Take 1 tablet by mouth Daily. 90 tablet 0    fenofibrate (TRICOR) 145 MG tablet TAKE 1 TABLET BY MOUTH EVERY DAY 90 tablet 1    gabapentin (Neurontin) 100 MG capsule Take 1 capsule by mouth 3 (Three) Times a Day. 90 capsule 2    metFORMIN ER (GLUCOPHAGE-XR) 500 MG 24 hr tablet TAKE 1 TABLET BY MOUTH DAILY WITH LARGEST MEAL OF THE DAY AND A GLASS OF WATER 90 tablet 01    metoprolol tartrate (LOPRESSOR) 25 MG tablet TAKE 1 TABLET BY MOUTH TWICE DAILY 180 tablet 1    Omega-3 Fatty Acids (FISH OIL) 1200 MG capsule capsule HOLD PRIOR TO SURG      omeprazole (priLOSEC) 40 MG capsule TAKE 1 CAPSULE BY MOUTH DAILY 90 capsule 1    rosuvastatin (CRESTOR) 10 MG tablet TAKE 1 TABLET BY MOUTH EVERY NIGHT 90 tablet 1    spironolactone (ALDACTONE) 25 MG tablet TAKE 1 TABLET BY MOUTH DAILY 90 tablet 1    traZODone (DESYREL) 50 MG tablet Take 0.5-1 tablets by mouth At Night As Needed for Sleep. 90 tablet 0    valACYclovir (VALTREX) 500 MG tablet Take 2 tablets by mouth 2 (Two) Times a Day. 4 tablet 3    valsartan (DIOVAN) 40 MG tablet Take 1 tablet by mouth Daily. 90 tablet 3    fluticasone (FLONASE) 50 MCG/ACT nasal spray 2 sprays into " the nostril(s) as directed by provider Daily for 30 days. 16 g 0     Facility-Administered Medications Prior to Visit   Medication Dose Route Frequency Provider Last Rate Last Admin    lidocaine (XYLOCAINE) 1 % injection 3 mL  3 mL Intradermal Once Dee Saunders MD           Allergies as of 07/08/2025 - Reviewed 07/08/2025   Allergen Reaction Noted    Betadine [povidone iodine] Rash 12/21/2023    Codeine Nausea Only 03/29/2016     Social History     Socioeconomic History    Marital status:      Spouse name: Saravanan    Number of children: 1    Years of education: College   Tobacco Use    Smoking status: Never     Passive exposure: Never    Smokeless tobacco: Never   Vaping Use    Vaping status: Never Used   Substance and Sexual Activity    Alcohol use: Yes     Alcohol/week: 3.0 - 4.0 standard drinks of alcohol     Types: 2 - 3 Glasses of wine, 1 Cans of beer per week     Comment: liquor-1 to 2 month    Drug use: No    Sexual activity: Yes     Partners: Male     Birth control/protection: Post-menopausal, Hysterectomy     Comment: Complete hysterectomy     Family History   Problem Relation Age of Onset    Uterine cancer Mother         hysterectomy for precancerous cells    Heart disease Mother         in her 70s    Diabetes Mother         Late in life    Arthritis Mother     Cancer Mother         spine    Hearing loss Mother         In her 70s    Hyperlipidemia Mother         in her 60s    Kidney disease Mother         ?    Vision loss Mother         Macular Degeneration    Osteoporosis Mother     Dementia Mother     Neuropathy Mother     Skin cancer Father     Heart disease Father         in his early 60s    Colon polyps Father         negative    Cancer Father         skin    Heart disease Sister         Heart attack-64    Mental retardation Sister     Neuropathy Sister     Atrial fibrillation Sister     Hyperlipidemia Sister         in her 50s    Vision loss Sister         Macular Degeneration     "Rheumatologic disease Maternal Aunt     Diabetes Maternal Grandmother         Late in life    Heart disease Maternal Grandmother         Stroke-80    Stroke Maternal Grandmother     Hyperlipidemia Maternal Grandmother         ?    Kidney disease Maternal Grandmother         ?    Heart disease Paternal Grandmother         CHF    Stroke Paternal Grandmother     Depression Paternal Grandmother     Hyperlipidemia Paternal Grandmother         ?    Anxiety disorder Paternal Grandmother         Agoraphobia/anxiety    Heart disease Paternal Grandfather         Emphysema-heart disease    Stroke Paternal Grandfather     Kidney disease Maternal Aunt         ?    Malig Hyperthermia Neg Hx      Review of Systems   Constitutional: Negative for malaise/fatigue.   Cardiovascular:  Negative for chest pain, claudication, dyspnea on exertion, leg swelling, near-syncope, orthopnea, palpitations, paroxysmal nocturnal dyspnea and syncope.   Respiratory:  Negative for shortness of breath.    Neurological:  Negative for brief paralysis, dizziness, headaches and light-headedness.   All other systems reviewed and are negative.       Objective:     Vitals:    07/08/25 1140   BP: 124/74   Pulse: 69   Resp: 16   SpO2: 98%   Weight: 62.1 kg (137 lb)   Height: 167.6 cm (66\")     Body mass index is 22.11 kg/m².    Vitals reviewed.   Constitutional:       General: Not in acute distress.     Appearance: Well-developed and not in distress. Not diaphoretic.   HENT:      Head: Normocephalic.   Pulmonary:      Effort: Pulmonary effort is normal. No respiratory distress.      Breath sounds: Normal breath sounds. No wheezing. No rhonchi. No rales.   Cardiovascular:      Normal rate. Regular rhythm.      Murmurs: There is no murmur.   Pulses:     Radial: 2+ bilaterally.  Edema:     Peripheral edema absent.   Skin:     General: Skin is warm and dry. There is no cyanosis.      Findings: No rash.   Neurological:      Mental Status: Alert and oriented to " "person, place, and time.   Psychiatric:         Behavior: Behavior normal. Behavior is cooperative.         Thought Content: Thought content normal.         Judgment: Judgment normal.       Lab Review:     Lab Results   Component Value Date     06/23/2025     09/17/2024    K 4.5 06/23/2025    K 4.1 09/17/2024    CL 96 (L) 06/23/2025     09/17/2024    CO2 25.3 06/23/2025    CO2 26.1 09/17/2024    BUN 17.0 06/23/2025    BUN 19 09/17/2024    CREATININE 0.63 06/23/2025    CREATININE 0.75 09/17/2024    EGFRIFNONA 75 01/26/2022    EGFRIFNONA 81 11/18/2021    EGFRIFAFRI 86 01/26/2022    EGFRIFAFRI 93 11/18/2021    GLUCOSE 108 (H) 06/23/2025    GLUCOSE 103 (H) 09/17/2024    CALCIUM 10.4 06/23/2025    CALCIUM 10.3 09/17/2024    ALBUMIN 4.7 06/23/2025    ALBUMIN 5.0 09/17/2024    BILITOT 0.2 06/23/2025    BILITOT 0.3 09/17/2024    AST 24 06/23/2025    AST 25 09/17/2024    ALT 18 06/23/2025    ALT 21 09/17/2024     Lab Results   Component Value Date    WBC 5.43 06/23/2025    WBC 4.67 09/17/2024    HGB 14.1 06/23/2025    HGB 15.4 09/17/2024    HCT 44.1 06/23/2025    HCT 49.0 (H) 09/17/2024    MCV 93.6 06/23/2025    MCV 93.7 09/17/2024     06/23/2025     09/17/2024     No results found for: \"PROBNP\", \"BNP\"  Lab Results   Component Value Date    CKTOTAL 53 03/15/2024     Lab Results   Component Value Date    TSH 1.160 06/23/2025    TSH 1.060 09/17/2024      Lipid Panel          9/17/2024    09:48 6/23/2025    14:37   Lipid Panel   Total Cholesterol 154  142    Triglycerides 165  291    HDL Cholesterol 51  47    VLDL Cholesterol 28  45    LDL Cholesterol  75  50    LDL/HDL Ratio 1.37            ECG 12 Lead    Date/Time: 7/8/2025 1:09 PM  Performed by: Morena Duke APRN    Authorized by: Morena Duke APRN  Comparison: compared with previous ECG   Similar to previous ECG  Rhythm: sinus rhythm  Rate: normal  BPM: 69  Conduction: right bundle branch block and left posterior fascicular " block  QRS axis: normal  Comments: Similar to prior        Assessment:       Diagnosis Plan   1. Primary hypertension        2. Mixed hyperlipidemia        3. PVCs (premature ventricular contractions)        4. RBBB        5. PAT (paroxysmal atrial tachycardia)        6. Coronary artery calcification        7. Type 2 diabetes mellitus without complication, without long-term current use of insulin          Plan:       1.  Hypertension.  Blood pressure is much better with the addition of valsartan.  She remains on spironolactone 25 mg a day as well as Lopressor 25 mg twice daily.  She has had no orthostatic symptoms and follow-up labs show normal renal function and electrolytes after the addition of valsartan.  I did ask her to continue to monitor blood pressure at home and call if consistently greater than 130/80 or less than 110/50.  2.  Paroxysmal atrial tachycardia.  Short bursts on Holter monitor in June 2019.  She denies palpitations on current metoprolol dose.  3.  PVCs 5% of the time.  Continue beta-blocker  4.  History of TIA  5.  Hyperlipidemia.  On statin therapy with rosuvastatin.  6.  Diabetes.  Per Dr. Muse.  7.  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  Unchanged on EKG today  8.  Allergic reaction to Zio patch.  9.  Depression and anxiety.  Follow-up with Dr. Muse.  10.  Coronary artery calcification.  CTA shows coronary artery calcification in the LAD and the aortic arch.  She currently denies anginal symptoms and is on aspirin and statin.  Will continue current regimen      Time Spent: I spent 30 minutes caring for Alexandria on this date of service. This time includes time spent by me in the following activities: preparing for the visit, reviewing tests, performing a medically appropriate examination and/or evaluations, counseling and educating the patient/family/caregiver, ordering medications, tests, or procedures, documenting information in the medical record, and  independently interpreting results and communicating that information with the patient/family/caregiver.   I spent 1 minutes on the separately reported service of ECG. This time is not included in the time used to support the E/M service also reported today.        Your medication list            Accurate as of July 8, 2025  1:11 PM. If you have any questions, ask your nurse or doctor.                CONTINUE taking these medications        Instructions Last Dose Given Next Dose Due   acetaminophen 500 MG tablet  Commonly known as: TYLENOL      Take 2 tablets by mouth Every 6 (Six) Hours As Needed for Mild Pain.       albuterol sulfate  (90 Base) MCG/ACT inhaler  Commonly known as: PROVENTIL HFA;VENTOLIN HFA;PROAIR HFA      INHALE 2 PUFFS BY MOUTH INTO THE LUNGS EVERY 4 HOURS AS NEEDED FOR WHEEZING       ALPRAZolam 0.25 MG tablet  Commonly known as: Xanax      Take 1 tablet by mouth Take As Directed. 1/2 to 1 tab daily PO PRN anxiety       Alum Hydroxide-Mag Trisilicate 80-14.2 MG chewable tablet  Commonly known as: Gaviscon      Chew 1 tablet 4 (Four) Times a Day As Needed (Heartburn).       aspirin 81 MG tablet      Take 1 tablet by mouth Daily.       Biotin 10 MG tablet      HOLD PRIOR TO SURG       Cholecalciferol 50 MCG (2000 UT) capsule      HOLD PRIOR TO SURG       desvenlafaxine 100 MG 24 hr tablet  Commonly known as: PRISTIQ      Take 1 tablet by mouth Daily.       fenofibrate 145 MG tablet  Commonly known as: TRICOR      TAKE 1 TABLET BY MOUTH EVERY DAY       fish oil 1200 MG capsule capsule      HOLD PRIOR TO SURG       fluticasone 50 MCG/ACT nasal spray  Commonly known as: FLONASE      2 sprays into the nostril(s) as directed by provider Daily for 30 days.       gabapentin 100 MG capsule  Commonly known as: Neurontin      Take 1 capsule by mouth 3 (Three) Times a Day.       metFORMIN  MG 24 hr tablet  Commonly known as: GLUCOPHAGE-XR      TAKE 1 TABLET BY MOUTH DAILY WITH LARGEST MEAL OF THE  DAY AND A GLASS OF WATER       metoprolol tartrate 25 MG tablet  Commonly known as: LOPRESSOR      TAKE 1 TABLET BY MOUTH TWICE DAILY       omeprazole 40 MG capsule  Commonly known as: priLOSEC      TAKE 1 CAPSULE BY MOUTH DAILY       rosuvastatin 10 MG tablet  Commonly known as: CRESTOR      TAKE 1 TABLET BY MOUTH EVERY NIGHT       spironolactone 25 MG tablet  Commonly known as: ALDACTONE      TAKE 1 TABLET BY MOUTH DAILY       traZODone 50 MG tablet  Commonly known as: DESYREL      Take 0.5-1 tablets by mouth At Night As Needed for Sleep.       valACYclovir 500 MG tablet  Commonly known as: VALTREX      Take 2 tablets by mouth 2 (Two) Times a Day.       valsartan 40 MG tablet  Commonly known as: DIOVAN      Take 1 tablet by mouth Daily.                Patient is no longer taking -.  I corrected the med list to reflect this.  I did not stop these medications.    Return in about 1 year (around 7/8/2026) for With Dr. Lynch.      Dictated utilizing Dragon dictation

## 2025-07-08 ENCOUNTER — OFFICE VISIT (OUTPATIENT)
Dept: CARDIOLOGY | Facility: CLINIC | Age: 80
End: 2025-07-08
Payer: MEDICARE

## 2025-07-08 VITALS
HEIGHT: 66 IN | OXYGEN SATURATION: 98 % | BODY MASS INDEX: 22.02 KG/M2 | HEART RATE: 69 BPM | DIASTOLIC BLOOD PRESSURE: 74 MMHG | SYSTOLIC BLOOD PRESSURE: 124 MMHG | WEIGHT: 137 LBS | RESPIRATION RATE: 16 BRPM

## 2025-07-08 DIAGNOSIS — E11.9 TYPE 2 DIABETES MELLITUS WITHOUT COMPLICATION, WITHOUT LONG-TERM CURRENT USE OF INSULIN: ICD-10-CM

## 2025-07-08 DIAGNOSIS — I45.10 RBBB: ICD-10-CM

## 2025-07-08 DIAGNOSIS — I49.3 PVCS (PREMATURE VENTRICULAR CONTRACTIONS): ICD-10-CM

## 2025-07-08 DIAGNOSIS — I47.19 PAT (PAROXYSMAL ATRIAL TACHYCARDIA): ICD-10-CM

## 2025-07-08 DIAGNOSIS — I10 PRIMARY HYPERTENSION: Primary | ICD-10-CM

## 2025-07-08 DIAGNOSIS — E78.2 MIXED HYPERLIPIDEMIA: ICD-10-CM

## 2025-07-08 DIAGNOSIS — I25.10 CORONARY ARTERY CALCIFICATION: ICD-10-CM

## 2025-07-09 ENCOUNTER — TELEPHONE (OUTPATIENT)
Dept: RADIATION ONCOLOGY | Facility: HOSPITAL | Age: 80
End: 2025-07-09
Payer: MEDICARE

## 2025-07-10 ENCOUNTER — OFFICE VISIT (OUTPATIENT)
Dept: RADIATION ONCOLOGY | Facility: HOSPITAL | Age: 80
End: 2025-07-10
Payer: MEDICARE

## 2025-07-10 VITALS
BODY MASS INDEX: 22.21 KG/M2 | DIASTOLIC BLOOD PRESSURE: 70 MMHG | SYSTOLIC BLOOD PRESSURE: 112 MMHG | WEIGHT: 137.6 LBS | HEART RATE: 62 BPM | RESPIRATION RATE: 16 BRPM | OXYGEN SATURATION: 95 %

## 2025-07-10 DIAGNOSIS — D05.11 DUCTAL CARCINOMA IN SITU (DCIS) OF RIGHT BREAST: Primary | ICD-10-CM

## 2025-07-10 PROCEDURE — G0463 HOSPITAL OUTPT CLINIC VISIT: HCPCS | Performed by: STUDENT IN AN ORGANIZED HEALTH CARE EDUCATION/TRAINING PROGRAM

## 2025-07-10 NOTE — PROGRESS NOTES
[facility]   Follow Up    Chief Complaint  DCIS of the Right Breast        Diagnosis: DCIS of the Right Breast     Overall Stage 0     pTis: per Lumpectomy Pathology  cN0: per MRI Breast and Physical Exam  cM0: per Physical Exam        Radiation Completion Date: 2/6/2024        Prescription:      Sequential     Site: Right Breast  Laterality: Right  Total Dose: 4240cGy  Dose per Fraction: 265cGy  Total Fractions: 16  Daily or BID: Daily  Modality: Photon  Technique: 3DCRT  Bolus: No         THEN        2.   Site: Lumpectomy Boost  Laterality: Right  Total Dose: 1000cGy  Dose per Fraction: 250cGy  Total Fractions: 4  Daily or BID: Daily  Modality: Photon  Technique: 3DCRT  Bolus: No        Interval History:    Alexandria Yousif presents for regularly scheduled follow-up approximately 18 months after completion of radiation therapy for DCIS of the right breast.  She continues to follow with psychiatry, cardiology, and has follow-up with Dr. Saunders later this year.  She has no new or concerning complaints.  She is struggling somewhat psychologically due to her 's worsening illness and failing health.      Imaging:      Mammogram 9/4/2024    IMPRESSION/RECOMMENDATION(S):  No mammographic evidence of malignancy. Recommend annual screening  mammogram in one year.      Pathology:      No new relevant pathology       Labs:    Lab Results   Component Value Date    CREATININE 0.63 06/23/2025               Problem List:  Patient Active Problem List   Diagnosis    Hypercalcemia    Mixed hyperlipidemia    Abnormal brain MRI    Vertigo    Anxiety disorder due to general medical condition    Depression    Gastroesophageal reflux disease    Impaired fasting glucose    Insomnia    Numbness of lower extremity    Osteopenia    PVCs (premature ventricular contractions)    PSVT (paroxysmal supraventricular tachycardia)    PAT (paroxysmal atrial tachycardia)    Polycythemia    Pulmonary nodule, right     Hyperalbuminemia    Type 2 diabetes mellitus without complication, without long-term current use of insulin    Coronary artery calcification    RBBB    Spinal stenosis of lumbar region without neurogenic claudication    Malignant neoplasm of female breast    Hot flashes    Anxiety    Generalized anxiety disorder    Cervical radiculitis    Primary hypertension          Medications:  Current Outpatient Medications on File Prior to Visit   Medication Sig Dispense Refill    acetaminophen (TYLENOL) 500 MG tablet Take 2 tablets by mouth Every 6 (Six) Hours As Needed for Mild Pain.      albuterol sulfate  (90 Base) MCG/ACT inhaler INHALE 2 PUFFS BY MOUTH INTO THE LUNGS EVERY 4 HOURS AS NEEDED FOR WHEEZING 8.5 g 0    ALPRAZolam (Xanax) 0.25 MG tablet Take 1 tablet by mouth Take As Directed. 1/2 to 1 tab daily PO PRN anxiety 30 tablet 0    Alum Hydroxide-Mag Trisilicate (GAVISCON) 80-14.2 MG chewable tablet Chew 1 tablet 4 (Four) Times a Day As Needed (Heartburn). 224 each 11    aspirin 81 MG tablet Take 1 tablet by mouth Daily.      Biotin 10 MG tablet HOLD PRIOR TO SURG      Cholecalciferol 50 MCG (2000 UT) capsule HOLD PRIOR TO SURG      desvenlafaxine (PRISTIQ) 100 MG 24 hr tablet Take 1 tablet by mouth Daily. 90 tablet 0    fenofibrate (TRICOR) 145 MG tablet TAKE 1 TABLET BY MOUTH EVERY DAY 90 tablet 1    fluticasone (FLONASE) 50 MCG/ACT nasal spray 2 sprays into the nostril(s) as directed by provider Daily for 30 days. 16 g 0    gabapentin (Neurontin) 100 MG capsule Take 1 capsule by mouth 3 (Three) Times a Day. 90 capsule 2    metFORMIN ER (GLUCOPHAGE-XR) 500 MG 24 hr tablet TAKE 1 TABLET BY MOUTH DAILY WITH LARGEST MEAL OF THE DAY AND A GLASS OF WATER 90 tablet 01    metoprolol tartrate (LOPRESSOR) 25 MG tablet TAKE 1 TABLET BY MOUTH TWICE DAILY 180 tablet 1    Omega-3 Fatty Acids (FISH OIL) 1200 MG capsule capsule HOLD PRIOR TO SURG      omeprazole (priLOSEC) 40 MG capsule TAKE 1 CAPSULE BY MOUTH DAILY 90  "capsule 1    rosuvastatin (CRESTOR) 10 MG tablet TAKE 1 TABLET BY MOUTH EVERY NIGHT 90 tablet 1    spironolactone (ALDACTONE) 25 MG tablet TAKE 1 TABLET BY MOUTH DAILY 90 tablet 1    traZODone (DESYREL) 50 MG tablet Take 0.5-1 tablets by mouth At Night As Needed for Sleep. 90 tablet 0    valACYclovir (VALTREX) 500 MG tablet Take 2 tablets by mouth 2 (Two) Times a Day. 4 tablet 3    valsartan (DIOVAN) 40 MG tablet Take 1 tablet by mouth Daily. 90 tablet 3     Current Facility-Administered Medications on File Prior to Visit   Medication Dose Route Frequency Provider Last Rate Last Admin    lidocaine (XYLOCAINE) 1 % injection 3 mL  3 mL Intradermal Once Dee Saunders MD              Allergies:  Allergies   Allergen Reactions    Betadine [Povidone Iodine] Rash     Burning/ stinging feeling on her rash    Codeine Nausea Only     BLURRED VISION, RASH       Vital Signs:  /70   Pulse 62   Resp 16   Wt 62.4 kg (137 lb 9.6 oz)   SpO2 95%   BMI 22.21 kg/m²   Estimated body mass index is 22.21 kg/m² as calculated from the following:    Height as of 7/8/25: 167.6 cm (66\").    Weight as of this encounter: 62.4 kg (137 lb 9.6 oz).  Pain Score    07/10/25 1146   PainSc: 2    PainLoc: Arm  Comment: soreness right armpit         ECOG: Restricted in physically strenuous activity but ambulatory and able to carry out work of a light or sedentary nature, e.g., light house work, office work = 1    Physical Exam  Vitals reviewed.   Constitutional:       General: She is not in acute distress.     Appearance: Normal appearance.   HENT:      Head: Normocephalic and atraumatic.   Eyes:      Extraocular Movements: Extraocular movements intact.      Pupils: Pupils are equal, round, and reactive to light.   Pulmonary:      Effort: Pulmonary effort is normal.   Abdominal:      General: Abdomen is flat.      Palpations: Abdomen is soft.   Musculoskeletal:      Cervical back: Normal range of motion.   Skin:     General: Skin is warm " and dry.   Neurological:      General: No focal deficit present.      Mental Status: She is alert and oriented to person, place, and time.   Psychiatric:         Mood and Affect: Mood normal.         Behavior: Behavior normal.        Left Breast: Normal breast exam, no concerning nodules in the breast or axilla.  No nipple discharge or inversion  Right Breast: Well-healed lumpectomy scar.  No concerning nodules in the breast or axilla.  No nipple discharge or inversion.      Result Review :  The following data was reviewed by: Rashi Hood MD on 07/10/2025:  Labs: Last Creatinine   Data reviewed : Radiologic studies Mammogram            Diagnoses and all orders for this visit:    1. Ductal carcinoma in situ (DCIS) of right breast (Primary)        Assessment:    Alexandria Yousif presents for regularly scheduled follow-up approximately 18 months after completion of radiation therapy for DCIS of the right breast.  She continues to follow with psychiatry, cardiology, and has follow-up with Dr. Saunders later this year.  She has no new or concerning complaints.  She is struggling somewhat psychologically due to her 's worsening illness and failing health.    I met with the patient and reviewed her interval history in detail.  She had benign mammogram last September and is already scheduled for her next 1.  She continues to follow with Dr. Saunders.  She has no new or concerning findings on her breast exam.  I offered her follow-up with me in 1 year versus as needed.  The patient prefers as needed.      Plan:    -Follow-up as needed       I spent 20 minutes caring for Alexandria on this date of service. This time includes time spent by me in the following activities:preparing for the visit, reviewing tests, obtaining and/or reviewing a separately obtained history, documenting information in the medical record, independently interpreting results and communicating that information with the patient/family/caregiver, and  care coordination  Follow Up   No follow-ups on file.  Patient was given instructions and counseling regarding her condition or for health maintenance advice. Please see specific information pulled into the AVS if appropriate.     Rasih Hood MD

## 2025-07-15 ENCOUNTER — HOSPITAL ENCOUNTER (OUTPATIENT)
Dept: SPEECH THERAPY | Facility: HOSPITAL | Age: 80
Setting detail: THERAPIES SERIES
End: 2025-07-15
Payer: MEDICARE

## 2025-07-25 ENCOUNTER — HOSPITAL ENCOUNTER (OUTPATIENT)
Dept: SPEECH THERAPY | Facility: HOSPITAL | Age: 80
Setting detail: THERAPIES SERIES
Discharge: HOME | End: 2025-07-25
Payer: MEDICARE

## 2025-07-25 DIAGNOSIS — R41.841 COGNITIVE COMMUNICATION DEFICIT: Primary | ICD-10-CM

## 2025-07-25 PROCEDURE — 96125 COGNITIVE TEST BY HC PRO: CPT | Performed by: SPEECH-LANGUAGE PATHOLOGIST

## 2025-07-25 NOTE — THERAPY EVALUATION
Outpatient Speech Language Pathology   Adult Speech Language Cognitive Initial Evaluation  [facility]     Patient Name: Alexandria Yousif  : 1945  MRN: [id number]  Today's Date: 2025        Visit Date: 2025   Patient Active Problem List   Diagnosis    Hypercalcemia    Mixed hyperlipidemia    Abnormal brain MRI    Vertigo    Anxiety disorder due to general medical condition    Depression    Gastroesophageal reflux disease    Impaired fasting glucose    Insomnia    Numbness of lower extremity    Osteopenia    PVCs (premature ventricular contractions)    PSVT (paroxysmal supraventricular tachycardia)    PAT (paroxysmal atrial tachycardia)    Polycythemia    Pulmonary nodule, right    Hyperalbuminemia    Type 2 diabetes mellitus without complication, without long-term current use of insulin    Coronary artery calcification    RBBB    Spinal stenosis of lumbar region without neurogenic claudication    Malignant neoplasm of female breast    Hot flashes    Anxiety    Generalized anxiety disorder    Cervical radiculitis    Primary hypertension        Past Medical History:   Diagnosis Date    Abnormal ECG     DR BLAND    Allergic 1980    Anxiety and depression     Arrhythmia 2019    Arthritis of back ?    Arthritis of neck ?    Atrial fibrillation 2019?    Basal cell carcinoma 2008    Scalp and face    Cervical herniated disc     CHF (congestive heart failure) 2019    Claustrophobia 1990s    Cluster headache     Colon polyp 2020    Coronary artery disease 2019    CVA (cerebral vascular accident) 2018    Mild to moderat ischemia    GERD (gastroesophageal reflux disease)     Grade II diastolic dysfunction     Headache     Headache, tension-type     2016    Heart murmur     Heart valve disease 2019    HL (hearing loss) 2019    Hypercalcemia 2016    mild at best when corrected for ongoing abnormal albumin levels with normal albumin being up to 4.7 g/dL though that is an issue with reference range  here    Hyperlipidemia     Hypertension     Injury of neck 1971    Hernieated disc when broke my back    Low back pain 1971    Broken back    Lumbosacral disc disease 1971    Multiple fractures/herniated disc/accident    LVH (left ventricular hypertrophy) 2019    Malignant neoplasm of female breast 10/23/2023    Memory loss     Neck strain 1971    Neuropathy in diabetes     OA (osteoarthritis)     Osteopenia     PAT (paroxysmal atrial tachycardia)     12 very brief runs of atrial tachycardia with the longest 7 beats in duration per 24-hour Holter monitor in June 2019    Pneumonia 08/2023 8/2023    Postoperative wound infection     7/2023 FROM FOOT INJURY    PSVT (paroxysmal supraventricular tachycardia)     5 episodes of PSVT the longest 11 beats in duration per Zio patch monitor in December 2019    PVCs (premature ventricular contractions)     5% burden per Zio patch monitor in December 2019    Rheumatic fever     Age 17    Rotator cuff syndrome 1990s    Shortness of breath     WITH EXERTION    Thoracic disc disorder 1971    TIA (transient ischemic attack)     Tinnitus     Trigeminal neuralgia     Type II diabetes mellitus 2019    Began Metformin    Urinary tract infection 5-2024    UTI or stone    Vertigo     Vision loss     Age related        Past Surgical History:   Procedure Laterality Date    BREAST BIOPSY  10/16/2023    At     BREAST LUMPECTOMY Right 11/16/2023    Procedure: right breast wire localized lumpectomy;  Surgeon: Dee Saunders MD;  Location: [facility] OR [facility];  Service: General;  Laterality: Right;    BREAST SURGERY  11-16-23    Lumpectomy & radiation    BRONCHOSCOPY N/A 03/21/2023    Procedure: BRONCHOSCOPY WITH BX;  Surgeon: Mae Barry MD;  Location: [facility] ENDOSCOPY;  Service: Pulmonary;  Laterality: N/A;  PRE/POST-ABNORMAL IMAGING     CEREBRAL ANGIOGRAM N/A 10/08/2018    Procedure: CEREBRAL ANGIOGRAM AND VENOGRAM WITH INTRASINUS PRESSURE RECORDING;  Surgeon: Alfredo Caruso MD;   "Location: [facility] HYBRID OR 18/19;  Service: Neurosurgery    COLONOSCOPY      2010    COLONOSCOPY N/A 02/17/2020    Procedure: COLONOSCOPY TO CECUM WITH COLD BIOPSY POLYPECTOMY;  Surgeon: Stan Flood MD;  Location: [facility] ENDOSCOPY;  Service: Gastroenterology;  Laterality: N/A;  PRE- FAMILY HX COLON POLYPS  POST- POLYP, HEMORRHOIDS    ENDOSCOPY  02/27/2017    Esophagitis, gastritis, small hh    HYSTERECTOMY      KNEE SURGERY Right     MOHS SURGERY      OOPHORECTOMY      TONSILLECTOMY AND ADENOIDECTOMY  1965    TRIGGER POINT INJECTION  1990s    Spinal    UPPER GASTROINTESTINAL ENDOSCOPY  2016    \"Extremely severe GERD\"         Visit Dx:    ICD-10-CM ICD-9-CM   1. Cognitive communication deficit  R41.841 799.52            OP SLP Assessment/Plan - 07/25/25 1628          SLP Assessment    Functional Problems Speech Language- Adult/Cognition  -KA    Impact on Function: Adult Speech Language/Cognition Difficulty participating in avocational activities  -KA    Clinical Impression: Speech Language-Adult/Congnition Minimal:;Cognitive Communication Impairment  -KA    Please refer to paper survey for additional self-reported information Yes  -KA    Please refer to items scanned into chart for additional diagnostic informaiton and handouts as provided by clinician Yes  -KA    Prognosis Good (comment)  -KA    Patient/caregiver participated in establishment of treatment plan and goals Yes  -KA    Patient would benefit from skilled therapy intervention Yes  -KA       SLP Plan    Frequency x1 a week  -KA    Duration 3-6 weeks  -KA    Planned CPT's? SLP DEV COG SKILLS INITIAL (15 MIN) : 33982;SLP DEV COG SKILLS ADD (15 MIN) : 50067  -KA    Expected Duration of Therapy Session (SLP Eval) 45  -KA              User Key  (r) = Recorded By, (t) = Taken By, (c) = Cosigned By      Initials Name Provider Type    Jason Weaver SLP Speech and Language Pathologist                     SLP SLC Evaluation - 07/25/25 1600          " Communication Assessment/Intervention    Document Type evaluation  -KA    Subjective Information no complaints  -KA    Patient Observations alert;cooperative  -KA    Patient Effort good  -KA    Symptoms Noted During/After Treatment none  -KA       General Information    Patient Profile Reviewed yes  -KA    Pertinent History Of Current Problem Patient referred for cognitive changes. Patient has recent history of treatment for breast cancer and completed radiation therapy a year ago. Patient reports she has history of TIAs. She also reports recent life stressors including husbands health requiring her to do additional home management tasks at home. She had recent office visit with Dr Monique who has referred her to Neuropshychological testing which pt reports is scheduled in August. Pt reports difficulty with writing, word finding and memory difficulty.,  -KA    Precautions/Limitations, Vision WFL  -KA    Precautions/Limitations, Hearing WFL  -KA    Prior Level of Function-Communication WFL  -KA    Plans/Goals Discussed with patient  -KA    Barriers to Rehab none identified  -KA    Patient's Goals for Discharge functional cognition  -KA    Standardized Assessment Used CLQT  -KA       Pain    Pretreatment Pain Rating 0/10 - no pain  -KA    Posttreatment Pain Rating 0/10 - no pain  -KA       Standardized Tests    Cognitive/Memory Tests CLQT: Cognitive Linguistic Quick Test  -KA       CLQT (The Cognitive Linguistic Quick Test)    Attention Domain Score 181  -KA    Attention Severity Rating 4: WNL  -KA    Memory Domain Score 166  -KA    Memory Severity Rating 4: WNL  -KA    Executive Function Domain Score 28  -KA    Executive Function Severity Rating 4: WNL  -KA    Language Domain Score 33  -KA    Language Severity Rating 4: WNL  -KA    Visuospatial Domain Score 84  -KA    Visuospatial Severity Rating 4: WNL  -KA    Clock Drawing Total Score 11  -KA    Clock Drawing Severity Rating WNL  -KA    Composite Severity Rating 4.0   -KA    Composite Severity Rating Range 4.0 - 3.5: WNL  -KA    CLQT Comments Only subtest pt scored below the criterion cut off in  is symbol trials indicating mild difficulty with divided attention. In clock drawing task pt unable to set long hand to correct time. Recommend short term therapy to educate patient on functional cognitive stimulating tasks to promote cognitive health and education on strategies to assist with management of home tasks and activities.  -KA       SLP Evaluation Clinical Impressions    SLP Diagnosis --   minimal cognitive communication impairment -KA    Rehab Potential/Prognosis good  -KA    SLC Criteria for Skilled Therapy Interventions Met yes  -KA    Functional Impact difficulty completing home management task  -KA       Recommendations    Therapy Frequency (SLP SLC) 1 day per week  -KA    Predicted Duration Therapy Intervention (Days) until discharge  -KA    Anticipated Discharge Disposition (SLP) home  -KA              User Key  (r) = Recorded By, (t) = Taken By, (c) = Cosigned By      Initials Name Provider Type    Jason Weaver SLP Speech and Language Pathologist                                    OP SLP Education       Row Name 07/25/25 1628       Education    Barriers to Learning No barriers identified  -KA    Education Provided Described results of evaluation;Patient expressed understanding of evaluation  -KA    Assessed Learning needs;Learning motivation  -KA    Learning Motivation Strong  -KA    Learning Method Explanation  -KA    Teaching Response Verbalized understanding  -KA              User Key  (r) = Recorded By, (t) = Taken By, (c) = Cosigned By      Initials Name Effective Dates    Jason Weaver SLP 01/05/24 -                    SLP OP Goals       Row Name 07/25/25 1600          Goal Type Needed    Goal Type Needed Memory;Attention/Orientation  -KA        Memory Goals    Memory LTG's Patient will be able to remember information needed to participate in  avocational activities  -KA     Status: Patient will be able to remember information needed to participate in avocational activities New  -KA     Memory STG's Patient will demonstrate improved ability to recall information by listening to paragraph and answering yes/no questions;Patient’s memory skills will be enhanced as reported by patient by using external memory aides  -KA     Patient’s memory skills will be enhanced as reported by patient by using external memory aides 90%:;without cues  -KA     Status: Patient’s memory skills will be enhanced as reported by patient by using external memory aides New  -KA     Patient will demonstrate improved ability to recall information by listening to paragraph and answering yes/no questions 90%:;without cues  -KA     Status: Patient will demonstrate improved ability to recall information by listening to paragraph and answering yes/no questions New  -KA        Attention/Orientation Goals    Attention/Orientation LTG's Patient will be able to execute all activities necessary to manage a home  -KA     Patient will be able to execute all activities necessary to manage a home Independently  -KA     Status: Patient will be able to execute all activities necessary to manage a home New  -KA     Attention/Orientation STG's Patient will improve attention skills by sustaining focus and participation to conversation/task  -KA     Patient will improve attention skills by sustaining focus and participation to conversation/task 5 minute task  -KA     Status: Patient will improve attention skills by sustaining focus and participation to conversation/task New  -KA               User Key  (r) = Recorded By, (t) = Taken By, (c) = Cosigned By      Initials Name Provider Type    Jason Weaver SLP Speech and Language Pathologist                    12:45 to 1:00  15:45 to 16:30        Time Calculation:   SLP Start Time: 1330  SLP Stop Time: 1415  SLP Time Calculation (min): 45 min  Timed  Charges  96125-Standardized cognitive performance testin  Total Minutes  Timed Charges Total Minutes: 105   Total Minutes: 105    Therapy Charges for Today       Code Description Service Date Service Provider Modifiers Qty    [id number]  ST STD COG PERF TEST PER HOUR 2025 Jason Knox, SLP GN 2                     Jason Knox, MISSY  2025

## 2025-07-28 ENCOUNTER — PATIENT MESSAGE (OUTPATIENT)
Dept: FAMILY MEDICINE CLINIC | Facility: CLINIC | Age: 80
End: 2025-07-28
Payer: MEDICARE

## 2025-07-28 ENCOUNTER — TRANSCRIBE ORDERS (OUTPATIENT)
Dept: ADMINISTRATIVE | Facility: HOSPITAL | Age: 80
End: 2025-07-28
Payer: MEDICARE

## 2025-07-28 DIAGNOSIS — K21.9 GASTROESOPHAGEAL REFLUX DISEASE, UNSPECIFIED WHETHER ESOPHAGITIS PRESENT: Primary | ICD-10-CM

## 2025-07-28 DIAGNOSIS — Z12.11 COLON CANCER SCREENING: ICD-10-CM

## 2025-07-28 DIAGNOSIS — Z12.31 BREAST CANCER SCREENING BY MAMMOGRAM: Primary | ICD-10-CM

## 2025-07-28 RX ORDER — METFORMIN HYDROCHLORIDE 500 MG/1
TABLET, EXTENDED RELEASE ORAL
Qty: 90 TABLET | Refills: 1 | Status: SHIPPED | OUTPATIENT
Start: 2025-07-28

## 2025-07-28 NOTE — TELEPHONE ENCOUNTER
Rx Refill Note  Requested Prescriptions     Pending Prescriptions Disp Refills    metFORMIN ER (GLUCOPHAGE-XR) 500 MG 24 hr tablet [Pharmacy Med Name: METFORMIN ER 500MG 24HR TABS] 90 tablet 01     Sig: TAKE 1 TABLET BY MOUTH DAILY WITH LARGEST MEAL OF THE DAY AND A GLASS OF WATER      Last office visit with prescribing clinician: Visit date not found   Last telemedicine visit with prescribing clinician: Visit date not found   Next office visit with prescribing clinician: Visit date not found                         Would you like a call back once the refill request has been completed: [] Yes [] No    If the office needs to give you a call back, can they leave a voicemail: [] Yes [] No    Aminata Batista MA  07/28/25, 10:42 EDT

## 2025-08-01 ENCOUNTER — HOSPITAL ENCOUNTER (OUTPATIENT)
Dept: SPEECH THERAPY | Facility: HOSPITAL | Age: 80
Setting detail: THERAPIES SERIES
Discharge: HOME | End: 2025-08-01
Payer: MEDICARE

## 2025-08-01 DIAGNOSIS — R41.841 COGNITIVE COMMUNICATION DEFICIT: Primary | ICD-10-CM

## 2025-08-01 PROCEDURE — 97130 THER IVNTJ EA ADDL 15 MIN: CPT | Performed by: SPEECH-LANGUAGE PATHOLOGIST

## 2025-08-01 PROCEDURE — 97129 THER IVNTJ 1ST 15 MIN: CPT | Performed by: SPEECH-LANGUAGE PATHOLOGIST

## 2025-08-01 NOTE — THERAPY TREATMENT NOTE
Outpatient Speech Language Pathology   Adult Speech Language Cognitive Treatment Note  [facility]     Patient Name: Alexandria Yousif  : 1945  MRN: [id number]  Today's Date: 2025         Visit Date: 2025   Patient Active Problem List   Diagnosis    Hypercalcemia    Mixed hyperlipidemia    Abnormal brain MRI    Vertigo    Anxiety disorder due to general medical condition    Depression    Gastroesophageal reflux disease    Impaired fasting glucose    Insomnia    Numbness of lower extremity    Osteopenia    PVCs (premature ventricular contractions)    PSVT (paroxysmal supraventricular tachycardia)    PAT (paroxysmal atrial tachycardia)    Polycythemia    Pulmonary nodule, right    Hyperalbuminemia    Type 2 diabetes mellitus without complication, without long-term current use of insulin    Coronary artery calcification    RBBB    Spinal stenosis of lumbar region without neurogenic claudication    Malignant neoplasm of female breast    Hot flashes    Anxiety    Generalized anxiety disorder    Cervical radiculitis    Primary hypertension          Visit Dx:    ICD-10-CM ICD-9-CM   1. Cognitive communication deficit  R41.841 799.52                              SLP OP Goals       Row Name 25 1500          Subjective Comments    Subjective Comments Patient is pleasant and cooperative  -KA        Memory Goals    Patient’s memory skills will be enhanced as reported by patient by using external memory aides 90%:;without cues  -KA     Status: Patient’s memory skills will be enhanced as reported by patient by using external memory aides New  -KA     Comments: Patient’s memory skills will be enhanced as reported by patient by using external memory aides Education completed on external  memory aids including use of calendars, journals, dry eraser boards and how can be applied. Patient does journal and likes this idea including writing daily notes and summarizing activities throughout the day or current  events.  -KA     Patient will demonstrate improved ability to recall information by listening to paragraph and answering yes/no questions 90%:;without cues  -KA     Status: Patient will demonstrate improved ability to recall information by listening to paragraph and answering yes/no questions New  -KA     Comments: Patient will demonstrate improved ability to recall information by listening to paragraph and answering yes/no questions Education completed on internal strategies including use of visualization and repetition and how can be applied. Education also completed on cognitive stimulating tasks with handouts provided.  -KA               User Key  (r) = Recorded By, (t) = Taken By, (c) = Cosigned By      Initials Name Provider Type    Jason Weaver SLP Speech and Language Pathologist                           Time Calculation:   SLP Start Time: 1330  SLP Stop Time: 1415  SLP Time Calculation (min): 45 min  Timed Charges  40626-RJ Dev of Cogn Skills Initial Minutes: 15  52169-FX Dev of Cogn Skills Add Minutes: 30  Total Minutes  Timed Charges Total Minutes: 45   Total Minutes: 45    Therapy Charges for Today       Code Description Service Date Service Provider Modifiers Qty    84488129141 HC ST DEV OF COGN SKILLS INITIAL 15 MIN 8/1/2025 Jason Knox, MISSY  1    [id number] HC ST DEV OF COGN SKILLS EACH ADDT'L 15 MIN 8/1/2025 Jason Knox, MISSY  2                     MISSY Lopez  8/1/2025

## 2025-08-11 RX ORDER — FENOFIBRATE 145 MG/1
145 TABLET, FILM COATED ORAL DAILY
Qty: 90 TABLET | Refills: 1 | Status: SHIPPED | OUTPATIENT
Start: 2025-08-11

## 2025-08-11 RX ORDER — ROSUVASTATIN CALCIUM 10 MG/1
10 TABLET, COATED ORAL NIGHTLY
Qty: 90 TABLET | Refills: 1 | Status: SHIPPED | OUTPATIENT
Start: 2025-08-11

## 2025-08-18 RX ORDER — TRAZODONE HYDROCHLORIDE 50 MG/1
25-50 TABLET ORAL NIGHTLY PRN
Qty: 90 TABLET | Refills: 0 | Status: SHIPPED | OUTPATIENT
Start: 2025-08-18

## 2025-08-19 ENCOUNTER — HOSPITAL ENCOUNTER (OUTPATIENT)
Dept: MRI IMAGING | Facility: HOSPITAL | Age: 80
Discharge: HOME | End: 2025-08-19
Admitting: PSYCHIATRY & NEUROLOGY
Payer: MEDICARE

## 2025-08-19 DIAGNOSIS — R68.89 FORGETFULNESS: ICD-10-CM

## 2025-08-19 PROCEDURE — 70553 MRI BRAIN STEM W/O & W/DYE: CPT

## 2025-08-19 PROCEDURE — 82565 ASSAY OF CREATININE: CPT

## 2025-08-19 PROCEDURE — A9577 INJ MULTIHANCE: HCPCS | Performed by: PSYCHIATRY & NEUROLOGY

## 2025-08-19 PROCEDURE — 25510000002 GADOBENATE DIMEGLUMINE 529 MG/ML SOLUTION: Performed by: PSYCHIATRY & NEUROLOGY

## 2025-08-19 RX ADMIN — GADOBENATE DIMEGLUMINE 12 ML: 529 INJECTION, SOLUTION INTRAVENOUS at 15:35

## 2025-08-20 DIAGNOSIS — F41.1 GENERALIZED ANXIETY DISORDER: ICD-10-CM

## 2025-08-20 LAB — CREAT BLDA-MCNC: 0.7 MG/DL (ref 0.6–1.3)

## 2025-08-20 RX ORDER — GABAPENTIN 100 MG/1
100 CAPSULE ORAL 3 TIMES DAILY
Qty: 90 CAPSULE | Refills: 2 | Status: SHIPPED | OUTPATIENT
Start: 2025-08-20 | End: 2026-08-20

## 2025-08-20 RX ORDER — ALPRAZOLAM 0.25 MG
0.25 TABLET ORAL TAKE AS DIRECTED
Qty: 30 TABLET | Refills: 0 | Status: SHIPPED | OUTPATIENT
Start: 2025-08-20 | End: 2026-08-20

## 2025-08-21 ENCOUNTER — HOSPITAL ENCOUNTER (OUTPATIENT)
Dept: SPEECH THERAPY | Facility: HOSPITAL | Age: 80
Setting detail: THERAPIES SERIES
Discharge: HOME | End: 2025-08-21
Payer: MEDICARE

## 2025-08-21 DIAGNOSIS — R41.841 COGNITIVE COMMUNICATION DEFICIT: Primary | ICD-10-CM

## 2025-08-21 PROCEDURE — 97129 THER IVNTJ 1ST 15 MIN: CPT | Performed by: SPEECH-LANGUAGE PATHOLOGIST

## 2025-08-21 PROCEDURE — 97130 THER IVNTJ EA ADDL 15 MIN: CPT | Performed by: SPEECH-LANGUAGE PATHOLOGIST

## (undated) DEVICE — ADAPT CLN BIOGUARD AIR/H2O DISP

## (undated) DEVICE — CANN O2 ETCO2 FITS ALL CONN CO2 SMPL A/ 7IN DISP LF

## (undated) DEVICE — Device

## (undated) DEVICE — GLV SURG SENSICARE MICRO PF LF 8 STRL

## (undated) DEVICE — SUT MNCRYL PLS ANTIB UD 4/0 PS2 18IN

## (undated) DEVICE — GLV SURG BIOGEL LTX PF 6 1/2

## (undated) DEVICE — NDL HYPO ECLPS SFTY 22G 1 1/2IN

## (undated) DEVICE — 1 TIP PRE-SHAPED MICROCATHETER: Brand: EXCELSIOR XT-27 FLEX

## (undated) DEVICE — THE TORRENT IRRIGATION SCOPE CONNECTOR IS USED WITH THE TORRENT IRRIGATION TUBING TO PROVIDE IRRIGATION FLUIDS SUCH AS STERILE WATER DURING GASTROINTESTINAL ENDOSCOPIC PROCEDURES WHEN USED IN CONJUNCTION WITH AN IRRIGATION PUMP (OR ELECTROSURGICAL UNIT).: Brand: TORRENT

## (undated) DEVICE — COPILOT BLEEDBACK CONTROL VALVE: Brand: COPILOT

## (undated) DEVICE — SHLD ANGIO 2LAYR CIR FEN

## (undated) DEVICE — STPCK 3WY HP ROT

## (undated) DEVICE — TRAP,MUCUS SPECIMEN, 80CC: Brand: MEDLINE

## (undated) DEVICE — APPL CHLORAPREP HI/LITE 26ML ORNG

## (undated) DEVICE — SPNG GZ WOVN 4X4IN 12PLY 10/BX STRL

## (undated) DEVICE — ENCORE® LATEX ORTHO SIZE 8.5, STERILE LATEX POWDER-FREE SURGICAL GLOVE: Brand: ENCORE

## (undated) DEVICE — SINGLE-USE BIOPSY FORCEPS: Brand: RADIAL JAW 4

## (undated) DEVICE — LN SMPL CO2 SHTRM SD STREAM W/M LUER

## (undated) DEVICE — GLV SURG BIOGEL LTX PF 7 1/2

## (undated) DEVICE — Device: Brand: D-STAT® DRY SILVER CLEAR TOPICAL HEMOSTAT

## (undated) DEVICE — TRAP FLD MINIVAC MEGADYNE 100ML

## (undated) DEVICE — CVR PROB 96IN LF STRL

## (undated) DEVICE — MSK AIRWY LARYNG LMA PILOT SZ4

## (undated) DEVICE — ADHS SKIN SURG TISS VISC PREMIERPRO EXOFIN HI/VISC FAST/DRY

## (undated) DEVICE — SINGLE USE BIOPSY VALVE MAJ-210: Brand: SINGLE USE BIOPSY VALVE (STERILE)

## (undated) DEVICE — FRCP BX RADJAW4 PULM WO NDL STD1.8X2 100

## (undated) DEVICE — GW HYDRPHLC STD ANG EXCHG .035IN 260CM

## (undated) DEVICE — EXTENSION SET, MALE LUER LOCK ADAPTER WITH RETRACTABLE COLLAR

## (undated) DEVICE — PINNACLE INTRODUCER SHEATH: Brand: PINNACLE

## (undated) DEVICE — ELECTRD BLD EZ CLN MOD 2.5IN

## (undated) DEVICE — PK ANGIO CERBRL RAD 40

## (undated) DEVICE — GLV SURG SENSICARE PI LF PF 7.5 GRN STRL

## (undated) DEVICE — BASN GW RINGMASTER

## (undated) DEVICE — 0.2 MICRON INTRAVENOUS FILTER FOR 96 HOUR USE WITH MICRO-BORE EXTENSION TUBING AN LUER-LOCK ADAPTER: Brand: PALL POSIDYNE® ELD FILTER

## (undated) DEVICE — MYNXGRIP 6F/7F: Brand: MYNXGRIP

## (undated) DEVICE — SENSR O2 OXIMAX FNGR A/ 18IN NONSTR

## (undated) DEVICE — RADIFOCUS GLIDEWIRE: Brand: GLIDEWIRE

## (undated) DEVICE — TBG PENCL TELESCP MEGADYNE SMOKE EVAC 10FT

## (undated) DEVICE — DRSNG SURESITE WNDW 4X4.5

## (undated) DEVICE — SOL NS 500ML

## (undated) DEVICE — PATIENT RETURN ELECTRODE, SINGLE-USE, CONTACT QUALITY MONITORING, ADULT, WITH 9FT CORD, FOR PATIENTS WEIGING OVER 33LBS. (15KG): Brand: MEGADYNE

## (undated) DEVICE — ST IV PRI VENOSET NV W/CLAMP 72IN

## (undated) DEVICE — SUT VIC 3/0 SH 27IN J416H

## (undated) DEVICE — STANDARD GUIDEWIRE WITH HYDROPHILIC COATING: Brand: SYNCHRO 2

## (undated) DEVICE — KT ORCA ORCAPOD DISP STRL

## (undated) DEVICE — VITAL SIGNS™ JACKSON-REES CIRCUITS: Brand: VITAL SIGNS™

## (undated) DEVICE — SUT SILK 2/0 FS BLK 18IN 685G

## (undated) DEVICE — KT NEURO CUST

## (undated) DEVICE — TUBING, SUCTION, 1/4" X 10', STRAIGHT: Brand: MEDLINE

## (undated) DEVICE — SPNG LAP 18X18IN LF STRL PK/5

## (undated) DEVICE — ADAPT SWVL FIBROPTIC BRONCH

## (undated) DEVICE — SINGLE USE SUCTION VALVE MAJ-209: Brand: SINGLE USE SUCTION VALVE (STERILE)

## (undated) DEVICE — RADIFOCUS TORQUE DEVICE MULTI-TORQUE VISE: Brand: RADIFOCUS TORQUE DEVICE

## (undated) DEVICE — PINNACLE R/O II INTRODUCER SHEATH WITH RADIOPAQUE MARKER: Brand: PINNACLE

## (undated) DEVICE — PK PROC MINOR TOWER 40

## (undated) DEVICE — MYNXGRIP 5F: Brand: MYNXGRIP

## (undated) DEVICE — APPL CHLORAPREP W/TINT 26ML ORNG

## (undated) DEVICE — SOL NACL 0.9PCT 1000ML

## (undated) DEVICE — GOWN,PREVENTION PLUS,XXLARGE,STERILE: Brand: MEDLINE